# Patient Record
Sex: MALE | Race: WHITE | NOT HISPANIC OR LATINO | Employment: PART TIME | URBAN - METROPOLITAN AREA
[De-identification: names, ages, dates, MRNs, and addresses within clinical notes are randomized per-mention and may not be internally consistent; named-entity substitution may affect disease eponyms.]

---

## 2017-02-11 ENCOUNTER — APPOINTMENT (OUTPATIENT)
Dept: LAB | Age: 67
End: 2017-02-11
Payer: COMMERCIAL

## 2017-02-11 DIAGNOSIS — E78.1 PURE HYPERGLYCERIDEMIA: ICD-10-CM

## 2017-02-11 DIAGNOSIS — E78.5 HYPERLIPIDEMIA: ICD-10-CM

## 2017-02-11 DIAGNOSIS — I10 ESSENTIAL (PRIMARY) HYPERTENSION: ICD-10-CM

## 2017-02-11 LAB
ALBUMIN SERPL BCP-MCNC: 4 G/DL (ref 3.5–5)
ALP SERPL-CCNC: 81 U/L (ref 46–116)
ALT SERPL W P-5'-P-CCNC: 34 U/L (ref 12–78)
ANION GAP SERPL CALCULATED.3IONS-SCNC: 9 MMOL/L (ref 4–13)
AST SERPL W P-5'-P-CCNC: 22 U/L (ref 5–45)
BILIRUB SERPL-MCNC: 0.87 MG/DL (ref 0.2–1)
BUN SERPL-MCNC: 8 MG/DL (ref 5–25)
CALCIUM SERPL-MCNC: 9.1 MG/DL (ref 8.3–10.1)
CHLORIDE SERPL-SCNC: 101 MMOL/L (ref 100–108)
CHOLEST SERPL-MCNC: 221 MG/DL (ref 50–200)
CO2 SERPL-SCNC: 29 MMOL/L (ref 21–32)
CREAT SERPL-MCNC: 0.91 MG/DL (ref 0.6–1.3)
GFR SERPL CREATININE-BSD FRML MDRD: >60 ML/MIN/1.73SQ M
GLUCOSE SERPL-MCNC: 93 MG/DL (ref 65–140)
HDLC SERPL-MCNC: 42 MG/DL (ref 40–60)
LDLC SERPL CALC-MCNC: 121 MG/DL (ref 0–100)
POTASSIUM SERPL-SCNC: 3.9 MMOL/L (ref 3.5–5.3)
PROT SERPL-MCNC: 8.1 G/DL (ref 6.4–8.2)
SODIUM SERPL-SCNC: 139 MMOL/L (ref 136–145)
TRIGL SERPL-MCNC: 289 MG/DL

## 2017-02-11 PROCEDURE — 80053 COMPREHEN METABOLIC PANEL: CPT

## 2017-02-11 PROCEDURE — 80061 LIPID PANEL: CPT

## 2017-02-11 PROCEDURE — 36415 COLL VENOUS BLD VENIPUNCTURE: CPT

## 2017-02-28 ENCOUNTER — ALLSCRIPTS OFFICE VISIT (OUTPATIENT)
Dept: OTHER | Facility: OTHER | Age: 67
End: 2017-02-28

## 2017-02-28 ENCOUNTER — TRANSCRIBE ORDERS (OUTPATIENT)
Dept: ADMINISTRATIVE | Facility: HOSPITAL | Age: 67
End: 2017-02-28

## 2017-02-28 DIAGNOSIS — M25.562 LEFT KNEE PAIN, UNSPECIFIED CHRONICITY: Primary | ICD-10-CM

## 2017-03-01 ENCOUNTER — ALLSCRIPTS OFFICE VISIT (OUTPATIENT)
Dept: OTHER | Facility: OTHER | Age: 67
End: 2017-03-01

## 2017-03-04 ENCOUNTER — APPOINTMENT (OUTPATIENT)
Dept: LAB | Age: 67
End: 2017-03-04
Payer: COMMERCIAL

## 2017-03-04 DIAGNOSIS — Z85.09 PERSONAL HISTORY OF MALIGNANT NEOPLASM OF OTHER DIGESTIVE ORGANS: ICD-10-CM

## 2017-03-04 LAB
ANION GAP SERPL CALCULATED.3IONS-SCNC: 5 MMOL/L (ref 4–13)
BUN SERPL-MCNC: 10 MG/DL (ref 5–25)
CALCIUM SERPL-MCNC: 9.6 MG/DL (ref 8.3–10.1)
CHLORIDE SERPL-SCNC: 101 MMOL/L (ref 100–108)
CO2 SERPL-SCNC: 34 MMOL/L (ref 21–32)
CREAT SERPL-MCNC: 0.97 MG/DL (ref 0.6–1.3)
GFR SERPL CREATININE-BSD FRML MDRD: >60 ML/MIN/1.73SQ M
GLUCOSE SERPL-MCNC: 96 MG/DL (ref 65–140)
POTASSIUM SERPL-SCNC: 4.7 MMOL/L (ref 3.5–5.3)
SODIUM SERPL-SCNC: 140 MMOL/L (ref 136–145)

## 2017-03-04 PROCEDURE — 80048 BASIC METABOLIC PNL TOTAL CA: CPT

## 2017-03-04 PROCEDURE — 36415 COLL VENOUS BLD VENIPUNCTURE: CPT

## 2017-03-07 ENCOUNTER — HOSPITAL ENCOUNTER (OUTPATIENT)
Dept: RADIOLOGY | Facility: HOSPITAL | Age: 67
Discharge: HOME | End: 2017-03-07
Payer: COMMERCIAL

## 2017-03-07 DIAGNOSIS — M25.562 LEFT KNEE PAIN, UNSPECIFIED CHRONICITY: ICD-10-CM

## 2017-03-07 PROCEDURE — 76942 ECHO GUIDE FOR BIOPSY: CPT

## 2017-03-07 PROCEDURE — 20606 DRAIN/INJ JOINT/BURSA W/US: CPT

## 2017-03-07 PROCEDURE — 76881 US COMPL JOINT R-T W/IMG: CPT

## 2017-03-07 RX ORDER — LIDOCAINE HYDROCHLORIDE 10 MG/ML
5 INJECTION, SOLUTION INFILTRATION; PERINEURAL
Status: COMPLETED | OUTPATIENT
Start: 2017-03-07 | End: 2017-03-07

## 2017-03-07 RX ORDER — SODIUM BICARBONATE 42 MG/ML
2.4 INJECTION, SOLUTION INTRAVENOUS
Status: COMPLETED | OUTPATIENT
Start: 2017-03-07 | End: 2017-03-07

## 2017-03-07 RX ADMIN — SODIUM BICARBONATE 2.4 MEQ: 42 SOLUTION INTRAVENOUS at 08:38

## 2017-03-07 RX ADMIN — LIDOCAINE HYDROCHLORIDE 5 ML: 10 INJECTION, SOLUTION INFILTRATION; PERINEURAL at 08:38

## 2017-03-16 ENCOUNTER — HOSPITAL ENCOUNTER (OUTPATIENT)
Dept: RADIOLOGY | Facility: HOSPITAL | Age: 67
Discharge: HOME | End: 2017-03-16
Attending: SURGERY
Payer: COMMERCIAL

## 2017-03-16 DIAGNOSIS — Z85.09 PERSONAL HISTORY OF MALIGNANT NEOPLASM OF OTHER DIGESTIVE ORGANS: ICD-10-CM

## 2017-03-16 PROCEDURE — 71260 CT THORAX DX C+: CPT

## 2017-03-16 PROCEDURE — 74177 CT ABD & PELVIS W/CONTRAST: CPT

## 2017-03-16 RX ADMIN — IOHEXOL 100 ML: 350 INJECTION, SOLUTION INTRAVENOUS at 19:00

## 2017-03-22 ENCOUNTER — TRANSCRIBE ORDERS (OUTPATIENT)
Dept: ADMINISTRATIVE | Facility: HOSPITAL | Age: 67
End: 2017-03-22

## 2017-03-22 ENCOUNTER — ALLSCRIPTS OFFICE VISIT (OUTPATIENT)
Dept: OTHER | Facility: OTHER | Age: 67
End: 2017-03-22

## 2017-03-22 DIAGNOSIS — Z85.09 PERSONAL HISTORY OF MALIGNANT NEOPLASM OF GALLBLADDER: Primary | ICD-10-CM

## 2017-04-06 ENCOUNTER — ALLSCRIPTS OFFICE VISIT (OUTPATIENT)
Dept: OTHER | Facility: OTHER | Age: 67
End: 2017-04-06

## 2017-04-25 ENCOUNTER — ALLSCRIPTS OFFICE VISIT (OUTPATIENT)
Dept: OTHER | Facility: OTHER | Age: 67
End: 2017-04-25

## 2017-05-18 ENCOUNTER — ALLSCRIPTS OFFICE VISIT (OUTPATIENT)
Dept: OTHER | Facility: OTHER | Age: 67
End: 2017-05-18

## 2017-05-26 ENCOUNTER — APPOINTMENT (OUTPATIENT)
Dept: LAB | Age: 67
End: 2017-05-26
Payer: COMMERCIAL

## 2017-05-26 ENCOUNTER — TRANSCRIBE ORDERS (OUTPATIENT)
Dept: ADMINISTRATIVE | Age: 67
End: 2017-05-26

## 2017-05-26 DIAGNOSIS — R97.20 ELEVATED PROSTATE SPECIFIC ANTIGEN (PSA): ICD-10-CM

## 2017-05-26 LAB — PSA SERPL-MCNC: 1.1 NG/ML (ref 0–4)

## 2017-05-26 PROCEDURE — 36415 COLL VENOUS BLD VENIPUNCTURE: CPT

## 2017-05-26 PROCEDURE — 84153 ASSAY OF PSA TOTAL: CPT

## 2017-06-05 ENCOUNTER — APPOINTMENT (OUTPATIENT)
Dept: LAB | Facility: HOSPITAL | Age: 67
End: 2017-06-05
Payer: COMMERCIAL

## 2017-06-05 ENCOUNTER — ALLSCRIPTS OFFICE VISIT (OUTPATIENT)
Dept: OTHER | Facility: OTHER | Age: 67
End: 2017-06-05

## 2017-06-05 DIAGNOSIS — R35.0 FREQUENCY OF MICTURITION: ICD-10-CM

## 2017-06-05 DIAGNOSIS — N40.0 ENLARGED PROSTATE WITHOUT LOWER URINARY TRACT SYMPTOMS (LUTS): ICD-10-CM

## 2017-06-05 LAB
BILIRUB UR QL STRIP: NORMAL
CLARITY UR: NORMAL
COLOR UR: YELLOW
GLUCOSE (HISTORICAL): NORMAL
HGB UR QL STRIP.AUTO: NORMAL
KETONES UR STRIP-MCNC: NORMAL MG/DL
LEUKOCYTE ESTERASE UR QL STRIP: NORMAL
NITRITE UR QL STRIP: NORMAL
PH UR STRIP.AUTO: 5 [PH]
PROT UR STRIP-MCNC: NORMAL MG/DL
SP GR UR STRIP.AUTO: 1
UROBILINOGEN UR QL STRIP.AUTO: NORMAL

## 2017-06-05 PROCEDURE — 87086 URINE CULTURE/COLONY COUNT: CPT

## 2017-06-06 LAB — BACTERIA UR CULT: NORMAL

## 2017-07-06 ENCOUNTER — TRANSCRIBE ORDERS (OUTPATIENT)
Dept: ADMINISTRATIVE | Age: 67
End: 2017-07-06

## 2017-07-06 ENCOUNTER — ALLSCRIPTS OFFICE VISIT (OUTPATIENT)
Dept: OTHER | Facility: OTHER | Age: 67
End: 2017-07-06

## 2017-07-06 ENCOUNTER — APPOINTMENT (OUTPATIENT)
Dept: LAB | Age: 67
End: 2017-07-06
Payer: COMMERCIAL

## 2017-07-06 DIAGNOSIS — E78.1 PURE HYPERGLYCERIDEMIA: ICD-10-CM

## 2017-07-06 DIAGNOSIS — L29.9 PRURITUS: ICD-10-CM

## 2017-07-06 DIAGNOSIS — M17.10 PRIMARY OSTEOARTHRITIS OF ONE KNEE: ICD-10-CM

## 2017-07-06 DIAGNOSIS — G25.0 ESSENTIAL TREMOR: ICD-10-CM

## 2017-07-06 DIAGNOSIS — K21.9 GASTRO-ESOPHAGEAL REFLUX DISEASE WITHOUT ESOPHAGITIS: ICD-10-CM

## 2017-07-06 DIAGNOSIS — I10 ESSENTIAL (PRIMARY) HYPERTENSION: ICD-10-CM

## 2017-07-06 DIAGNOSIS — E78.5 HYPERLIPIDEMIA: ICD-10-CM

## 2017-07-06 PROCEDURE — 82785 ASSAY OF IGE: CPT

## 2017-07-06 PROCEDURE — 36415 COLL VENOUS BLD VENIPUNCTURE: CPT

## 2017-07-06 PROCEDURE — 86003 ALLG SPEC IGE CRUDE XTRC EA: CPT

## 2017-07-07 LAB
A ALTERNATA IGE QN: <0.1 KUA/I
A FUMIGATUS IGE QN: <0.1 KUA/I
ALLERGEN COMMENT: NORMAL
ALLERGEN COMMENT: NORMAL
ALMOND IGE QN: <0.1 KUA/I
BERMUDA GRASS IGE QN: <0.1 KUA/I
BOXELDER IGE QN: <0.1 KUA/I
C HERBARUM IGE QN: <0.1 KUA/I
CASHEW NUT IGE QN: <0.1 KUA/I
CAT DANDER IGE QN: <0.1 KUA/I
CMN PIGWEED IGE QN: <0.1 KUA/I
CODFISH IGE QN: <0.1 KUA/I
COMMON RAGWEED IGE QN: <0.1 KUA/I
COTTONWOOD IGE QN: <0.1 KUA/I
D FARINAE IGE QN: <0.1 KUA/I
D PTERONYSS IGE QN: <0.1 KUA/I
DOG DANDER IGE QN: <0.1 KUA/I
EGG WHITE IGE QN: <0.1 KUA/I
GLUTEN IGE QN: <0.1 KUA/I
HAZELNUT IGE QN: <0.1 KUA/L
LONDON PLANE IGE QN: <0.1 KUA/I
MILK IGE QN: <0.1 KUA/I
MOUSE URINE PROT IGE QN: <0.1 KUA/I
MT JUNIPER IGE QN: <0.1 KUA/I
MUGWORT IGE QN: <0.1 KUA/I
P NOTATUM IGE QN: <0.1 KUA/I
PEANUT IGE QN: <0.1 KUA/I
ROACH IGE QN: <0.1 KUA/I
SALMON IGE QN: <0.1 KUA/I
SCALLOP IGE QN: <0.1 KUA/I
SESAME SEED IGE QN: <0.1 KUA/I
SHEEP SORREL IGE QN: <0.1 KUA/I
SHRIMP IGE QN: <0.1 KUA/I
SILVER BIRCH IGE QN: <0.1 KUA/I
SOYBEAN IGE QN: <0.1 KUA/I
TIMOTHY IGE QN: <0.1 KUA/I
TOTAL IGE SMQN RAST: 8.22 KU/L (ref 0–113)
TOTAL IGE SMQN RAST: 8.22 KU/L (ref 0–113)
TUNA IGE QN: <0.1 KUA/I
WALNUT IGE QN: <0.1 KUA/I
WALNUT IGE QN: <0.1 KUA/I
WHEAT IGE QN: <0.1 KUA/I
WHITE ASH IGE QN: <0.1 KUA/I
WHITE ELM IGE QN: <0.1 KUA/I
WHITE MULBERRY IGE QN: <0.1 KUA/I
WHITE OAK IGE QN: <0.1 KUA/I

## 2017-07-10 ENCOUNTER — GENERIC CONVERSION - ENCOUNTER (OUTPATIENT)
Dept: OTHER | Facility: OTHER | Age: 67
End: 2017-07-10

## 2017-07-11 ENCOUNTER — ALLSCRIPTS OFFICE VISIT (OUTPATIENT)
Dept: OTHER | Facility: OTHER | Age: 67
End: 2017-07-11

## 2017-08-15 ENCOUNTER — GENERIC CONVERSION - ENCOUNTER (OUTPATIENT)
Dept: OTHER | Facility: OTHER | Age: 67
End: 2017-08-15

## 2017-08-22 ENCOUNTER — ALLSCRIPTS OFFICE VISIT (OUTPATIENT)
Dept: OTHER | Facility: OTHER | Age: 67
End: 2017-08-22

## 2017-08-28 ENCOUNTER — APPOINTMENT (OUTPATIENT)
Dept: LAB | Facility: CLINIC | Age: 67
End: 2017-08-28
Payer: COMMERCIAL

## 2017-08-28 ENCOUNTER — GENERIC CONVERSION - ENCOUNTER (OUTPATIENT)
Dept: OTHER | Facility: OTHER | Age: 67
End: 2017-08-28

## 2017-08-28 DIAGNOSIS — E78.5 HYPERLIPIDEMIA: ICD-10-CM

## 2017-08-28 LAB
ALBUMIN SERPL BCP-MCNC: 3.6 G/DL (ref 3.5–5)
ALP SERPL-CCNC: 73 U/L (ref 46–116)
ALT SERPL W P-5'-P-CCNC: 23 U/L (ref 12–78)
ANION GAP SERPL CALCULATED.3IONS-SCNC: 5 MMOL/L (ref 4–13)
AST SERPL W P-5'-P-CCNC: 15 U/L (ref 5–45)
BILIRUB SERPL-MCNC: 0.86 MG/DL (ref 0.2–1)
BUN SERPL-MCNC: 10 MG/DL (ref 5–25)
CALCIUM SERPL-MCNC: 9.3 MG/DL (ref 8.3–10.1)
CHLORIDE SERPL-SCNC: 102 MMOL/L (ref 100–108)
CHOLEST SERPL-MCNC: 193 MG/DL (ref 50–200)
CO2 SERPL-SCNC: 30 MMOL/L (ref 21–32)
CREAT SERPL-MCNC: 0.84 MG/DL (ref 0.6–1.3)
GFR SERPL CREATININE-BSD FRML MDRD: 92 ML/MIN/1.73SQ M
GLUCOSE P FAST SERPL-MCNC: 93 MG/DL (ref 65–99)
HDLC SERPL-MCNC: 39 MG/DL (ref 40–60)
LDLC SERPL CALC-MCNC: 112 MG/DL (ref 0–100)
POTASSIUM SERPL-SCNC: 4.7 MMOL/L (ref 3.5–5.3)
PROT SERPL-MCNC: 7.4 G/DL (ref 6.4–8.2)
SODIUM SERPL-SCNC: 137 MMOL/L (ref 136–145)
TRIGL SERPL-MCNC: 208 MG/DL

## 2017-08-28 PROCEDURE — 80061 LIPID PANEL: CPT

## 2017-08-28 PROCEDURE — 80053 COMPREHEN METABOLIC PANEL: CPT

## 2017-08-28 PROCEDURE — 36415 COLL VENOUS BLD VENIPUNCTURE: CPT

## 2017-09-01 DIAGNOSIS — E78.5 HYPERLIPIDEMIA: ICD-10-CM

## 2017-09-01 DIAGNOSIS — Z85.09 PERSONAL HISTORY OF MALIGNANT NEOPLASM OF OTHER DIGESTIVE ORGANS: ICD-10-CM

## 2017-09-06 ENCOUNTER — ALLSCRIPTS OFFICE VISIT (OUTPATIENT)
Dept: OTHER | Facility: OTHER | Age: 67
End: 2017-09-06

## 2017-09-07 ENCOUNTER — APPOINTMENT (OUTPATIENT)
Dept: LAB | Facility: CLINIC | Age: 67
End: 2017-09-07
Payer: COMMERCIAL

## 2017-09-07 DIAGNOSIS — Z85.09 PERSONAL HISTORY OF MALIGNANT NEOPLASM OF OTHER DIGESTIVE ORGANS: ICD-10-CM

## 2017-09-07 LAB
ANION GAP SERPL CALCULATED.3IONS-SCNC: 7 MMOL/L (ref 4–13)
BUN SERPL-MCNC: 12 MG/DL (ref 5–25)
CALCIUM SERPL-MCNC: 9.5 MG/DL (ref 8.3–10.1)
CHLORIDE SERPL-SCNC: 102 MMOL/L (ref 100–108)
CO2 SERPL-SCNC: 29 MMOL/L (ref 21–32)
CREAT SERPL-MCNC: 0.98 MG/DL (ref 0.6–1.3)
GFR SERPL CREATININE-BSD FRML MDRD: 81 ML/MIN/1.73SQ M
GLUCOSE P FAST SERPL-MCNC: 101 MG/DL (ref 65–99)
POTASSIUM SERPL-SCNC: 4.7 MMOL/L (ref 3.5–5.3)
SODIUM SERPL-SCNC: 138 MMOL/L (ref 136–145)

## 2017-09-07 PROCEDURE — 36415 COLL VENOUS BLD VENIPUNCTURE: CPT

## 2017-09-07 PROCEDURE — 80048 BASIC METABOLIC PNL TOTAL CA: CPT

## 2017-09-19 ENCOUNTER — GENERIC CONVERSION - ENCOUNTER (OUTPATIENT)
Dept: OTHER | Facility: OTHER | Age: 67
End: 2017-09-19

## 2017-09-19 ENCOUNTER — HOSPITAL ENCOUNTER (OUTPATIENT)
Dept: RADIOLOGY | Facility: HOSPITAL | Age: 67
Discharge: HOME | End: 2017-09-19
Attending: SURGERY
Payer: COMMERCIAL

## 2017-09-19 DIAGNOSIS — Z85.09 PERSONAL HISTORY OF MALIGNANT NEOPLASM OF OTHER DIGESTIVE ORGANS: ICD-10-CM

## 2017-09-19 PROCEDURE — 71260 CT THORAX DX C+: CPT

## 2017-09-19 PROCEDURE — 74177 CT ABD & PELVIS W/CONTRAST: CPT

## 2017-09-19 RX ADMIN — IOHEXOL 100 ML: 350 INJECTION, SOLUTION INTRAVENOUS at 18:05

## 2017-09-27 ENCOUNTER — ALLSCRIPTS OFFICE VISIT (OUTPATIENT)
Dept: OTHER | Facility: OTHER | Age: 67
End: 2017-09-27

## 2017-09-27 ENCOUNTER — TRANSCRIBE ORDERS (OUTPATIENT)
Dept: ADMINISTRATIVE | Facility: HOSPITAL | Age: 67
End: 2017-09-27

## 2017-09-27 DIAGNOSIS — D49.0 NEOPLASM OF GALLBLADDER: Primary | ICD-10-CM

## 2017-10-23 NOTE — PROGRESS NOTES
Assessment  1  Essential tremor (333 1) (G25 0)    Plan  Acute respiratory infection, Baker's cyst of knee, Chronic diarrhea, Elevated prostate  specific antigen (PSA), Health Maintenance, Encounter for prostate cancer screening,  Enlarged prostate without lower urinary tract symptoms (luts), Esophageal reflux,  Essential tremor    · Follow-up visit in 4 Months Evaluation and Treatment  Follow-up  Status: Hold For -  Scheduling  Requested for: [id number]   Ordered; For: Acute respiratory infection, Baker's cyst of knee, Chronic diarrhea, Elevated prostate specific antigen (PSA), Health Maintenance, Encounter for prostate cancer screening, Enlarged prostate without lower urinary tract symptoms (luts), Esophageal reflux, Essential tremor; Ordered By: Linda Counts Performed:  Due: [id number]  Essential tremor    · Primidone 50 MG Oral Tablet; 1/2 po qhs for 1 week then 1/2 po bid for 1 wk then  1/2 qam  and 1 qhs for 1 wk then 1 po bid   Rx By: Linda Richards; Dispense: 0 Days ; #:60 Tablet; Refill: 5;For: Essential tremor; HUMPHREY = N; Sent To: [id number]   Discussion/Summary  Discussion Summary:   He has tried and failed multiple medication so far  Tremors continues to be bothersome and he would like not exhaust all medication options before considering surgery  Medical and surgical treatment options including DBS and MRI focused ultrasound discussed  will try a trial of primidone 50mg 1: 1/2 tab qhs 2: 1/2 tab q am and q eve3: 1/2 tab q am and 1 tab qeve4: 1 tab twice daily  Counseling Documentation With Imm: The patient was counseled regarding instructions for management,-- patient and family education,-- impressions,-- risks and benefits of treatment options  Medication SE Review and Pt Understands Tx: Possible side effects of new medications were reviewed with the patient/guardian today  The treatment plan was reviewed with the patient/guardian   The patient/guardian understands and agrees with the treatment plan      Chief Complaint  Chief Complaint Free Text Note Form: Patient present for a follow up with tremors due to stopping topermax because of side effects  History of Present Illness  HPI: Jigar Jarvis is a 74year old [address]  with essential tremor who presents for follow up  To review, tremors began gradually over 10 years ago  He was followed by Dr Haddad Loud  He was previously on propranolol ER 120mg  Gabapentin was added about 2 years ago but it made him drowsy so it was discontinued  He was hospitalized for Gastrointestinal stromal tumor removal and was questioned about propranolol ER  This got him thinking and he questioned being on it  It was also becoming less effective  He was eventually tapered off by Dr Faizan De  His tremor gotten worse  last seen she was started on topiramate  He stopped it due to tingling and dizziness but it was helping significantly when he was on it  He has no head, vocal, or leg tremor  Tremor continues to be present with action and can interfere with eating, using utensils, and buttoning  Handwriting is tremulous  Using two hands helps  Review of Systems  Neurological ROS:   Constitutional: no fever, no chills, no recent weight gain, no recent weight loss, no complaints of feeling tired, no changes in appetite  HEENT:  no sinus problems, not feeling congested, no blurred vision, no dryness of the eyes, no eye pain, no hearing loss, no tinnitus, no mouth sores, no sore throat, no hoarseness, no dysphagia, no masses, no bleeding  Cardiovascular:  no chest pain or pressure, no palpitations present, the heart rate was not rapid or irregular, no swelling in the arms or legs, no poor circulation  Respiratory:  no unusual or persistant cough, no shortness of breath with or without exertion  Gastrointestinal: diarrhea  Genitourinary: increased frequency  Musculoskeletal: arthralgias  Integumentary rash: [place]    Psychiatric:  no anxiety, no depression, no mood swings, no psychiatric hospitalizations, no sleep problems  Endocrine  no unusual weight loss or gain, no excessive urination, no excessive thirst, no hair loss or gain, no hot or cold intolerance, no menstrual period change or irregularity, no loss of sexual ability or drive, no erection difficulty, no nipple discharge  Hematologic/Lymphatic:  no unusual bleeding, no tendency for easy bruising, no clotting skin or lumps  Neurological General: increased sleepiness,-- trouble falling asleep-- and-- waking up at night  Neurological Mental Status:  no confusion, no mood swings, no alteration or loss of consciousness, no difficulty expressing/understanding speech, no memory problems  Neurological Cranial Nerves:  no blurry or double vision, no loss of vision, no face drooping, no facial numbness or weakness, no taste or smell loss/changes, no hearing loss or ringing, no vertigo or dizziness, no dysphagia, no slurred speech  Neurological Motor findings include: tremor  Neurological Coordination:  no unsteadiness, no vertigo or dizziness, no clumsiness, no problems reaching for objects  Neurological Sensory:  no numbness, no pain, no tingling, does not fall when eyes closed or taking a shower  Neurological Gait:  no difficulty walking, not falling to one side, no sensation of being pushed, has not had falls  ROS Reviewed:   ROS reviewed  Active Problems  1  Acute respiratory infection (519 8) (J22)   2  Baker's cyst of knee (727 51) (M71 20)   3  Chronic diarrhea (787 91) (K52 9)   4  Elevated prostate specific antigen (PSA) (790 93) (R97 20)   5  Encounter for prostate cancer screening (V76 44) (Z12 5)   6  Enlarged prostate without lower urinary tract symptoms (luts) (600 00) (N40 0)   7  Esophageal reflux (530 81) (K21 9)   8  Essential hypertriglyceridemia (272 1) (E78 1)   9  Essential tremor (333 1) (G25 0)   10  H/O malignant gastrointestinal stromal tumor (GIST) (V10 09) (Z85 09)   11  Hyperlipidemia (272 4) (E78 5)   12  Hypertension (401 9) (I10)   13  Itching (698 9) (L29 9)   14  Metatarsal stress fracture of right foot (733 94) (M84 374A)   15  History of Need for vaccination for DTP (V06 1) (Z23)   16  Peyronie's disease (607 85) (N48 6)   17  Primary osteoarthritis of knee (715 16) (M17 10)   18  Splenomegaly (789 2) (R16 1)   19  Tremor, essential (333 1) (G25 0)   20  Urinary frequency (788 41) (R35 0)    Past Medical History  1  History of Acute upper respiratory infection (465 9) (J06 9)   2  History of Atypical chest pain (786 59) (R07 89)   3  History of Cough (786 2) (R05)   4  History of Disturbance of skin sensation (782 0) (R20 9)   5  History of Duodenal nodule (537 89) (K31 89)   6  History of abdominal pain (V13 89) (Z87 898)   7  History of abnormal weight loss (V13 89) (Z87 898)   8  History of acute pharyngitis (V12 69) (Z87 09)   9  History of anemia (V12 3) (Z86 2)   10  History of depression (V11 8) (Z86 59)   11  History of diarrhea (V12 79) (Z87 898)   12  History of diarrhea (V12 79) (Z87 898)   13  History of diverticulitis of colon (V12 79) (Z87 19)   14  History of dizziness (V13 89) (Z87 898)   15  History of fatigue (V13 89) (Z87 898)   16  History of glaucoma (V12 49) (Z86 69)   17  History of insomnia (V13 89) (Z87 898)   18  History of malignant gastrointestinal stromal tumor (GIST) (V10 09) (Z85 09)   19  History of Knee effusion (719 06) (M25 469)   20  History of Lactose intolerance (271 3) (E73 9)   21  History of Need for measles-mumps-rubella (MMR) vaccine (V06 4) (Z23)   22  History of Need for vaccination for DTP (V06 1) (Z23)   23  History of Need for vaccination with 13-polyvalent pneumococcal conjugate vaccine    (V03 82) (Z23)   24  History of Numbness (782 0) (R20 0)   25  History of Pain in left knee (719 46) (M25 562)   26  History of Post-nasal drip (784 91) (R09 82)   27  History of Rhinorrhea (478 19) (J34 89)   28   History of Screening for genitourinary condition (V81 6) (Z13 89)   29  History of Situational depression (309 0) (F43 21)   30  History of Sore throat (462) (J02 9)   31  History of Welcome to Medicare preventive visit (V70 0) (Z00 00)  Active Problems And Past Medical History Reviewed: The active problems and past medical history were reviewed and updated today  Surgical History  1  History of Back Surgery   2  History of Cholecystectomy   3  History of Elbow Surgery Repair   4  History of Knee Arthroscopy (Therapeutic)  Surgical History Reviewed: The surgical history was reviewed and updated today  Family History  Mother    1  Denied: Family history of Colon cancer   2  Denied: Family history of Crohn disease   3  Denied: Family history of Liver disease  Father    4  Denied: Family history of Colon cancer   5  Denied: Family history of Crohn disease   6  Denied: Family history of Liver disease  Family History    7  Denied: Family history of colon cancer   8  Denied: Family history of Crohn's disease   9  Denied: Family history of liver disease   10  Family history of Hypertension (V17 49)    Social History   · Denied: History of Drug Use   · Never a smoker   · Never Drank Alcohol   · Non-smoker (V49 89) (Z78 9)  Social History Reviewed: The social history was reviewed and updated today  Current Meds   1  Cholestyramine 4 GM Oral Packet; TAKE 1 PACKET EVERY OTHER DAY; Therapy: (Nirali Grace) to Recorded   2  ClonazePAM 1 MG Oral Tablet; TAKE 1 TABLET Bedtime AS NEEDED; Therapy: 73RTW7652 to (Evaluate:11Mar2017)  Requested for: 68WXH6242; Last   Rx:01Mar2017 Ordered   3  Dorzolamide HCl-Timolol Mal 22 3-6 8 MG/ML Ophthalmic Solution; INSTILL 1 DROP   INTO RIGHT EYE 3 TIMES DAILY; Therapy: 28Apr2015 to (Evaluate:26Pjj7403) Recorded   4  Fish Oil 1200 MG Oral Capsule; TAKE 2 CAPSULE Daily; Therapy: 61LMI5859 to (Evaluate:92Gtc0268) Recorded   5   Fluticasone Propionate 50 MCG/ACT Nasal Suspension; 2 SPRAYS NASALLY AT   BEDTIME AS NEEDED; Therapy: 69SKJ6590 to (Evaluate:10Apr2017)  Requested for: 84YZG8780; Last   Rx:01Mar2017 Ordered   6  Glucosamine Chondr 1500 Complx CAPS; TAKE 3 CAPSULE DAILY; Therapy: (Recorded:25Mar2015) to Recorded   7  Lansoprazole 30 MG SOLR; USE AS DIRECTED; Therapy: ([facility]) to Recorded   8  Lumigan 0 01 % Ophthalmic Solution; INSTILL 1 DROP INTO RIGHT EYE ONCE DAILY   IN THE EVENING; Therapy: 28Apr2015 to (Evaluate:08Jan2016) Recorded   9  One-Daily Multi Vitamins TABS; TAKE 1 TABLET DAILY; Therapy: (Recorded:25Mar2015) to Recorded   10  Super B-50 B Complex Oral Capsule; 1 tablet daily; Therapy: 10HGV1346 to Recorded   11  Tamsulosin HCl - 0 4 MG Oral Capsule; TAKE ONE CAPSULE BY MOUTH EVERY DAY AT    BEDTIME; Therapy: 18Apr2011 to (Evaluate:03Jun2018)  Requested for: 05VTP3090; Last    Rx:09Jun2017 Ordered   12  Vitamin D3 5000 UNIT Oral Capsule; Take 1 tablet daily; Therapy: 33PGU1137 to (Evaluate:57Hvp7770) Recorded   13  Vitamin E 400 UNIT Oral Capsule; 1 tablet daily; Therapy: 40MZA1463 to Recorded  Medication List Reviewed: The medication list was reviewed and updated today  Allergies  1  No Known Drug Allergies  2  No Known Environmental Allergies   3  No Known Food Allergies    Vitals  Signs   Recorded: 10HFN8851 85:66RA   Systolic: 247, LUE, Sitting  Diastolic: 72, LUE, Sitting  Height: 5 ft 8 in  Weight: 185 lb   BMI Calculated: 28 13  BSA Calculated: 1 98    Physical Exam    Constitutional   General appearance: No acute distress, well appearing and well nourished  Eyes   Ophthalmoscopic examination: Vision is grossly normal  Gross visual field testing by confrontation shows no abnormalities  EOMI in both eyes  Conjunctivae clear  Eyelids normal palpebral fissures equal  Orbits exhibit normal position  No discharge from the eyes  PERRL  Bilateral optic discs: not visualized     Musculoskeletal   Gait and station: Normal gait, stance and balance  Muscle strength: Normal strength throughout  Muscle tone: No atrophy, abnormal movements, flaccidity, cogwheeling or spasticity  Neurologic   Orientation to person, place, and time: Normal     Recent and remote memory: Demonstrates normal memory  Attention span and concentration: Normal thought process and attention span  Language: Names objects, able to repeat phrases and speaks spontaneously  Fund of knowledge: Normal vocabulary with appropriate knowledge of current events and past history  2nd cranial nerve: Normal     3rd, 4th, and 6th cranial nerves: Normal     5th cranial nerve: Normal     7th cranial nerve: Normal     8th cranial nerve: Normal     9th cranial nerve: Normal     11th cranial nerve: Normal     12th cranial nerve: Normal     Sensation: Normal   Sensory exam: intact to light touch,-- intact pain and temperature sensation-- and-- intact vibration sensation  Reflexes: Normal   Deep tendon reflexes: 1+ right biceps,-- 1+ left biceps,-- 1+ right triceps,-- 1+ left triceps,-- 1+ right patella,-- 1+ left patella,-- 1+ right ankle jerk-- and-- 1+ left ankle jerk  Superficial/Primitive Reflexes: primitive reflexes were absent  Coordination: Abnormal  -- (Mild tremor on FTN on left    Moderate tremor on spirals which was worse on the left  Handwriting was moderately tremulous  No vocal or head tremor  No bradykinesia or dystonic posturing) Coordination: bilateral finger to nose dysmetria, but-- no dysdiadochokinesia-- and-- no heel-shin dysmetria     Mood and affect: Normal        Future Appointments    Date/Time Provider Specialty Site   09/06/2017 05:30 PM Radha Schwab DO Internal Medicine [facility]   09/27/2017 03:45 PM Allison Da Silva MD Surgical Oncology [facility]   06/12/2018 09:00 AM Annette Suh [facility] U[facility] [address]     Signatures   Electronically signed by : Luciana Ramsey MD; Aug 22 2017  8:34AM EST                       (Author)

## 2017-10-25 NOTE — PROGRESS NOTES
Assessment  1  Chronic diarrhea (787 91) (K52 9)   2  History of benign essential tremor (V12 49) (Z86 69)   3  History of hypertension (V12 59) (Z86 79)   4  Hyperlipidemia (272 4) (E78 5)   5  History of Acute respiratory infection (519 8) (J22)   6  History of urinary frequency (V13 09) (Z87 898)   7  History of Metatarsal stress fracture of right foot (733 94) (M84 374A)   8  History of itching (V13 3) (Z87 898)   9  Tremor, essential (333 1) (G25 0)    Assessment and plan #1 chronic diarrhea which is well-controlled using Questran secondary to yeast tumor which is being monitored through gastric urology  #2 hyperlipidemia recommend a low-cholesterol diet currently stable and doing well #3 essential tremor ongoing symptoms he is currently on primidone he did not tolerate Topamax secondary to numbness and tingling therefore the medicine had to be discontinued currently with the primidone and he does not see much improvement he is working with neurology he does not want a Botox injection  #4  Recommend a seasonal flu shot in the fall RTO in 6 months call if any problems  Plan  Screening for genitourinary condition    · *VB - Urinary Incontinence Screen (Dx Z13 89 Screen for UI); Status:Complete - Retrospective By  Protocol Authorization;   Done: 44MPT3522 05:22PM    Chief Complaint  Chief Complaint Chronic Condition [facility]: Patient is here today for follow up of chronic conditions described in HPI  Advance Directives  Advance Directive [facility]:   The patient is in agreement to receive the Advance Care Planning service--    YES - Patient has an advance health care directive  History of Present Illness  HPI: 62-year-old male coming in for a follow-up examination hyperlipidemia, essential tremor, chronic diarrhea/Shirley tumor; the patient reports any that he continues to have ongoing essential tremor he is now try to medications  Neurology   Patient does report to me swelling of imbalance diet reports to me that he is doing some walking but primarily doing a lot of driving  He reports any chronic diarrhea which is well-controlled with Questran  Overall the patient does feel well he is compliant taking his medications and reports many episode of itching improved with Z-Dejuan and also steroid cream  the pt is working with Neuro Dr Samantha Zimmer first tried topamax tingling and numbness but helped and pt trying priodone , diet good , some walking no stress      Review of Systems  Complete-Male:   Constitutional: No fever or chills, feels well, no tiredness, no recent weight gain or weight loss  Cardiovascular: No complaints of slow heart rate, no fast heart rate, no chest pain, no palpitations, no leg claudication, no lower extremity  Respiratory: No complaints of shortness of breath, no wheezing, no cough, no SOB on exertion, no orthopnea or PND  Gastrointestinal: diarrhea-- and-- chronic, but-- no abdominal pain,-- no nausea,-- no vomiting,-- no constipation-- and-- no blood in stools  Genitourinary: frequency, but-- no dysuria,-- no urinary hesitancy,-- no genital lesions,-- no incontinence,-- no nocturia-- and-- no testicular pain  ROS Reviewed:   ROS reviewed  Active Problems  1  Baker's cyst of knee (727 51) (M71 20)   2  Chronic diarrhea (787 91) (K52 9)   3  Elevated prostate specific antigen (PSA) (790 93) (R97 20)   4  Encounter for prostate cancer screening (V76 44) (Z12 5)   5  Enlarged prostate without lower urinary tract symptoms (luts) (600 00) (N40 0)   6  Esophageal reflux (530 81) (K21 9)   7  Essential hypertriglyceridemia (272 1) (E78 1)   8  H/O malignant gastrointestinal stromal tumor (GIST) (V10 09) (Z85 09)   9  Hyperlipidemia (272 4) (E78 5)   10  History of Need for vaccination for DTP (V06 1) (Z23)   11  Peyronie's disease (607 85) (N48 6)   12  Primary osteoarthritis of knee (715 16) (M17 10)   13  Splenomegaly (789 2) (R16 1)   14   Tremor, essential (333 1) (G25 0)    Past Medical History  1  History of Acute upper respiratory infection (465 9) (J06 9)   2  History of Atypical chest pain (786 59) (R07 89)   3  History of Cough (786 2) (R05)   4  History of Disturbance of skin sensation (782 0) (R20 9)   5  History of Duodenal nodule (537 89) (K31 89)   6  History of abdominal pain (V13 89) (Z87 898)   7  History of abnormal weight loss (V13 89) (Z87 898)   8  History of acute pharyngitis (V12 69) (Z87 09)   9  History of anemia (V12 3) (Z86 2)   10  History of depression (V11 8) (Z86 59)   11  History of diarrhea (V12 79) (Z87 898)   12  History of diarrhea (V12 79) (Z87 898)   13  History of diverticulitis of colon (V12 79) (Z87 19)   14  History of dizziness (V13 89) (Z87 898)   15  History of fatigue (V13 89) (Z87 898)   16  History of glaucoma (V12 49) (Z86 69)   17  History of insomnia (V13 89) (Z87 898)   18  History of malignant gastrointestinal stromal tumor (GIST) (V10 09) (Z85 09)   19  History of Knee effusion (719 06) (M25 469)   20  History of Lactose intolerance (271 3) (E73 9)   21  History of Need for measles-mumps-rubella (MMR) vaccine (V06 4) (Z23)   22  History of Need for vaccination for DTP (V06 1) (Z23)   23  History of Need for vaccination with 13-polyvalent pneumococcal conjugate vaccine (V03 82) (Z23)   24  History of Numbness (782 0) (R20 0)   25  History of Pain in left knee (719 46) (M25 562)   26  History of Post-nasal drip (784 91) (R09 82)   27  History of Rhinorrhea (478 19) (J34 89)   28  History of Screening for genitourinary condition (V81 6) (Z13 89)   29  History of Situational depression (309 0) (F43 21)   30  History of Sore throat (462) (J02 9)   31  History of Welcome to Medicare preventive visit (V70 0) (Z00 00)  Active Problems And Past Medical History Reviewed: The active problems and past medical history were reviewed and updated today  Surgical History  1  History of Back Surgery   2  History of Cholecystectomy   3   History of Elbow Surgery Repair   4  History of Knee Arthroscopy (Therapeutic)  Surgical History Reviewed: The surgical history was reviewed and updated today  Family History  Mother    1  Denied: Family history of Colon cancer   2  Denied: Family history of Crohn disease   3  Denied: Family history of Liver disease  Father    4  Denied: Family history of Colon cancer   5  Denied: Family history of Crohn disease   6  Denied: Family history of Liver disease  Family History    7  Denied: Family history of colon cancer   8  Denied: Family history of Crohn's disease   9  Denied: Family history of liver disease   10  Family history of Hypertension (V17 49)  Family History Reviewed: The family history was reviewed and updated today  Social History   · Denied: History of Drug Use   · Never a smoker   · Never Drank Alcohol   · Non-smoker (V49 89) (Z78 9)  Social History Reviewed: The social history was reviewed and updated today  The social history was reviewed and is unchanged  Current Meds   1  Cholestyramine 4 GM Oral Packet; TAKE 1 PACKET EVERY OTHER DAY; Therapy: (Ze Harmon) to Recorded   2  Dorzolamide HCl-Timolol Mal 22 3-6 8 MG/ML Ophthalmic Solution; INSTILL 1 DROP INTO RIGHT EYE 3   TIMES DAILY; Therapy: 28Apr2015 to (Evaluate:74Kdi1749) Recorded   3  Fish Oil 1200 MG Oral Capsule; TAKE 2 CAPSULE Daily; Therapy: 46RJD4166 to (Evaluate:86Bwq4938) Recorded   4  Fluticasone Propionate 50 MCG/ACT Nasal Suspension; 2 SPRAYS NASALLY AT BEDTIME AS   NEEDED; Therapy: 26DAP7021 to (Evaluate:13Uhc7452)  Requested for: 92PFQ6003; Last Rx:01Mar2017   Ordered   5  Glucosamine Chondr 1500 Complx CAPS; TAKE 3 CAPSULE DAILY; Therapy: (Recorded:25Mar2015) to Recorded   6  Lansoprazole 30 MG SOLR; USE AS DIRECTED; Therapy: (Ze Harmon) to Recorded   7  Lumigan 0 01 % Ophthalmic Solution; INSTILL 1 DROP INTO RIGHT EYE ONCE DAILY IN THE   EVENING; Therapy: 28Apr2015 to (Evaluate:08Jan2016) Recorded   8  One-Daily Multi Vitamins TABS; TAKE 1 TABLET DAILY; Therapy: (Recorded:25Mar2015) to Recorded   9  Primidone 50 MG Oral Tablet; 1/2 po qhs for 1 week then 1/2 po bid for 1 wk then 1/2 qam    and 1 qhs for 1 wk then 1 po bid; Therapy: 01Hht7684 to (Last Henrietta Weiss)  Requested for: 83Fhr9129 Ordered   10  Super B-50 B Complex Oral Capsule; 1 tablet daily; Therapy: 62ULU3859 to Recorded   11  Tamsulosin HCl - 0 4 MG Oral Capsule; TAKE ONE CAPSULE BY MOUTH EVERY DAY AT BEDTIME; Therapy: 18Apr2011 to (Evaluate:03Jun2018)  Requested for: 24MZI3731; Last Rx:09Jun2017    Ordered   12  Vitamin D3 5000 UNIT Oral Capsule; Take 1 tablet daily; Therapy: 52MUM7669 to (Evaluate:71Tjx9924) Recorded   13  Vitamin E 400 UNIT Oral Capsule; 1 tablet daily; Therapy: 98RAY1624 to Recorded  Medication List Reviewed: The medication list was reviewed and updated today  Allergies  1  No Known Drug Allergies  2  No Known Environmental Allergies   3  No Known Food Allergies    Vitals  Vital Signs    Recorded: 91GAK4043 05:25PM   Heart Rate 61   Respiration 16   Systolic 438   Diastolic 82   Height 5 ft 8 in   Weight 185 lb    BMI Calculated 28 13   BSA Calculated 1 98   O2 Saturation 98     Physical Exam    Constitutional   General appearance: No acute distress, well appearing and well nourished  Eyes   Conjunctiva and lids: No swelling, erythema, or discharge  Pupils and irises: Equal, round and reactive to light  Ears, Nose, Mouth, and Throat   External inspection of ears and nose: Normal     Nasal mucosa, septum, and turbinates: Normal without edema or erythema  Oropharynx: Normal with no erythema, edema, exudate or lesions  Pulmonary   Respiratory effort: No increased work of breathing or signs of respiratory distress  Auscultation of lungs: Clear to auscultation, equal breath sounds bilaterally, no wheezes, no rales, no rhonci      Cardiovascular   Auscultation of heart: Normal rate and rhythm, normal S1 and S2, without murmurs  Lymphatic   Palpation of lymph nodes in neck: No lymphadenopathy  Psychiatric   Mood and affect: Normal          Results/Data  *VB - Urinary Incontinence Screen (Dx Z13 89 Screen for UI) 52GBB9288 05:22PM Matos Kayli     Test Name Result Flag Reference   Urinary Incontinence Assessment [id number]         Health Management  History of diarrhea   COLONOSCOPY; every 10 years; Last 57CTW0474; Next Due: 76Nhq2803; Active    Future Appointments    Date/Time Provider Specialty Site   05/21/2018 05:30 PM Linn Fields MD Neurology [facility] [address]   12/05/2017 02:15 PM Aki Stokes [facility] NEUROLOGY [facility]   03/21/2018 05:30 PM Shawna Milan DO Internal Medicine [facility]   09/19/2017 05:20 PM ANNA Briseno   Orthopedic Surgery [facility]   09/27/2017 03:45 PM Víctor Geiger MD Surgical Oncology [facility] SURGICAL ONCOLOGY   06/12/2018 09:00 AM Penny Esquivel [facility] Neurology [facility] CTR FOR UROLOGY [facility]     Signatures   Electronically signed by : Leyda Longo DO; Sep  6 2017  7:16PM EST                       (Author)

## 2017-11-27 ENCOUNTER — APPOINTMENT (OUTPATIENT)
Dept: RADIOLOGY | Age: 67
End: 2017-11-27
Payer: COMMERCIAL

## 2017-11-27 ENCOUNTER — TRANSCRIBE ORDERS (OUTPATIENT)
Dept: ADMINISTRATIVE | Age: 67
End: 2017-11-27

## 2017-11-27 ENCOUNTER — ALLSCRIPTS OFFICE VISIT (OUTPATIENT)
Dept: OTHER | Facility: OTHER | Age: 67
End: 2017-11-27

## 2017-11-27 DIAGNOSIS — J20.9 ACUTE BRONCHITIS: ICD-10-CM

## 2017-11-27 PROCEDURE — 71020 HB CHEST X-RAY 2VW FRONTAL&LATL: CPT

## 2017-11-28 ENCOUNTER — GENERIC CONVERSION - ENCOUNTER (OUTPATIENT)
Dept: OTHER | Facility: OTHER | Age: 67
End: 2017-11-28

## 2017-11-28 NOTE — PROGRESS NOTES
Assessment    1  Acute bronchitis (466 0) (J20 9)  Assessment and plan 1  Acute bronchitis rule out pneumonia the patient has taken a course of Z-Dejuan 10 days ago this antibiotic is now out of his system he reports me initially feeling better but his symptoms did come back I will check a chest x-ray PA and Lat to rule out pneumonia I will prescribe the patient Levaquin 500 mg 1 tablet daily x7 days, Tessalon Perle 100 mg 1 p o  q h s  p r n  and slightly of fluids, rest he may use Mucinex as directed p r n  RTO as scheduled call if any change, worsen or if his symptoms do not landen   Plan  Acute bronchitis    · Benzonatate 100 MG Oral Capsule (Tessalon Perles); TAKE 1 CAPSULE BedtimePRN   · LevoFLOXacin 500 MG Oral Tablet (Levaquin); TAKE 1 TABLET DAILY ASDIRECTED   · ProAir  (90 Base) MCG/ACT Inhalation Aerosol Solution; INHALE 2 PUFFSEVERY 4-6 HOURS AS NEEDED   · * XR CHEST PA & LATERAL; Status:Active; Requested for:27Nov2017;     Chief Complaint  Patient presents today c/o dry cough with chest tightness x 4 weeks  He is also c/o loose stools off and on  History of Present Illness  HPI: 61-year-old male who is coming in with a chief complaint cough he reports me he developed cold sx and cough 1 week after the flu shot ; got the z-pack day 10  Of the antibiotic; the patient does report me his symptoms initially improved while on Z-Dejuan but his symptoms did come back as of this past Saturday cough, he has noticed some tightness  Dry cough is now using Allegra; the pt to me that while he is eating his coughing and sometimes does have food that comes out the nostrils temp 97  2 feels warm ;  for Kids peace eating and drinking ok      Review of Systems   Constitutional: feeling poorly, but-- no fever,-- no recent weight gain,-- no chills,-- not feeling tired-- and-- no recent weight loss    Cardiovascular: no complaints of slow or fast heart rate, no chest pain, no palpitations, no leg claudication or lower extremity edema  Respiratory: cough, but-- no shortness of breath,-- no orthopnea,-- no wheezing,-- no shortness of breath during exertion-- and-- no PND  Gastrointestinal: no complaints of abdominal pain, no constipation, no nausea or vomiting, no diarrhea or bloody stools  Genitourinary: no complaints of dysuria or incontinence, no hesitancy, no nocturia, no genital lesion, no inadequacy of penile erection  ROS reviewed  Active Problems  1  Baker's cyst of knee (727 51) (M71 20)   2  Chronic diarrhea (787 91) (K52 9)   3  Elevated prostate specific antigen (PSA) (790 93) (R97 20)   4  Encounter for prostate cancer screening (V76 44) (Z12 5)   5  Enlarged prostate without lower urinary tract symptoms (luts) (600 00) (N40 0)   6  Esophageal reflux (530 81) (K21 9)   7  Essential hypertriglyceridemia (272 1) (E78 1)   8  H/O malignant gastrointestinal stromal tumor (GIST) (V10 09) (Z85 09)   9  Hyperlipidemia (272 4) (E78 5)   10  History of Need for vaccination for DTP (V06 1) (Z23)   11  Peyronie's disease (607 85) (N48 6)   12  Primary osteoarthritis of knee (715 16) (M17 10)   13  Screening for genitourinary condition (V81 6) (Z13 89)   14  Splenomegaly (789 2) (R16 1)   15  Tremor, essential (333 1) (G25 0)    Past Medical History  Active Problems And Past Medical History Reviewed: The active problems and past medical history were reviewed and updated today  Surgical History  Surgical History Reviewed: The surgical history was reviewed and updated today  Social History     · Denied: History of Drug Use   · Never a smoker   · Never Drank Alcohol   · Non-smoker (V49 89) (Z78 9)  The social history was reviewed and updated today  The social history was reviewed and is unchanged  Family History  Family History Reviewed: The family history was reviewed and updated today  Current Meds   1   Cholestyramine 4 GM Oral Packet; TAKE 1 PACKET EVERY OTHER DAY  Requested for: 40LGV6845; Last Rx:28Sep2017 Ordered   2  Dorzolamide HCl-Timolol Mal 22 3-6 8 MG/ML Ophthalmic Solution; INSTILL 1 DROP INTO RIGHT EYE 3 TIMES DAILY; Therapy: 28Apr2015 to (Evaluate:13Vob7910) Recorded   3  Fish Oil 1200 MG Oral Capsule; TAKE 2 CAPSULE Daily; Therapy: 15MRM6356 to (Evaluate:95Qww2646) Recorded   4  Fluticasone Propionate 50 MCG/ACT Nasal Suspension; 2 SPRAYS NASALLY AT BEDTIME AS NEEDED; Therapy: 77EEL8375 to (Evaluate:10Apr2017)  Requested for: 63OPD2154; Last Rx:01Mar2017 Ordered   5  Glucosamine Chondr 1500 Complx CAPS; TAKE 3 CAPSULE DAILY; Therapy: (Recorded:25Mar2015) to Recorded   6  Lansoprazole 30 MG SOLR; USE AS DIRECTED; Therapy: (Nirali Grace) to Recorded   7  Lumigan 0 01 % Ophthalmic Solution; INSTILL 1 DROP INTO RIGHT EYE ONCE DAILY IN THE EVENING; Therapy: 28Apr2015 to (Evaluate:08Jan2016) Recorded   8  One-Daily Multi Vitamins TABS; TAKE 1 TABLET DAILY; Therapy: (Recorded:25Mar2015) to Recorded   9  Primidone 50 MG Oral Tablet; 3 po qam and 2 po qeve; Therapy: 10Nvh3377 to (Last Rx:01Nov2017)  Requested for: 14RJH1945 Ordered   10  Super B-50 B Complex Oral Capsule; 1 tablet daily; Therapy: 57ZZR5757 to Recorded   11  Tamsulosin HCl - 0 4 MG Oral Capsule; TAKE ONE CAPSULE BY MOUTH EVERY DAY AT  BEDTIME; Therapy: 18Apr2011 to (Evaluate:03Jun2018)  Requested for: 15YIJ9924; Last  Rx:09Jun2017 Ordered   12  Vitamin D3 5000 UNIT Oral Capsule; Take 1 tablet daily; Therapy: 50NFV9134 to (Evaluate:40Eet9293) Recorded   13  Vitamin E 400 UNIT Oral Capsule; 1 tablet daily; Therapy: 47UEU0366 to Recorded    The medication list was reviewed and updated today  Allergies  1  No Known Drug Allergies  2  No Known Environmental Allergies   3   No Known Food Allergies    Vitals   Recorded: 28UXU4646 02:51PM   Temperature 97 4 F   Heart Rate 63   Respiration 16   Systolic 381, RUE, Sitting   Diastolic 74, RUE, Sitting   Height 5 ft 8 in   Weight 181 lb 0 2 oz   BMI Calculated 27 52 BSA Calculated 1 96   O2 Saturation 99       Physical Exam   Constitutional  General appearance: No acute distress, well appearing and well nourished  Eyes  Conjunctiva and lids: No swelling, erythema, or discharge  Pupils and irises: Equal, round and reactive to light  Ears, Nose, Mouth, and Throat  External inspection of ears and nose: Normal    Nasal mucosa, septum, and turbinates: Normal without edema or erythema  Oropharynx: Normal with no erythema, edema, exudate or lesions  Pulmonary  Respiratory effort: No increased work of breathing or signs of respiratory distress  -- ? Crackles right middle lung  Auscultation of lungs: Clear to auscultation, equal breath sounds bilaterally, no wheezes, no rales, no rhonci  Cardiovascular  Auscultation of heart: Normal rate and rhythm, normal S1 and S2, without murmurs  Lymphatic  Palpation of lymph nodes in neck: No lymphadenopathy  Psychiatric  Mood and affect: Normal          Future Appointments    Date/Time Provider Specialty Site   05/21/2018 05:30 PM Paddy Nation MD Neurology [place] [address]   12/05/2017 02:15 PM Adams Holter, [facility] Neurology [facility]  [place]   03/21/2018 05:30 PM Lupe Mack DO Internal Medicine [facility] [place]   12/19/2017 05:20 PM Milissa Kayser, M D   Orthopedic Surgery [facility] INPATIENT REHABILITATION [place]   04/04/2018 03:00 PM Michael Cortez MD Surgical Oncology [facility]ER [facility]ARE  [place]   06/12/2018 09:00 AM Tamie Kramer [facility] Neurology [facility] CTR FOR UROLOGY [place]       Signatures   Electronically signed by : Ariana Adkins DO; Nov 27 2017  3:21PM EST                       (Author)

## 2017-12-05 ENCOUNTER — ALLSCRIPTS OFFICE VISIT (OUTPATIENT)
Dept: OTHER | Facility: OTHER | Age: 67
End: 2017-12-05

## 2017-12-12 ENCOUNTER — GENERIC CONVERSION - ENCOUNTER (OUTPATIENT)
Dept: INTERNAL MEDICINE CLINIC | Facility: CLINIC | Age: 67
End: 2017-12-12

## 2017-12-12 ENCOUNTER — GENERIC CONVERSION - ENCOUNTER (OUTPATIENT)
Dept: OTHER | Facility: OTHER | Age: 67
End: 2017-12-12

## 2017-12-13 NOTE — PROGRESS NOTES
Assessment    1  Tremor, essential (333 1) (G25 0)    Plan  Tremor, essential    · Follow-up as previously scheduled Evaluation and Treatment  Follow-up  Status:Complete  Done: 58BLL3949   Ordered;Tremor, essential; Ordered By: Edna Madden Performed:  Due: 17MJD2446    Discussion/Summary  Discussion Summary:   Patient continues to have action tremors in the hands mainly affecting his handwriting  No parkinsonian features on exam  He denies any improvement of his tremors with Primidone and is concerned that he may be having some side effects  He has failed trials of topiramate, Neurontin and propranolol in the past  Once again discussed the surgical options including DBS and MRI guided US  He is not interested in surgery at this time and feels that he is still functioning well  He would like to to stop the Primidone and he will wean off as follows: 2tabs bid x 1 week then 1tab bid x 1 week then 1tab daily x 1 week then STOP  He would still like to come in with Dr Maite Arellano as scheduled in 5/2017  the case and plan to Dr Maite Arellano for review  Counseling Documentation With Imm: The patient was counseled regarding instructions for management,-- prognosis,-- patient and family education,-- impressions,-- risks and benefits of treatment options  total time of encounter was 35 minutes-- and-- 18 minutes was spent counseling  Chief Complaint  Chief Complaint Free Text Note Form: Patient presents today for a neurological follow up with worsening tremor in hands      History of Present Illness  HPI: Yuval Martinez is a 74year old [facility]  with essential tremor who presents for follow up  To review, tremors began gradually over 10 years ago  He was followed by Dr Sheila Hagan  He was previously on propranolol ER 120mg  Gabapentin was added about 2 years ago but it made him drowsy so it was discontinued  He was hospitalized for Gastrointestinal stromal tumor removal and was questioned about propranolol ER   This got him thinking and he questioned being on it  It was also becoming less effective  He was eventually tapered off by Dr Shellye Libman  His tremor got worse  He was later started on topiramate however this was stopped due to tingling and dizziness  It did however help with the tremors  his last visit he was started on a trial of Primidone and is currently taking Primidone 50mg 3tab qam and 2tabs qpm   denies any clear improvement since starting the Primidone  He actually feels that his tremors are worse  He continues to have bothersome tremors  He has to steady his left hand with his right when trying to write  He has trouble with eating and drinking  The tremors are not so much affecting his daily function and he is still able to perform his duties at work  He is able to dress and shower without issues  He does not feel that the medication is helping the tremors at this point  his ROS, FH, Sh and social history  Review of Systems  Neurological ROS:  Constitutional: fatigue  HEENT:  no sinus problems, not feeling congested, no blurred vision, no dryness of the eyes, no eye pain, no hearing loss, no tinnitus, no mouth sores, no sore throat, no hoarseness, no dysphagia, no masses, no bleeding  Cardiovascular:  no chest pain or pressure, no palpitations present, the heart rate was not rapid or irregular, no swelling in the arms or legs, no poor circulation  Respiratory:  no unusual or persistant cough, no shortness of breath with or without exertion  Gastrointestinal: diarrhea  Genitourinary: feelings of urinary urgency-- and-- increased frequency  Musculoskeletal:  no arthralgias, no myalgias, no immobility or loss of function, no head/neck/back pain, no pain while walking  Integumentary  no masses, no rash, no skin lesions, no livedo reticularis  Psychiatric:  no anxiety, no depression, no mood swings, no psychiatric hospitalizations, no sleep problems  Endocrine   no unusual weight loss or gain, no excessive urination, no excessive thirst, no hair loss or gain, no hot or cold intolerance, no menstrual period change or irregularity, no loss of sexual ability or drive, no erection difficulty, no nipple discharge  Hematologic/Lymphatic:  no unusual bleeding, no tendency for easy bruising, no clotting skin or lumps  Neurological General: trouble falling asleep-- and-- waking up at night  Neurological Mental Status:  no confusion, no mood swings, no alteration or loss of consciousness, no difficulty expressing/understanding speech, no memory problems  Neurological Cranial Nerves:  no blurry or double vision, no loss of vision, no face drooping, no facial numbness or weakness, no taste or smell loss/changes, no hearing loss or ringing, no vertigo or dizziness, no dysphagia, no slurred speech  Neurological Motor findings include: tremor  Neurological Coordination:  no unsteadiness, no vertigo or dizziness, no clumsiness, no problems reaching for objects  Neurological Sensory:  no numbness, no pain, no tingling, does not fall when eyes closed or taking a shower  Neurological Gait:  no difficulty walking, not falling to one side, no sensation of being pushed, has not had falls  Active Problems  1  Acute bronchitis (466 0) (J20 9)   2  Baker's cyst of knee (727 51) (M71 20)   3  Chronic diarrhea (787 91) (K52 9)   4  Elevated prostate specific antigen (PSA) (790 93) (R97 20)   5  Encounter for prostate cancer screening (V76 44) (Z12 5)   6  Enlarged prostate without lower urinary tract symptoms (luts) (600 00) (N40 0)   7  Esophageal reflux (530 81) (K21 9)   8  Essential hypertriglyceridemia (272 1) (E78 1)   9  H/O malignant gastrointestinal stromal tumor (GIST) (V10 09) (Z85 09)   10  Hyperlipidemia (272 4) (E78 5)   11  History of Need for vaccination for DTP (V06 1) (Z23)   12  Peyronie's disease (607 85) (N48 6)   13  Primary osteoarthritis of knee (715 16) (M17 10)   14   Screening for genitourinary condition (V81 6) (Z13 89)   15  Splenomegaly (789 2) (R16 1)   16  Tremor, essential (333 1) (G25 0)    Past Medical History    1  History of Acute upper respiratory infection (465 9) (J06 9)   2  History of Atypical chest pain (786 59) (R07 89)   3  History of Cough (786 2) (R05)   4  History of Disturbance of skin sensation (782 0) (R20 9)   5  History of Duodenal nodule (537 89) (K31 89)   6  History of abdominal pain (V13 89) (Z87 898)   7  History of abnormal weight loss (V13 89) (Z87 898)   8  History of acute pharyngitis (V12 69) (Z87 09)   9  History of anemia (V12 3) (Z86 2)   10  History of depression (V11 8) (Z86 59)   11  History of diarrhea (V12 79) (Z87 898)   12  History of diarrhea (V12 79) (Z87 898)   13  History of diverticulitis of colon (V12 79) (Z87 19)   14  History of dizziness (V13 89) (Z87 898)   15  History of fatigue (V13 89) (Z87 898)   16  History of glaucoma (V12 49) (Z86 69)   17  History of insomnia (V13 89) (Z87 898)   18  History of malignant gastrointestinal stromal tumor (GIST) (V10 09) (Z85 09)   19  History of Knee effusion (719 06) (M25 469)   20  History of Lactose intolerance (271 3) (E73 9)   21  History of Need for measles-mumps-rubella (MMR) vaccine (V06 4) (Z23)   22  History of Need for vaccination for DTP (V06 1) (Z23)   23  History of Need for vaccination with 13-polyvalent pneumococcal conjugate vaccine  (V03 82) (Z23)   24  History of Numbness (782 0) (R20 0)   25  History of Pain in left knee (719 46) (M25 562)   26  History of Post-nasal drip (784 91) (R09 82)   27  History of Rhinorrhea (478 19) (J34 89)   28  History of Screening for genitourinary condition (V81 6) (Z13 89)   29  History of Situational depression (309 0) (F43 21)   30  History of Sore throat (462) (J02 9)   31  History of Welcome to Medicare preventive visit (V70 0) (Z00 00)    Surgical History  1  History of Back Surgery   2  History of Cholecystectomy   3  History of Elbow Surgery Repair   4   History of Knee Arthroscopy (Therapeutic)    Family History  Mother    1  Denied: Family history of Colon cancer   2  Denied: Family history of Crohn disease   3  Denied: Family history of Liver disease  Father    4  Denied: Family history of Colon cancer   5  Denied: Family history of Crohn disease   6  Denied: Family history of Liver disease  Family History    7  Denied: Family history of colon cancer   8  Denied: Family history of Crohn's disease   9  Denied: Family history of liver disease   10  Family history of Hypertension (V17 49)    Social History     · Denied: History of Drug Use   · Never a smoker   · Never Drank Alcohol   · Non-smoker (V49 89) (Z78 9)    Current Meds   1  Cholestyramine 4 GM Oral Packet; TAKE 1 PACKET EVERY OTHER DAY  Requested for: 60WVT8613; Last Rx:86Oim2552 Ordered   2  ClonazePAM 1 MG Oral Tablet; Take 1 tablet at night PRN; Therapy: (Recorded:25Odp6755) to Recorded   3  Dorzolamide HCl-Timolol Mal 22 3-6 8 MG/ML Ophthalmic Solution; INSTILL 1 DROP INTO RIGHT EYE 3 TIMES DAILY; Therapy: 28Apr2015 to (Evaluate:74Hfn3748) Recorded   4  Fish Oil 1200 MG Oral Capsule; TAKE 2 CAPSULE Daily; Therapy: 06CIK4793 to (Evaluate:77Fbi4435) Recorded   5  Glucosamine Chondr 1500 Complx CAPS; TAKE 3 CAPSULE DAILY; Therapy: (Recorded:25Mar2015) to Recorded   6  Lansoprazole 30 MG SOLR; USE AS DIRECTED; Therapy: (Henny Estes) to Recorded   7  Lumigan 0 01 % Ophthalmic Solution; INSTILL 1 DROP INTO RIGHT EYE ONCE DAILY IN THE EVENING; Therapy: 28Apr2015 to (Evaluate:08Jan2016) Recorded   8  One-Daily Multi Vitamins TABS; TAKE 1 TABLET DAILY; Therapy: (Recorded:25Mar2015) to Recorded   9  Primidone 50 MG Oral Tablet; 3 po qam and 2 po qeve; Therapy: 63Uem7591 to (Last Rx:01Nov2017)  Requested for: 61YBD8306 Ordered   10  ProAir  (90 Base) MCG/ACT Inhalation Aerosol Solution; INHALE 2 PUFFS  EVERY 4-6 HOURS AS NEEDED; Therapy: 90WUR0291 to (Last 781 1854)  Requested for: [phone] Ordered   11  Super B-50 B Complex Oral Capsule; 1 tablet daily; Therapy: 65GGF0657 to Recorded   12  Tamsulosin HCl - 0 4 MG Oral Capsule; TAKE ONE CAPSULE BY MOUTH EVERY DAY AT  BEDTIME; Therapy: 18Apr2011 to (Evaluate:03Jun2018)  Requested for: 30VLQ1555; Last  Rx:09Jun2017 Ordered   13  Vitamin D3 5000 UNIT Oral Capsule; Take 1 tablet daily; Therapy: 84NLD9971 to (Evaluate:62Nyr0564) Recorded   14  Vitamin E 400 UNIT Oral Capsule; 1 tablet daily; Therapy: 03QGE4341 to Recorded    Allergies  1  No Known Drug Allergies    2  No Known Environmental Allergies   3  No Known Food Allergies    Vitals  Signs   Recorded: 87JYS5305 50:84JC   Systolic: 966  Diastolic: 72  Height: 5 ft 8 in  Weight: 181 lb 6 oz  BMI Calculated: 27 58  BSA Calculated: 1 96    Physical Exam   Constitutional  General appearance: No acute distress, well appearing and well nourished  Eyes  Ophthalmoscopic examination: Vision is grossly normal  Gross visual field testing by confrontation shows no abnormalities  EOMI in both eyes  Conjunctivae clear  Eyelids normal palpebral fissures equal  Orbits exhibit normal position  No discharge from the eyes  PERRL  Bilateral optic discs: not visualized  Musculoskeletal  Gait and station: Normal gait, stance and balance  Muscle strength: Normal strength throughout  Muscle tone: No atrophy, abnormal movements, flaccidity, cogwheeling or spasticity  Neurologic  Orientation to person, place, and time: Normal    Recent and remote memory: Demonstrates normal memory  Attention span and concentration: Normal thought process and attention span  Language: Names objects, able to repeat phrases and speaks spontaneously  Fund of knowledge: Normal vocabulary with appropriate knowledge of current events and past history     2nd cranial nerve: Normal    3rd, 4th, and 6th cranial nerves: Normal    5th cranial nerve: Normal    7th cranial nerve: Normal    8th cranial nerve: Normal    9th cranial nerve: Normal    11th cranial nerve: Normal    12th cranial nerve: Normal    Sensation: Normal   Sensory exam: intact to light touch,-- intact pain and temperature sensation-- and-- intact vibration sensation  Reflexes: Normal   Deep tendon reflexes: 1+ right biceps,-- 1+ left biceps,-- 1+ right triceps,-- 1+ left triceps,-- 1+ right patella,-- 1+ left patella,-- 1+ right ankle jerk-- and-- 1+ left ankle jerk  Superficial/Primitive Reflexes: primitive reflexes were absent  Coordination: Abnormal  -- (Very subtle tremor on FTN  Moderate tremor on spirals worse on the left  Handwriting was moderately tremulous and illegible  No vocal or head tremor  No bradykinesia or dystonic posturing) Coordination: bilateral finger to nose dysmetria, but-- no dysdiadochokinesia-- and-- no heel-shin dysmetria  Mood and affect: Normal        Attending Note  Collaborating Physician Note: Collaborating Note: I agree with the Advanced Practitioner note  I discussed the case with the Advanced Practitioner and reviewed the AP note      Future Appointments    Date/Time Provider Specialty Site   05/21/2018 05:30 PM Charleen Floyd MD Neurology [facility]  [place]   03/21/2018 05:30 PM Sagar Parra DO Internal Medicine [facility] [place]   01/09/2018 05:00 PM ANNA Finn   Orthopedic Surgery [facility] [place]   04/04/2018 03:00 PM [facility], MD Surgical Oncology [facility]   06/12/2018 09:00 AM Enid Chang [facility] Neurology  [address] [facility]       Signatures   Electronically signed by : Caryle Collier, [facility]; Dec  5 2017  2:44PM EST                       (Author)    Electronically signed by : Diana Alamo MD; Dec 12 2017 12:52PM EST                       (Author)

## 2018-01-02 ENCOUNTER — GENERIC CONVERSION - ENCOUNTER (OUTPATIENT)
Dept: OTHER | Facility: OTHER | Age: 68
End: 2018-01-02

## 2018-01-10 NOTE — RESULT NOTES
Verified Results  (1) ALLERGY, FOOD PANEL [id number] 03:18PM Yaakov Granados    Order Number: [id number]     Test Name Result Flag Reference   FISH COD <0 10 kUA/I  0 00-0 09   EGG WHITE <0 10 kUA/I  0 00-0 09   GLUTEN <0 10 kUA/I  0 00-0 09   MILK <0 10 kUA/I  0 00-0 09   PEANUT <0 10 kUA/I  0 00-0 09   F041 SALMON <0 10 kUA/I  0 00-0 09   SCALLOP <0 10 kUA/I  0 00-0 09   SESAME <0 10 kUA/I  0 00-0 09   SHRIMP <0 10 kUA/I  0 00-0 09   SOYBEAN <0 10 kUA/I  0 00-0 09   ALLERGEN TUNA (F40) IGE <0 10 kUA/I  0 00-0 09   WALNUT <0 10 kUA/I  0 00-0 09   WHEAT <0 10 kUA/I  0 00-0 09   TOTAL IGE 8 22 kU/l  0-113   ALLERGEN ALMONDS <0 10 kUA/I  0 00-0 09   ALLERGEN CASHEW <0 10 kUA/I  0 00-0 09   ALLERGEN HAZELNUT/FILBERT IGE <0 10 kUA/l  0 00-0 09   ALLERGEN COMMENT See Below     As in all diagnostic testing, a diagnosis should be made by the physician based on both test results and patient clinical history  (1) ALLERGY, NORTHEAST PANEL ADULT [id number] 03:18PM Yaakov Granados    Order Number: [id number]     Test Name Result Flag Reference   ALTERNARIA ALTERNATA <0 10 kUA/I  0 00-0 09   ASPERGILLUS FUMIGATUS <0 10 kUA/I  0 00-0 09   BERMUDA GRASS <0 10 kUA/I  0 00-0 09   ALLERGEN MAPLE/BOX ELDER <0 10 kUA/I  0 00-0 09   ALLERGEN CAT EPITHELIUM-DANDER <0 10 kUA/I  0 00-0 09   CLADOSPORIUM HERBARUM <0 10 kUA/I  0 00-0 09   COCKROACH <0 10 kUA/I  0 00-0 09   ALLERGEN, COMMON SILVER BIRCH <0 10 kUA/I  0 00-0 09   ALLERGEN, COTTONWOOD <0 10 kUA/I  0 00-0 09   D  FARINAE <0 10 kUA/I  0 00-0 09   D  PTERONYSSINUS <0 10 kUA/I  0 00-0 09   DOG DANDER <0 10 kUA/I  0 00-0 09   ALLERGEN ELM <0 10 kUA/I  0 00-0 09   M[place] TREE <0 10 kUA/I  0 00-0 09   MOUSE URINE <0 10 kUA/I  0 00-0 09   As in all diagnostic testing, a diagnosis should be made by the physician based on both test results and patient clinical history     ALLERGEN MUGWORT IGE <0 10 kUA/I  0 00-0 09   MULBERRY TREE <0 10 kUA/I  0 00-0 09 ALLERGEN, OAK <0 10 kUA/I  0 00-0 09   PENICILLIUM CHRYSOGENUM <0 10 kUA/I  0 00-0 09   ALLERGEN ROUGH PIGWEED (W14) IGE <0 10 kUA/I  0 00-0 09   COMMON RAGWEED <0 10 kUA/I  0 00-0 09   ALLERGEN SHEEP SORREL (W18) IGE <0 10 kUA/I  0 00-0 09   SYCAMORE TREE <0 10 kUA/I  0 00-0 09   FLAVIA GRASS <0 10 kUA/I  0 00-0 09   WALNUT TREE <0 10 kUA/I  0 00-0 09   WHITE VERÓNICA TREE <0 10 kUA/I  0 00-0 09   TOTAL IGE 8 22 kU/l  0-113   ALLERGEN COMMENT See Below

## 2018-01-10 NOTE — RESULT NOTES
Message   notify the pt normal throat culture f/u as scheduled        Verified Results  (1) THROAT CULTURE (CULTURE, UPPER RESPIRATORY) [id number] 01:12PM Apurva Janie    Order Number: [id number]     Test Name Result Flag Reference   CLINICAL REPORT (Report)     Test:        Throat culture  Specimen Type:   Throat  Specimen Date:   12/13/2016 1:12 PM  Result Date:    12/15/2016 1:06 PM  Result Status:   Final result  Resulting Lab:   [place] [address]            Tel: [phone]      CULTURE                                       ------------------                                   Negative for beta-hemolytic Streptococcus       Signatures   Electronically signed by : Tim Adame DO; Dec 16 2016  3:41PM EST                       (Author)

## 2018-01-10 NOTE — RESULT NOTES
Message   Please notify pt normal test-chest x-ray no active pulmonary disease please have the pt follow up with me to discuss as scheduled        Verified Results  * XR CHEST PA & LATERAL [id number] 03:47PM Faustine Sheets Order Number: [id number]     Test Name Result Flag Reference   XR CHEST PA & LATERAL (Report)     CHEST - DUAL ENERGY     INDICATION: J20 9: Acute bronchitis, unspecified  History taken directly from the electronic ordering system  Cough, chest tightness     COMPARISON: 12/23/2016     VIEWS: PA (including soft tissue/bone algorithms) and lateral projections     IMAGES: 5     FINDINGS:        Cardiomediastinal silhouette appears unremarkable  The lungs are clear  No pneumothorax or pleural effusion  Visualized osseous structures appear within normal limits for the patient's age  IMPRESSION:     No active pulmonary disease         Workstation performed: [id number]     Signed by:   Jacek Snowden MD   11/28/17

## 2018-01-11 NOTE — RESULT NOTES
Message   Notify the patient normal TSH follow up as scheduled        Verified Results  (1) VITAMIN B12 [id number] 09:50AM Lynder Oyster Order Number: [id number]  TW Order Number: [id number]     Test Name Result Flag Reference   VITAMIN B12 595 pg/mL  100-900     (1) VITAMIN D 25-HYDROXY 38VHM5059 09:50AM Lupe Number   TW Order Number: [id number]  TW Order Number: [id number]     Test Name Result Flag Reference   VIT D 25-HYDROX 31 5 ng/mL  30 0-100 0     (1) CBC/ PLT (NO DIFF) 20NLQ7293 09:50AM Lynder Oyster Order Number: [id number]   Order Number: [id number]     Test Name Result Flag Reference   HEMATOCRIT 46 4 %  36 5-49 3   HEMOGLOBIN 16 0 g/dL  12 0-17 0   MCHC 34 5 g/dL  31 4-37 4   MCH 31 5 pg  26 8-34 3   MCV 91 fL  82-98   PLATELET COUNT 757 Thousands/uL  149-390   RBC COUNT 5 08 Million/uL  3 88-5 62   RDW 13 1 %  11 6-15 1   WBC COUNT 6 04 Thousand/uL  4 31-10 16   MPV 11 7 fL  8 9-12 7     (1) TSH 27SKM7326 09:50AM Lynaleksey Ovalentin Order Number: [id number]     Test Name Result Flag Reference   TSH 0 505 uIU/mL  0 358-3 740       Signatures   Electronically signed by : Ariana Adkins DO; May 28 2016  7:47AM EST                       (Author)

## 2018-01-12 VITALS
SYSTOLIC BLOOD PRESSURE: 110 MMHG | DIASTOLIC BLOOD PRESSURE: 66 MMHG | TEMPERATURE: 94.4 F | BODY MASS INDEX: 28.59 KG/M2 | HEART RATE: 55 BPM | WEIGHT: 188 LBS | OXYGEN SATURATION: 95 %

## 2018-01-12 VITALS
HEIGHT: 68 IN | BODY MASS INDEX: 27.43 KG/M2 | SYSTOLIC BLOOD PRESSURE: 114 MMHG | OXYGEN SATURATION: 99 % | TEMPERATURE: 97.4 F | HEART RATE: 63 BPM | WEIGHT: 181.01 LBS | RESPIRATION RATE: 16 BRPM | DIASTOLIC BLOOD PRESSURE: 74 MMHG

## 2018-01-12 VITALS
RESPIRATION RATE: 17 BRPM | SYSTOLIC BLOOD PRESSURE: 122 MMHG | TEMPERATURE: 98.3 F | DIASTOLIC BLOOD PRESSURE: 82 MMHG | WEIGHT: 184.31 LBS | BODY MASS INDEX: 27.93 KG/M2 | HEIGHT: 68 IN | HEART RATE: 74 BPM | OXYGEN SATURATION: 94 %

## 2018-01-13 VITALS
HEART RATE: 64 BPM | WEIGHT: 183 LBS | SYSTOLIC BLOOD PRESSURE: 110 MMHG | BODY MASS INDEX: 27.74 KG/M2 | RESPIRATION RATE: 14 BRPM | DIASTOLIC BLOOD PRESSURE: 70 MMHG | HEIGHT: 68 IN | OXYGEN SATURATION: 94 % | TEMPERATURE: 98.2 F

## 2018-01-13 VITALS
DIASTOLIC BLOOD PRESSURE: 72 MMHG | WEIGHT: 185 LBS | HEIGHT: 68 IN | SYSTOLIC BLOOD PRESSURE: 124 MMHG | BODY MASS INDEX: 28.04 KG/M2

## 2018-01-13 VITALS
HEART RATE: 85 BPM | WEIGHT: 186.03 LBS | BODY MASS INDEX: 28.19 KG/M2 | DIASTOLIC BLOOD PRESSURE: 68 MMHG | SYSTOLIC BLOOD PRESSURE: 112 MMHG | HEIGHT: 68 IN | RESPIRATION RATE: 20 BRPM | OXYGEN SATURATION: 95 %

## 2018-01-13 VITALS
BODY MASS INDEX: 28.15 KG/M2 | HEART RATE: 63 BPM | HEIGHT: 68 IN | DIASTOLIC BLOOD PRESSURE: 72 MMHG | SYSTOLIC BLOOD PRESSURE: 114 MMHG | OXYGEN SATURATION: 96 % | WEIGHT: 185.75 LBS | RESPIRATION RATE: 18 BRPM

## 2018-01-13 VITALS
SYSTOLIC BLOOD PRESSURE: 138 MMHG | HEIGHT: 68 IN | BODY MASS INDEX: 28.19 KG/M2 | DIASTOLIC BLOOD PRESSURE: 84 MMHG | RESPIRATION RATE: 18 BRPM | HEART RATE: 78 BPM | WEIGHT: 186.01 LBS

## 2018-01-13 VITALS
HEIGHT: 68 IN | HEART RATE: 61 BPM | RESPIRATION RATE: 16 BRPM | DIASTOLIC BLOOD PRESSURE: 82 MMHG | WEIGHT: 185 LBS | BODY MASS INDEX: 28.04 KG/M2 | OXYGEN SATURATION: 98 % | SYSTOLIC BLOOD PRESSURE: 126 MMHG

## 2018-01-13 NOTE — RESULT NOTES
Message   Please inform the patient of the recent ultrasound was normal, spleen appears to be somewhat smaller than previous  Biopsies from small intestine and stomach are normal  The nodule is benign  Follow-up in the office as needed  Verified Results  * US ABDOMEN COMPLETE [id number] 08:40AM Caity Heredia Order Number: [id number]   Performing Comments: evaluate for splenomegaly   - Patient Instructions: To schedule this appointment, please contact Central Scheduling at [phone]  Test Name Result Flag Reference   US ABDOMEN COMPLETE (Report)     ABDOMEN ULTRASOUND, COMPLETE     INDICATION: History of duodenal carcinoma  Evaluate splenic size  COMPARISON: CT, 9/14/2016, prior ultrasound, 6/21/2010     TECHNIQUE:  Real-time ultrasound of the abdomen was performed with a curvilinear transducer with both volumetric sweeps and still imaging techniques  FINDINGS:     PANCREAS: Visualized portions of the pancreas are within normal limits  AORTA AND IVC: Visualized portions are normal for patient age  LIVER:   Size: Within normal range  The liver measures 14 7 cm in the midclavicular line  Contour: Surface contour is smooth  Parenchyma: Echogenicity and echotexture are within normal limits  No evidence of suspicious mass  The main portal vein is patent and hepatopetal       BILIARY:   Status post cholecystectomy  No intrahepatic biliary dilatation  CBD measures 4 mm  No choledocholithiasis  KIDNEY:    Right kidney measures 12 0 x 5 9 cm  Within normal limits  Left kidney measures 11 1 x 5 4 cm  Simple 8 mm upper pole cortical cyst noted  Otherwise unremarkable  SPLEEN:    Measures 11 1 x 12 0 x 3 8 cm  Prior CT studies have demonstrated a length of approximately 13 cm  ASCITES: None  IMPRESSION:   1  Maximal splenic diameter 12 0 cm  This is measuring slightly smaller than prior CT measurements of 13 cm      2  Status post cholecystectomy 3  8 mm left renal cyst   4  Otherwise unremarkable study       Workstation performed: [id number]     Signed by:   Anastasiia Alva MD   11/7/16     (1) TISSUE EXAM [id number] 02:07PM Odette Crespo     Test Name Result Flag Reference   LAB AP CASE REPORT (Report)     Surgical Pathology Report             Case: [id number]                   Authorizing Provider: Jacek Porras MD      Collected:      11/01/2016 1407        Ordering Location:   [facility]    Received:      11/01/2016 [address] [facility], [address] Endoscopy                               Pathologist:      Bindu Antoine MD                                 Specimens:  A) - Duodenum                                             B) - Stomach, Gastric nodule   LAB AP FINAL DIAGNOSIS (Report)     A  Duodenum (biopsy):  - Duodenal mucosa with no diagnostic abnormality  - No Marsh lesion  - Negative for dysplasia     B  Gastric nodule (biopsy):  - Reactive antral and oxyntic gastropathy  - No H pylori identified on immunohistochemistry stain  - No intestinal metaplasia (Alcian blue/PAS)    Comment: Pancytokeratin AE1/3 was performed  Electronically signed by Bindu Antoine MD on 11/4/2016 at 9:14 AM   LAB AP SURGICAL ADDITIONAL INFORMATION (Report)     These tests were developed and their performance characteristics   determined by [facility] or School Admissions  They may not be cleared or approved by the [place]  Food and   Drug Administration  The FDA has determined that such clearance or   approval is not necessary  These tests are used for clinical purposes  They should not be regarded as investigational or for research  This   laboratory has been approved by [facility], designated as a high-complexity   laboratory and is qualified to perform these tests  LAB AP GROSS DESCRIPTION (Report)     A  The specimen is received in formalin, labeled with the patient's name   and medical record number, and is designated duodenum   The specimen   consists of several, rubbery, tan-brown tissue fragments measuring 0 8 x   0 8 x 0 2 cm in aggregate dimension  Entirely submitted  One cassette  B  The specimen is received in formalin, labeled with the patient's name   and medical record number, and is designated gastric nodule   The   specimen consists of 3 rubbery tan-brown tissue fragments measuring from   0 2 up to 0 4 cm in greatest dimension  Entirely submitted  One cassette  Note: The estimated total formalin fixation time based upon information   provided by the submitting clinician and the standard processing schedule   is 23 75 hours      SRS   LAB AP CLINICAL INFORMATION      R/o celiac    H/o Duodenal GIST - resected at [facility]   R/o celiac

## 2018-01-13 NOTE — RESULT NOTES
Message   Notify the patient normal vitamin D level follow up as scheduled        Verified Results  (1) VITAMIN B12 [id number] 09:50AM Martha Aguilar    Order Number: [id number]  TW Order Number: [id number]     Test Name Result Flag Reference   VITAMIN B12 595 pg/mL  100-900     (1) VITAMIN D 25-HYDROXY 77PSB0034 09:50AM Martha Aguilar    Order Number: [id number]   Order Number: [id number]     Test Name Result Flag Reference   VIT D 25-HYDROX 31 5 ng/mL  30 0-100 0       Signatures   Electronically signed by : Raman Son DO; May 28 2016  7:46AM EST                       (Author)

## 2018-01-14 VITALS
WEIGHT: 185.25 LBS | DIASTOLIC BLOOD PRESSURE: 80 MMHG | HEIGHT: 68 IN | BODY MASS INDEX: 28.08 KG/M2 | SYSTOLIC BLOOD PRESSURE: 126 MMHG | HEART RATE: 66 BPM

## 2018-01-14 VITALS
DIASTOLIC BLOOD PRESSURE: 70 MMHG | HEIGHT: 68 IN | HEART RATE: 64 BPM | WEIGHT: 187 LBS | SYSTOLIC BLOOD PRESSURE: 124 MMHG | BODY MASS INDEX: 28.34 KG/M2

## 2018-01-15 VITALS
RESPIRATION RATE: 18 BRPM | WEIGHT: 186 LBS | SYSTOLIC BLOOD PRESSURE: 143 MMHG | DIASTOLIC BLOOD PRESSURE: 81 MMHG | HEART RATE: 75 BPM | BODY MASS INDEX: 28.28 KG/M2

## 2018-01-15 VITALS
SYSTOLIC BLOOD PRESSURE: 114 MMHG | HEART RATE: 79 BPM | DIASTOLIC BLOOD PRESSURE: 78 MMHG | BODY MASS INDEX: 28.19 KG/M2 | RESPIRATION RATE: 20 BRPM | WEIGHT: 185.38 LBS

## 2018-01-16 NOTE — RESULT NOTES
Message   Notify the patient normal vitamin B12 level follow up as scheduled        Verified Results  (1) VITAMIN B12 43XMQ7453 09:50AM Sagar Parra    Order Number: [id number]  TW Order Number: [id number]     Test Name Result Flag Reference   VITAMIN B12 595 pg/mL  100-900       Signatures   Electronically signed by : Trudi Prader, DO; May 28 2016  7:46AM EST                       (Author)

## 2018-01-18 NOTE — RESULT NOTES
Message   Notify the patient normal CBC follow up as scheduled        Verified Results  (1) VITAMIN B12 02YVC2623 09:50AM Judith Du Bois Order Number: [id number]  TW Order Number: [id number]     Test Name Result Flag Reference   VITAMIN B12 595 pg/mL  100-900     (1) VITAMIN D 25-HYDROXY 34GJV3815 09:50AM Judith Poplar Order Number: [id number]  TW Order Number: [id number]     Test Name Result Flag Reference   VIT D 25-HYDROX 31 5 ng/mL  30 0-100 0     (1) CBC/ PLT (NO DIFF) 59UOP3705 09:50AM Judith Du Bois Order Number: [id number]  TW Order Number: [id number]     Test Name Result Flag Reference   HEMATOCRIT 46 4 %  36 5-49 3   HEMOGLOBIN 16 0 g/dL  12 0-17 0   MCHC 34 5 g/dL  31 4-37 4   MCH 31 5 pg  26 8-34 3   MCV 91 fL  82-98   PLATELET COUNT 532 Thousands/uL  149-390   RBC COUNT 5 08 Million/uL  3 88-5 62   RDW 13 1 %  11 6-15 1   WBC COUNT 6 04 Thousand/uL  4 31-10 16   MPV 11 7 fL  8 9-12 7       Signatures   Electronically signed by : Dellia Gosselin, DO; May 28 2016  7:47AM EST                       (Author)

## 2018-01-22 VITALS
SYSTOLIC BLOOD PRESSURE: 126 MMHG | WEIGHT: 184 LBS | BODY MASS INDEX: 27.89 KG/M2 | HEIGHT: 68 IN | HEART RATE: 58 BPM | DIASTOLIC BLOOD PRESSURE: 74 MMHG

## 2018-01-23 VITALS
WEIGHT: 181.38 LBS | HEIGHT: 68 IN | BODY MASS INDEX: 27.49 KG/M2 | SYSTOLIC BLOOD PRESSURE: 114 MMHG | DIASTOLIC BLOOD PRESSURE: 72 MMHG

## 2018-01-23 NOTE — PROGRESS NOTES
Assessment    1  Elevated prostate specific antigen (PSA) (790 93) (R97 20)   2  Enlarged prostate without lower urinary tract symptoms (luts) (600 00) (N40 0)   3  Peyronie's disease (607 85) (N48 6)    Plan  Elevated prostate specific antigen (PSA), Urinary frequency    · Myrbetriq 50 MG Oral Tablet Extended Release 24 Hour; Take 1 tablet daily   Rx By: Deyvi Machado; Dispense: 0 Days ; #:90 Tablet Extended Release 24 Hour; Refill: 3; For: Elevated prostate specific antigen (PSA), Urinary frequency; HUMPHREY = N; Verified Transmission to [id number] Last Updated By: System, SureScripts; 6/5/2017 9:12:19 AM   · Prostate Needle Biopsy - POC; Status:Active; Requested KBA:[id number];    Perform: In Office; IOH:[id number]; Ordered;  For:Elevated prostate specific antigen (PSA), Urinary frequency; Ordered By:Diogo Ambriz Hence; Enlarged prostate without lower urinary tract symptoms (luts)    · Tamsulosin HCl - 0 4 MG Oral Capsule; TAKE ONE CAPSULE BY MOUTH  NIGHTLY AT BEDTIME   Rx By: Deyvi Machado; Dispense: 30 Days ; #:30 Capsule; Refill: 11; For: Enlarged prostate without lower urinary tract symptoms (luts); HUMPHREY = Y; Verified Transmission to [id number] Last Updated By: System, SureScripts; 6/5/2017 9:12:20 AM  Enlarged prostate without lower urinary tract symptoms (luts), Urinary frequency    · (1) URINE CULTURE; Source:Urine, Clean Catch; Status:Active - Retrospective By  Protocol Authorization; Requested WEX:[id number];    Perform:[facility]; RICHARD:[id number]; Last Updated Power Marie; 6/5/2017 9:05:30 AM;Ordered;  For:Enlarged prostate without lower urinary tract symptoms (luts), Urinary frequency; Ordered By:Diogo Ambriz Hence; Peyronie's disease    · Urine Dip Non-Automated- POC; Status:Complete - Retrospective By Protocol  Authorization;   Done: 56BBE3443 08:40AM   Performed: In Office; NVO:[id number]; Last Updated Power Marie; 6/5/2017 8:41:31 AM;Ordered;   For:Peyronie's disease; Ordered By:Rashad Ambriz; Discussion/Summary  Discussion Summary:   Nguyễn Escobar is a 72-year-old gentleman here for follow-up on his BPH managed by Dr Inga Langston  PSA is stable at 1 1  He will have this repeated in one year  He continues on tamsulosin  This was refilled electronically  His urine is clear today  However, we will send off for culture to ensure no infection  We will treat if positive  He was given samples of Myrbetriq 50 mg to use  Side effect profile was reviewed  Prescription was also sent electronically to his pharmacy  He will follow-up in 4-6 weeks with a PVR at the visit  He knows to call with any problems or concerns  Medication SE Review and Pt Understands Tx: Possible side effects of new medications were reviewed with the patient/guardian today  The treatment plan was reviewed with the patient/guardian  The patient/guardian understands and agrees with the treatment plan      Chief Complaint  Chief Complaint Free Text Note Form: elevated PSA, BPH, peyronie's disease  PSA= 1 1 5/26/17      History of Present Illness  HPI: Nguyễn Escobar is a 72-year-old gentleman here for follow-up on his BPH, Peyronie's disease and prostate cancer screening  He reports she's been having some pressure right is for the past few months  He is not getting any better  In addition, he reports urinary frequency and nocturia Ã2-3  He does watch what he drinks during work hours due to his job  He attempts to stop fluids prior to bedtime  His urinary symptoms have been bothersome for the past month or so  Patient denies any new medications  He denies any constipation  Review of Systems  Complete-Male Urology:   Constitutional: No fever or chills, feels well, no tiredness, no recent weight gain or weight loss  Respiratory: No complaints of shortness of breath, no wheezing, no cough, no SOB on exertion, no orthopnea or PND     Cardiovascular: No complaints of slow heart rate, no fast heart rate, no chest pain, no palpitations, no leg claudication, no lower extremity  Gastrointestinal: No complaints of abdominal pain, no constipation, no nausea or vomiting, no diarrhea or bloody stools  Genitourinary: feelings of urinary urgency and stream quality fair, but no dysuria, no urinary hesitancy, no hematuria, no empty sensation and no incontinence    The patient presents with complaints of nocturia (2-3x)  Musculoskeletal: No complaints of arthralgia, no myalgias, no joint swelling or stiffness, no limb pain or swelling  Integumentary: itching  Hematologic/Lymphatic: No complaints of swollen glands, no swollen glands in the neck, does not bleed easily, no easy bruising  Neurological: No compliants of headache, no confusion, no convulsions, no numbness or tingling, no dizziness or fainting, no limb weakness, no difficulty walking  ROS Reviewed:   ROS reviewed  Active Problems    1  Baker's cyst of knee (727 51) (M71 20)   2  Chronic diarrhea (787 91) (K52 9)   3  Elevated prostate specific antigen (PSA) (790 93) (R97 20)   4  Enlarged prostate without lower urinary tract symptoms (luts) (600 00) (N40 0)   5  Esophageal reflux (530 81) (K21 9)   6  Essential hypertriglyceridemia (272 1) (E78 1)   7  Essential tremor (333 1) (G25 0)   8  H/O malignant gastrointestinal stromal tumor (GIST) (V10 09) (Z85 09)   9  Hyperlipidemia (272 4) (E78 5)   10  Hypertension (401 9) (I10)   11  Metatarsal stress fracture of right foot (733 94) (M84 374A)   12  History of Need for vaccination for DTP (V06 1) (Z23)   13  Peyronie's disease (607 85) (N48 6)   14  Primary osteoarthritis of knee (715 16) (M17 10)   15  Splenomegaly (789 2) (R16 1)   16  Tremor, essential (333 1) (G25 0)    Past Medical History    1  History of Acute upper respiratory infection (465 9) (J06 9)   2  History of Atypical chest pain (786 59) (R07 89)   3  History of Cough (786 2) (R05)   4  History of Disturbance of skin sensation (782 0) (R20 9)   5   History of Duodenal nodule (537 89) (K31 89)   6  History of abdominal pain (V13 89) (Z87 898)   7  History of abnormal weight loss (V13 89) (Z87 898)   8  History of acute pharyngitis (V12 69) (Z87 09)   9  History of anemia (V12 3) (Z86 2)   10  History of depression (V11 8) (Z86 59)   11  History of diarrhea (V12 79) (Z87 898)   12  History of diarrhea (V12 79) (Z87 898)   13  History of diverticulitis of colon (V12 79) (Z87 19)   14  History of dizziness (V13 89) (Z87 898)   15  History of fatigue (V13 89) (Z87 898)   16  History of glaucoma (V12 49) (Z86 69)   17  History of insomnia (V13 89) (Z87 898)   18  History of malignant gastrointestinal stromal tumor (GIST) (V10 09) (Z85 09)   19  History of Knee effusion (719 06) (M25 469)   20  History of Lactose intolerance (271 3) (E73 9)   21  History of Need for measles-mumps-rubella (MMR) vaccine (V06 4) (Z23)   22  History of Need for vaccination for DTP (V06 1) (Z23)   23  History of Need for vaccination with 13-polyvalent pneumococcal conjugate vaccine    (V03 82) (Z23)   24  History of Numbness (782 0) (R20 0)   25  History of Pain in left knee (719 46) (M25 562)   26  History of Post-nasal drip (784 91) (R09 82)   27  History of Rhinorrhea (478 19) (J34 89)   28  History of Screening for genitourinary condition (V81 6) (Z13 89)   29  History of Situational depression (309 0) (F43 21)   30  History of Sore throat (462) (J02 9)   31  History of Welcome to Medicare preventive visit (V70 0) (Z00 00)  Active Problems And Past Medical History Reviewed: The active problems and past medical history were reviewed and updated today  Surgical History    1  History of Back Surgery   2  History of Cholecystectomy   3  History of Elbow Surgery Repair   4  History of Knee Arthroscopy (Therapeutic)  Surgical History Reviewed: The surgical history was reviewed and updated today  Family History  Mother    1  Denied: Family history of Colon cancer   2   Denied: Family history of Crohn disease   3  Denied: Family history of Liver disease  Father    4  Denied: Family history of Colon cancer   5  Denied: Family history of Crohn disease   6  Denied: Family history of Liver disease  Family History    7  Denied: Family history of colon cancer   8  Denied: Family history of Crohn's disease   9  Denied: Family history of liver disease   10  Family history of Hypertension (V17 49)  Family History Reviewed: The family history was reviewed and updated today  Social History    · Denied: History of Drug Use   · Never Drank Alcohol   · Non-smoker (V49 89) (Z78 9)  Social History Reviewed: The social history was reviewed and updated today  The social history was reviewed and is unchanged  Current Meds   1  Cholestyramine 4 GM Oral Packet; MIX THE CONTENTS OF 1 POWDER PACKET WITH   2-6 OZ OF NONCARBONATED BEVERAGE AND SWALLOW ONCE DAILY; Therapy: 70RFN7541 to (Carmela Ureña)  Requested for: 48DQK0619; Last   Rx:01Feb2017 Ordered   2  ClonazePAM 1 MG Oral Tablet; TAKE 1 TABLET Bedtime AS NEEDED; Therapy: 53VWH8973 to (Evaluate:11Mar2017)  Requested for: 70UHH2579; Last   Rx:01Mar2017 Ordered   3  Dorzolamide HCl-Timolol Mal 22 3-6 8 MG/ML Ophthalmic Solution; INSTILL 1 DROP   INTO RIGHT EYE 3 TIMES DAILY; Therapy: 14Ekb6699 to (Evaluate:91Ssn4130) Recorded   4  Fish Oil 1200 MG Oral Capsule; TAKE 2 CAPSULE Daily; Therapy: 96MMR7672 to (Evaluate:62Ezb4855) Recorded   5  Fluticasone Propionate 50 MCG/ACT Nasal Suspension; 2 SPRAYS NASALLY AT   BEDTIME AS NEEDED; Therapy: 41BKF3853 to (Evaluate:70Mgx8724)  Requested for: 14QSY1874; Last   Rx:01Mar2017 Ordered   6  Glucosamine Chondr 1500 Complx CAPS; TAKE 3 CAPSULE DAILY; Therapy: (Recorded:25Mar2015) to Recorded   7  Lansoprazole 30 MG Oral Capsule Delayed Release; TAKE 1 CAPSULE DAILY; Therapy: 16MCZ1001 to (Evaluate:05Oct2017)  Requested for: 21ENE4644; Last   Rx:10Oct2016 Ordered   8   Lumigan 0 01 % Ophthalmic Solution; INSTILL 1 DROP INTO RIGHT EYE ONCE DAILY   IN THE EVENING; Therapy: 28Apr2015 to (Evaluate:08Jan2016) Recorded   9  One-Daily Multi Vitamins TABS; TAKE 1 TABLET DAILY; Therapy: (Recorded:25Mar2015) to Recorded   10  Super B-50 B Complex Oral Capsule; 1 tablet daily; Therapy: 60FKU6274 to Recorded   11  Tamsulosin HCl - 0 4 MG Oral Capsule; TAKE 1 CAPSULE AT BEDTIME; Therapy: 95CNZ4224 to (Last Laretta Medici)  Requested for: 71Mct2617 Ordered   12  Topiramate 25 MG Oral Tablet; 1 po qam x 1 week then 1 po bid x 1 week then 2    po qam and 1 po qeve x 1 week then 2 po bid (continue at lowest effective dose); Therapy: 78GKS3533 to (Last Rx:74Hpz4754)  Requested for: 83DIR1639 Ordered   13  Vitamin D3 5000 UNIT Oral Capsule; Take 1 tablet daily; Therapy: 16CPZ3263 to (Evaluate:48Nif7385) Recorded   14  Vitamin E 400 UNIT Oral Capsule; 1 tablet daily; Therapy: 41WLA0592 to Recorded  Medication List Reviewed: The medication list was reviewed and updated today  Allergies    1  No Known Drug Allergies    2  No Known Environmental Allergies   3  No Known Food Allergies    Vitals  Vital Signs    Recorded: 79ZCL6259 08:40AM   Heart Rate 64   Systolic 957   Diastolic 70   Height 5 ft 8 in   Weight 187 lb    BMI Calculated 28 43   BSA Calculated 1 99     Physical Exam    Constitutional   General appearance: No acute distress, well appearing and well nourished  Head and Face   Head and face: Normal     Eyes   Conjunctiva and lids: No erythema, swelling or discharge  Ears, Nose, Mouth, and Throat   Hearing: Normal     Pulmonary   Respiratory effort: No increased work of breathing or signs of respiratory distress  Cardiovascular   Examination of extremities for edema and/or varicosities: Normal     Abdomen   Abdomen: Non-tender, no masses  Genitourinary deferred  Musculoskeletal   Gait and station: Normal     Neurologic   Cranial nerves: Cranial nerves 2-12 intact      Psychiatric   Judgment and insight: Normal     Orientation to person, place and time: Normal     Recent and remote memory: Intact  Mood and affect: Normal        Results/Data  Urine Dip Non-Automated- POC 45GYO8728 08:40AM Maya Campoil     Test Name Result Flag Reference   Color Yellow     Clarity Transparent     Leukocytes -     Nitrite -     Blood -     Bilirubin -     Urobilinogen -     Protein -     Ph 5 0     Specific Gravity 1 005     Ketone -     Glucose -       (1) PSA, DIAGNOSTIC (FOLLOW-UP) 88XLD8056 10:04AM [place]  Order Number: [id number]     Test Name Result Flag Reference   PSA 1 1 ng/mL  0 0-4 0   American Urological Association Guidelines define biochemical recurrence of prostate cancer as a detectable or rising PSA value post-radical prostatectomy that is greater than or equal to 0 2 ng/mL with a second confirmatory level of greater than or equal to 0 2 ng/mL  (1) PSA, DIAGNOSTIC (FOLLOW-UP) 37LAF4178 09:40AM Marshall Castor     Test Name Result Flag Reference   PSA 1 0 ng/mL  0 0-4 0     Procedure    Procedure: Bladder Ultrasound Post Void Residual    Test indication: Urinary Urgency  The procedure's were discussed with the patient  Equipment And Procedure: The patient voided  U/S Findings: 67 47 ml        Future Appointments    Date/Time Provider Specialty Site   08/22/2017 08:00 AM Niles Cuello MD Neurology [facility] NEUROLOGY [facility]   09/06/2017 05:30 PM Jose L Adkins DO Internal Medicine [facility]   09/27/2017 03:45 PM Jesus Gee MD Surgical Oncology [facility] SURGICAL ONCOLOGY   07/11/2017 09:15 AM Maya Agarwal [facility] Urology [address]  [address]ilo Claros     Signatures   Electronically signed by : Manjit Gomez [facility]; Jun 5 2017  9:29AM EST                       (Author)    Electronically signed by : ANNA Bolivar ; Jun 5 2017 11:06AM EST

## 2018-01-24 VITALS
DIASTOLIC BLOOD PRESSURE: 78 MMHG | SYSTOLIC BLOOD PRESSURE: 116 MMHG | HEART RATE: 70 BPM | WEIGHT: 180 LBS | BODY MASS INDEX: 27.37 KG/M2

## 2018-02-15 ENCOUNTER — OFFICE VISIT (OUTPATIENT)
Dept: OBGYN CLINIC | Facility: OTHER | Age: 68
End: 2018-02-15
Payer: COMMERCIAL

## 2018-02-15 ENCOUNTER — APPOINTMENT (OUTPATIENT)
Dept: RADIOLOGY | Facility: OTHER | Age: 68
End: 2018-02-15
Payer: COMMERCIAL

## 2018-02-15 VITALS
BODY MASS INDEX: 27.8 KG/M2 | HEIGHT: 68 IN | HEART RATE: 76 BPM | DIASTOLIC BLOOD PRESSURE: 85 MMHG | WEIGHT: 183.4 LBS | SYSTOLIC BLOOD PRESSURE: 126 MMHG

## 2018-02-15 DIAGNOSIS — M75.41 SUBACROMIAL IMPINGEMENT OF RIGHT SHOULDER: Primary | ICD-10-CM

## 2018-02-15 PROCEDURE — 20610 DRAIN/INJ JOINT/BURSA W/O US: CPT | Performed by: ORTHOPAEDIC SURGERY

## 2018-02-15 PROCEDURE — 73030 X-RAY EXAM OF SHOULDER: CPT

## 2018-02-15 PROCEDURE — 99203 OFFICE O/P NEW LOW 30 MIN: CPT | Performed by: ORTHOPAEDIC SURGERY

## 2018-02-15 RX ORDER — BENZONATATE 100 MG/1
1 CAPSULE ORAL
COMMUNITY
Start: 2017-11-27 | End: 2018-03-21 | Stop reason: ALTCHOICE

## 2018-02-15 RX ORDER — VITAMIN E 268 MG
1 CAPSULE ORAL DAILY
COMMUNITY
Start: 2016-06-16 | End: 2018-04-04 | Stop reason: SDUPTHER

## 2018-02-15 RX ORDER — CHOLESTYRAMINE 4 G/9G
1 POWDER, FOR SUSPENSION ORAL EVERY OTHER DAY
COMMUNITY
End: 2018-11-07 | Stop reason: SDUPTHER

## 2018-02-15 RX ORDER — LEVOFLOXACIN 500 MG/1
1 TABLET, FILM COATED ORAL DAILY
COMMUNITY
Start: 2017-11-27 | End: 2018-04-04 | Stop reason: ALTCHOICE

## 2018-02-15 RX ORDER — PRIMIDONE 50 MG/1
TABLET ORAL
COMMUNITY
Start: 2017-08-22 | End: 2018-04-04 | Stop reason: ALTCHOICE

## 2018-02-15 RX ORDER — HYDROCORTISONE ACETATE 0.5 %
CREAM (GRAM) TOPICAL
COMMUNITY
End: 2018-04-04 | Stop reason: SDUPTHER

## 2018-02-15 RX ORDER — FLUTICASONE PROPIONATE 50 MCG
2 SPRAY, SUSPENSION (ML) NASAL
COMMUNITY
Start: 2017-03-01 | End: 2018-12-24 | Stop reason: ALTCHOICE

## 2018-02-15 RX ORDER — BETAMETHASONE SODIUM PHOSPHATE AND BETAMETHASONE ACETATE 3; 3 MG/ML; MG/ML
6 INJECTION, SUSPENSION INTRA-ARTICULAR; INTRALESIONAL; INTRAMUSCULAR; SOFT TISSUE
Status: COMPLETED | OUTPATIENT
Start: 2018-02-15 | End: 2018-02-15

## 2018-02-15 RX ORDER — BUPIVACAINE HYDROCHLORIDE 2.5 MG/ML
2 INJECTION, SOLUTION INFILTRATION; PERINEURAL
Status: COMPLETED | OUTPATIENT
Start: 2018-02-15 | End: 2018-02-15

## 2018-02-15 RX ORDER — DIFLORASONE DIACETATE 0.5 MG/G
OINTMENT TOPICAL
Refills: 3 | COMMUNITY
Start: 2017-11-10 | End: 2019-03-20 | Stop reason: ALTCHOICE

## 2018-02-15 RX ORDER — AMOXICILLIN 500 MG
2 CAPSULE ORAL DAILY
COMMUNITY
Start: 2016-06-16 | End: 2018-04-04 | Stop reason: SDUPTHER

## 2018-02-15 RX ADMIN — BETAMETHASONE SODIUM PHOSPHATE AND BETAMETHASONE ACETATE 6 MG: 3; 3 INJECTION, SUSPENSION INTRA-ARTICULAR; INTRALESIONAL; INTRAMUSCULAR; SOFT TISSUE at 16:18

## 2018-02-15 RX ADMIN — BUPIVACAINE HYDROCHLORIDE 2 ML: 2.5 INJECTION, SOLUTION INFILTRATION; PERINEURAL at 16:18

## 2018-02-15 NOTE — PATIENT INSTRUCTIONS

## 2018-02-15 NOTE — PROGRESS NOTES
Assessment/Plan:  Assessment/Plan   Diagnoses and all orders for this visit:    Subacromial impingement of right shoulder        Discussion:  The patient has an examination consistent with subacromial impingement his right shoulder and was offered/provided a subacromial injection today with referral to physical therapy  We will re-evaluate in 6 to 8 weeks, if he fails to improve further imaging in the form of MRI scan would be indicated, currently there is no indication for an MRI given his excellent rotator cuff strength testing on exam   The pathophysiology and treatment options for subacromial impingement syndrome were reviewed in detail with the patient    Subjective:   Patient ID: Dawood Mckay is a 79 y o  male  Patient presents, referred by his primary physician Dr Leesa Ruelas for evaluation and treatment of his right shoulder pain  The pain has been present for a month or so, he did fall on the ice while ice skating and injured his hip and knee as well as his right shoulder, the shoulder continues to cause him pain with internal rotation and pulling his pants up, he has not noted any decrease range of motion  He says the pain is sharp is localized lateral aspect of his right proximal humerus, worse activity relieved by rest, no numbness or tingling or fevers and chills associated with this, the pain is intermittent in timing  The following portions of the patient's history were reviewed and updated as appropriate: allergies, current medications, past family history, past medical history, past social history, past surgical history and problem list     Review of Systems   Constitutional: Negative for chills and fever  HENT: Negative for hearing loss  Eyes: Negative for visual disturbance  Respiratory: Negative for shortness of breath  Cardiovascular: Negative for chest pain  Gastrointestinal: Negative for abdominal pain     Musculoskeletal:        As reviewed in the HPI   Skin: Negative for rash  Neurological:        As reviewed in the HPI   Psychiatric/Behavioral: Negative for agitation  Objective:  Right Shoulder Exam     Comments:  Right shoulder full active and passive range of motion, positive Carroll and Neer impingement signs, 5/5 abduction external rotation strength testing, pain with testing but good strength  Full elbow wrist range range of motion with neurologically intact distally              Physical Exam   Constitutional: He is oriented to person, place, and time  He appears well-developed and well-nourished  HENT:   Head: Normocephalic and atraumatic  Pulmonary/Chest: Effort normal    Abdominal: Soft  Neurological: He is alert and oriented to person, place, and time  Skin: Skin is warm and dry  Psychiatric: He has a normal mood and affect  His behavior is normal    Nursing note and vitals reviewed  I have personally reviewed pertinent films in PACS and my interpretation is as follows      Three views right shoulder show no acute glenohumeral abnormality      Large joint arthrocentesis  Date/Time: 2/15/2018 4:18 PM  Consent given by: patient  Timeout: Immediately prior to procedure a time out was called to verify the correct patient, procedure, equipment, support staff and site/side marked as required   Supporting Documentation  Indications: pain   Procedure Details  Location: shoulder - R subacromial bursa  Preparation: Patient was prepped and draped in the usual sterile fashion  Needle size: 22 G  Ultrasound guidance: no  Approach: lateral  Medications administered: 2 mL bupivacaine 0 25 %; 6 mg betamethasone acetate-betamethasone sodium phosphate 6 (3-3) mg/mL    Patient tolerance: patient tolerated the procedure well with no immediate complications  Dressing:  Sterile dressing applied

## 2018-03-05 ENCOUNTER — EVALUATION (OUTPATIENT)
Dept: PHYSICAL THERAPY | Facility: REHABILITATION | Age: 68
End: 2018-03-05
Payer: COMMERCIAL

## 2018-03-05 DIAGNOSIS — M75.41 SUBACROMIAL IMPINGEMENT OF RIGHT SHOULDER: Primary | ICD-10-CM

## 2018-03-05 PROCEDURE — 97110 THERAPEUTIC EXERCISES: CPT | Performed by: PHYSICAL THERAPIST

## 2018-03-05 PROCEDURE — G8990 OTHER PT/OT CURRENT STATUS: HCPCS | Performed by: PHYSICAL THERAPIST

## 2018-03-05 PROCEDURE — G8991 OTHER PT/OT GOAL STATUS: HCPCS | Performed by: PHYSICAL THERAPIST

## 2018-03-05 PROCEDURE — 97161 PT EVAL LOW COMPLEX 20 MIN: CPT | Performed by: PHYSICAL THERAPIST

## 2018-03-05 NOTE — PROGRESS NOTES
PT Evaluation     Today's date: 3/5/2018  Patient name: Yo Singh  : 1950  MRN: [id number]  Referring provider: Viktoriya Cullen MD  Dx:   Encounter Diagnosis     ICD-10-CM    1  Subacromial impingement of right shoulder M75 41 Ambulatory referral to Physical Therapy                  Assessment  Impairments: abnormal or restricted ROM, impaired physical strength, lacks appropriate home exercise program and pain with function    Assessment details: Patient presents with shoulder pain, decreased shoulder ROM, decreased shoulder strength, and decreased function secondary to R shoulder impingement  Patient would benefit from skilled PT intervention to address these issues and to maximize function  Thank you for the referral     Understanding of Dx/Px/POC: good   Prognosis: good    Plan  Planned modality interventions: cryotherapy  Planned therapy interventions: manual therapy, joint mobilization, neuromuscular re-education, patient education, therapeutic exercise and home exercise program  Frequency: 2x week  Duration in weeks: 6  Treatment plan discussed with: patient        Subjective Evaluation    History of Present Illness  Mechanism of injury: Pt is a 79 y o male with a c/o R shoulder pain after falling while ice skating , [address] [place] [address] Rd injury  Pt noted the pain started a few days later, initially had pain in his knee and hip, which has subsided  Pt saw Dr Sanaz Rodriguez in February and was diagnosed with shoulder impingement  Pt was given a cortisone injection which provided relief for a few days  Pt also had radiographs, which were negative, as per pt  Pt is now referred for therapy  Pt is L hand dominant  Pt reports pain/difficulty with OH activity, pulling pants up, donning shirts, reaching behind back, donning belt, vacuuming, sleep (diffiuculty laying on R shoulder for any length of time)  Pt would like to resume exercising at home      Pain  At best pain ratin  At worst pain rating: 4  Location: R shoulder          Objective     Tenderness     Additional Tenderness Details  No TTP noted  Active Range of Motion     Right Shoulder   Flexion: 140 degrees with pain  Abduction: 140 degrees with pain  Adduction: WFL  External rotation BTH: C6 with pain  Internal rotation BTB: T9 with pain    Passive Range of Motion     Right Shoulder   Flexion: 143 degrees with pain  Abduction: 160 degrees with pain  External rotation 90°: WFL  Internal rotation 90°: 55 degrees     Additional Passive Range of Motion Details  Decreased post/inf GH joint play  Strength/Myotome Testing     Right Shoulder     Planes of Motion   Flexion: 4 (pain)   Abduction: 4+ (pain)   External rotation at 0°: 4+ (pain)   Internal rotation at 0°: 5     Isolated Muscles   Biceps: 5   Triceps: 5     Tests     Additional Tests Details  (+)neers, (+)salmeron, (-)cross arm, (-)drop arm, (-)ER lag, (-)lift off, (-)empty can, (+)obriens for pain  General Comments     Shoulder Comments   Pt denies instability  Pt reports intermittent crepitus, but not painful  Precautions: GI disorder, essential tremor, C4-5 fusion  Daily Treatment Diary     Manual  3/5            R shoulder IR PROM and post/inf GH Jm's                                                                       Exercise Diary  3/5            ube             TB LPD gtb 10            TB rows gtb 10            TB ER gtb 10            TB IR gtb 10            Sleeper stretch 10"x10            Post cap stretch HEP            scap punches             scap abcs             Wall slides-towel             Prone rows             Prone shoulder ext             Prone horizontal ABD             Ball circles against wall cw/ccw                                                                                               Modalities              CP PRN

## 2018-03-08 ENCOUNTER — OFFICE VISIT (OUTPATIENT)
Dept: PHYSICAL THERAPY | Facility: REHABILITATION | Age: 68
End: 2018-03-08
Payer: COMMERCIAL

## 2018-03-08 DIAGNOSIS — M75.41 SUBACROMIAL IMPINGEMENT OF RIGHT SHOULDER: Primary | ICD-10-CM

## 2018-03-08 PROCEDURE — 97112 NEUROMUSCULAR REEDUCATION: CPT

## 2018-03-08 NOTE — PROGRESS NOTES
Daily Note     Today's date: 3/8/2018  Patient name: Theo Cross  : 1950  MRN: [id number]  Referring provider: Angus Felipe MD  Dx:   Encounter Diagnosis     ICD-10-CM    1  Subacromial impingement of right shoulder M75 41                   Subjective: Pt reported intermittent soreness and achiness along anterior shoulder  Most challenged and discomfort pulling his pants up  Objective: See treatment diary   Manual  3/8                     R shoulder IR PROM and post/inf GH Jm's   5 min                                                                                                                           Exercise Diary  3/5  3/8                   ube    90 rpm 3'/3'                   TB LPD gtb 10  gtb 15                   TB rows gtb 10  gtb 15                   TB ER gtb 10  gtb 15                   TB IR gtb 10  gtb 15                   Sleeper stretch 10"x10  10"x10                   Post cap stretch HEP                     scap punches    3"x 15                   scap abcs    1x                   Wall slides-towel    10"x10                   Prone rows    15                   Prone shoulder ext    15                   Prone horizontal ABD    15                   Ball circles against wall cw/ccw    grn 15 ea                                                                                                                                                                          Modalities                        CP PRN                                                                              Assessment: Tolerated treatment well  Patient exhibited good technique with therapeutic exercises  Monitor symptoms NV  Plan: Continue per plan of care

## 2018-03-13 ENCOUNTER — APPOINTMENT (OUTPATIENT)
Dept: LAB | Age: 68
End: 2018-03-13
Payer: COMMERCIAL

## 2018-03-13 ENCOUNTER — TRANSCRIBE ORDERS (OUTPATIENT)
Dept: ADMINISTRATIVE | Age: 68
End: 2018-03-13

## 2018-03-13 DIAGNOSIS — Z85.09 PERSONAL HISTORY OF MALIGNANT NEOPLASM OF OTHER DIGESTIVE ORGANS: ICD-10-CM

## 2018-03-13 LAB
ANION GAP SERPL CALCULATED.3IONS-SCNC: 4 MMOL/L (ref 4–13)
BUN SERPL-MCNC: 11 MG/DL (ref 5–25)
CALCIUM SERPL-MCNC: 8.8 MG/DL (ref 8.3–10.1)
CHLORIDE SERPL-SCNC: 105 MMOL/L (ref 100–108)
CO2 SERPL-SCNC: 30 MMOL/L (ref 21–32)
CREAT SERPL-MCNC: 0.84 MG/DL (ref 0.6–1.3)
GFR SERPL CREATININE-BSD FRML MDRD: 91 ML/MIN/1.73SQ M
GLUCOSE P FAST SERPL-MCNC: 87 MG/DL (ref 65–99)
POTASSIUM SERPL-SCNC: 4 MMOL/L (ref 3.5–5.3)
SODIUM SERPL-SCNC: 139 MMOL/L (ref 136–145)

## 2018-03-13 PROCEDURE — 80048 BASIC METABOLIC PNL TOTAL CA: CPT

## 2018-03-13 PROCEDURE — 36415 COLL VENOUS BLD VENIPUNCTURE: CPT

## 2018-03-14 ENCOUNTER — OFFICE VISIT (OUTPATIENT)
Dept: PHYSICAL THERAPY | Facility: REHABILITATION | Age: 68
End: 2018-03-14
Payer: COMMERCIAL

## 2018-03-14 DIAGNOSIS — M75.41 SUBACROMIAL IMPINGEMENT OF RIGHT SHOULDER: Primary | ICD-10-CM

## 2018-03-14 PROCEDURE — 97112 NEUROMUSCULAR REEDUCATION: CPT | Performed by: PHYSICAL THERAPIST

## 2018-03-14 PROCEDURE — 97110 THERAPEUTIC EXERCISES: CPT | Performed by: PHYSICAL THERAPIST

## 2018-03-14 NOTE — PROGRESS NOTES
Daily Note     Today's date: 3/14/2018  Patient name: Manuel Andrade  : 1950  MRN: [id number]  Referring provider: Jeane Joaquin MD  Dx: No diagnosis found  Subjective: Pt reports no significant changes thus far  Pt noted some discomfort with posterior capsule stretch and sleeper stretch; reviewed technique with pt with improved tolerance  Objective: See treatment diary below  Manual  3/8  3/14                   R shoulder IR PROM and post/inf GH Jm's   5 min  6'                                                                                                                         Exercise Diary  3/5  3/8  3/14                 ube    90 rpm 3'/3'  90 rpm 3'/3'                 TB LPD gtb 10  gtb 15  2x10                 TB rows gtb 10  gtb 15  2x10                 TB ER gtb 10  gtb 15  2x10                 TB IR gtb 10  gtb 15  2x10                 Sleeper stretch 10"x10  10"x10  10"x10                 Post cap stretch HEP                     scap punches    3"x 15  3"x15                 scap abcs    1x  x1                 Wall slides-towel    10"x10  10"x10                 Prone rows    15  15                 Prone shoulder ext    15  15                 Prone horizontal ABD    15  15                 Ball circles against wall cw/ccw    grn 15 ea   15ea                                                                                                                                                                       Modalities                        CP PRN                                                     Assessment: Tolerated treatment well  Pt required VC's for correct technique with Te's  No pain or soreness post session  Patient would benefit from continued PT      Plan: Continue per plan of care

## 2018-03-19 ENCOUNTER — OFFICE VISIT (OUTPATIENT)
Dept: PHYSICAL THERAPY | Facility: REHABILITATION | Age: 68
End: 2018-03-19
Payer: COMMERCIAL

## 2018-03-19 DIAGNOSIS — M75.41 SUBACROMIAL IMPINGEMENT OF RIGHT SHOULDER: Primary | ICD-10-CM

## 2018-03-19 PROCEDURE — 97112 NEUROMUSCULAR REEDUCATION: CPT

## 2018-03-19 PROCEDURE — 97110 THERAPEUTIC EXERCISES: CPT

## 2018-03-19 NOTE — PROGRESS NOTES
Daily Note     Today's date: 3/19/2018  Patient name: Shreyas Reid  : 1950  MRN: [id number]  Referring provider: Dmitri Mariano MD  Dx:   Encounter Diagnosis     ICD-10-CM    1  Subacromial impingement of right shoulder M75 41                   Subjective: Patient noted that pain in R shoulder shifted to lateral and goes down half way between shoulder and elbow  Since starting therapy  Patient noted no change in sx's  Patient is unable to sleep due to not being able to get comfortable or lay on either side  Objective: See treatment diary below    Manual  3/8  3/14  3/19                 R shoulder IR PROM and post/inf GH Jm's   5 min  6'  5 min IR  (post/ inf GH JM NV)                                                                                                                       Exercise Diary  3/5  3/8  3/14  3/19               ube    90 rpm 3'/3'  90 rpm 3'/3'  90 rpm 3'/3'               TB LPD gtb 10  gtb 15  2x10  2x10               TB rows gtb 10  gtb 15  2x10  2x10               TB ER gtb 10  gtb 15  2x10 10x               TB IR gtb 10  gtb 15  2x10 10x               Sleeper stretch 10"x10  10"x10  10"x10  10"x10               Post cap stretch HEP                     scap punches    3"x 15  3"x15  3"x15               scap abcs    1x  x1  1x               Wall slides-towel    10"x10  10"x10 10"x10               Prone rows    15  15  15               Prone shoulder ext    15  15  15x               Prone horizontal ABD    15  15  15x               Ball circles against wall cw/ccw    grn 15 ea   14UG  68BE                                                                                                                                                                     Modalities                        CP PRN                                                    Assessment: Tolerated treatment fair  TB exercises irritated patient's Upper arm/ R shoulder modified reps for today   Pt  Noted that sleeper stretch is still challenging Patient  would benefit from continued PT      Plan: Continue per plan of care

## 2018-03-21 ENCOUNTER — OFFICE VISIT (OUTPATIENT)
Dept: INTERNAL MEDICINE CLINIC | Facility: CLINIC | Age: 68
End: 2018-03-21
Payer: COMMERCIAL

## 2018-03-21 VITALS
DIASTOLIC BLOOD PRESSURE: 78 MMHG | BODY MASS INDEX: 28.22 KG/M2 | HEIGHT: 68 IN | WEIGHT: 186.2 LBS | OXYGEN SATURATION: 97 % | RESPIRATION RATE: 16 BRPM | HEART RATE: 73 BPM | SYSTOLIC BLOOD PRESSURE: 118 MMHG

## 2018-03-21 DIAGNOSIS — E78.1 HYPERTRIGLYCERIDEMIA: ICD-10-CM

## 2018-03-21 DIAGNOSIS — G25.0 TREMOR, ESSENTIAL: ICD-10-CM

## 2018-03-21 DIAGNOSIS — J02.9 SORE THROAT: ICD-10-CM

## 2018-03-21 DIAGNOSIS — K21.9 GASTROESOPHAGEAL REFLUX DISEASE WITHOUT ESOPHAGITIS: Primary | ICD-10-CM

## 2018-03-21 PROCEDURE — 99214 OFFICE O/P EST MOD 30 MIN: CPT | Performed by: INTERNAL MEDICINE

## 2018-03-21 RX ORDER — PANTOPRAZOLE SODIUM 40 MG/1
40 TABLET, DELAYED RELEASE ORAL DAILY
Qty: 30 TABLET | Refills: 3 | Status: SHIPPED | OUTPATIENT
Start: 2018-03-21 | End: 2018-05-08 | Stop reason: SDUPTHER

## 2018-03-21 RX ORDER — AMOXICILLIN 500 MG/1
500 CAPSULE ORAL EVERY 8 HOURS SCHEDULED
Qty: 30 CAPSULE | Refills: 0 | Status: SHIPPED | OUTPATIENT
Start: 2018-03-21 | End: 2018-03-31

## 2018-03-21 NOTE — PROGRESS NOTES
Assessment/Plan:         Diagnoses and all orders for this visit:    Gastroesophageal reflux disease without esophagitis  Comments: Worsening of the symptoms, nocturnal awakenings he is currently on high-dose Prevacid; I will have the patient go for EGD and will change to Protonix 40 mg kolby  Orders:  -     Ambulatory referral to Gastroenterology; Future  -     pantoprazole (PROTONIX) 40 mg tablet; Take 1 tablet (40 mg total) by mouth daily    Sore throat  Comments:  Amoxicillin 500 mg t i d  times 10 days, fluids rest if symptoms not landen in the next several days please notify me  Orders:  -     amoxicillin (AMOXIL) 500 mg capsule; Take 1 capsule (500 mg total) by mouth every 8 (eight) hours for 10 days    Hypertriglyceridemia  Comments:  Low triglyceride diet routine exercise will continue monitor the lipid panel  Orders:  -     Comprehensive metabolic panel; Future  -     Lipid Panel with Direct LDL reflex; Future    Tremor, essential  Comments:  Working with Neurology I did review the Neurology letters currently he has not had much success these he has tried primidone, Topamax and gabapentin         Return to office 6 months  call if any problems  Subjective:      Patient ID: Mele Mehta is a 79 y o  male  HPI 72-year old male coming in for a follow up visit regarding GERD, sore throat, essential tremor, hyperlipidemia; the patient reports me that; st x 1 day; pt reports burping , not feeling too good he needs take prevacid bid since the holiday- increase of the gerd he was waking in middle of the night ;last night st , no f/c , feels warm starting wed , 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  Does report me sinus pain coughing no postnasal drip no ear pain symptoms did start last night he has not noticed the lymph node swelling      The following portions of the patient's history were reviewed and updated as appropriate: current medications, past family history, past medical history, past social history, past surgical history and problem list     Review of Systems   Constitutional: Positive for fatigue  Negative for activity change, appetite change, chills, fever and unexpected weight change  HENT: Positive for sore throat  Negative for hearing loss and postnasal drip  Eyes: Negative for pain  Respiratory: Negative for cough and shortness of breath  Cardiovascular: Negative for chest pain  Gastrointestinal: Positive for diarrhea (controlled )  Negative for abdominal distention, abdominal pain, nausea and vomiting  Gerd   Neurological: Negative for dizziness, light-headedness and headaches  Psychiatric/Behavioral: Negative for suicidal ideas  The patient is not nervous/anxious  Objective:      /78 (BP Location: Left arm, Patient Position: Sitting, Cuff Size: Standard)   Pulse 73   Resp 16   Ht 5' 8" (1 727 m)   Wt 84 5 kg (186 lb 3 2 oz)   SpO2 97%   BMI 28 31 kg/m²                       Return in about 6 months (around 9/21/2018)        No Known Allergies    Past Medical History:   Diagnosis Date    Abnormal weight loss     RESOLVED: [id number]    Anemia     RESOLVED: [id number]    Atypical chest pain     RESOLVED: [id number]    BPH (benign prostatic hyperplasia)     Cancer (HCC)     DUODENAL    Luis Miguel's syndrome     Depression     RESOLVED: [id number]    Diverticulitis of colon     LAST ASSESSED: [id number]    Duodenal nodule     RESOLVED: [id number]    Gastrointestinal stromal tumor (GIST) (HCC)     MALIGNANT; RESOLVED: [id number]    GERD (gastroesophageal reflux disease)     Glaucoma     RESOLVED: [id number]    Insomnia     RESOLVED: [id number]    Lactose intolerance     LAST ASSESSED: [id number]     Past Surgical History:   Procedure Laterality Date    BACK SURGERY      CERVICAL FUSION      c4 and c5    CHOLECYSTECTOMY      ELBOW SURGERY Left     REPAIR    EYE SURGERY      KNEE ARTHROSCOPY Left     LUMBAR DISCECTOMY      L5 S1  ORIF RADIAL SHAFT FRACTURE      VT ESOPHAGOGASTRODUODENOSCOPY TRANSORAL DIAGNOSTIC N/A 11/1/2016    Procedure: ESOPHAGOGASTRODUODENOSCOPY (EGD); Surgeon: Milly Govea MD;  Location: [facility];   Service: Gastroenterology    SMALL INTESTINE SURGERY      GIST surgery     Current Outpatient Prescriptions on File Prior to Visit   Medication Sig Dispense Refill    B Complex-Biotin-FA (SUPER B-50 B COMPLEX PO) Take 1 tablet by mouth daily      bimatoprost (LUMIGAN) 0 01 % ophthalmic drops 1 drop daily at bedtime      Cholecalciferol (VITAMIN D-3 PO) Take 1 capsule by mouth daily      cholestyramine (QUESTRAN) 4 GM/DOSE powder Take 1 packet by mouth every other day      dorzolamide (TRUSOPT) 2 % ophthalmic solution 1 drop 3 (three) times a day      fluticasone (FLONASE) 50 mcg/act nasal spray 2 sprays into each nostril      GLUCOSAMINE CHONDROITIN COMPLX PO Take 1 tablet by mouth daily      Multiple Vitamins-Minerals (MULTIVITAL) tablet Take 1 tablet by mouth daily      PROAIR  (90 Base) MCG/ACT inhaler Inhale 2 puffs  0    tamsulosin (FLOMAX) 0 4 mg Take 0 4 mg by mouth daily with dinner      Vitamin E 400 UNITS TABS Take 1 tablet by mouth daily      [DISCONTINUED] LANSOPRAZOLE PO Take 30 mg by mouth daily      clonazePAM (KlonoPIN) 1 mg tablet Take 1 mg by mouth daily at bedtime as needed for seizures      diflorasone (PSORCON) 0 05 % ointment APPLY EVERY DAY AT BEDTIME WITH OCCLUSION  3    Glucosamine-Chondroit-Vit C-Mn (GLUCOSAMINE CHONDR 1500 COMPLX) CAPS Take by mouth      levofloxacin (LEVAQUIN) 500 mg tablet Take 1 tablet by mouth daily      Omega-3 Fatty Acids (FISH OIL PO) Take 2,400 mg by mouth daily      Omega-3 Fatty Acids (FISH OIL) 1200 MG CAPS Take 2 capsules by mouth daily      primidone (MYSOLINE) 50 mg tablet Take by mouth      vitamin E, tocopherol, 400 units capsule Take 1 tablet by mouth daily      [DISCONTINUED] benzonatate (TESSALON PERLES) 100 mg capsule Take 1 capsule by mouth       No current facility-administered medications on file prior to visit  Family History   Problem Relation Age of Onset    Thyroid disease Mother     Thyroid disease Brother     Diabetes Paternal Grandmother     Hypertension Family      Social History     Social History    Marital status: /Civil Union     Spouse name: N/A    Number of children: N/A    Years of education: N/A     Occupational History    Not on file  Social History Main Topics    Smoking status: Never Smoker    Smokeless tobacco: Never Used    Alcohol use Yes      Comment: 2x per month; NEVER DRANK ALCOHOL AS PER ALL SCRIPTS     Drug use: No    Sexual activity: Not on file     Other Topics Concern    Not on file     Social History Narrative    No narrative on file     Vitals:    03/21/18 0925   BP: 118/78   BP Location: Left arm   Patient Position: Sitting   Cuff Size: Standard   Pulse: 73   Resp: 16   SpO2: 97%   Weight: 84 5 kg (186 lb 3 2 oz)   Height: 5' 8" (1 727 m)     Results for orders placed or performed in visit on 64/66/90   Basic metabolic panel   Result Value Ref Range    Sodium 139 136 - 145 mmol/L    Potassium 4 0 3 5 - 5 3 mmol/L    Chloride 105 100 - 108 mmol/L    CO2 30 21 - 32 mmol/L    Anion Gap 4 4 - 13 mmol/L    BUN 11 5 - 25 mg/dL    Creatinine 0 84 0 60 - 1 30 mg/dL    Glucose, Fasting 87 65 - 99 mg/dL    Calcium 8 8 8 3 - 10 1 mg/dL    eGFR 91 ml/min/1 73sq m     Weight (last 2 days)     Date/Time   Weight    03/21/18 0925  84 5 (186 2)            Body mass index is 28 31 kg/m²  BP      Temp      Pulse     Resp      SpO2        Vitals:    03/21/18 0925   Weight: 84 5 kg (186 lb 3 2 oz)     Vitals:    03/21/18 0925   Weight: 84 5 kg (186 lb 3 2 oz)      Physical Exam   Constitutional: He appears well-developed and well-nourished  No distress  HENT:   Head: Normocephalic and atraumatic     Right Ear: External ear normal    Left Ear: External ear normal    Mouth/Throat: Oropharynx is clear and moist  No oropharyngeal exudate  Erythema, normal airway no exudate   Eyes: Conjunctivae are normal  Pupils are equal, round, and reactive to light  Right eye exhibits no discharge  Left eye exhibits no discharge  No scleral icterus  Neck: Neck supple  Cardiovascular: Normal rate, regular rhythm and normal heart sounds  Exam reveals no gallop and no friction rub  No murmur heard  Pulmonary/Chest: No respiratory distress  He has no wheezes  He has no rales  Abdominal: Soft  Bowel sounds are normal  He exhibits no distension and no mass  There is no tenderness  There is no rebound and no guarding  Musculoskeletal: He exhibits no edema or deformity  Lymphadenopathy:     He has no cervical adenopathy  Neurological: He is alert  Skin: He is not diaphoretic  Psychiatric: He has a normal mood and affect

## 2018-03-22 ENCOUNTER — APPOINTMENT (OUTPATIENT)
Dept: PHYSICAL THERAPY | Facility: REHABILITATION | Age: 68
End: 2018-03-22
Payer: COMMERCIAL

## 2018-03-26 ENCOUNTER — HOSPITAL ENCOUNTER (OUTPATIENT)
Dept: RADIOLOGY | Facility: HOSPITAL | Age: 68
Discharge: HOME | End: 2018-03-26
Attending: SURGERY
Payer: COMMERCIAL

## 2018-03-26 DIAGNOSIS — Z85.09 PERSONAL HISTORY OF MALIGNANT NEOPLASM OF GALLBLADDER: ICD-10-CM

## 2018-03-26 DIAGNOSIS — D49.0 NEOPLASM OF GALLBLADDER: ICD-10-CM

## 2018-03-26 PROCEDURE — 74177 CT ABD & PELVIS W/CONTRAST: CPT

## 2018-03-26 PROCEDURE — 71260 CT THORAX DX C+: CPT

## 2018-03-26 RX ADMIN — IOHEXOL 100 ML: 350 INJECTION, SOLUTION INTRAVENOUS at 18:39

## 2018-03-27 DIAGNOSIS — Z85.09 PERSONAL HISTORY OF MALIGNANT NEOPLASM OF OTHER DIGESTIVE ORGANS: ICD-10-CM

## 2018-03-28 ENCOUNTER — OFFICE VISIT (OUTPATIENT)
Dept: PHYSICAL THERAPY | Facility: REHABILITATION | Age: 68
End: 2018-03-28
Payer: COMMERCIAL

## 2018-03-28 DIAGNOSIS — M75.41 SUBACROMIAL IMPINGEMENT OF RIGHT SHOULDER: Primary | ICD-10-CM

## 2018-03-28 PROCEDURE — 97110 THERAPEUTIC EXERCISES: CPT | Performed by: PHYSICAL THERAPIST

## 2018-03-28 PROCEDURE — 97112 NEUROMUSCULAR REEDUCATION: CPT | Performed by: PHYSICAL THERAPIST

## 2018-03-28 NOTE — PROGRESS NOTES
Daily Note     Today's date: 3/28/2018  Patient name: Shreyas Reid  : 1950  MRN: [id number]  Referring provider: Dmitri Mariano MD  Dx:   Encounter Diagnosis     ICD-10-CM    1  Subacromial impingement of right shoulder M75 41               1on1 from [id number] and performed remaining TE's as part of Fitness program       Subjective: Pt expresses discouragement with continued pain, especially with laying in bed  Pt advised to try using pillow to support his arm in bed      Objective: See treatment diary below  Manual  3/8  3/14  3/19  3/28               R shoulder IR PROM and post/inf GH Jm's   5 min  6'  5 min IR  (post/ inf GH JM NV)  5'                                                                                                                     Exercise Diary  3/5  3/8  3/14  3/19  3/28             ube    90 rpm 3'/3'  90 rpm 3'/3'  90 rpm 3'/3'  90rpm 3'/3'             TB LPD gtb 10  gtb 15  2x10  2x10  gtb 20             TB rows gtb 10  gtb 15  2x10  2x10  gtb 20             TB ER gtb 10  gtb 15  2x10 10x  gtb 20             TB IR gtb 10  gtb 15  2x10 10x  gtb 20             Sleeper stretch 10"x10  10"x10  10"x10  10"x10  10"x10             Post cap stretch HEP                     scap punches    3"x 15  3"x15  3"x15  2#2x10, 3"ea             scap abcs    1x  x1  1x  2#x1             Wall slides-towel    10"x10  10"x10 10"x10  10"x10             Prone rows    15  15  15  2#x15             Prone shoulder ext    15  15  15x  2#x15             Prone horizontal ABD    15  15  15x  2#X15             Ball circles against wall cw/ccw    grn 15 ea   05BF  88MJ Donna Welch                        CP PRN                             Assessment: Tolerated treatment well  Improved ROM post Jm's    VC's required for correct technique with TE's and to perform within pain-free ranges  Patient would benefit from continued PT      Plan: Continue per plan of care

## 2018-03-29 ENCOUNTER — OFFICE VISIT (OUTPATIENT)
Dept: PHYSICAL THERAPY | Facility: REHABILITATION | Age: 68
End: 2018-03-29
Payer: COMMERCIAL

## 2018-03-29 DIAGNOSIS — M75.41 SUBACROMIAL IMPINGEMENT OF RIGHT SHOULDER: Primary | ICD-10-CM

## 2018-03-29 PROCEDURE — 97110 THERAPEUTIC EXERCISES: CPT

## 2018-03-29 PROCEDURE — 97112 NEUROMUSCULAR REEDUCATION: CPT

## 2018-03-29 NOTE — PROGRESS NOTES
Daily Note     Today's date: 3/29/2018  Patient name: Yo Singh  : 1950  MRN: [id number]  Referring provider: Viktoriya Cullen MD  Dx:   Encounter Diagnosis     ICD-10-CM    1  Subacromial impingement of right shoulder M75 41                   Subjective: Pt reported intermittent symptoms persist but does see improvement  Objective: See treatment diary below  Manual  3/8  3/14  3/19  3/28  3/29             R shoulder IR PROM and post/inf GH Jm's   5 min  6'  5 min IR  (post/ inf GH JM NV)  5'  5 min                                                                                                                   Exercise Diary  3/5  3/8  3/14  3/19  3/28  3/29           ube    90 rpm 3'/3'  90 rpm 3'/3'  90 rpm 3'/3'  90rpm 3'/3'  90 rpm 3'/3'           TB LPD gtb 10  gtb 15  2x10  2x10  gtb 20  gtb 20           TB rows gtb 10  gtb 15  2x10  2x10  gtb 20  gtb 20           TB ER gtb 10  gtb 15  2x10 10x  gtb 20  gtb 20           TB IR gtb 10  gtb 15  2x10 10x  gtb 20  gtb 20           Sleeper stretch 10"x10  10"x10  10"x10  10"x10  10"x10  10"x10           Post cap stretch HEP                     scap punches    3"x 15  3"x15  3"x15  2#2x10, 3"ea  2#  3"x20           scap abcs    1x  x1  1x  2#x1  2#x1           6y Wall slides-towel    10"x10  10"x10 10"x10  10"x10  10"x10           Prone rows    15  15  15  2#x15  2#x15           Prone shoulder ext    15  15  15x  2#x15  2#x15           Prone horizontal ABD    15  15  15x  2#X15  2#x15           Ball circles against wall cw/ccw    grn 15 ea   15ea  92HF  12KK  15 ea                                                                                                                                                                  Modalities                        CP PRN                              Assessment: Tolerated treatment well  Patient exhibited good technique with therapeutic exercises  VC's needed for correct technique throughout session  Fatigued by end of session  Plan: Continue per plan of care

## 2018-04-02 ENCOUNTER — OFFICE VISIT (OUTPATIENT)
Dept: PHYSICAL THERAPY | Facility: REHABILITATION | Age: 68
End: 2018-04-02
Payer: COMMERCIAL

## 2018-04-02 DIAGNOSIS — M75.41 SUBACROMIAL IMPINGEMENT OF RIGHT SHOULDER: Primary | ICD-10-CM

## 2018-04-02 PROCEDURE — 97112 NEUROMUSCULAR REEDUCATION: CPT

## 2018-04-02 PROCEDURE — 97140 MANUAL THERAPY 1/> REGIONS: CPT

## 2018-04-02 PROCEDURE — 97110 THERAPEUTIC EXERCISES: CPT

## 2018-04-02 NOTE — PROGRESS NOTES
Daily Note     Today's date: 2018  Patient name: Leslie Muñoz  : 1950  MRN: [id number]  Referring provider: Bao Delarosa MD  Dx:   Encounter Diagnosis     ICD-10-CM    1  Subacromial impingement of right shoulder M75 41                   Subjective: Pt  reports no change in status upon arrival         Objective: See treatment diary below      Manual  3/8  3/14  3/19  3/28  3/29  42           R shoulder IR PROM and post/inf GH Jm's   5 min  6'  5 min IR  (post/ inf GH JM NV)  5'  5 min  KP (PROM & LAT)                                                                                                                 Exercise Diary  3/5  3/8  3/14  3/19  3/28  3/29  4/2         ube    90 rpm 3'/3'  90 rpm 3'/3'  90 rpm 3'/3'  90rpm 3'/3'  90 rpm 3'/3'  90 rpm 3/3         TB LPD gtb 10  gtb 15  2x10  2x10  gtb 20  gtb 20  BTB 20         TB rows gtb 10  gtb 15  2x10  2x10  gtb 20  gtb 20  BTB 20         TB ER gtb 10  gtb 15  2x10 10x  gtb 20  gtb 20  BTB 20         TB IR gtb 10  gtb 15  2x10 10x  gtb 20  gtb 20  BTB 20         Sleeper stretch 10"x10  10"x10  10"x10  10"x10  10"x10  10"x10  10x10"         Post cap stretch HEP                     scap punches    3"x 15  3"x15  3"x15  2#2x10, 3"ea  2#  3"x20  2# 20x3"         scap abcs    1x  x1  1x  2#x1  2#x1  2#x1         6y Wall slides-towel    10"x10  10"x10 10"x10  10"x10  10"x10  10x10"         Prone rows    15  15  15  2#x15  2#x15  2#x15         Prone shoulder ext    15  15  15x  2#x15  2#x15  2#x15         Prone horizontal ABD    15  15  15x  2#X15  2#x15  2#x15         Ball circles against wall cw/ccw    grn 15 ea   15ea  15ea  91DX  14 ea  Panda Cosier                                                                                                                                                               Modalities                        CP PRN                              Assessment: Tolerated treatment fair   Patient demonstrated fatigue post treatment and would benefit from continued PT  Pt able to increase resistance on band exercise, tolerated well  Pt requires some cuing for form during TB rows  Pt  able to complete all exercises with no increase in pain during or after session  Pt 1:1 with PTA KP [phone], PT FB [phone]  Plan: Continue per plan of care

## 2018-04-03 ENCOUNTER — TELEPHONE (OUTPATIENT)
Dept: OBGYN CLINIC | Facility: CLINIC | Age: 68
End: 2018-04-03

## 2018-04-03 NOTE — TELEPHONE ENCOUNTER
Caller: Dr Soheila Mendez Patient  Ph: [phone]  Caller would like to start authorization for left knee injection  Please process

## 2018-04-04 ENCOUNTER — OFFICE VISIT (OUTPATIENT)
Dept: SURGICAL ONCOLOGY | Facility: HOSPITAL | Age: 68
End: 2018-04-04
Payer: COMMERCIAL

## 2018-04-04 ENCOUNTER — OFFICE VISIT (OUTPATIENT)
Dept: PHYSICAL THERAPY | Facility: REHABILITATION | Age: 68
End: 2018-04-04
Payer: COMMERCIAL

## 2018-04-04 VITALS
WEIGHT: 180 LBS | DIASTOLIC BLOOD PRESSURE: 80 MMHG | BODY MASS INDEX: 27.28 KG/M2 | RESPIRATION RATE: 14 BRPM | SYSTOLIC BLOOD PRESSURE: 138 MMHG | HEART RATE: 70 BPM | TEMPERATURE: 97.6 F | HEIGHT: 68 IN

## 2018-04-04 DIAGNOSIS — M75.41 SUBACROMIAL IMPINGEMENT OF RIGHT SHOULDER: Primary | ICD-10-CM

## 2018-04-04 DIAGNOSIS — C49.A3 GASTROINTESTINAL STROMAL TUMOR (GIST) OF DUODENUM (HCC): Primary | ICD-10-CM

## 2018-04-04 PROCEDURE — 97112 NEUROMUSCULAR REEDUCATION: CPT

## 2018-04-04 PROCEDURE — 99214 OFFICE O/P EST MOD 30 MIN: CPT | Performed by: SURGERY

## 2018-04-04 PROCEDURE — 97110 THERAPEUTIC EXERCISES: CPT

## 2018-04-04 PROCEDURE — 97140 MANUAL THERAPY 1/> REGIONS: CPT

## 2018-04-04 NOTE — PROGRESS NOTES
Surgical Oncology Follow Up       [address] [place] [address] SURGICAL ONCOLOGY [facility] [address]  [place] [address]    Ana Manzanareswa  1950  [id number]  [place] [address] [address]    Chief Complaint   Patient presents with    Results     CT SCAN & LABS WORK, 6 MONTH F/U       Assessment/Plan:    No problem-specific Assessment & Plan notes found for this encounter  Diagnoses and all orders for this visit:    Gastrointestinal stromal tumor (GIST) of duodenum ([address] )  -     CT chest abdomen pelvis w contrast; Future  -     Basic metabolic panel; Future        Advance Care Planning/Advance Directives:  Discussed disease status, cancer treatment plans and/or cancer treatment goals with the patient  No history exists  History of Present Illness:   Diagnosis and Staging: stage I gastrointestinal stromal tumor, duodenum         Treatment History: duodenal resection July 2015      Interval History: Mr Charles Rivera is a 29-year-old man here for follow-up visit approximately a year post duodenal resection for low-grade gastrointestinal stromal tumor  He is doing well and has no major complaints to report  He had a CT scan done in anticipation of today's visit  Review of Systems:  Review of Systems   Constitutional: Negative  HENT: Negative  Eyes: Negative  Respiratory: Negative  Cardiovascular: Negative  Gastrointestinal: Negative  Endocrine: Negative  Genitourinary: Negative  Musculoskeletal: Negative  Skin: Negative  Allergic/Immunologic: Negative  Neurological: Negative  Hematological: Negative  Psychiatric/Behavioral: Negative          Patient Active Problem List   Diagnosis    Subacromial impingement of right shoulder    Enlarged prostate without lower urinary tract symptoms (luts)    Esophageal reflux    Essential hypertriglyceridemia    Hyperlipidemia    Splenomegaly    Tremor, essential    Primary osteoarthritis of knee    Gastrointestinal stromal tumor (GIST) of duodenum (HCC)     Past Medical History:   Diagnosis Date    Abnormal weight loss     RESOLVED: [id number]    Anemia     RESOLVED: 13QLE5286    Atypical chest pain     RESOLVED: 64EMO7551    BPH (benign prostatic hyperplasia)     Cancer (HCC)     DUODENAL    Luis Miguel's syndrome     Depression     RESOLVED: 47NGV3539    Diverticulitis of colon     LAST ASSESSED: 30DGQ6251    Duodenal nodule     RESOLVED: 34PGR6289    Gastrointestinal stromal tumor (GIST) (HCC)     MALIGNANT; RESOLVED: 95BYT4981    GERD (gastroesophageal reflux disease)     Glaucoma     RESOLVED: 49HUJ5146    Insomnia     RESOLVED: 81TSH2625    Lactose intolerance     LAST ASSESSED: 53OKY1323     Past Surgical History:   Procedure Laterality Date    BACK SURGERY      CERVICAL FUSION      c4 and c5    CHOLECYSTECTOMY      ELBOW SURGERY Left     REPAIR    EYE SURGERY      KNEE ARTHROSCOPY Left     LUMBAR DISCECTOMY      L5 S1    ORIF RADIAL SHAFT FRACTURE      [place] ESOPHAGOGASTRODUODENOSCOPY TRANSORAL DIAGNOSTIC N/A 11/1/2016    Procedure: ESOPHAGOGASTRODUODENOSCOPY (EGD); Surgeon: Verena Guthrie MD;  [facility]; Service: Gastroenterology    SMALL INTESTINE SURGERY      GIST surgery     Family History   Problem Relation Age of Onset    Thyroid disease Mother     Thyroid disease Brother     Diabetes Paternal Grandmother     Hypertension Family      Social History     Social History    Marital status: /Civil Union     Spouse name: N/A    Number of children: N/A    Years of education: N/A     Occupational History    Not on file       Social History Main Topics    Smoking status: Never Smoker    Smokeless tobacco: Never Used    Alcohol use Yes      Comment: 2x per month; NEVER DRANK ALCOHOL AS PER ALL SCRIPTS     Drug use: No    Sexual activity: Not on file     Other Topics Concern    Not on file     Social History Narrative    No narrative on file       Current Outpatient Prescriptions:     B Complex-Biotin-FA (SUPER B-50 B COMPLEX PO), Take 1 tablet by mouth daily, Disp: , Rfl:     bimatoprost (LUMIGAN) 0 01 % ophthalmic drops, 1 drop daily at bedtime, Disp: , Rfl:     Cholecalciferol (VITAMIN D-3 PO), Take 1 capsule by mouth daily, Disp: , Rfl:     cholestyramine (QUESTRAN) 4 GM/DOSE powder, Take 1 packet by mouth every other day, Disp: , Rfl:     diflorasone (PSORCON) 0 05 % ointment, APPLY EVERY DAY AT BEDTIME WITH OCCLUSION, Disp: , Rfl: 3    dorzolamide (TRUSOPT) 2 % ophthalmic solution, 1 drop 3 (three) times a day, Disp: , Rfl:     fluticasone (FLONASE) 50 mcg/act nasal spray, 2 sprays into each nostril, Disp: , Rfl:     GLUCOSAMINE CHONDROITIN COMPLX PO, Take 1 tablet by mouth daily, Disp: , Rfl:     Multiple Vitamins-Minerals (MULTIVITAL) tablet, Take 1 tablet by mouth daily, Disp: , Rfl:     pantoprazole (PROTONIX) 40 mg tablet, Take 1 tablet (40 mg total) by mouth daily, Disp: 30 tablet, Rfl: 3    PROAIR  (90 Base) MCG/ACT inhaler, Inhale 2 puffs, Disp: , Rfl: 0    tamsulosin (FLOMAX) 0 4 mg, Take 0 4 mg by mouth daily with dinner, Disp: , Rfl:     Vitamin E 400 UNITS TABS, Take 1 tablet by mouth daily, Disp: , Rfl:   No Known Allergies  Vitals:    04/04/18 1508   BP: 138/80   Pulse: 70   Resp: 14   Temp: 97 6 °F (36 4 °C)       Physical Exam   Constitutional: He is oriented to person, place, and time  He appears well-developed and well-nourished  HENT:   Head: Normocephalic and atraumatic  Right Ear: External ear normal    Left Ear: External ear normal    Eyes: Conjunctivae and EOM are normal  Pupils are equal, round, and reactive to light  Neck: Normal range of motion  Neck supple  Cardiovascular: Normal rate, regular rhythm, normal heart sounds and intact distal pulses      Pulmonary/Chest: Effort normal and breath sounds normal    Abdominal: Soft  Bowel sounds are normal  He exhibits no distension and no mass  There is no tenderness  There is no rebound and no guarding  Musculoskeletal: Normal range of motion  Lymphadenopathy:     He has no cervical adenopathy  Neurological: He is alert and oriented to person, place, and time  Skin: Skin is warm and dry  Psychiatric: He has a normal mood and affect  His behavior is normal  Judgment and thought content normal          Results:  Labs:  None    Imaging  Ct Chest Abdomen Pelvis W Contrast    Result Date: 4/1/2018  Narrative: CT CHEST, ABDOMEN AND PELVIS WITH IV CONTRAST INDICATION:   D49 0: Neoplasm of unspecified behavior of digestive system  COMPARISON: CT chest abdomen pelvis 9/19/2017 TECHNIQUE: CT examination of the chest, abdomen and pelvis was performed  Axial, sagittal, and coronal 2D reformatted images were created from the source data and submitted for interpretation  Radiation dose length product (DLP) for this visit:  957 77 mGy-cm   This examination, like all CT scans performed in the [facility], was performed utilizing techniques to minimize radiation dose exposure, including the use of iterative  reconstruction and automated exposure control  IV Contrast:  100 mL of iohexol (OMNIPAQUE) Enteric Contrast: Enteric contrast was administered  FINDINGS: CHEST LUNGS:  There is bibasilar dependent atelectasis  Lungs are otherwise clear  There is no tracheal or endobronchial lesion  PLEURA:  Unremarkable  HEART/GREAT VESSELS:  Unremarkable for patient's age  MEDIASTINUM AND ARIAN:  Unremarkable  CHEST WALL AND LOWER NECK:   Unremarkable  ABDOMEN LIVER/BILIARY TREE:  Unremarkable  GALLBLADDER:  Gallbladder is surgically absent  SPLEEN:  Unremarkable  PANCREAS:  Unremarkable  ADRENAL GLANDS:  Unremarkable  KIDNEYS/URETERS:  Unremarkable  No hydronephrosis   STOMACH AND BOWEL:  There is colonic diverticulosis without evidence of acute diverticulitis  APPENDIX:  A normal appendix was visualized  ABDOMINOPELVIC CAVITY:  No ascites or free intraperitoneal air  No lymphadenopathy  VESSELS:  Unremarkable for patient's age  PELVIS REPRODUCTIVE ORGANS:  Unremarkable for patient's age  URINARY BLADDER:  Unremarkable  ABDOMINAL WALL/INGUINAL REGIONS:  Unremarkable  OSSEOUS STRUCTURES:  No acute fracture or destructive osseous lesion  Impression: 1  No evidence of metastatic disease within the chest, abdomen or pelvis  2   Colonic diverticulosis without evidence of acute diverticulitis  Workstation performed: [id number]     I reviewed the above laboratory and imaging data  Discussion/Summary:  History of stage I duodenal gastrointestinal stromal tumor  No evidence of disease recurrence  Plan on 6 month follow-up with scan at that time

## 2018-04-04 NOTE — LETTER
April 4, 2018     Halima JuliannaIvette  [address] [facility] [address] [place] [address]    Patient: Jessica Garcia   YOB: 1950   Date of Visit: 4/4/2018       Dear Dr Brennen Avalos: Thank you for referring Jessica Garcia to me for evaluation  Below are my notes for this consultation  If you have questions, please do not hesitate to call me  I look forward to following your patient along with you  Sincerely,        Gretel Ellis MD        CC: MD Dalila Robb MD Murphy Foy, MD Libbie Morton, MD Hazeline Phillips, MD  4/4/2018  3:26 PM  Sign at close encounter     Surgical Oncology Follow Up       [place]  [address]  [facility] [address]    Jessica Garcia  1950  [id number]  [facility]  [address] [address]    Chief Complaint   Patient presents with    Results     CT SCAN & LABS WORK, 6 MONTH F/U       Assessment/Plan:    No problem-specific Assessment & Plan notes found for this encounter  Diagnoses and all orders for this visit:    Gastrointestinal stromal tumor (GIST) of duodenum ([facility] [address] )  -     CT chest abdomen pelvis w contrast; Future  -     Basic metabolic panel; Future        Advance Care Planning/Advance Directives:  Discussed disease status, cancer treatment plans and/or cancer treatment goals with the patient  No history exists  History of Present Illness:   Diagnosis and Staging: stage I gastrointestinal stromal tumor, duodenum         Treatment History: duodenal resection July 2015      Interval History: Mr Dewayne Corcoran is a 74-year-old man here for follow-up visit approximately a year post duodenal resection for low-grade gastrointestinal stromal tumor  He is doing well and has no major complaints to report  He had a CT scan done in anticipation of today's visit  Review of Systems:  Review of Systems   Constitutional: Negative  HENT: Negative  Eyes: Negative  Respiratory: Negative  Cardiovascular: Negative  Gastrointestinal: Negative  Endocrine: Negative  Genitourinary: Negative  Musculoskeletal: Negative  Skin: Negative  Allergic/Immunologic: Negative  Neurological: Negative  Hematological: Negative  Psychiatric/Behavioral: Negative  Patient Active Problem List   Diagnosis    Subacromial impingement of right shoulder    Enlarged prostate without lower urinary tract symptoms (luts)    Esophageal reflux    Essential hypertriglyceridemia    Hyperlipidemia    Splenomegaly    Tremor, essential    Primary osteoarthritis of knee    Gastrointestinal stromal tumor (GIST) of duodenum (HCC)     Past Medical History:   Diagnosis Date    Abnormal weight loss     RESOLVED: 06FRU9500    Anemia     RESOLVED: 23RGW4903    Atypical chest pain     RESOLVED: 69ZGV6979    BPH (benign prostatic hyperplasia)     Cancer (HCC)     DUODENAL    Luis Miguel's syndrome     Depression     RESOLVED: 67POP8472    Diverticulitis of colon     LAST ASSESSED: 59XBA4476    Duodenal nodule     RESOLVED: 12XBW4450    Gastrointestinal stromal tumor (GIST) (HCC)     MALIGNANT; RESOLVED: 63YKL1462    GERD (gastroesophageal reflux disease)     Glaucoma     RESOLVED: 13FOW3731    Insomnia     RESOLVED: 53IUA6097    Lactose intolerance     LAST ASSESSED: 29JGC3494     Past Surgical History:   Procedure Laterality Date    BACK SURGERY      CERVICAL FUSION      c4 and c5    CHOLECYSTECTOMY      ELBOW SURGERY Left     REPAIR    EYE SURGERY      KNEE ARTHROSCOPY Left     LUMBAR DISCECTOMY      L5 S1    ORIF RADIAL SHAFT FRACTURE      [place] ESOPHAGOGASTRODUODENOSCOPY TRANSORAL DIAGNOSTIC N/A 11/1/2016    Procedure: ESOPHAGOGASTRODUODENOSCOPY (EGD); Surgeon: Hanny Herr MD;  [facility];   Service: Gastroenterology    SMALL INTESTINE SURGERY      GIST surgery     Family History   Problem Relation Age of Onset    Thyroid disease Mother     Thyroid disease Brother     Diabetes Paternal Grandmother     Hypertension Family      Social History     Social History    Marital status: /Civil Union     Spouse name: N/A    Number of children: N/A    Years of education: N/A     Occupational History    Not on file       Social History Main Topics    Smoking status: Never Smoker    Smokeless tobacco: Never Used    Alcohol use Yes      Comment: 2x per month; NEVER DRANK ALCOHOL AS PER ALL SCRIPTS     Drug use: No    Sexual activity: Not on file     Other Topics Concern    Not on file     Social History Narrative    No narrative on file       Current Outpatient Prescriptions:     B Complex-Biotin-FA (SUPER B-50 B COMPLEX PO), Take 1 tablet by mouth daily, Disp: , Rfl:     bimatoprost (LUMIGAN) 0 01 % ophthalmic drops, 1 drop daily at bedtime, Disp: , Rfl:     Cholecalciferol (VITAMIN D-3 PO), Take 1 capsule by mouth daily, Disp: , Rfl:     cholestyramine (QUESTRAN) 4 GM/DOSE powder, Take 1 packet by mouth every other day, Disp: , Rfl:     diflorasone (PSORCON) 0 05 % ointment, APPLY EVERY DAY AT BEDTIME WITH OCCLUSION, Disp: , Rfl: 3    dorzolamide (TRUSOPT) 2 % ophthalmic solution, 1 drop 3 (three) times a day, Disp: , Rfl:     fluticasone (FLONASE) 50 mcg/act nasal spray, 2 sprays into each nostril, Disp: , Rfl:     GLUCOSAMINE CHONDROITIN COMPLX PO, Take 1 tablet by mouth daily, Disp: , Rfl:     Multiple Vitamins-Minerals (MULTIVITAL) tablet, Take 1 tablet by mouth daily, Disp: , Rfl:     pantoprazole (PROTONIX) 40 mg tablet, Take 1 tablet (40 mg total) by mouth daily, Disp: 30 tablet, Rfl: 3    PROAIR  (90 Base) MCG/ACT inhaler, Inhale 2 puffs, Disp: , Rfl: 0    tamsulosin (FLOMAX) 0 4 mg, Take 0 4 mg by mouth daily with dinner, Disp: , Rfl:     Vitamin E 400 UNITS TABS, Take 1 tablet by mouth daily, Disp: , Rfl: No Known Allergies  Vitals:    04/04/18 1508   BP: 138/80   Pulse: 70   Resp: 14   Temp: 97 6 °F (36 4 °C)       Physical Exam   Constitutional: He is oriented to person, place, and time  He appears well-developed and well-nourished  HENT:   Head: Normocephalic and atraumatic  Right Ear: External ear normal    Left Ear: External ear normal    Eyes: Conjunctivae and EOM are normal  Pupils are equal, round, and reactive to light  Neck: Normal range of motion  Neck supple  Cardiovascular: Normal rate, regular rhythm, normal heart sounds and intact distal pulses  Pulmonary/Chest: Effort normal and breath sounds normal    Abdominal: Soft  Bowel sounds are normal  He exhibits no distension and no mass  There is no tenderness  There is no rebound and no guarding  Musculoskeletal: Normal range of motion  Lymphadenopathy:     He has no cervical adenopathy  Neurological: He is alert and oriented to person, place, and time  Skin: Skin is warm and dry  Psychiatric: He has a normal mood and affect  His behavior is normal  Judgment and thought content normal          Results:  Labs:  None    Imaging  Ct Chest Abdomen Pelvis W Contrast    Result Date: 4/1/2018  Narrative: CT CHEST, ABDOMEN AND PELVIS WITH IV CONTRAST INDICATION:   D49 0: Neoplasm of unspecified behavior of digestive system  COMPARISON: CT chest abdomen pelvis 9/19/2017 TECHNIQUE: CT examination of the chest, abdomen and pelvis was performed  Axial, sagittal, and coronal 2D reformatted images were created from the source data and submitted for interpretation  Radiation dose length product (DLP) for this visit:  957 77 mGy-cm   This examination, like all CT scans performed in the [facility], was performed utilizing techniques to minimize radiation dose exposure, including the use of iterative  reconstruction and automated exposure control   IV Contrast:  100 mL of iohexol (OMNIPAQUE) Enteric Contrast: Enteric contrast was administered  FINDINGS: CHEST LUNGS:  There is bibasilar dependent atelectasis  Lungs are otherwise clear  There is no tracheal or endobronchial lesion  PLEURA:  Unremarkable  HEART/GREAT VESSELS:  Unremarkable for patient's age  MEDIASTINUM AND ARIAN:  Unremarkable  CHEST WALL AND LOWER NECK:   Unremarkable  ABDOMEN LIVER/BILIARY TREE:  Unremarkable  GALLBLADDER:  Gallbladder is surgically absent  SPLEEN:  Unremarkable  PANCREAS:  Unremarkable  ADRENAL GLANDS:  Unremarkable  KIDNEYS/URETERS:  Unremarkable  No hydronephrosis  STOMACH AND BOWEL:  There is colonic diverticulosis without evidence of acute diverticulitis  APPENDIX:  A normal appendix was visualized  ABDOMINOPELVIC CAVITY:  No ascites or free intraperitoneal air  No lymphadenopathy  VESSELS:  Unremarkable for patient's age  PELVIS REPRODUCTIVE ORGANS:  Unremarkable for patient's age  URINARY BLADDER:  Unremarkable  ABDOMINAL WALL/INGUINAL REGIONS:  Unremarkable  OSSEOUS STRUCTURES:  No acute fracture or destructive osseous lesion  Impression: 1  No evidence of metastatic disease within the chest, abdomen or pelvis  2   Colonic diverticulosis without evidence of acute diverticulitis  Workstation performed: [id number]     I reviewed the above laboratory and imaging data  Discussion/Summary:  History of stage I duodenal gastrointestinal stromal tumor  No evidence of disease recurrence  Plan on 6 month follow-up with scan at that time

## 2018-04-04 NOTE — PROGRESS NOTES
Daily Note     Today's date: 2018  Patient name: Janette Johnson  : 1950  MRN: [id number]  Referring provider: Royer Berry MD  Dx:   Encounter Diagnosis     ICD-10-CM    1   Subacromial impingement of right shoulder M75 41                   Subjective: Pt  reports no change in status upon arrival         Objective: See treatment diary below        Manual  3/8  3/14  3/19  3/28  3/29  4/2  4/4         R shoulder IR PROM and post/inf GH Jm's   5 min  6'  5 min IR  (post/ inf GH JM NV)  5'  5 min  KP (PROM & LAT)  KP (PROM & LAT)                                                                                                               Exercise Diary  3/5  3/8  3/14  3/19  3/28  3/29  4/2  4/4       ube    90 rpm 3'/3'  90 rpm 3'/3'  90 rpm 3'/3'  90rpm 3'/3'  90 rpm 3'/3'  90 rpm 3/3  90 rpm 3/3       TB LPD gtb 10  gtb 15  2x10  2x10  gtb 20  gtb 20  BTB 20  BTB 20       TB rows gtb 10  gtb 15  2x10  2x10  gtb 20  gtb 20  BTB 20  BTB 20       TB ER gtb 10  gtb 15  2x10 10x  gtb 20  gtb 20  BTB 20  BTB 20       TB IR gtb 10  gtb 15  2x10 10x  gtb 20  gtb 20  BTB 20  BTB 20       Sleeper stretch 10"x10  10"x10  10"x10  10"x10  10"x10  10"x10  10x10"  10x10"       Post cap stretch HEP                     scap punches    3"x 15  3"x15  3"x15  2#2x10, 3"ea  2#  3"x20  2# 20x3"  2# 20x3"       scap abcs    1x  x1  1x  2#x1  2#x1  2#x1  2# x 1       6y Wall slides-towel    10"x10  10"x10 10"x10  10"x10  10"x10  10x10"  10x10"       Prone rows    15  15  15  2#x15  2#x15  2#x15  2#x15       Prone shoulder ext    15  15  15x  2#x15  2#x15  2#x15  2#x15       Prone horizontal ABD    15  15  15x  2#X15  2#x15  2#x15  2#x15       Ball circles against wall cw/ccw    grn 15 ea   15ea  15ea  15ea  15 ea  [place] [facility] [place]                        CP PRN                              Assessment: Tolerated treatment fair  Patient would benefit from continued PT  Pt had some fatigue with ball on wall, required cuing for proper hand orientation for prone exercises  Pt  able to complete all exercises with no increase in pain during or after session  Pt  1:1 with PTA for entirety  Plan: Continue per plan of care

## 2018-04-09 ENCOUNTER — OFFICE VISIT (OUTPATIENT)
Dept: PHYSICAL THERAPY | Facility: REHABILITATION | Age: 68
End: 2018-04-09
Payer: COMMERCIAL

## 2018-04-09 DIAGNOSIS — M75.41 SUBACROMIAL IMPINGEMENT OF RIGHT SHOULDER: Primary | ICD-10-CM

## 2018-04-09 PROCEDURE — 97112 NEUROMUSCULAR REEDUCATION: CPT

## 2018-04-09 PROCEDURE — 97110 THERAPEUTIC EXERCISES: CPT

## 2018-04-09 PROCEDURE — 97140 MANUAL THERAPY 1/> REGIONS: CPT

## 2018-04-09 NOTE — PROGRESS NOTES
Daily Note     Today's date: 2018  Patient name: Leo Mcmillan  : 1950  MRN: [id number]  Referring provider: Rasta Ball MD  Dx:   Encounter Diagnosis     ICD-10-CM    1  Subacromial impingement of right shoulder M75 41                   Subjective: Patient states that his shoulder hurts more when he is trying to sleep           Objective: See treatment diary below        Manual  3/8  3/14  3/19  3/28  3/29  4/2  4/4         R shoulder IR PROM and post/inf GH Jm's   5 min  6'  5 min IR  (post/ inf GH JM NV)  5'  5 min  KP (PROM & LAT)  KP (PROM & LAT)                                                                                                               Exercise Diary  3/5  3/8  3/14  3/19  3/28  3/29  4/2  4/4  4/9   ube    90 rpm 3'/3'  90 rpm 3'/3'  90 rpm 3'/3'  90rpm 3'/3'  90 rpm 3'/3'  90 rpm 3/3  90 rpm 3/3  90rpm  3/3   TB LPD gtb 10  gtb 15  2x10  2x10  gtb 20  gtb 20  BTB 20  BTB 20  BTB 20   TB rows gtb 10  gtb 15  2x10  2x10  gtb 20  gtb 20  BTB 20  BTB 20  BTB 20   TB ER gtb 10  gtb 15  2x10 10x  gtb 20  gtb 20  BTB 20  BTB 20  BTB 20   TB IR gtb 10  gtb 15  2x10 10x  gtb 20  gtb 20  BTB 20  BTB 20  BTB 20   Sleeper stretch 10"x10  10"x10  10"x10  10"x10  10"x10  10"x10  10x10"  10x10"  10x10"   Post cap stretch HEP                HEP   scap punches    3"x 15  3"x15  3"x15  2#2x10, 3"ea  2#  3"x20  2# 20x3"  2# 20x3"  3# 20x3"   scap abcs    1x  x1  1x  2#x1  2#x1  2#x1  2# x 1  3# x1   Wall slides-towel    10"x10  10"x10 10"x10  10"x10  10"x10  10x10"  10x10"  10x10"   Prone rows    15  15  15  2#x15  2#x15  2#x15  2#x15  2# x15   Prone shoulder ext    15  15  15x  2#x15  2#x15  2#x15  2#x15  2# x15   Prone horizontal ABD    15  15  15x  2#X15  2#x15  2#x15  2#x15  2# x15   Ball circles against wall cw/ccw    grn 15 ea   15ea  15ea  15ea  15 ea   95MV  89DO  20 ea                                                                                                                                             Modalities                        CP PRN                              Assessment: Patient did well with all TE as listed, reports no increased pain during or post tx  Plan: Continue per plan of care

## 2018-04-10 ENCOUNTER — OFFICE VISIT (OUTPATIENT)
Dept: OBGYN CLINIC | Facility: HOSPITAL | Age: 68
End: 2018-04-10
Payer: COMMERCIAL

## 2018-04-10 VITALS
WEIGHT: 187 LBS | SYSTOLIC BLOOD PRESSURE: 128 MMHG | DIASTOLIC BLOOD PRESSURE: 85 MMHG | RESPIRATION RATE: 20 BRPM | HEART RATE: 72 BPM | BODY MASS INDEX: 28.43 KG/M2

## 2018-04-10 DIAGNOSIS — M71.22 BAKER CYST, LEFT: Primary | ICD-10-CM

## 2018-04-10 DIAGNOSIS — M17.12 PRIMARY OSTEOARTHRITIS OF LEFT KNEE: ICD-10-CM

## 2018-04-10 PROCEDURE — 20610 DRAIN/INJ JOINT/BURSA W/O US: CPT | Performed by: ORTHOPAEDIC SURGERY

## 2018-04-10 PROCEDURE — 20610 DRAIN/INJ JOINT/BURSA W/O US: CPT | Performed by: PHYSICIAN ASSISTANT

## 2018-04-10 RX ORDER — LIDOCAINE HYDROCHLORIDE 10 MG/ML
2 INJECTION, SOLUTION INFILTRATION; PERINEURAL
Status: COMPLETED | OUTPATIENT
Start: 2018-04-10 | End: 2018-04-10

## 2018-04-10 RX ADMIN — LIDOCAINE HYDROCHLORIDE 2 ML: 10 INJECTION, SOLUTION INFILTRATION; PERINEURAL at 18:01

## 2018-04-10 NOTE — PROGRESS NOTES
Orthopedics          Con Benjamin 79 y o  male MRN: [id number]      Chief Complaint:   left knee pain    HPI:   79 y o male complaining of left knee pain and osteoarthritis  The patient did receive a Synvisc-One injection 7 months ago  He states having mild Ubaldo Paci agent of his pain  Pain is aching in nature  Patient does state having fullness in the posterior aspect of his left knee  Does have a history aspiration of his left Baker cyst however this did accumulate several days thereafter  He does have aching pain in nature denies any clicking popping catching instability of his left knee                Review Of Systems:   · Skin: Normal  · Neuro: See HPI  · Musculoskeletal: See HPI  · 14 point review of systems negative except as stated above     Past Medical History:   Past Medical History:   Diagnosis Date    Abnormal weight loss     RESOLVED: [id number]    Anemia     RESOLVED: [id number]    Atypical chest pain     RESOLVED: 05STL5771    BPH (benign prostatic hyperplasia)     Cancer (HCC)     DUODENAL    Luis Miguel's syndrome     Depression     RESOLVED: [id number]    Diverticulitis of colon     LAST ASSESSED: [id number]    Duodenal nodule     RESOLVED: [id number]    Gastrointestinal stromal tumor (GIST) (HCC)     MALIGNANT; RESOLVED: [id number]    GERD (gastroesophageal reflux disease)     Glaucoma     RESOLVED: 77BDR3173    Insomnia     RESOLVED: 43ZOM7012    Lactose intolerance     LAST ASSESSED: [id number]       Past Surgical History:   Past Surgical History:   Procedure Laterality Date    BACK SURGERY      CERVICAL FUSION      c4 and c5    CHOLECYSTECTOMY      ELBOW SURGERY Left     REPAIR    EYE SURGERY      KNEE ARTHROSCOPY Left     LUMBAR DISCECTOMY      L5 S1    ORIF RADIAL SHAFT FRACTURE      [place] ESOPHAGOGASTRODUODENOSCOPY TRANSORAL DIAGNOSTIC N/A 11/1/2016    Procedure: ESOPHAGOGASTRODUODENOSCOPY (EGD); Surgeon: Stone Blancas MD;  Location: [facility];   Service: Gastroenterology   Yoel Medrano SMALL INTESTINE SURGERY      GIST surgery       Family History:  Family history reviewed and non-contributory  Family History   Problem Relation Age of Onset    Thyroid disease Mother     Thyroid disease Brother     Diabetes Paternal Grandmother     Hypertension Family        Social History:  Social History     Social History    Marital status: /Civil Union     Spouse name: N/A    Number of children: N/A    Years of education: N/A     Social History Main Topics    Smoking status: Never Smoker    Smokeless tobacco: Never Used    Alcohol use Yes      Comment: 2x per month; NEVER DRANK ALCOHOL AS PER ALL SCRIPTS     Drug use: No    Sexual activity: Not Asked     Other Topics Concern    None     Social History Narrative    None       Allergies:   No Known Allergies    Labs:    0  Lab Value Date/Time   HCT 49 9 (H) 12/06/2016 1032   HCT 46 4 05/27/2016 0950   HCT 39 1 08/05/2015 0649   HCT 40 5 08/02/2015 0604   HCT 39 1 07/31/2015 0657   HGB 17 4 (H) 12/06/2016 1032   HGB 16 0 05/27/2016 0950   HGB 13 2 08/05/2015 0649   HGB 13 8 08/02/2015 0604   HGB 13 1 07/31/2015 0657   INR 0 96 07/01/2015 0944   WBC 7 53 12/06/2016 1032   WBC 6 04 05/27/2016 0950   WBC 5 23 08/05/2015 0649   WBC 9 51 08/02/2015 0604   WBC 8 05 07/31/2015 0657   ESR 7 12/06/2016 1032       Meds:    Current Outpatient Prescriptions:     B Complex-Biotin-FA (SUPER B-50 B COMPLEX PO), Take 1 tablet by mouth daily, Disp: , Rfl:     bimatoprost (LUMIGAN) 0 01 % ophthalmic drops, 1 drop daily at bedtime, Disp: , Rfl:     Cholecalciferol (VITAMIN D-3 PO), Take 1 capsule by mouth daily, Disp: , Rfl:     cholestyramine (QUESTRAN) 4 GM/DOSE powder, Take 1 packet by mouth every other day, Disp: , Rfl:     diflorasone (PSORCON) 0 05 % ointment, APPLY EVERY DAY AT BEDTIME WITH OCCLUSION, Disp: , Rfl: 3    dorzolamide (TRUSOPT) 2 % ophthalmic solution, 1 drop 3 (three) times a day, Disp: , Rfl:     fluticasone (FLONASE) 50 mcg/act nasal spray, 2 sprays into each nostril, Disp: , Rfl:     GLUCOSAMINE CHONDROITIN COMPLX PO, Take 1 tablet by mouth daily, Disp: , Rfl:     Multiple Vitamins-Minerals (MULTIVITAL) tablet, Take 1 tablet by mouth daily, Disp: , Rfl:     pantoprazole (PROTONIX) 40 mg tablet, Take 1 tablet (40 mg total) by mouth daily, Disp: 30 tablet, Rfl: 3    PROAIR  (90 Base) MCG/ACT inhaler, Inhale 2 puffs, Disp: , Rfl: 0    tamsulosin (FLOMAX) 0 4 mg, Take 0 4 mg by mouth daily with dinner, Disp: , Rfl:     Vitamin E 400 UNITS TABS, Take 1 tablet by mouth daily, Disp: , Rfl:       Physical Exam:     General Appearance:    Alert, cooperative, no distress, appears stated age   Head:    Normocephalic, without obvious abnormality, atraumatic   Eyes:    conjunctiva/corneas clear, both eyes        Nose:   Nares normal, septum midline, no drainage    Throat:   Lips normal; teeth and gums normal   Neck:    symmetrical, trachea midline, ;     thyroid:  no enlargement/   Back:     Symmetric, no curvature, ROM normal   Lungs:   No audible wheezing or labored breathing   Chest Wall:    No tenderness or deformity    Heart:    Regular rate and rhythm               Pulses:   2+ and symmetric all extremities   Skin:   Skin color, texture, turgor normal, no rashes or lesions   Neurologic:   normal strength, sensation and reflexes     throughout       Musculoskeletal: left lower extremity  · On examination of the left knee there is no effusion, no erythema  Range of motion to full active extension and flexion to greater than 120°  Pain on palpation medial and lateral joint lines  There is crepitus with range of motion, no warmth to palpation, bony enlargement noted  No pain on palpation pes anserine bursa region or distal iliotibial band  There is a large Baker cyst palpated posteriorly of the left knee Stable to varus and valgus stress without pain or gapping  Negative anterior and posterior drawer testing    Sensation intact distal pulses present  Large joint arthrocentesis  Date/Time: 4/10/2018 6:01 PM  Consent given by: patient  Site marked: site marked  Supporting Documentation  Indications: pain   Procedure Details  Location: knee - L knee  Needle size: 18 G  Ultrasound guidance: no  Approach: superior  Medications administered: 2 mL lidocaine 1 %; 48 mg hylan 48 MG/6ML              _*_*_*_*_*_*_*_*_*_*_*_*_*_*_*_*_*_*_*_*_*_*_*_*_*_*_*_*_*_*_*_*_*_*_*_*_*_*_*_*_*    Assessment:  68 y o male with left knee pain osteoarthritis left knee Baker cyst    Plan:   · Weight bearing as tolerated  left lower extremity  · Left knee Synvisc-One injection given as noted above  · Advised no significant activity or increased ambulation over the next 24-48 hours and warm compresses to the knee no greater 20 minutes 3-4 times 24 hours following the injection  Patient advised should they develop any increasing pain, redness, drainage, numbness, tingling or swelling surrounding the injection sight, they are to contact our office or present to the emergency department    · Ordered ultrasound guided left knee Baker cyst aspiration and injection Depo-Medrol  · Follow up in 3 months or sooner if needed        James Buck PA-C

## 2018-04-11 ENCOUNTER — TRANSCRIBE ORDERS (OUTPATIENT)
Dept: ADMINISTRATIVE | Facility: HOSPITAL | Age: 68
End: 2018-04-11

## 2018-04-11 DIAGNOSIS — M71.22 SYNOVIAL CYST OF LEFT POPLITEAL SPACE: Primary | ICD-10-CM

## 2018-04-12 ENCOUNTER — OFFICE VISIT (OUTPATIENT)
Dept: PHYSICAL THERAPY | Facility: REHABILITATION | Age: 68
End: 2018-04-12
Payer: COMMERCIAL

## 2018-04-12 DIAGNOSIS — M75.41 SUBACROMIAL IMPINGEMENT OF RIGHT SHOULDER: Primary | ICD-10-CM

## 2018-04-12 PROCEDURE — 97112 NEUROMUSCULAR REEDUCATION: CPT

## 2018-04-12 PROCEDURE — G8990 OTHER PT/OT CURRENT STATUS: HCPCS

## 2018-04-12 PROCEDURE — G8991 OTHER PT/OT GOAL STATUS: HCPCS

## 2018-04-12 NOTE — PROGRESS NOTES
Daily Note     Today's date: 2018  Patient name: Urmila Kumar  : 1950  MRN: [id number]  Referring provider: Balbir Adkins MD  Dx:   Encounter Diagnosis     ICD-10-CM    1  Subacromial impingement of right shoulder M75 41                   Subjective: Pt reported minimal symptoms today  Improved ROM noted  Objective: See treatment diary below  FOTO 82; initial 63  Manual                 R shoulder IR PROM and post/inf GH Jm's   5 min                                                                                                                     Exercise Diary             ube  90 rpm 4'/4'           TB LPD btb 20           TB rows btb 20           TB ER btb 20           TB IR btb 20           Sleeper stretch 10"x10           Post cap stretch HEP                   scap punches  3# 20x3"           scap abcs  3#x1           Wall slides-towel  10"x10           Prone rows  3#x15           Prone shoulder ext  3#x15           Prone horizontal ABD  3#x15           Ball circles against wall cw/ccw  20 ea                                                                                                                                                      Modalities                        CP PRN  10 min                           Assessment: Tolerated treatment well  Patient exhibited good technique with therapeutic exercises  VC's needed for correct technique throughout session  Plan: Continue per plan of care   MD appt

## 2018-04-17 ENCOUNTER — OFFICE VISIT (OUTPATIENT)
Dept: OBGYN CLINIC | Facility: OTHER | Age: 68
End: 2018-04-17
Payer: COMMERCIAL

## 2018-04-17 VITALS
WEIGHT: 186 LBS | BODY MASS INDEX: 28.19 KG/M2 | DIASTOLIC BLOOD PRESSURE: 84 MMHG | HEIGHT: 68 IN | HEART RATE: 69 BPM | SYSTOLIC BLOOD PRESSURE: 122 MMHG

## 2018-04-17 DIAGNOSIS — M75.51 SUBACROMIAL BURSITIS OF RIGHT SHOULDER JOINT: Primary | ICD-10-CM

## 2018-04-17 PROCEDURE — 99214 OFFICE O/P EST MOD 30 MIN: CPT | Performed by: ORTHOPAEDIC SURGERY

## 2018-04-17 NOTE — PROGRESS NOTES
Assessment/Plan:  Assessment/Plan     80-year-old male with ongoing right shoulder pain secondary to right subacromial bursitis without pain relief with conservative treatment  As patient has not improved significantly with right shoulder subacromial corticosteroid injection and physical therapy, MRI of right shoulder will be obtained to further investigate patient's right shoulder symptoms  Discussion was had with patient concerning possible future surgical over since non operative treatment depending on what the MRI of right shoulder shows  Patient will follow up in office following MRI of right shoulder to discuss the results and further treatment options  Subjective:   Patient ID: Lydia Ortez is a 79 y o  male  80-year-old male with right shoulder subacromial bursitis presenting for evaluation of ongoing right shoulder pain  Patient states that he has been participating in outpatient physical therapy 2-3 days per week over the last 6 weeks without significant improvement in symptoms  Patient continues to complain of anterior shoulder pain that is made worse with overhead activity  Pain subsided somewhat at rest   Patient has not been taking over-the-counter analgesics for pain relief  Patient denies any numbness or tingling  He believes that he has gotten stronger with physical therapy but his pain persists  The following portions of the patient's history were reviewed and updated as appropriate: allergies, current medications, past family history, past medical history, past social history, past surgical history and problem list     Review of Systems   All other systems reviewed and are negative        Objective:  Ortho Exam   Right upper extremity  Skin intact, no erythema, no ecchymosis  Range of motion right shoulder equal to contralateral limb in forward  flexion, internal rotation, external rotation, AB duction  5/5 strength with shoulder abduction, internal rotation, external rotation  Positive Carroll, negative Abhilash's, negative form close, negative bear hugger tests, negative speed's, negative Yergason's  Distal extremity warm and sensate  Physical Exam   Constitutional: He is oriented to person, place, and time  He appears well-developed and well-nourished  HENT:   Head: Normocephalic  Eyes: Pupils are equal, round, and reactive to light  Cardiovascular: Normal rate  Pulmonary/Chest: Effort normal and breath sounds normal    Abdominal: Soft  Neurological: He is alert and oriented to person, place, and time  Skin: Skin is warm  Psychiatric: He has a normal mood and affect         No x-rays were taken reviewed at today's visit

## 2018-04-18 ENCOUNTER — TRANSCRIBE ORDERS (OUTPATIENT)
Dept: RADIOLOGY | Facility: HOSPITAL | Age: 68
End: 2018-04-18

## 2018-04-18 ENCOUNTER — HOSPITAL ENCOUNTER (OUTPATIENT)
Dept: RADIOLOGY | Facility: HOSPITAL | Age: 68
Discharge: HOME | End: 2018-04-18
Attending: PHYSICIAN ASSISTANT
Payer: COMMERCIAL

## 2018-04-18 DIAGNOSIS — M71.22 SYNOVIAL CYST OF LEFT POPLITEAL SPACE: ICD-10-CM

## 2018-04-18 PROCEDURE — 20605 DRAIN/INJ JOINT/BURSA W/O US: CPT

## 2018-04-18 PROCEDURE — 76881 US COMPL JOINT R-T W/IMG: CPT

## 2018-04-18 RX ORDER — LIDOCAINE HYDROCHLORIDE 10 MG/ML
2 INJECTION, SOLUTION INFILTRATION; PERINEURAL ONCE
Status: COMPLETED | OUTPATIENT
Start: 2018-04-18 | End: 2018-04-18

## 2018-04-18 RX ORDER — BUPIVACAINE HYDROCHLORIDE 2.5 MG/ML
1 INJECTION, SOLUTION EPIDURAL; INFILTRATION; INTRACAUDAL ONCE
Status: COMPLETED | OUTPATIENT
Start: 2018-04-18 | End: 2018-04-18

## 2018-04-18 RX ORDER — METHYLPREDNISOLONE ACETATE 80 MG/ML
80 INJECTION, SUSPENSION INTRA-ARTICULAR; INTRALESIONAL; INTRAMUSCULAR; SOFT TISSUE ONCE
Status: COMPLETED | OUTPATIENT
Start: 2018-04-18 | End: 2018-04-18

## 2018-04-18 RX ADMIN — METHYLPREDNISOLONE ACETATE 80 MG: 80 INJECTION, SUSPENSION INTRA-ARTICULAR; INTRALESIONAL; INTRAMUSCULAR; SOFT TISSUE at 09:22

## 2018-04-18 RX ADMIN — LIDOCAINE HYDROCHLORIDE 2 ML: 10 INJECTION, SOLUTION INFILTRATION; PERINEURAL at 09:20

## 2018-04-18 RX ADMIN — BUPIVACAINE HYDROCHLORIDE 1 ML: 2.5 INJECTION, SOLUTION EPIDURAL; INFILTRATION; INTRACAUDAL at 09:20

## 2018-04-19 ENCOUNTER — TELEPHONE (OUTPATIENT)
Dept: INTERNAL MEDICINE CLINIC | Facility: CLINIC | Age: 68
End: 2018-04-19

## 2018-04-19 NOTE — TELEPHONE ENCOUNTER
Patient wanted to let you know that the Pantoprazole 40mg that was prescribed at his last visit was not working at [facility]  He increased to QAM and QPM and states that it is helping a little however he still has the nausea and belching  He has an appt with Dr Reid Means on 5/8  He wants to know if this is ok to take BID until he sees Dr Reid Means  Please advise

## 2018-04-24 ENCOUNTER — HOSPITAL ENCOUNTER (OUTPATIENT)
Dept: RADIOLOGY | Age: 68
Discharge: HOME | End: 2018-04-24
Payer: COMMERCIAL

## 2018-04-24 DIAGNOSIS — M75.51 SUBACROMIAL BURSITIS OF RIGHT SHOULDER JOINT: ICD-10-CM

## 2018-04-24 PROCEDURE — 73221 MRI JOINT UPR EXTREM W/O DYE: CPT

## 2018-05-03 ENCOUNTER — OFFICE VISIT (OUTPATIENT)
Dept: OBGYN CLINIC | Facility: OTHER | Age: 68
End: 2018-05-03
Payer: COMMERCIAL

## 2018-05-03 VITALS
DIASTOLIC BLOOD PRESSURE: 74 MMHG | BODY MASS INDEX: 28.71 KG/M2 | HEART RATE: 76 BPM | WEIGHT: 188.8 LBS | SYSTOLIC BLOOD PRESSURE: 107 MMHG

## 2018-05-03 DIAGNOSIS — M25.819 CYST OF JOINT OF SHOULDER: ICD-10-CM

## 2018-05-03 DIAGNOSIS — M75.41 SUBACROMIAL IMPINGEMENT OF RIGHT SHOULDER: Primary | ICD-10-CM

## 2018-05-03 DIAGNOSIS — M19.011 PRIMARY OSTEOARTHRITIS OF RIGHT SHOULDER: ICD-10-CM

## 2018-05-03 PROCEDURE — 99214 OFFICE O/P EST MOD 30 MIN: CPT | Performed by: ORTHOPAEDIC SURGERY

## 2018-05-03 NOTE — PROGRESS NOTES
Orthopaedic Surgery - Office Note  Lydia Ortez (81 y o  male)   : 1950   MRN: [id number]  Encounter Date: 5/3/2018    Chief Complaint   Patient presents with    Right Shoulder - Pain       Assessment / Plan  Primary Osteoarthritis right shoulder, supraspinatus intrasubstance tear with cyst      · Continue with home exercises  · Conservative vs surgical treatment options were discussed today  Pt would like to continue conservative treatment  · Continue with OTC pain medication as needed for pain   Return for 6-8 weeks  if continuing to have pain, will consider a shoulder arthroscopy  History of Present Illness  Lydia Ortez is a 79 y o  male who presents for follow up of right shoulder pain after MRI  He states that for 2 weeks he has been resting his shoulder and not doing exercises  IT feels better but he still has pain in the lateral shoulder  The pain does not radiate  He denies any numbness or tingling  He is currently not taking anything for pain  He did PT in the past without relief  He has been doing home exercises but stopped for the past 2 weeks to rest   He states his symptoms are now tolerable  He had a steroid injection in the past but is unsure how much it helped  Review of Systems  Pertinent items are noted in HPI  All other systems were reviewed and are negative  A comprehensive review of systems was negative  Physical Exam  /74 (BP Location: Left arm, Patient Position: Sitting, Cuff Size: Adult)   Pulse 76   Wt 85 6 kg (188 lb 12 8 oz)   BMI 28 71 kg/m²   Cons: Appears well  No apparent distress  Psych: Alert  Oriented x3  Mood and affect normal   Eyes: PERRLA, EOMI  Resp: Normal effort  No audible wheezing or stridor  CV: Palpable pulse  No discernable arrhythmia  No LE edema  Lymph:  No palpable cervical, axillary, or inguinal lymphadenopathy  Skin: Warm  No palpable masses  No visible lesions  Neuro: Normal muscle tone    Normal and symmetric DTR's      Right Shoulder Exam  Alignment / Posture:  Normal shoulder posture  Inspection:  No swelling  No edema  No erythema  No ecchymosis  No muscle atrophy  No deformity  Palpation:  No tenderness  ROM:  ROM right shoulder equal to the left shoulder in Flexion, ER, IR with pain at extreme ROM  Strength:  5/5 supraspinatus, infraspinatus, and subscapularis  Stability:  No objective shoulder instability  Tests: (-) Bear hug  Neurovascular:  Sensation intact in Ax/R/M/U nerve distributions  2+ radial pulse  Studies Reviewed  I have personally reviewed pertinent films in PACS  and I have personally reviewed pertinent films in PACS and my interpretation is mild DJD of shoulder, noninsertional supraspinatus intrasubstance tear with cyst     Procedures  No procedures today  Medical, Surgical, Family, and Social History  The patient's medical history, family history, and social history, were reviewed and updated as appropriate      Past Medical History:   Diagnosis Date    Abnormal weight loss     RESOLVED: [id number]    Anemia     RESOLVED: 63ZBX1435    Atypical chest pain     RESOLVED: 08DMJ4569    BPH (benign prostatic hyperplasia)     Cancer (HCC)     DUODENAL    Luis Miguel's syndrome     Depression     RESOLVED: 19MBX6536    Diverticulitis of colon     LAST ASSESSED: 33PUY5989    Duodenal nodule     RESOLVED: 33MCP3828    Gastrointestinal stromal tumor (GIST) (HCC)     MALIGNANT; RESOLVED: 71EMH8764    GERD (gastroesophageal reflux disease)     Glaucoma     RESOLVED: 28CRH4594    Insomnia     RESOLVED: 67BZI9232    Lactose intolerance     LAST ASSESSED: 80KCD3575       Past Surgical History:   Procedure Laterality Date    BACK SURGERY      CERVICAL FUSION      c4 and c5    CHOLECYSTECTOMY      ELBOW SURGERY Left     REPAIR    EYE SURGERY      KNEE ARTHROSCOPY Left     LUMBAR DISCECTOMY      L5 S1    ORIF RADIAL SHAFT FRACTURE      [place] ESOPHAGOGASTRODUODENOSCOPY TRANSORAL DIAGNOSTIC N/A 11/1/2016    Procedure: ESOPHAGOGASTRODUODENOSCOPY (EGD); Surgeon: Sanam Martinez MD;  Location: [facility]; Service: Gastroenterology    SMALL INTESTINE SURGERY      GIST surgery       Family History   Problem Relation Age of Onset    Thyroid disease Mother     Thyroid disease Brother     Diabetes Paternal Grandmother     Hypertension Family        Social History     Occupational History    Not on file       Social History Main Topics    Smoking status: Never Smoker    Smokeless tobacco: Never Used    Alcohol use Yes      Comment: 2x per month; NEVER DRANK ALCOHOL AS PER ALL SCRIPTS     Drug use: No    Sexual activity: Not on file       No Known Allergies      Current Outpatient Prescriptions:     B Complex-Biotin-FA (SUPER B-50 B COMPLEX PO), Take 1 tablet by mouth daily, Disp: , Rfl:     bimatoprost (LUMIGAN) 0 01 % ophthalmic drops, 1 drop daily at bedtime, Disp: , Rfl:     Cholecalciferol (VITAMIN D-3 PO), Take 1 capsule by mouth daily, Disp: , Rfl:     cholestyramine (QUESTRAN) 4 GM/DOSE powder, Take 1 packet by mouth every other day, Disp: , Rfl:     diflorasone (PSORCON) 0 05 % ointment, APPLY EVERY DAY AT BEDTIME WITH OCCLUSION, Disp: , Rfl: 3    dorzolamide (TRUSOPT) 2 % ophthalmic solution, 1 drop 3 (three) times a day, Disp: , Rfl:     fluticasone (FLONASE) 50 mcg/act nasal spray, 2 sprays into each nostril, Disp: , Rfl:     GLUCOSAMINE CHONDROITIN COMPLX PO, Take 1 tablet by mouth daily, Disp: , Rfl:     Multiple Vitamins-Minerals (MULTIVITAL) tablet, Take 1 tablet by mouth daily, Disp: , Rfl:     pantoprazole (PROTONIX) 40 mg tablet, Take 1 tablet (40 mg total) by mouth daily, Disp: 30 tablet, Rfl: 3    PROAIR  (90 Base) MCG/ACT inhaler, Inhale 2 puffs, Disp: , Rfl: 0    tamsulosin (FLOMAX) 0 4 mg, Take 0 4 mg by mouth daily with dinner, Disp: , Rfl:     Vitamin E 400 UNITS TABS, Take 1 tablet by mouth daily, Disp: , Rfl:       DELORES Adhikari Attestation I,:    am acting as a scribe while in the presence of the attending physician :        I,:    personally performed the services described in this documentation    as scribed in my presence :

## 2018-05-03 NOTE — PATIENT INSTRUCTIONS
If the symptoms become worse please contact the office so we can consider progressing towards arthroscopic evaluation and treatment of the shoulder

## 2018-05-08 ENCOUNTER — OFFICE VISIT (OUTPATIENT)
Dept: GASTROENTEROLOGY | Facility: AMBULARY SURGERY CENTER | Age: 68
End: 2018-05-08
Payer: COMMERCIAL

## 2018-05-08 VITALS
BODY MASS INDEX: 28.31 KG/M2 | DIASTOLIC BLOOD PRESSURE: 68 MMHG | HEART RATE: 70 BPM | TEMPERATURE: 98.2 F | WEIGHT: 186.8 LBS | HEIGHT: 68 IN | SYSTOLIC BLOOD PRESSURE: 120 MMHG

## 2018-05-08 DIAGNOSIS — C49.A3 GASTROINTESTINAL STROMAL TUMOR (GIST) OF DUODENUM (HCC): Primary | ICD-10-CM

## 2018-05-08 DIAGNOSIS — K21.9 GASTROESOPHAGEAL REFLUX DISEASE WITHOUT ESOPHAGITIS: ICD-10-CM

## 2018-05-08 PROBLEM — R14.2 BELCHING: Status: ACTIVE | Noted: 2018-05-08

## 2018-05-08 PROCEDURE — 99213 OFFICE O/P EST LOW 20 MIN: CPT | Performed by: INTERNAL MEDICINE

## 2018-05-08 RX ORDER — PANTOPRAZOLE SODIUM 40 MG/1
40 TABLET, DELAYED RELEASE ORAL 2 TIMES DAILY
Qty: 60 TABLET | Refills: 3 | Status: SHIPPED | OUTPATIENT
Start: 2018-05-08 | End: 2018-09-12 | Stop reason: SDUPTHER

## 2018-05-08 RX ORDER — RANITIDINE 150 MG/1
150 TABLET ORAL 2 TIMES DAILY
Qty: 60 TABLET | Refills: 3 | Status: SHIPPED | OUTPATIENT
Start: 2018-05-08 | End: 2018-05-23 | Stop reason: ALTCHOICE

## 2018-05-08 NOTE — PROGRESS NOTES
[facility]  Iraida Garcia 79 y o  male MRN: [id number]            Assessment & Plan:    Pleasant 74-year-old gentleman with a history of a duodenal GIST, status post resection, has had IBS type symptoms, now with previously well controlled longstanding history of acid reflux however over the past several months that said significant discomfort and persistent belching  1  Gastrointestinal stromal tumor (GIST) of duodenum   -status post resection, imaging studies have been unremarkable    2  Gastroesophageal reflux disease with excessive belching  The patient does report that he swallows very slowly in drinks significant liquids well he is eating   - I will schedule an EGD to assess possible causes of his belching dyspepsia and mild dysphagia  I will look for esophagitis, gastritis, and PUD  - I discussed with him/her the risks and benefits of the procedure  - in the meantime, he can discontinue taking pantoprazole , will prescribe ranitidine twice daily to try instead of PPI's  I also recommend that he tries taking gas-X or similar OTC medication to reduce the belching and upper GI gas    -additionally he can try IBGard   - he is to continue to avoid carbonated beverages and other food triggers  He will call the office in a few weeks to discuss results of the procedure and to discuss further treatment if necessary  _____________________________________________________________      CC: GERD    HPI:  Iraida Garcia is a 79 y o male who is here for follow up  He is status post resection of duodenal gist  He was taking Prevacid or other PPI many years, which stopped alleviating his symptoms a few months ago  He was put on pantoprazole twice a day instead, with no improvement to his symptoms  His symptoms are significantly worse at night when he lays down  He complains of belching and and painful heartburn  He does not eat late at night, and he elevates the head of the bed   The patient reports that his wife and him have not been sleeping in the same room due to the loud belching  He also has difficulty sleeping due to the belching, he takes melatonin  He reports some nausea, but despite this his weight has been stable and denies vomiting, diarrhea, and loss of appetite  He eats yogurt 5 times a week, and he started a probiotic  He reports that he has been able to avoids dairy  He also reports chronic dysphagia to solid food, which causes him to drink lots of water while eating  His last EGD was in 2016, no remarkable findings were noted at that time  He is a nonsmoker, but reports some alcohol use  He works as a  for [employer]  Patient reports that his loose stools much improved, he has been taking cholestyramine about every 3rd day  ROS:  The remainder of the ROS was negative except for the pertinent positives mentioned in HPI  Allergies: Patient has no known allergies      Medications:   Current Outpatient Prescriptions:     B Complex-Biotin-FA (SUPER B-50 B COMPLEX PO), Take 1 tablet by mouth daily, Disp: , Rfl:     bimatoprost (LUMIGAN) 0 01 % ophthalmic drops, 1 drop daily at bedtime, Disp: , Rfl:     Cholecalciferol (VITAMIN D-3 PO), Take 1 capsule by mouth daily, Disp: , Rfl:     cholestyramine (QUESTRAN) 4 GM/DOSE powder, Take 1 packet by mouth every other day, Disp: , Rfl:     diflorasone (PSORCON) 0 05 % ointment, APPLY EVERY DAY AT BEDTIME WITH OCCLUSION, Disp: , Rfl: 3    dorzolamide (TRUSOPT) 2 % ophthalmic solution, 1 drop 3 (three) times a day, Disp: , Rfl:     fluticasone (FLONASE) 50 mcg/act nasal spray, 2 sprays into each nostril, Disp: , Rfl:     GLUCOSAMINE CHONDROITIN COMPLX PO, Take 1 tablet by mouth daily, Disp: , Rfl:     Multiple Vitamins-Minerals (MULTIVITAL) tablet, Take 1 tablet by mouth daily, Disp: , Rfl:     pantoprazole (PROTONIX) 40 mg tablet, Take 1 tablet (40 mg total) by mouth daily, Disp: 30 tablet, Rfl: 3    PROAIR  (90 Base) MCG/ACT inhaler, Inhale 2 puffs, Disp: , Rfl: 0    tamsulosin (FLOMAX) 0 4 mg, Take 0 4 mg by mouth daily with dinner, Disp: , Rfl:     Vitamin E 400 UNITS TABS, Take 1 tablet by mouth daily, Disp: , Rfl:     Past Medical History:   Diagnosis Date    Abnormal weight loss     RESOLVED: [id number]    Anemia     RESOLVED: 69MDJ4656    Atypical chest pain     RESOLVED: [id number]    BPH (benign prostatic hyperplasia)     Cancer (HCC)     DUODENAL    Luis Miguel's syndrome     Depression     RESOLVED: [id number]    Diverticulitis of colon     LAST ASSESSED: 99SMC2770    Duodenal nodule     RESOLVED: 45ITD4810    Gastrointestinal stromal tumor (GIST) (HCC)     MALIGNANT; RESOLVED: [id number]    GERD (gastroesophageal reflux disease)     Glaucoma     RESOLVED: 75MBZ1576    Insomnia     RESOLVED: [id number]    Lactose intolerance     LAST ASSESSED: 72CSG1221       Past Surgical History:   Procedure Laterality Date    BACK SURGERY      CERVICAL FUSION      c4 and c5    CHOLECYSTECTOMY      ELBOW SURGERY Left     REPAIR    EYE SURGERY      KNEE ARTHROSCOPY Left     LUMBAR DISCECTOMY      L5 S1    ORIF RADIAL SHAFT FRACTURE      SD ESOPHAGOGASTRODUODENOSCOPY TRANSORAL DIAGNOSTIC N/A 11/1/2016    Procedure: ESOPHAGOGASTRODUODENOSCOPY (EGD); Surgeon: Jacek Porras MD;  [facility]; Service: Gastroenterology    SMALL INTESTINE SURGERY      GIST surgery       Family History   Problem Relation Age of Onset    Thyroid disease Mother     Thyroid disease Brother     Diabetes Paternal Grandmother     Hypertension Family         reports that he has never smoked  He has never used smokeless tobacco  He reports that he drinks alcohol  He reports that he does not use drugs        Physical Exam:    /68 (BP Location: Left arm, Patient Position: Sitting, Cuff Size: Standard)   Pulse 70   Temp 98 2 °F (36 8 °C) (Tympanic)   Ht 5' 8" (1 727 m)   Wt 84 7 kg (186 lb 12 8 oz)   BMI 28 40 kg/m² Gen: wn/wd, NAD  HEENT: anicteric, MMM, no cervical LAD  CVS: RRR, no m/r/g  CHEST: CTA b/l  ABD: +BS, soft, NT,ND, no hepatosplenomegaly  Surgical scar, well-healed  EXT: no c/c/e  NEURO: aaox3  SKIN: NO rashes    Attestation:   By signing my name below, IMasha, attest that this documentation has been prepared under the direction and in the presence of Becky Reardon MD  Electronically Signed: Mirtha Purvis  05/08/2018  IBecky, personally performed the services described in this documentation  All medical record entries made by the mirtha were at my direction and in my presence  I have reviewed the chart and discharge instructions and agree that the record reflects my personal performance and is accurate and complete  Becky Reardon MD  05/08/2018

## 2018-05-08 NOTE — LETTER
May 8, 2018     Familiaarnie LyleIvette  [address] [place] [address]    Patient: Dmitri Kaminski   YOB: 1950   Date of Visit: 5/8/2018       Dear Dr Caron Brandt: Thank you for referring Dmitri Kaminski to me for evaluation  Below are my notes for this consultation  If you have questions, please do not hesitate to call me  I look forward to following your patient along with you  Sincerely,        Felicita Doran MD        CC: No Recipients  Felicita Doran MD  5/8/2018 10:24 PM  Sign at close encounter  [facility]  Dmitri Kaminski 79 y o  male MRN: [id number]            Assessment & Plan:    Pleasant 51-year-old gentleman with a history of a duodenal GIST, status post resection, has had IBS type symptoms, now with previously well controlled longstanding history of acid reflux however over the past several months that said significant discomfort and persistent belching  1  Gastrointestinal stromal tumor (GIST) of duodenum   -status post resection, imaging studies have been unremarkable    2  Gastroesophageal reflux disease with excessive belching  The patient does report that he swallows very slowly in drinks significant liquids well he is eating   - I will schedule an EGD to assess possible causes of his belching dyspepsia and mild dysphagia  I will look for esophagitis, gastritis, and PUD  - I discussed with him/her the risks and benefits of the procedure  - in the meantime, he can discontinue taking pantoprazole , will prescribe ranitidine twice daily to try instead of PPI's  I also recommend that he tries taking gas-X or similar OTC medication to reduce the belching and upper GI gas    -additionally he can try IBGard   - he is to continue to avoid carbonated beverages and other food triggers  He will call the office in a few weeks to discuss results of the procedure and to discuss further treatment if necessary  _____________________________________________________________      CC: GERD    HPI:  Iraida Garcia is a 79 y o male who is here for follow up  He is status post resection of duodenal gist  He was taking Prevacid or other PPI many years, which stopped alleviating his symptoms a few months ago  He was put on pantoprazole twice a day instead, with no improvement to his symptoms  His symptoms are significantly worse at night when he lays down  He complains of belching and and painful heartburn  He does not eat late at night, and he elevates the head of the bed  The patient reports that his wife and him have not been sleeping in the same room due to the loud belching  He also has difficulty sleeping due to the belching, he takes melatonin  He reports some nausea, but despite this his weight has been stable and denies vomiting, diarrhea, and loss of appetite  He eats yogurt 5 times a week, and he started a probiotic  He reports that he has been able to avoids dairy  He also reports chronic dysphagia to solid food, which causes him to drink lots of water while eating  His last EGD was in 2016, no remarkable findings were noted at that time  He is a nonsmoker, but reports some alcohol use  He works as a  for [employer]  Patient reports that his loose stools much improved, he has been taking cholestyramine about every 3rd day  ROS:  The remainder of the ROS was negative except for the pertinent positives mentioned in HPI  Allergies: Patient has no known allergies      Medications:   Current Outpatient Prescriptions:     B Complex-Biotin-FA (SUPER B-50 B COMPLEX PO), Take 1 tablet by mouth daily, Disp: , Rfl:     bimatoprost (LUMIGAN) 0 01 % ophthalmic drops, 1 drop daily at bedtime, Disp: , Rfl:     Cholecalciferol (VITAMIN D-3 PO), Take 1 capsule by mouth daily, Disp: , Rfl:     cholestyramine (QUESTRAN) 4 GM/DOSE powder, Take 1 packet by mouth every other day, Disp: , Rfl:     diflorasone (PSORCON) 0 05 % ointment, APPLY EVERY DAY AT BEDTIME WITH OCCLUSION, Disp: , Rfl: 3    dorzolamide (TRUSOPT) 2 % ophthalmic solution, 1 drop 3 (three) times a day, Disp: , Rfl:     fluticasone (FLONASE) 50 mcg/act nasal spray, 2 sprays into each nostril, Disp: , Rfl:     GLUCOSAMINE CHONDROITIN COMPLX PO, Take 1 tablet by mouth daily, Disp: , Rfl:     Multiple Vitamins-Minerals (MULTIVITAL) tablet, Take 1 tablet by mouth daily, Disp: , Rfl:     pantoprazole (PROTONIX) 40 mg tablet, Take 1 tablet (40 mg total) by mouth daily, Disp: 30 tablet, Rfl: 3    PROAIR  (90 Base) MCG/ACT inhaler, Inhale 2 puffs, Disp: , Rfl: 0    tamsulosin (FLOMAX) 0 4 mg, Take 0 4 mg by mouth daily with dinner, Disp: , Rfl:     Vitamin E 400 UNITS TABS, Take 1 tablet by mouth daily, Disp: , Rfl:     Past Medical History:   Diagnosis Date    Abnormal weight loss     RESOLVED: [id number]    Anemia     RESOLVED: 16KCL7608    Atypical chest pain     RESOLVED: 26IEG4505    BPH (benign prostatic hyperplasia)     Cancer (HCC)     DUODENAL    Luis Miguel's syndrome     Depression     RESOLVED: 88GPN9048    Diverticulitis of colon     LAST ASSESSED: [id number]    Duodenal nodule     RESOLVED: 07KSI1608    Gastrointestinal stromal tumor (GIST) (HCC)     MALIGNANT; RESOLVED: 40UPM6495    GERD (gastroesophageal reflux disease)     Glaucoma     RESOLVED: 15TMP4934    Insomnia     RESOLVED: 07ARH9666    Lactose intolerance     LAST ASSESSED: 58ONB6077       Past Surgical History:   Procedure Laterality Date    BACK SURGERY      CERVICAL FUSION      c4 and c5    CHOLECYSTECTOMY      ELBOW SURGERY Left     REPAIR    EYE SURGERY      KNEE ARTHROSCOPY Left     LUMBAR DISCECTOMY      L5 S1    ORIF RADIAL SHAFT FRACTURE      NH ESOPHAGOGASTRODUODENOSCOPY TRANSORAL DIAGNOSTIC N/A 11/1/2016    Procedure: ESOPHAGOGASTRODUODENOSCOPY (EGD); Surgeon: Jacek Porras MD;  [facility];   Service: Gastroenterology    SMALL INTESTINE SURGERY      GIST surgery       Family History   Problem Relation Age of Onset    Thyroid disease Mother     Thyroid disease Brother     Diabetes Paternal Grandmother     Hypertension Family         reports that he has never smoked  He has never used smokeless tobacco  He reports that he drinks alcohol  He reports that he does not use drugs  Physical Exam:    /68 (BP Location: Left arm, Patient Position: Sitting, Cuff Size: Standard)   Pulse 70   Temp 98 2 °F (36 8 °C) (Tympanic)   Ht 5' 8" (1 727 m)   Wt 84 7 kg (186 lb 12 8 oz)   BMI 28 40 kg/m²      Gen: wn/wd, NAD  HEENT: anicteric, MMM, no cervical LAD  CVS: RRR, no m/r/g  CHEST: CTA b/l  ABD: +BS, soft, NT,ND, no hepatosplenomegaly  Surgical scar, well-healed  EXT: no c/c/e  NEURO: aaox3  SKIN: NO rashes    Attestation:   By signing my name below, IHilton, attest that this documentation has been prepared under the direction and in the presence of Odette Brink MD  Electronically Signed: Andre Zapata  05/08/2018  IOdette, personally performed the services described in this documentation  All medical record entries made by the paulaibadelaida were at my direction and in my presence  I have reviewed the chart and discharge instructions and agree that the record reflects my personal performance and is accurate and complete  Odette Brink MD  05/08/2018

## 2018-05-09 ENCOUNTER — APPOINTMENT (OUTPATIENT)
Dept: LAB | Age: 68
End: 2018-05-09
Payer: COMMERCIAL

## 2018-05-09 DIAGNOSIS — E78.1 HYPERTRIGLYCERIDEMIA: ICD-10-CM

## 2018-05-09 LAB
ALBUMIN SERPL BCP-MCNC: 3.8 G/DL (ref 3.5–5)
ALP SERPL-CCNC: 70 U/L (ref 46–116)
ALT SERPL W P-5'-P-CCNC: 27 U/L (ref 12–78)
ANION GAP SERPL CALCULATED.3IONS-SCNC: 6 MMOL/L (ref 4–13)
AST SERPL W P-5'-P-CCNC: 19 U/L (ref 5–45)
BILIRUB SERPL-MCNC: 0.87 MG/DL (ref 0.2–1)
BUN SERPL-MCNC: 11 MG/DL (ref 5–25)
CALCIUM SERPL-MCNC: 9.4 MG/DL (ref 8.3–10.1)
CHLORIDE SERPL-SCNC: 108 MMOL/L (ref 100–108)
CHOLEST SERPL-MCNC: 207 MG/DL (ref 50–200)
CO2 SERPL-SCNC: 27 MMOL/L (ref 21–32)
CREAT SERPL-MCNC: 0.86 MG/DL (ref 0.6–1.3)
GFR SERPL CREATININE-BSD FRML MDRD: 90 ML/MIN/1.73SQ M
GLUCOSE P FAST SERPL-MCNC: 87 MG/DL (ref 65–99)
HDLC SERPL-MCNC: 42 MG/DL (ref 40–60)
LDLC SERPL CALC-MCNC: 137 MG/DL (ref 0–100)
POTASSIUM SERPL-SCNC: 3.9 MMOL/L (ref 3.5–5.3)
PROT SERPL-MCNC: 7.2 G/DL (ref 6.4–8.2)
SODIUM SERPL-SCNC: 141 MMOL/L (ref 136–145)
TRIGL SERPL-MCNC: 140 MG/DL

## 2018-05-09 PROCEDURE — 80061 LIPID PANEL: CPT

## 2018-05-09 PROCEDURE — 80053 COMPREHEN METABOLIC PANEL: CPT

## 2018-05-09 PROCEDURE — 36415 COLL VENOUS BLD VENIPUNCTURE: CPT

## 2018-05-22 ENCOUNTER — ANESTHESIA EVENT (OUTPATIENT)
Dept: PERIOP | Facility: AMBULARY SURGERY CENTER | Age: 68
End: 2018-05-22
Payer: COMMERCIAL

## 2018-05-23 ENCOUNTER — OFFICE VISIT (OUTPATIENT)
Dept: NEUROLOGY | Facility: CLINIC | Age: 68
End: 2018-05-23
Payer: COMMERCIAL

## 2018-05-23 VITALS
SYSTOLIC BLOOD PRESSURE: 116 MMHG | DIASTOLIC BLOOD PRESSURE: 70 MMHG | HEIGHT: 68 IN | HEART RATE: 54 BPM | WEIGHT: 182 LBS | BODY MASS INDEX: 27.58 KG/M2

## 2018-05-23 DIAGNOSIS — G25.0 TREMOR, ESSENTIAL: Primary | ICD-10-CM

## 2018-05-23 PROCEDURE — 99214 OFFICE O/P EST MOD 30 MIN: CPT | Performed by: PSYCHIATRY & NEUROLOGY

## 2018-05-23 RX ORDER — PRIMIDONE 50 MG/1
TABLET ORAL
Qty: 300 TABLET | Refills: 5 | Status: SHIPPED | OUTPATIENT
Start: 2018-05-23 | End: 2018-10-10 | Stop reason: HOSPADM

## 2018-05-23 NOTE — PROGRESS NOTES
Patient ID: Joby Moore is a 79 y o  male  Assessment/Plan:    Tremor, essential  Moderate action tremors, prominent on the left  We once again discussed surgical options including deep brain stimulation and MRI focused ultrasound  He is not interested in surgical options  After discussion he opted to restart primidone 50mg qhs x 1 week then 1 po bid x 1 week then keep increasing by adding 1 tab weekly until you reach 250mg bid (5 pills bid) or lowest dose that improves tremor He will call if  reaches this dose to inform if it helps so we can send in 250mg pills bid or does not help for instructions to taper off  He will call if he develop side effects  If ineffective can consider trials of medication which have less evidence in treating tremor such as mirtazepine  Diagnoses and all orders for this visit:    Tremor, essential  -     primidone (MYSOLINE) 50 mg tablet; 1 po qhs x 1 week then increase by 1 tab weekly until reach 5 tabs bid (250mg) or lowest effective dose         Subjective:    Joby Moore is a [facility]  with essential tremor who presents for follow up  To review, tremors began gradually over 11 years ago  He was followed by Dr Smiley Hum  He was previously on propranolol ER 120mg  Gabapentin was added about 2 years ago but it made him drowsy so it was discontinued  He was hospitalized for Gastrointestinal stromal tumor removal and was questioned about propranolol ER  This got him thinking and he questioned being on it  It was also becoming less effective  He was eventually tapered off by Dr Mary Washington  His tremor gotten worse  Later seen and placed on topiramate with significant improvement but discontinued due to tingling and dizziness  He  has no head, vocal, or leg tremor  We have spoken of DBS and focused ultrasound but he wished to exhaust all medication options first       He was last started on primidone and titrated up to 50mg 3 tabs qam and 2 qhs with no improvement   He was told he could further increase but opted to taper off and wait to see me before deciding   Moderate action tremor continues to be present with action and can interfere with eating, using utensils, and buttoning  Handwriting is tremulous  Using two hands helps  Left hand is worse  Nothing makes it better  He wishes to know if it is safe to retry primidone at higher dose  The following portions of the patient's history were reviewed and updated as appropriate: past family history, past social history and past surgical history  Objective:    Blood pressure 116/70, pulse (!) 54, height 5' 8" (1 727 m), weight 82 6 kg (182 lb)  Physical Exam    Neurological Exam      ROS:    Review of Systems   Constitutional: Negative  Negative for appetite change and fever  HENT: Positive for trouble swallowing  Negative for hearing loss, tinnitus and voice change  Eyes: Negative  Negative for photophobia and pain  Respiratory: Positive for cough  Negative for shortness of breath  Cardiovascular: Negative  Negative for palpitations  Gastrointestinal: Positive for constipation and diarrhea  Negative for nausea and vomiting  Endocrine: Negative  Negative for cold intolerance and heat intolerance  Genitourinary: Positive for frequency and urgency  Negative for dysuria  Musculoskeletal: Positive for arthralgias and neck stiffness  Negative for myalgias and neck pain  Skin: Negative  Negative for rash  Neurological: Positive for tremors  Negative for dizziness, seizures, syncope, facial asymmetry, speech difficulty, weakness, light-headedness, numbness and headaches  Hematological: Negative  Does not bruise/bleed easily  Psychiatric/Behavioral: Negative  Negative for confusion, hallucinations and sleep disturbance

## 2018-05-23 NOTE — PATIENT INSTRUCTIONS
Restart primidone 50mg qhs x 1 week then 1 po bid x 1 week then keep increasing by adding 1 tab weekly until you reach 250mg bid (5 pills bid) or lowest dose that improves tremor  Call if you reach this dose to inform if it helps so we can send in 250mg pills or does not help for instructions to taper off  Call if you develop side effects  If ineffective can consider trials of medication which have less evidence in treating tremor such as mirtazepine

## 2018-05-23 NOTE — ASSESSMENT & PLAN NOTE
Moderate action tremors, prominent on the left  We once again discussed surgical options including deep brain stimulation and MRI focused ultrasound  He is not interested in surgical options  After discussion he opted to restart primidone 50mg qhs x 1 week then 1 po bid x 1 week then keep increasing by adding 1 tab weekly until you reach 250mg bid (5 pills bid) or lowest dose that improves tremor He will call if  reaches this dose to inform if it helps so we can send in 250mg pills bid or does not help for instructions to taper off  He will call if he develop side effects  If ineffective can consider trials of medication which have less evidence in treating tremor such as mirtazepine

## 2018-05-31 ENCOUNTER — HOSPITAL ENCOUNTER (OUTPATIENT)
Facility: AMBULARY SURGERY CENTER | Age: 68
Setting detail: OUTPATIENT SURGERY
Discharge: HOME | End: 2018-05-31
Attending: INTERNAL MEDICINE | Admitting: INTERNAL MEDICINE
Payer: COMMERCIAL

## 2018-05-31 ENCOUNTER — ANESTHESIA (OUTPATIENT)
Dept: PERIOP | Facility: AMBULARY SURGERY CENTER | Age: 68
End: 2018-05-31
Payer: COMMERCIAL

## 2018-05-31 VITALS
HEART RATE: 68 BPM | DIASTOLIC BLOOD PRESSURE: 54 MMHG | OXYGEN SATURATION: 97 % | SYSTOLIC BLOOD PRESSURE: 99 MMHG | TEMPERATURE: 97 F | WEIGHT: 182 LBS | RESPIRATION RATE: 22 BRPM | HEIGHT: 68 IN | BODY MASS INDEX: 27.58 KG/M2

## 2018-05-31 DIAGNOSIS — C49.A3 GASTROINTESTINAL STROMAL TUMOR (GIST) OF DUODENUM (HCC): ICD-10-CM

## 2018-05-31 DIAGNOSIS — K21.9 GASTROESOPHAGEAL REFLUX DISEASE WITHOUT ESOPHAGITIS: ICD-10-CM

## 2018-05-31 PROCEDURE — 88342 IMHCHEM/IMCYTCHM 1ST ANTB: CPT | Performed by: PATHOLOGY

## 2018-05-31 PROCEDURE — 88305 TISSUE EXAM BY PATHOLOGIST: CPT | Performed by: PATHOLOGY

## 2018-05-31 PROCEDURE — 43239 EGD BIOPSY SINGLE/MULTIPLE: CPT | Performed by: INTERNAL MEDICINE

## 2018-05-31 RX ORDER — SODIUM CHLORIDE 9 MG/ML
INJECTION, SOLUTION INTRAVENOUS CONTINUOUS PRN
Status: DISCONTINUED | OUTPATIENT
Start: 2018-05-31 | End: 2018-05-31 | Stop reason: SURG

## 2018-05-31 RX ORDER — LIDOCAINE HYDROCHLORIDE 10 MG/ML
INJECTION, SOLUTION INFILTRATION; PERINEURAL AS NEEDED
Status: DISCONTINUED | OUTPATIENT
Start: 2018-05-31 | End: 2018-05-31 | Stop reason: SURG

## 2018-05-31 RX ORDER — PROPOFOL 10 MG/ML
INJECTION, EMULSION INTRAVENOUS AS NEEDED
Status: DISCONTINUED | OUTPATIENT
Start: 2018-05-31 | End: 2018-05-31 | Stop reason: SURG

## 2018-05-31 RX ADMIN — LIDOCAINE HYDROCHLORIDE 50 MG: 10 INJECTION, SOLUTION INFILTRATION; PERINEURAL at 09:40

## 2018-05-31 RX ADMIN — PROPOFOL 120 MG: 10 INJECTION, EMULSION INTRAVENOUS at 09:40

## 2018-05-31 RX ADMIN — SODIUM CHLORIDE: 0.9 INJECTION, SOLUTION INTRAVENOUS at 09:31

## 2018-05-31 RX ADMIN — PROPOFOL 20 MG: 10 INJECTION, EMULSION INTRAVENOUS at 09:43

## 2018-05-31 NOTE — ANESTHESIA POSTPROCEDURE EVALUATION
Post-Op Assessment Note      CV Status:  Stable    Mental Status:  Alert and awake    Hydration Status:  Euvolemic    PONV Controlled:  Controlled    Airway Patency:  Patent    Post Op Vitals Reviewed: Yes          Staff: CRNA           BP 99/54 (05/31/18 0950)    Temp (!) 97 3 °F (36 3 °C) (05/31/18 0950)    Pulse (!) 53 (05/31/18 0950)   Resp 18 (05/31/18 0950)    SpO2 97 % (05/31/18 0950)

## 2018-05-31 NOTE — DISCHARGE INSTRUCTIONS
Discharge home  Resume regular diet  Continue with pantoprazole twice daily for symptom control  Gradually try to wean off PPI therapy  Trial of activated charcoal  Consider amitriptyline for persistent belching  Follow up biopsy results  Call with any abdominal pain, bleeding, fevers

## 2018-05-31 NOTE — ANESTHESIA PREPROCEDURE EVALUATION
Review of Systems/Medical History  Patient summary reviewed    No history of anesthetic complications     Cardiovascular  Negative cardio ROS Hyperlipidemia,    Pulmonary  Negative pulmonary ROS        GI/Hepatic    GERD ,             Endo/Other     GYN       Hematology   Musculoskeletal    Arthritis     Neurology   Psychology           Physical Exam    Airway    Mallampati score: II  TM Distance: >3 FB  Neck ROM: full     Dental       Cardiovascular  Comment: Negative ROS,     Pulmonary      Other Findings        Anesthesia Plan  ASA Score- 2     Anesthesia Type- IV sedation with anesthesia with ASA Monitors  Additional Monitors:   Airway Plan:     Comment: IV sedation,  standard ASA monitors  Risks and benefits discussed with patient; patient consented and agrees to proceed  I saw and evaluated the patient  If seen with CRNA, we have discussed the anesthetic plan and I am in agreement that the plan is appropriate for the patient        Plan Factors-    Induction- intravenous  Postoperative Plan-     Informed Consent- Anesthetic plan and risks discussed with patient  I personally reviewed this patient with the CRNA  Discussed and agreed on the Anesthesia Plan with the [facility] [place]

## 2018-05-31 NOTE — OP NOTE
ESOPHAGOGASTRODUODENOSCOPY    PROCEDURE: EGD    SEDATION: Monitored anesthesia care, check anesthesia records    ASA Class: 2    INDICATIONS:  Dyspepsia, belching, reflux    CONSENT:  Informed consent was obtained for the procedure, including sedation after explaining the risks and benefits of the procedure  Risks including but not limited to bleeding, perforation, infection, and missed lesion  PREPARATION:   Telemetry, pulse oximetry, blood pressure were monitored throughout the procedure  Patient was identified by myself both verbally and by visual inspection of ID band  DESCRIPTION:   Patient was placed in the left lateral decubitus position and was sedated with the above medication  The gastroscope was introduced in to the oropharynx and the esophagus was intubated under direct visualization  Scope was passed down the esophagus up to 2nd part of the duodenum  A careful inspection was made as the gastroscope was withdrawn, including a retroflexed view of the stomach; findings and interventions are described below  FINDINGS:    #1  Esophagus-normal esophagus, normal GE junction, no signs of  esophagitis    #2  Stomach-mild antral erythematous changes biopsies taken for H pylori  Normal retroflexion    #3  Duodenum-normal appearing duodenum, postsurgical changes from the bulb to 2nd portion of the duodenum  No signs of nodularity or residual recurrent just         IMPRESSIONS:      Mild gastritis otherwise relatively normal examination including close examination of the esophagus  Gastric biopsies taken    RECOMMENDATIONS:     Discharge home  Resume regular diet  Continue with pantoprazole twice daily for symptom control  Gradually try to wean off PPI therapy  Trial of activated charcoal  Consider amitriptyline for persistent belching  Follow up biopsy results  Call with any abdominal pain, bleeding, fevers    COMPLICATIONS:  None; patient tolerated the procedure well            DISPOSITION: PACU CONDITION: Stable

## 2018-05-31 NOTE — H&P
History and Physical - [facility]  Ml Dumont 79 y o  male MRN: [id number]    HPI: Ml Dumont is a 79y o  year old male who presents with hx of duodenal GIST, persistent belching and reflux  Review of Systems    Historical Information   Past Medical History:   Diagnosis Date    Abnormal weight loss     RESOLVED: [id number]    Anemia     RESOLVED: 30ZQP4475    Atypical chest pain     RESOLVED: 11LSR0078    BPH (benign prostatic hyperplasia)     Cancer (HCC)     DUODENAL    Luis Miguel's syndrome     Depression     RESOLVED: 63QIO0151    Diverticulitis of colon     LAST ASSESSED: 56KEB4733    Duodenal nodule     RESOLVED: 16KSL3347    Gastrointestinal stromal tumor (GIST) (HCC)     MALIGNANT; RESOLVED: 47GXE1572    GERD (gastroesophageal reflux disease)     Glaucoma     RESOLVED: 59TDE4819    Insomnia     RESOLVED: 52LNO8505    Lactose intolerance     LAST ASSESSED: 82GQO7387     Past Surgical History:   Procedure Laterality Date    BACK SURGERY      CERVICAL FUSION      c4 and c5    CHOLECYSTECTOMY      ELBOW SURGERY Left     REPAIR    EYE SURGERY      KNEE ARTHROSCOPY Left     LUMBAR DISCECTOMY      L5 S1    ORIF RADIAL SHAFT FRACTURE      [place] ESOPHAGOGASTRODUODENOSCOPY TRANSORAL DIAGNOSTIC N/A 11/1/2016    Procedure: ESOPHAGOGASTRODUODENOSCOPY (EGD); Surgeon: Dona Burns MD;  [facility];   Service: Gastroenterology    SMALL INTESTINE SURGERY      GIST surgery     Social History   History   Alcohol Use    Yes     Comment: 2x month/ rare     History   Drug Use No     History   Smoking Status    Never Smoker   Smokeless Tobacco    Never Used     Family History   Problem Relation Age of Onset    Thyroid disease Mother     Thyroid disease Brother     Diabetes Paternal Grandmother     Hypertension Family        Meds/Allergies     Prescriptions Prior to Admission   Medication    B Complex-Biotin-FA (SUPER B-50 B COMPLEX PO)    bimatoprost (LUMIGAN) 0 01 % ophthalmic drops    Cholecalciferol (VITAMIN D-3 PO)    cholestyramine (QUESTRAN) 4 GM/DOSE powder    dorzolamide (TRUSOPT) 2 % ophthalmic solution    GLUCOSAMINE CHONDROITIN COMPLX PO    Multiple Vitamins-Minerals (MULTIVITAL) tablet    pantoprazole (PROTONIX) 40 mg tablet    tamsulosin (FLOMAX) 0 4 mg    Vitamin E 400 UNITS TABS    diflorasone (PSORCON) 0 05 % ointment    fluticasone (FLONASE) 50 mcg/act nasal spray    primidone (MYSOLINE) 50 mg tablet       No Known Allergies    Objective     Blood pressure 116/76, pulse (!) 53, temperature (!) 96 9 °F (36 1 °C), temperature source Temporal, resp  rate 16, height 5' 8" (1 727 m), weight 82 6 kg (182 lb), SpO2 96 %  PHYSICAL EXAM    Gen: NAD  CV: RRR  CHEST: Clear  ABD: soft, NT/ND  EXT: no edema  Neuro: AAO      ASSESSMENT/PLAN:  This is a 79y o  year old male here for hx of duodenal GIST, persistent belching and reflux       PLAN:   Procedure: Antione Galvez

## 2018-06-01 DIAGNOSIS — Z12.5 ENCOUNTER FOR SCREENING FOR MALIGNANT NEOPLASM OF PROSTATE: ICD-10-CM

## 2018-06-01 DIAGNOSIS — R97.20 ELEVATED PROSTATE SPECIFIC ANTIGEN (PSA): ICD-10-CM

## 2018-06-02 ENCOUNTER — APPOINTMENT (OUTPATIENT)
Dept: LAB | Age: 68
End: 2018-06-02
Payer: COMMERCIAL

## 2018-06-02 DIAGNOSIS — R97.20 ELEVATED PROSTATE SPECIFIC ANTIGEN (PSA): ICD-10-CM

## 2018-06-02 DIAGNOSIS — Z12.5 ENCOUNTER FOR SCREENING FOR MALIGNANT NEOPLASM OF PROSTATE: ICD-10-CM

## 2018-06-02 LAB — PSA SERPL-MCNC: 0.9 NG/ML (ref 0–4)

## 2018-06-02 PROCEDURE — 36415 COLL VENOUS BLD VENIPUNCTURE: CPT

## 2018-06-02 PROCEDURE — G0103 PSA SCREENING: HCPCS

## 2018-06-07 ENCOUNTER — TELEPHONE (OUTPATIENT)
Dept: GASTROENTEROLOGY | Facility: AMBULARY SURGERY CENTER | Age: 68
End: 2018-06-07

## 2018-06-07 NOTE — TELEPHONE ENCOUNTER
----- Message from Melisa Stringer, [address] [place] sent at 6/6/2018  7:45 AM EDT -----  Regarding: results      ----- Message -----  From: Odette Brink MD  Sent: 6/6/2018   6:03 AM  To: Gastroenterology [facility]    Please inform the patient that biopsies from recent upper endoscopy were negative for H pylori  Please make sure that the patient has tried activated charcoal     Please have him call with an update of his symptoms in 2 to 4 weeks, if he continues to have persistent belching will try amitriptyline  Please have the patient call with any questions or concerns, schedule office follow-up with me in 3 months, sooner if needed

## 2018-06-07 NOTE — TELEPHONE ENCOUNTER
Spoke with pt, charcoal did not works- caused loose stools   Scheduled patient 3month follow up, pt will call in 2 wks to follow up regarding sx

## 2018-06-11 ENCOUNTER — OFFICE VISIT (OUTPATIENT)
Dept: UROLOGY | Facility: AMBULATORY SURGERY CENTER | Age: 68
End: 2018-06-11
Payer: COMMERCIAL

## 2018-06-11 VITALS
WEIGHT: 184 LBS | HEIGHT: 68 IN | DIASTOLIC BLOOD PRESSURE: 68 MMHG | SYSTOLIC BLOOD PRESSURE: 118 MMHG | BODY MASS INDEX: 27.89 KG/M2 | HEART RATE: 72 BPM

## 2018-06-11 DIAGNOSIS — N40.0 ENLARGED PROSTATE WITHOUT LOWER URINARY TRACT SYMPTOMS (LUTS): Primary | ICD-10-CM

## 2018-06-11 DIAGNOSIS — Z12.5 SCREENING FOR PROSTATE CANCER: ICD-10-CM

## 2018-06-11 PROCEDURE — 99213 OFFICE O/P EST LOW 20 MIN: CPT | Performed by: NURSE PRACTITIONER

## 2018-06-11 RX ORDER — TAMSULOSIN HYDROCHLORIDE 0.4 MG/1
0.4 CAPSULE ORAL
Qty: 90 CAPSULE | Refills: 3 | Status: SHIPPED | OUTPATIENT
Start: 2018-06-11 | End: 2018-06-19 | Stop reason: SDUPTHER

## 2018-06-11 RX ORDER — DORZOLAMIDE HYDROCHLORIDE AND TIMOLOL MALEATE 20; 5 MG/ML; MG/ML
SOLUTION/ DROPS OPHTHALMIC
Refills: 4 | COMMUNITY
Start: 2018-05-20

## 2018-06-11 NOTE — PROGRESS NOTES
6/11/2018    Urmila Kumar  1950  [id number]        Assessment  BPH  Prostate cancer screening    Discussion  Tanya Mathew is a 79 y o  male being managed by Dr Singletary  His PSA remains stable at 0 9  He is doing well with no urinary complaints  He will return in 1 year with a PSA  He will continue to take Flomax daily  All questions answered  History of Present Illness  79 y o  male with a history of BPH presents today for 1 year follow up  He continues to take Flomax daily  He denies any lower urinary tract symptoms except for a weak stream  He does not find this bothersome  He denies any changes in his overall health  Review of Systems  Review of Systems   Constitutional: Negative  HENT: Negative  Respiratory: Negative  Cardiovascular: Negative  Gastrointestinal: Negative  Genitourinary:        As per HPI   Musculoskeletal: Negative  Skin: Negative  Neurological: Negative  Hematological: Negative  AUA SYMPTOM SCORE      Most Recent Value   AUA SYMPTOM SCORE   How often have you had a sensation of not emptying your bladder completely after you finished urinating? 1   How often have you had to urinate again less than two hours after you finished urinating? 1   How often have you found you stopped and started again several times when you urinate? 1   How often have you found it difficult to postpone urination? 1   How often have you had a weak urinary stream?  1   How often have you had to push or strain to begin urination? 0   How many times did you most typically get up to urinate from the time you went to bed at night until the time you got up in the morning? 2   Quality of Life: If you were to spend the rest of your life with your urinary condition just the way it is now, how would you feel about that?  2   AUA SYMPTOM SCORE  7        Urinary Incontinence Screening      Most Recent Value   Urinary Incontinence   Urinary Incontinence? No   Incomplete emptying? Yes   Urinary frequency? Yes   Urinary urgency? Yes   Urinary hesitancy? No   Dysuria (painful difficult urination)? No   Nocturia (waking up to use the bathroom)? No   Straining (having to push to go)? No   Weak stream?  Yes   Intermittent stream?  No   Post void dribbling? No            Past Medical History  Past Medical History:   Diagnosis Date    Abnormal weight loss     RESOLVED: [id number]    Anemia     RESOLVED: 36AVW8404    Atypical chest pain     RESOLVED: 64BZA4974    BPH (benign prostatic hyperplasia)     Cancer (HCC)     DUODENAL    Luis Miguel's syndrome     Depression     RESOLVED: 50CGS8078    Diverticulitis of colon     LAST ASSESSED: 03FIY4705    Duodenal nodule     RESOLVED: 23GRF2950    Gastrointestinal stromal tumor (GIST) (HCC)     MALIGNANT; RESOLVED: 31IIX7804    GERD (gastroesophageal reflux disease)     Glaucoma     RESOLVED: 43NPQ3155    Insomnia     RESOLVED: 29EED1132    Lactose intolerance     LAST ASSESSED: 89UXZ4742       Past Surgical History  Past Surgical History:   Procedure Laterality Date    BACK SURGERY      CERVICAL FUSION      c4 and c5    CHOLECYSTECTOMY      ELBOW SURGERY Left     REPAIR    EYE SURGERY      KNEE ARTHROSCOPY Left     LUMBAR DISCECTOMY      L5 S1    ORIF RADIAL SHAFT FRACTURE      [place] ESOPHAGOGASTRODUODENOSCOPY TRANSORAL DIAGNOSTIC N/A 11/1/2016    Procedure: ESOPHAGOGASTRODUODENOSCOPY (EGD); Surgeon: Gene Velazquez MD;  [facility]; Service: Gastroenterology    RI ESOPHAGOGASTRODUODENOSCOPY TRANSORAL DIAGNOSTIC N/A 5/31/2018    Procedure: ESOPHAGOGASTRODUODENOSCOPY (EGD); Surgeon: Gene Velazquez MD;  [facility];   Service: Gastroenterology    SMALL INTESTINE SURGERY      GIST surgery        Past Family History  Family History   Problem Relation Age of Onset    Thyroid disease Mother     Thyroid disease Brother     Diabetes Paternal Grandmother     Hypertension Family        Past Social history  Social History Social History    Marital status: /Civil Union     Spouse name: N/A    Number of children: N/A    Years of education: N/A     Occupational History    Not on file  Social History Main Topics    Smoking status: Never Smoker    Smokeless tobacco: Never Used    Alcohol use Yes      Comment: 2x month/ rare    Drug use: No    Sexual activity: Yes     Other Topics Concern    Not on file     Social History Narrative    No narrative on file       Current Medications  Current Outpatient Prescriptions   Medication Sig Dispense Refill    B Complex-Biotin-FA (SUPER B-50 B COMPLEX PO) Take 1 tablet by mouth daily      bimatoprost (LUMIGAN) 0 01 % ophthalmic drops 1 drop daily at bedtime      Cholecalciferol (VITAMIN D-3 PO) Take 1 capsule by mouth daily      cholestyramine (QUESTRAN) 4 GM/DOSE powder Take 1 packet by mouth every other day      diflorasone (PSORCON) 0 05 % ointment APPLY EVERY DAY AT BEDTIME WITH OCCLUSION  3    dorzolamide (TRUSOPT) 2 % ophthalmic solution 1 drop 3 (three) times a day      fluticasone (FLONASE) 50 mcg/act nasal spray 2 sprays into each nostril      GLUCOSAMINE CHONDROITIN COMPLX PO Take 1 tablet by mouth daily      Multiple Vitamins-Minerals (MULTIVITAL) tablet Take 1 tablet by mouth daily      pantoprazole (PROTONIX) 40 mg tablet Take 1 tablet (40 mg total) by mouth 2 (two) times a day 60 tablet 3    primidone (MYSOLINE) 50 mg tablet 1 po qhs x 1 week then increase by 1 tab weekly until reach 5 tabs bid (250mg) or lowest effective dose 300 tablet 5    tamsulosin (FLOMAX) 0 4 mg Take 0 4 mg by mouth daily with dinner      Vitamin E 400 UNITS TABS Take 1 tablet by mouth daily      dorzolamide-timolol (COSOPT) 22 3-6 8 MG/ML ophthalmic solution INSTILL 1 DROP INTO RIGHT EYE 3 TIMES A DAY  4     No current facility-administered medications for this visit          Allergies  No Known Allergies    Past Medical History, Social History, Family History, medications and allergies were reviewed and updated as appropriate  Vitals  Vitals:    06/11/18 1455   BP: 118/68   Pulse: 72   Weight: 83 5 kg (184 lb)   Height: 5' 8" (1 727 m)       Physical Exam  Skin: warm, dry, intact  Pulmonary: Non-labored breathing  Abdomen: Soft, non-tender, non-distended  Musculoskeletal: AROM with no joint deformity or tenderness  Neurology: Alert and oriented  (Male):  KATHLEEN: Prostate is not enlarged at 35 grams  No nodules noted          Results  Lab Results   Component Value Date    PSA 0 9 06/02/2018    PSA 1 1 05/26/2017    PSA 1 0 05/18/2016     Lab Results   Component Value Date    GLUCOSE 96 03/04/2017    CALCIUM 9 4 05/09/2018     05/09/2018    K 3 9 05/09/2018    CO2 27 05/09/2018     05/09/2018    BUN 11 05/09/2018    CREATININE 0 86 05/09/2018     Lab Results   Component Value Date    WBC 7 53 12/06/2016    HGB 17 4 (H) 12/06/2016    HCT 49 9 (H) 12/06/2016    MCV 90 12/06/2016     12/06/2016

## 2018-06-19 DIAGNOSIS — N40.0 ENLARGED PROSTATE WITHOUT LOWER URINARY TRACT SYMPTOMS (LUTS): ICD-10-CM

## 2018-06-20 RX ORDER — TAMSULOSIN HYDROCHLORIDE 0.4 MG/1
0.4 CAPSULE ORAL
Qty: 90 CAPSULE | Refills: 3 | Status: SHIPPED | OUTPATIENT
Start: 2018-06-20 | End: 2019-06-14 | Stop reason: SDUPTHER

## 2018-07-31 ENCOUNTER — TELEPHONE (OUTPATIENT)
Dept: NEUROLOGY | Facility: CLINIC | Age: 68
End: 2018-07-31

## 2018-07-31 NOTE — TELEPHONE ENCOUNTER
He has failed trials of propranolol, Neurontin and Topamax in the past   If he feels the Primidone is not helping and causing side effects he can taper off of the medication  There have been some trials looking at Remeron for tremor control (this is an antidepressant)  I do not know if it would help or not however I do not have any other medication options for him  I know we spoke about surgical options in the past as well which remain on option for him  Spoke with the patient and he would like to titrate off the Primidone  He will take 2tabs bid for a week then 1tab bid for a week then stop  Discussed the option of a trial of Remeron however he does not wish to start anything new  He is not interested in surgery  At this time he is asking to cancel his upcoming appointment with Dr Erica Fernando in October and will call if he changes his mind in the future  Padmini Gee, can we please cancel his October appt with Dr Erica Fernando  Thanks!

## 2018-07-31 NOTE — TELEPHONE ENCOUNTER
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

## 2018-08-15 ENCOUNTER — OFFICE VISIT (OUTPATIENT)
Dept: INTERNAL MEDICINE CLINIC | Facility: CLINIC | Age: 68
End: 2018-08-15
Payer: COMMERCIAL

## 2018-08-15 VITALS
WEIGHT: 176.6 LBS | HEART RATE: 90 BPM | HEIGHT: 68 IN | TEMPERATURE: 98.2 F | DIASTOLIC BLOOD PRESSURE: 76 MMHG | BODY MASS INDEX: 26.76 KG/M2 | SYSTOLIC BLOOD PRESSURE: 138 MMHG | OXYGEN SATURATION: 97 %

## 2018-08-15 DIAGNOSIS — M79.10 MYALGIA: ICD-10-CM

## 2018-08-15 DIAGNOSIS — K92.1 MELENA: ICD-10-CM

## 2018-08-15 DIAGNOSIS — R07.89 ATYPICAL CHEST PAIN: ICD-10-CM

## 2018-08-15 DIAGNOSIS — C49.A4 GIST (GASTROINTESTINAL STROMA TUMOR), MALIGNANT, COLON (HCC): ICD-10-CM

## 2018-08-15 DIAGNOSIS — R19.7 DIARRHEA, UNSPECIFIED TYPE: Primary | ICD-10-CM

## 2018-08-15 PROBLEM — R52 BODY ACHES: Status: ACTIVE | Noted: 2018-08-15

## 2018-08-15 PROCEDURE — 93000 ELECTROCARDIOGRAM COMPLETE: CPT | Performed by: INTERNAL MEDICINE

## 2018-08-15 PROCEDURE — 99214 OFFICE O/P EST MOD 30 MIN: CPT | Performed by: INTERNAL MEDICINE

## 2018-08-15 RX ORDER — LATANOPROST 50 UG/ML
1 SOLUTION/ DROPS OPHTHALMIC
COMMUNITY
End: 2018-10-10 | Stop reason: HOSPADM

## 2018-08-15 NOTE — PROGRESS NOTES
Assessment/Plan:           Problem List Items Addressed This Visit        Digestive    GIST (gastrointestinal stroma tumor), malignant, colon ([address] )     With his multitude of symptoms I would like him to go for a CT scan of the abdomen to evaluate for recurrence of this tumor         Relevant Orders    Comprehensive metabolic panel (Completed)    Melena     Rule out peptic ulcer disease at this point time he does not want to go to ER but will go to ER if worse, I will refer him to GI for his EGD and further evaluation  Relevant Orders    Ambulatory referral to Gastroenterology    CBC (Includes Diff/Plt) (Refl)    Lipase (Completed)    Stool Enteric Bacterial Panel by PCR (Completed)    Clostridium difficile Cult W/Refl Toxin B,PCR    CT abdomen pelvis w contrast       Other    Diarrhea - Primary     I will order stool studies? Could this be secondary to a Gist tumor versus secondary to melena         Relevant Orders    Comprehensive metabolic panel (Completed)    Lipase (Completed)    Stool Enteric Bacterial Panel by PCR (Completed)    Clostridium difficile Cult W/Refl Toxin B,PCR    CT abdomen pelvis w contrast    Myalgia    Relevant Orders    Lipase (Completed)    Lyme disease, western blot (Completed)    CT abdomen pelvis w contrast    Echo stress test w contrast if indicated    Atypical chest pain     He reports me lower chest wall myalgias/discomfort? Atypical chest pain will check stress echo, EKG baseline normal sinus rhythm no acute changes  Relevant Orders    POCT ECG          Return to office 1  months  call if any problems  Subjective:      Patient ID: Ray Topete is a 79 y o  male      HPI 79 male multiple problems diarrhea, lower chest wall discomfort/myalgias, history of gastrointestinal stroma tumor; the patient is accompanied with his wife The last week he has noticed sensitivity to touch and also across the lower chest bilaterally and abdominal region worsened with sitting back chair the temperature the watery diarrhea times day and he does report me a dark/black stools whole last week reports me stomachache he no exertional sx  No rash previous achiness, no chills no fevers no mucus in the stool    The following portions of the patient's history were reviewed and updated as appropriate: allergies, current medications, past family history, past medical history, past social history, past surgical history and problem list     Review of Systems   Constitutional: Negative for activity change, appetite change and unexpected weight change  Eyes: Negative for visual disturbance  Respiratory: Negative for cough and shortness of breath  Cardiovascular: Negative for chest pain  Lower chest wall pressure   Gastrointestinal: Positive for diarrhea  Negative for abdominal pain, nausea and vomiting  Melena   Neurological: Negative for dizziness, light-headedness and headaches  Hematological: Negative for adenopathy  Objective:                    No Follow-up on file        No Known Allergies    Past Medical History:   Diagnosis Date    Abnormal weight loss     RESOLVED: [id number]    Anemia     RESOLVED: 79NMQ7572    Atypical chest pain     RESOLVED: 99EZT6853    BPH (benign prostatic hyperplasia)     Cancer (HCC)     DUODENAL    Luis Miguel's syndrome     Depression     RESOLVED: 36OLH6781    Diverticulitis of colon     LAST ASSESSED: 46HSY7775    Duodenal nodule     RESOLVED: 41PUU6291    Gastrointestinal stromal tumor (GIST) (HCC)     MALIGNANT; RESOLVED: 37IVY8538    GERD (gastroesophageal reflux disease)     Glaucoma     RESOLVED: 03IIT4754    Insomnia     RESOLVED: 86HOZ3483    Lactose intolerance     LAST ASSESSED: 67GTZ9344     Past Surgical History:   Procedure Laterality Date    BACK SURGERY      CERVICAL FUSION      c4 and c5    CHOLECYSTECTOMY      ELBOW SURGERY Left     REPAIR    EYE SURGERY      KNEE ARTHROSCOPY Left     LUMBAR DISCECTOMY L5 S1    ORIF RADIAL SHAFT FRACTURE      UT ESOPHAGOGASTRODUODENOSCOPY TRANSORAL DIAGNOSTIC N/A 11/1/2016    Procedure: ESOPHAGOGASTRODUODENOSCOPY (EGD); Surgeon: Sanam Martinez MD;  [facility]; Service: Gastroenterology    UT ESOPHAGOGASTRODUODENOSCOPY TRANSORAL DIAGNOSTIC N/A 5/31/2018    Procedure: ESOPHAGOGASTRODUODENOSCOPY (EGD); Surgeon: Sanam Martinez MD;  [facility];   Service: Gastroenterology    SMALL INTESTINE SURGERY      GIST surgery     Current Outpatient Prescriptions on File Prior to Visit   Medication Sig Dispense Refill    B Complex-Biotin-FA (SUPER B-50 B COMPLEX PO) Take 1 tablet by mouth daily      Cholecalciferol (VITAMIN D-3) 5000 units TABS Take 1 capsule by mouth daily      cholestyramine (QUESTRAN) 4 GM/DOSE powder Take 1 packet by mouth every other day      dorzolamide-timolol (COSOPT) 22 3-6 8 MG/ML ophthalmic solution INSTILL 1 DROP INTO RIGHT EYE 3 TIMES A DAY  4    GLUCOSAMINE CHONDROITIN COMPLX PO Take 1 tablet by mouth daily 4500mg  daily       Multiple Vitamins-Minerals (MULTIVITAL) tablet Take 1 tablet by mouth daily [employer]       pantoprazole (PROTONIX) 40 mg tablet Take 1 tablet (40 mg total) by mouth 2 (two) times a day 60 tablet 3    tamsulosin (FLOMAX) 0 4 mg Take 1 capsule (0 4 mg total) by mouth daily with dinner 90 capsule 3    Vitamin E 400 UNITS TABS Take 1 tablet by mouth daily      bimatoprost (LUMIGAN) 0 01 % ophthalmic drops 1 drop daily at bedtime      diflorasone (PSORCON) 0 05 % ointment APPLY EVERY DAY AT BEDTIME WITH OCCLUSION  3    dorzolamide (TRUSOPT) 2 % ophthalmic solution 1 drop 3 (three) times a day      fluticasone (FLONASE) 50 mcg/act nasal spray 2 sprays into each nostril      primidone (MYSOLINE) 50 mg tablet 1 po qhs x 1 week then increase by 1 tab weekly until reach 5 tabs bid (250mg) or lowest effective dose (Patient not taking: Reported on 8/15/2018 ) 300 tablet 5     No current facility-administered medications on file prior to visit  Family History   Problem Relation Age of Onset    Thyroid disease Mother     Thyroid disease Brother     Diabetes Paternal Grandmother     Hypertension Family      Social History     Social History    Marital status: /Civil Union     Spouse name: N/A    Number of children: N/A    Years of education: N/A     Occupational History    Not on file  Social History Main Topics    Smoking status: Never Smoker    Smokeless tobacco: Never Used    Alcohol use Yes      Comment: occasional    Drug use: No    Sexual activity: Yes     Other Topics Concern    Not on file     Social History Narrative    No narrative on file     Vitals:    08/15/18 1453   BP: 138/76   Pulse: 90   Temp: 98 2 °F (36 8 °C)   SpO2: 97%   Weight: 80 1 kg (176 lb 9 6 oz)   Height: 5' 8" (1 727 m)     Results for orders placed or performed in visit on 06/02/18   PSA   Result Value Ref Range    PSA 0 9 0 0 - 4 0 ng/mL     Weight (last 2 days)     None        Body mass index is 26 85 kg/m²  BP      Temp      Pulse     Resp      SpO2        Vitals:    08/15/18 1453   Weight: 80 1 kg (176 lb 9 6 oz)     Vitals:    08/15/18 1453   Weight: 80 1 kg (176 lb 9 6 oz)       /76   Pulse 90   Temp 98 2 °F (36 8 °C)   Ht 5' 8" (1 727 m)   Wt 80 1 kg (176 lb 9 6 oz)   SpO2 97%   BMI 26 85 kg/m²          Physical Exam   Constitutional: He appears well-developed and well-nourished  No distress  HENT:   Head: Normocephalic and atraumatic  Right Ear: External ear normal    Left Ear: External ear normal    Mouth/Throat: Oropharynx is clear and moist    Eyes: Conjunctivae are normal  Pupils are equal, round, and reactive to light  Right eye exhibits no discharge  Left eye exhibits no discharge  No scleral icterus  Neck: Neck supple  Cardiovascular: Normal rate, regular rhythm and normal heart sounds  Exam reveals no gallop and no friction rub  No murmur heard    Pulmonary/Chest: No respiratory distress  He has no wheezes  He has no rales  Abdominal: Soft  Bowel sounds are normal  He exhibits no distension and no mass  There is no tenderness  There is no rebound and no guarding  Musculoskeletal: He exhibits no edema or deformity  Lymphadenopathy:     He has no cervical adenopathy  Neurological: He is alert  Skin: He is not diaphoretic  Psychiatric: He has a normal mood and affect

## 2018-08-16 ENCOUNTER — TRANSCRIBE ORDERS (OUTPATIENT)
Dept: LAB | Age: 68
End: 2018-08-16

## 2018-08-16 ENCOUNTER — APPOINTMENT (OUTPATIENT)
Dept: LAB | Age: 68
End: 2018-08-16
Payer: COMMERCIAL

## 2018-08-16 ENCOUNTER — TRANSCRIBE ORDERS (OUTPATIENT)
Dept: ADMINISTRATIVE | Age: 68
End: 2018-08-16

## 2018-08-16 DIAGNOSIS — M79.10 MYALGIA: ICD-10-CM

## 2018-08-16 DIAGNOSIS — C49.A4 GIST (GASTROINTESTINAL STROMA TUMOR), MALIGNANT, COLON (HCC): ICD-10-CM

## 2018-08-16 DIAGNOSIS — M79.10 MYALGIA: Primary | ICD-10-CM

## 2018-08-16 DIAGNOSIS — R19.7 DIARRHEA, UNSPECIFIED TYPE: ICD-10-CM

## 2018-08-16 DIAGNOSIS — K92.1 MELENA: ICD-10-CM

## 2018-08-16 DIAGNOSIS — R19.7 DIARRHEA OF PRESUMED INFECTIOUS ORIGIN: Primary | ICD-10-CM

## 2018-08-16 DIAGNOSIS — C49.A3 GASTROINTESTINAL STROMAL TUMOR (GIST) OF DUODENUM (HCC): ICD-10-CM

## 2018-08-16 LAB
ALBUMIN SERPL BCP-MCNC: 4 G/DL (ref 3.5–5)
ALP SERPL-CCNC: 76 U/L (ref 46–116)
ALT SERPL W P-5'-P-CCNC: 37 U/L (ref 12–78)
ANION GAP SERPL CALCULATED.3IONS-SCNC: 5 MMOL/L (ref 4–13)
AST SERPL W P-5'-P-CCNC: 44 U/L (ref 5–45)
BASOPHILS # BLD AUTO: 0.01 THOUSANDS/ΜL (ref 0–0.1)
BASOPHILS NFR BLD AUTO: 0 % (ref 0–1)
BILIRUB SERPL-MCNC: 0.73 MG/DL (ref 0.2–1)
BUN SERPL-MCNC: 9 MG/DL (ref 5–25)
CALCIUM SERPL-MCNC: 9.4 MG/DL (ref 8.3–10.1)
CHLORIDE SERPL-SCNC: 103 MMOL/L (ref 100–108)
CO2 SERPL-SCNC: 29 MMOL/L (ref 21–32)
CREAT SERPL-MCNC: 0.83 MG/DL (ref 0.6–1.3)
EOSINOPHIL # BLD AUTO: 0.09 THOUSAND/ΜL (ref 0–0.61)
EOSINOPHIL NFR BLD AUTO: 3 % (ref 0–6)
ERYTHROCYTE [DISTWIDTH] IN BLOOD BY AUTOMATED COUNT: 12.1 % (ref 11.6–15.1)
GFR SERPL CREATININE-BSD FRML MDRD: 92 ML/MIN/1.73SQ M
GLUCOSE P FAST SERPL-MCNC: 93 MG/DL (ref 65–99)
HCT VFR BLD AUTO: 46.8 % (ref 36.5–49.3)
HGB BLD-MCNC: 15.8 G/DL (ref 12–17)
IMM GRANULOCYTES # BLD AUTO: 0.01 THOUSAND/UL (ref 0–0.2)
IMM GRANULOCYTES NFR BLD AUTO: 0 % (ref 0–2)
LIPASE SERPL-CCNC: 197 U/L (ref 73–393)
LYMPHOCYTES # BLD AUTO: 1.01 THOUSANDS/ΜL (ref 0.6–4.47)
LYMPHOCYTES NFR BLD AUTO: 37 % (ref 14–44)
MCH RBC QN AUTO: 30.5 PG (ref 26.8–34.3)
MCHC RBC AUTO-ENTMCNC: 33.8 G/DL (ref 31.4–37.4)
MCV RBC AUTO: 90 FL (ref 82–98)
MONOCYTES # BLD AUTO: 0.38 THOUSAND/ΜL (ref 0.17–1.22)
MONOCYTES NFR BLD AUTO: 14 % (ref 4–12)
NEUTROPHILS # BLD AUTO: 1.25 THOUSANDS/ΜL (ref 1.85–7.62)
NEUTS SEG NFR BLD AUTO: 46 % (ref 43–75)
NRBC BLD AUTO-RTO: 0 /100 WBCS
PLATELET # BLD AUTO: 96 THOUSANDS/UL (ref 149–390)
PMV BLD AUTO: 11.8 FL (ref 8.9–12.7)
POTASSIUM SERPL-SCNC: 4.4 MMOL/L (ref 3.5–5.3)
PROT SERPL-MCNC: 7.7 G/DL (ref 6.4–8.2)
RBC # BLD AUTO: 5.18 MILLION/UL (ref 3.88–5.62)
SODIUM SERPL-SCNC: 137 MMOL/L (ref 136–145)
WBC # BLD AUTO: 2.75 THOUSAND/UL (ref 4.31–10.16)

## 2018-08-16 PROCEDURE — 80053 COMPREHEN METABOLIC PANEL: CPT

## 2018-08-16 PROCEDURE — 87505 NFCT AGENT DETECTION GI: CPT

## 2018-08-16 PROCEDURE — 36415 COLL VENOUS BLD VENIPUNCTURE: CPT

## 2018-08-16 PROCEDURE — 85025 COMPLETE CBC W/AUTO DIFF WBC: CPT

## 2018-08-16 PROCEDURE — 87493 C DIFF AMPLIFIED PROBE: CPT

## 2018-08-16 PROCEDURE — 83690 ASSAY OF LIPASE: CPT

## 2018-08-16 PROCEDURE — 86617 LYME DISEASE ANTIBODY: CPT

## 2018-08-17 LAB
C DIFF TOX GENS STL QL NAA+PROBE: NORMAL
CAMPYLOBACTER DNA SPEC NAA+PROBE: NORMAL
SALMONELLA DNA SPEC QL NAA+PROBE: NORMAL
SHIGA TOXIN STX GENE SPEC NAA+PROBE: NORMAL
SHIGELLA DNA SPEC QL NAA+PROBE: NORMAL

## 2018-08-18 LAB
B BURGDOR IGG PATRN SER IB-IMP: NEGATIVE
B BURGDOR IGM PATRN SER IB-IMP: NEGATIVE
B BURGDOR18KD IGG SER QL IB: NORMAL
B BURGDOR23KD IGG SER QL IB: NORMAL
B BURGDOR23KD IGM SER QL IB: NORMAL
B BURGDOR28KD IGG SER QL IB: NORMAL
B BURGDOR30KD IGG SER QL IB: NORMAL
B BURGDOR39KD IGG SER QL IB: NORMAL
B BURGDOR39KD IGM SER QL IB: NORMAL
B BURGDOR41KD IGG SER QL IB: NORMAL
B BURGDOR41KD IGM SER QL IB: NORMAL
B BURGDOR45KD IGG SER QL IB: NORMAL
B BURGDOR58KD IGG SER QL IB: NORMAL
B BURGDOR66KD IGG SER QL IB: NORMAL
B BURGDOR93KD IGG SER QL IB: NORMAL

## 2018-08-19 PROBLEM — R07.89 ATYPICAL CHEST PAIN: Status: ACTIVE | Noted: 2018-08-19

## 2018-08-19 PROBLEM — C49.A4 GIST (GASTROINTESTINAL STROMA TUMOR), MALIGNANT, COLON (HCC): Status: ACTIVE | Noted: 2018-08-19

## 2018-08-19 PROBLEM — K92.1 MELENA: Status: ACTIVE | Noted: 2018-08-19

## 2018-08-19 PROBLEM — M79.10 MYALGIA: Status: ACTIVE | Noted: 2018-08-19

## 2018-08-19 PROCEDURE — 93000 ELECTROCARDIOGRAM COMPLETE: CPT | Performed by: INTERNAL MEDICINE

## 2018-08-19 NOTE — ASSESSMENT & PLAN NOTE
With his multitude of symptoms I would like him to go for a CT scan of the abdomen to evaluate for recurrence of this tumor

## 2018-08-19 NOTE — ASSESSMENT & PLAN NOTE
He reports me lower chest wall myalgias/discomfort? Atypical chest pain will check stress echo, EKG baseline normal sinus rhythm no acute changes

## 2018-08-19 NOTE — ASSESSMENT & PLAN NOTE
Rule out peptic ulcer disease at this point time he does not want to go to ER but will go to ER if worse, I will refer him to GI for his EGD and further evaluation

## 2018-08-20 ENCOUNTER — APPOINTMENT (OUTPATIENT)
Dept: LAB | Age: 68
End: 2018-08-20
Payer: COMMERCIAL

## 2018-08-20 DIAGNOSIS — D69.6 THROMBOCYTOPENIA (HCC): ICD-10-CM

## 2018-08-20 DIAGNOSIS — D72.819 LEUKOPENIA, UNSPECIFIED TYPE: Primary | ICD-10-CM

## 2018-08-20 DIAGNOSIS — D72.819 LEUKOPENIA, UNSPECIFIED TYPE: ICD-10-CM

## 2018-08-20 LAB
BASOPHILS # BLD AUTO: 0.01 THOUSANDS/ΜL (ref 0–0.1)
BASOPHILS NFR BLD AUTO: 0 % (ref 0–1)
EOSINOPHIL # BLD AUTO: 0.05 THOUSAND/ΜL (ref 0–0.61)
EOSINOPHIL NFR BLD AUTO: 1 % (ref 0–6)
ERYTHROCYTE [DISTWIDTH] IN BLOOD BY AUTOMATED COUNT: 12.3 % (ref 11.6–15.1)
HCT VFR BLD AUTO: 44.8 % (ref 36.5–49.3)
HGB BLD-MCNC: 15.1 G/DL (ref 12–17)
IMM GRANULOCYTES # BLD AUTO: 0.02 THOUSAND/UL (ref 0–0.2)
IMM GRANULOCYTES NFR BLD AUTO: 0 % (ref 0–2)
LYMPHOCYTES # BLD AUTO: 1.26 THOUSANDS/ΜL (ref 0.6–4.47)
LYMPHOCYTES NFR BLD AUTO: 20 % (ref 14–44)
MCH RBC QN AUTO: 30.7 PG (ref 26.8–34.3)
MCHC RBC AUTO-ENTMCNC: 33.7 G/DL (ref 31.4–37.4)
MCV RBC AUTO: 91 FL (ref 82–98)
MONOCYTES # BLD AUTO: 0.65 THOUSAND/ΜL (ref 0.17–1.22)
MONOCYTES NFR BLD AUTO: 10 % (ref 4–12)
NEUTROPHILS # BLD AUTO: 4.47 THOUSANDS/ΜL (ref 1.85–7.62)
NEUTS SEG NFR BLD AUTO: 69 % (ref 43–75)
NRBC BLD AUTO-RTO: 0 /100 WBCS
PLATELET # BLD AUTO: 166 THOUSANDS/UL (ref 149–390)
PMV BLD AUTO: 10.8 FL (ref 8.9–12.7)
RBC # BLD AUTO: 4.92 MILLION/UL (ref 3.88–5.62)
WBC # BLD AUTO: 6.46 THOUSAND/UL (ref 4.31–10.16)

## 2018-08-20 PROCEDURE — 85025 COMPLETE CBC W/AUTO DIFF WBC: CPT

## 2018-08-20 PROCEDURE — 36415 COLL VENOUS BLD VENIPUNCTURE: CPT

## 2018-08-21 ENCOUNTER — TELEPHONE (OUTPATIENT)
Dept: INTERNAL MEDICINE CLINIC | Facility: CLINIC | Age: 68
End: 2018-08-21

## 2018-08-21 NOTE — TELEPHONE ENCOUNTER
Patient had questions about blood work and what is next  Patient's CT scan was moved up from October to next Tuesday 8/28  Patient has appt [facility] on [facility]12 and a few hours before that he sees Dr Nan Garcia  Any follow up plans to blood work now that it was normal? He wants to know if you want to wait to proceed further until CT scan results come back  Please advise

## 2018-08-22 NOTE — TELEPHONE ENCOUNTER
Patient informed of plan  Patient reports he is still "tired as hell" but the aches & pains have subsided

## 2018-08-30 ENCOUNTER — HOSPITAL ENCOUNTER (OUTPATIENT)
Dept: RADIOLOGY | Facility: HOSPITAL | Age: 68
Discharge: HOME | End: 2018-08-30
Attending: SURGERY
Payer: COMMERCIAL

## 2018-08-30 DIAGNOSIS — C49.A3 GASTROINTESTINAL STROMAL TUMOR (GIST) OF DUODENUM (HCC): ICD-10-CM

## 2018-08-30 PROCEDURE — 74177 CT ABD & PELVIS W/CONTRAST: CPT

## 2018-08-30 PROCEDURE — 71260 CT THORAX DX C+: CPT

## 2018-08-30 RX ADMIN — IOHEXOL 100 ML: 350 INJECTION, SOLUTION INTRAVENOUS at 19:58

## 2018-09-12 ENCOUNTER — OFFICE VISIT (OUTPATIENT)
Dept: INTERNAL MEDICINE CLINIC | Facility: CLINIC | Age: 68
End: 2018-09-12
Payer: COMMERCIAL

## 2018-09-12 ENCOUNTER — OFFICE VISIT (OUTPATIENT)
Dept: GASTROENTEROLOGY | Facility: AMBULARY SURGERY CENTER | Age: 68
End: 2018-09-12
Payer: COMMERCIAL

## 2018-09-12 VITALS
DIASTOLIC BLOOD PRESSURE: 80 MMHG | BODY MASS INDEX: 26.98 KG/M2 | RESPIRATION RATE: 18 BRPM | HEIGHT: 68 IN | TEMPERATURE: 97.7 F | WEIGHT: 178 LBS | HEART RATE: 63 BPM | SYSTOLIC BLOOD PRESSURE: 100 MMHG

## 2018-09-12 VITALS
WEIGHT: 177.6 LBS | DIASTOLIC BLOOD PRESSURE: 82 MMHG | RESPIRATION RATE: 16 BRPM | OXYGEN SATURATION: 97 % | SYSTOLIC BLOOD PRESSURE: 120 MMHG | BODY MASS INDEX: 26.92 KG/M2 | HEART RATE: 67 BPM | HEIGHT: 68 IN

## 2018-09-12 DIAGNOSIS — K58.0 IRRITABLE BOWEL SYNDROME WITH DIARRHEA: ICD-10-CM

## 2018-09-12 DIAGNOSIS — K92.1 MELENA: ICD-10-CM

## 2018-09-12 DIAGNOSIS — C49.A4 GIST (GASTROINTESTINAL STROMA TUMOR), MALIGNANT, COLON (HCC): ICD-10-CM

## 2018-09-12 DIAGNOSIS — R14.2 BELCHING: Primary | ICD-10-CM

## 2018-09-12 DIAGNOSIS — Z23 NEED FOR PNEUMOCOCCAL VACCINATION: ICD-10-CM

## 2018-09-12 DIAGNOSIS — K21.9 GASTROESOPHAGEAL REFLUX DISEASE WITHOUT ESOPHAGITIS: ICD-10-CM

## 2018-09-12 DIAGNOSIS — R53.83 FATIGUE, UNSPECIFIED TYPE: Primary | ICD-10-CM

## 2018-09-12 PROBLEM — K58.9 IBS (IRRITABLE BOWEL SYNDROME): Status: ACTIVE | Noted: 2018-09-12

## 2018-09-12 PROCEDURE — 99213 OFFICE O/P EST LOW 20 MIN: CPT | Performed by: INTERNAL MEDICINE

## 2018-09-12 PROCEDURE — G0009 ADMIN PNEUMOCOCCAL VACCINE: HCPCS

## 2018-09-12 PROCEDURE — 99214 OFFICE O/P EST MOD 30 MIN: CPT | Performed by: INTERNAL MEDICINE

## 2018-09-12 PROCEDURE — 90732 PPSV23 VACC 2 YRS+ SUBQ/IM: CPT

## 2018-09-12 RX ORDER — PANTOPRAZOLE SODIUM 40 MG/1
40 TABLET, DELAYED RELEASE ORAL 2 TIMES DAILY
Qty: 60 TABLET | Refills: 3 | Status: SHIPPED | OUTPATIENT
Start: 2018-09-12 | End: 2019-01-04 | Stop reason: SDUPTHER

## 2018-09-12 NOTE — PROGRESS NOTES
Assessment/Plan:         Problem List Items Addressed This Visit        Digestive    Gastroesophageal reflux disease without esophagitis     Currently on Protonix 40 mg once a day seen GI and currently clinically stable  GIST (gastrointestinal stroma tumor), malignant, colon ([facility] [address] )     Recent CT scan did not show any recurrence  IBS (irritable bowel syndrome)     CT scan was negative the patient seen GI and his symptoms were felt to be irritable bowel syndrome we recommended increasing fiber in the diet probiotic and patient will follow up with GI I did review the stool cultures that were negative  Other Visit Diagnoses     Fatigue, unspecified type    -  Primary    Relevant Orders    TSH, 3rd generation (Completed)    Need for pneumococcal vaccination        Relevant Orders    Pneumococcal polysaccharide vaccine 23-valent greater than or equal to 1yo subcutaneous/IM (Completed)          Return to office 6  months  call if any problems  Subjective:      Patient ID: Ray Topete is a 79 y o  male  HPI 72-year old male coming in for a follow up visit regarding fatigue, irritable bowel syndrome Gist tumor, GERD; The patient reports me compliant taking medications without untoward side effects the  The patient is here to review his medical condition, update me on the medical condition and the patient reports me no hospitalizations and no ER visits  Patient reports me his bowel movements have somewhat improved he has seen GI and felt to have irritable bowel syndrome he underwent CT scan which was reviewed with him today  Also stool cultures were negative  Report me symptoms of fatigue    The following portions of the patient's history were reviewed and updated as appropriate: allergies, current medications, past family history, past medical history, past social history, past surgical history and problem list     Review of Systems   Constitutional: Positive for fatigue   Negative for activity change, appetite change and unexpected weight change  HENT: Negative for congestion and postnasal drip  Eyes: Negative for visual disturbance  Respiratory: Negative for cough and shortness of breath  Cardiovascular: Negative for chest pain  Gastrointestinal: Negative for abdominal pain, diarrhea (Improved), nausea and vomiting  Neurological: Negative for dizziness, light-headedness and headaches  Hematological: Negative for adenopathy  Objective:                    No Follow-up on file  No Known Allergies    Past Medical History:   Diagnosis Date    Abnormal weight loss     RESOLVED: [id number]    Anemia     RESOLVED: 87OQL5082    Atypical chest pain     RESOLVED: 34ZOL8251    BPH (benign prostatic hyperplasia)     Cancer (HCC)     DUODENAL    Luis Miguel's syndrome     Depression     RESOLVED: 73MMH3930    Diverticulitis of colon     LAST ASSESSED: 33BCK6329    Duodenal nodule     RESOLVED: [id number]    Gastrointestinal stromal tumor (GIST) (HCC)     MALIGNANT; RESOLVED: 99XFI9010    GERD (gastroesophageal reflux disease)     Glaucoma     RESOLVED: 39LVB3297    Insomnia     RESOLVED: 95TOX9709    Lactose intolerance     LAST ASSESSED: 22QKF7900     Past Surgical History:   Procedure Laterality Date    BACK SURGERY      CERVICAL FUSION      c4 and c5    CHOLECYSTECTOMY      ELBOW SURGERY Left     REPAIR    EYE SURGERY      KNEE ARTHROSCOPY Left     LUMBAR DISCECTOMY      L5 S1    ORIF RADIAL SHAFT FRACTURE      [place] ESOPHAGOGASTRODUODENOSCOPY TRANSORAL DIAGNOSTIC N/A 11/1/2016    Procedure: ESOPHAGOGASTRODUODENOSCOPY (EGD); Surgeon: Lewis Chen MD;  [facility]; Service: Gastroenterology    [place] ESOPHAGOGASTRODUODENOSCOPY TRANSORAL DIAGNOSTIC N/A 5/31/2018    Procedure: ESOPHAGOGASTRODUODENOSCOPY (EGD); Surgeon: Lewis Chen MD;  [facility];   Service: Gastroenterology    SMALL INTESTINE SURGERY      GIST surgery     Current Outpatient Prescriptions on File Prior to Visit   Medication Sig Dispense Refill    B Complex-Biotin-FA (SUPER B-50 B COMPLEX PO) Take 1 tablet by mouth daily      bimatoprost (LUMIGAN) 0 01 % ophthalmic drops 1 drop daily at bedtime      Cholecalciferol (VITAMIN D-3) 5000 units TABS Take 1 capsule by mouth daily      cholestyramine (QUESTRAN) 4 GM/DOSE powder Take 1 packet by mouth every other day      diflorasone (PSORCON) 0 05 % ointment APPLY EVERY DAY AT BEDTIME WITH OCCLUSION  3    dorzolamide (TRUSOPT) 2 % ophthalmic solution 1 drop 3 (three) times a day      dorzolamide-timolol (COSOPT) 22 3-6 8 MG/ML ophthalmic solution INSTILL 1 DROP INTO RIGHT EYE 3 TIMES A DAY  4    fluticasone (FLONASE) 50 mcg/act nasal spray 2 sprays into each nostril      GLUCOSAMINE CHONDROITIN COMPLX PO Take 1 tablet by mouth daily 4500mg  daily       latanoprost (XALATAN) 0 005 % ophthalmic solution Administer 1 drop to the right eye daily at bedtime      Multiple Vitamins-Minerals (MULTIVITAL) tablet Take 1 tablet by mouth daily Spectravite       primidone (MYSOLINE) 50 mg tablet 1 po qhs x 1 week then increase by 1 tab weekly until reach 5 tabs bid (250mg) or lowest effective dose 300 tablet 5    tamsulosin (FLOMAX) 0 4 mg Take 1 capsule (0 4 mg total) by mouth daily with dinner 90 capsule 3    Vitamin E 400 UNITS TABS Take 1 tablet by mouth daily       No current facility-administered medications on file prior to visit  Family History   Problem Relation Age of Onset    Thyroid disease Mother     Thyroid disease Brother     Diabetes Paternal Grandmother     Hypertension Family      Social History     Social History    Marital status: /Civil Union     Spouse name: N/A    Number of children: N/A    Years of education: N/A     Occupational History    Not on file       Social History Main Topics    Smoking status: Never Smoker    Smokeless tobacco: Never Used    Alcohol use Yes      Comment: occasional    Drug use: No    Sexual activity: Yes     Other Topics Concern    Not on file     Social History Narrative    No narrative on file     Vitals:    09/12/18 1819   BP: 120/82   BP Location: Right arm   Patient Position: Sitting   Cuff Size: Standard   Pulse: 67   Resp: 16   SpO2: 97%   Weight: 80 6 kg (177 lb 9 6 oz)   Height: 5' 8" (1 727 m)     Results for orders placed or performed in visit on 08/20/18   CBC and differential   Result Value Ref Range    WBC 6 46 4 31 - 10 16 Thousand/uL    RBC 4 92 3 88 - 5 62 Million/uL    Hemoglobin 15 1 12 0 - 17 0 g/dL    Hematocrit 44 8 36 5 - 49 3 %    MCV 91 82 - 98 fL    MCH 30 7 26 8 - 34 3 pg    MCHC 33 7 31 4 - 37 4 g/dL    RDW 12 3 11 6 - 15 1 %    MPV 10 8 8 9 - 12 7 fL    Platelets 084 841 - 809 Thousands/uL    nRBC 0 /100 WBCs    Neutrophils Relative 69 43 - 75 %    Immat GRANS % 0 0 - 2 %    Lymphocytes Relative 20 14 - 44 %    Monocytes Relative 10 4 - 12 %    Eosinophils Relative 1 0 - 6 %    Basophils Relative 0 0 - 1 %    Neutrophils Absolute 4 47 1 85 - 7 62 Thousands/µL    Immature Grans Absolute 0 02 0 00 - 0 20 Thousand/uL    Lymphocytes Absolute 1 26 0 60 - 4 47 Thousands/µL    Monocytes Absolute 0 65 0 17 - 1 22 Thousand/µL    Eosinophils Absolute 0 05 0 00 - 0 61 Thousand/µL    Basophils Absolute 0 01 0 00 - 0 10 Thousands/µL     Weight (last 2 days)     None        Body mass index is 27 kg/m²  BP      Temp      Pulse     Resp      SpO2        Vitals:    09/12/18 1819   Weight: 80 6 kg (177 lb 9 6 oz)     Vitals:    09/12/18 1819   Weight: 80 6 kg (177 lb 9 6 oz)       /82 (BP Location: Right arm, Patient Position: Sitting, Cuff Size: Standard)   Pulse 67   Resp 16   Ht 5' 8" (1 727 m)   Wt 80 6 kg (177 lb 9 6 oz)   SpO2 97%   BMI 27 00 kg/m²          Physical Exam   Constitutional: He appears well-developed and well-nourished  No distress  HENT:   Head: Normocephalic and atraumatic     Right Ear: External ear normal    Left Ear: External ear normal    Mouth/Throat: Oropharynx is clear and moist    Eyes: Conjunctivae are normal  Pupils are equal, round, and reactive to light  Right eye exhibits no discharge  Left eye exhibits no discharge  No scleral icterus  Neck: Neck supple  Cardiovascular: Normal rate, regular rhythm and normal heart sounds  Exam reveals no gallop and no friction rub  No murmur heard  Pulmonary/Chest: No respiratory distress  He has no wheezes  He has no rales  Abdominal: Soft  Bowel sounds are normal  He exhibits no distension and no mass  There is no tenderness  There is no rebound and no guarding  Musculoskeletal: He exhibits no edema or deformity  Lymphadenopathy:     He has no cervical adenopathy  Neurological: He is alert  Skin: He is not diaphoretic  Psychiatric: He has a normal mood and affect

## 2018-09-12 NOTE — LETTER
September 12, 2018     Leeann BrighticKaleeramses  [address] [facility] [address] [place] [address]    Patient: Yelitza Ortiz   YOB: 1950   Date of Visit: 9/12/2018       Dear Dr Artis Serrano: Thank you for referring Yelitza Ortiz to me for evaluation  Below are my notes for this consultation  If you have questions, please do not hesitate to call me  I look forward to following your patient along with you  Sincerely,        Tory Jane MD        CC: No Recipients  Tory Jane MD  9/12/2018  6:21 PM  Sign at close encounter  [address] [facility]  Yelitza Ortiz 79 y o  male MRN: [id number]            Assessment & Plan:    Pleasant 77-year-old gentleman here for follow-up of dyspepsia and belching with also diarrhea  Has a history of just     1   Belching:  Suspect this is functional, appeared to have improved with PPI therapy  -continue twice daily PPI therapy for now  -recommend lactose-free diet  -we will consider amitriptyline in the future if his belching persists    2  Diarrhea:  Appears to be functional, we have tried several different options  -recommended daily fiber supplementation  -if symptoms are not improving will give him a trial of Xifaxan for IBS-diarrhea  -again recommended lactose-free diet    3  History of just:  Followed by Surgical Oncology  -his recent CT scan showed no recurrence of disease    4  Fatigue:  Suspect this may be functional, would recommend checking TSH to evaluate      _____________________________________________________________        CC:  Here for follow-up    HPI:  Yelitza Ortiz is a 79 y o male who is here for here for follow-up  As you know this is a pleasant 77-year-old gentleman, with a history of duodenal just status post surgical resection, had been doing relatively well but we had seen him several months ago he had persistent belching and dyspeptic symptoms    Upper endoscopy demonstrated some esophagitis and gastritis, he is placed on twice daily PPI therapy  He notes some much of the symptoms had improved over the summer however that he had what he describes as significant fatigue like symptoms and change in stools  He had a CT scan and laboratory studies which were normal  Initial laboratory studies demonstrated some mild leukopenia and thrombocytopenia which had resolved on repeat labs  The patient for weight has been stable  Continues to have loose stools despite taking cholestyramine  Alternating days of several episodes of loose bowel movements  Denies any significant abdominal bloating or pain  His appetite is poor but he has maintained his weight  He continues take Protonix twice daily  He has been eating significant amounts of yogurt for the probiotic affect  Interestingly in the past it seems that some of his loose stools were due to dairy products  Recently has belching has returned, he has to wake up at night shift around and walk until he belches then he has relief  We had tried him on activated charcoal which she notes did not help with his symptoms  ROS:  The remainder of the ROS was negative except for the pertinent positives mentioned in HPI  Allergies: Patient has no known allergies      Medications:   Current Outpatient Prescriptions:     B Complex-Biotin-FA (SUPER B-50 B COMPLEX PO), Take 1 tablet by mouth daily, Disp: , Rfl:     bimatoprost (LUMIGAN) 0 01 % ophthalmic drops, 1 drop daily at bedtime, Disp: , Rfl:     Cholecalciferol (VITAMIN D-3) 5000 units TABS, Take 1 capsule by mouth daily, Disp: , Rfl:     cholestyramine (QUESTRAN) 4 GM/DOSE powder, Take 1 packet by mouth every other day, Disp: , Rfl:     dorzolamide (TRUSOPT) 2 % ophthalmic solution, 1 drop 3 (three) times a day, Disp: , Rfl:     fluticasone (FLONASE) 50 mcg/act nasal spray, 2 sprays into each nostril, Disp: , Rfl:     GLUCOSAMINE CHONDROITIN COMPLX PO, Take 1 tablet by mouth daily 4500mg  daily , Disp: , Rfl:    Multiple Vitamins-Minerals (MULTIVITAL) tablet, Take 1 tablet by mouth daily Spectravite , Disp: , Rfl:     pantoprazole (PROTONIX) 40 mg tablet, Take 1 tablet (40 mg total) by mouth 2 (two) times a day, Disp: 60 tablet, Rfl: 3    tamsulosin (FLOMAX) 0 4 mg, Take 1 capsule (0 4 mg total) by mouth daily with dinner, Disp: 90 capsule, Rfl: 3    Vitamin E 400 UNITS TABS, Take 1 tablet by mouth daily, Disp: , Rfl:     diflorasone (PSORCON) 0 05 % ointment, APPLY EVERY DAY AT BEDTIME WITH OCCLUSION, Disp: , Rfl: 3    dorzolamide-timolol (COSOPT) 22 3-6 8 MG/ML ophthalmic solution, INSTILL 1 DROP INTO RIGHT EYE 3 TIMES A DAY, Disp: , Rfl: 4    latanoprost (XALATAN) 0 005 % ophthalmic solution, Administer 1 drop to the right eye daily at bedtime, Disp: , Rfl:     primidone (MYSOLINE) 50 mg tablet, 1 po qhs x 1 week then increase by 1 tab weekly until reach 5 tabs bid (250mg) or lowest effective dose (Patient not taking: Reported on 8/15/2018 ), Disp: 300 tablet, Rfl: 5    Past Medical History:   Diagnosis Date    Abnormal weight loss     RESOLVED: [id number]    Anemia     RESOLVED: 15OCE5553    Atypical chest pain     RESOLVED: [id number]    BPH (benign prostatic hyperplasia)     Cancer (HCC)     DUODENAL    Luis Miguel's syndrome     Depression     RESOLVED: 28XLT4295    Diverticulitis of colon     LAST ASSESSED: 69BXN7128    Duodenal nodule     RESOLVED: 16JKV5913    Gastrointestinal stromal tumor (GIST) (HCC)     MALIGNANT; RESOLVED: 28AEC0806    GERD (gastroesophageal reflux disease)     Glaucoma     RESOLVED: 60CYI8481    Insomnia     RESOLVED: 40TCN1905    Lactose intolerance     LAST ASSESSED: 27LQV5194       Past Surgical History:   Procedure Laterality Date    BACK SURGERY      CERVICAL FUSION      c4 and c5    CHOLECYSTECTOMY      ELBOW SURGERY Left     REPAIR    EYE SURGERY      KNEE ARTHROSCOPY Left     LUMBAR DISCECTOMY      L5 S1    ORIF RADIAL SHAFT FRACTURE      [place] ESOPHAGOGASTRODUODENOSCOPY TRANSORAL DIAGNOSTIC N/A 11/1/2016    Procedure: ESOPHAGOGASTRODUODENOSCOPY (EGD); Surgeon: Marzena Pak MD;  [facility]; Service: Gastroenterology    [place] ESOPHAGOGASTRODUODENOSCOPY TRANSORAL DIAGNOSTIC N/A 5/31/2018    Procedure: ESOPHAGOGASTRODUODENOSCOPY (EGD); Surgeon: Marzena Pak MD;  [facility]; Service: Gastroenterology    SMALL INTESTINE SURGERY      GIST surgery       Family History   Problem Relation Age of Onset    Thyroid disease Mother     Thyroid disease Brother     Diabetes Paternal Grandmother     Hypertension Family         reports that he has never smoked  He has never used smokeless tobacco  He reports that he drinks alcohol  He reports that he does not use drugs        Physical Exam:    /80 (BP Location: Right arm, Patient Position: Sitting, Cuff Size: Standard)   Pulse 63   Temp 97 7 °F (36 5 °C) (Tympanic)   Resp 18   Ht 5' 8" (1 727 m)   Wt 80 7 kg (178 lb)   BMI 27 06 kg/m²      Gen: wn/wd, NAD  HEENT: anicteric, MMM, no cervical LAD  CVS: RRR, no m/r/g  CHEST: CTA b/l  ABD: +BS, soft, NT,ND, no hepatosplenomegaly  EXT: no c/c/e  NEURO: aaox3  SKIN: NO rashes

## 2018-09-12 NOTE — PATIENT INSTRUCTIONS
Stop yogurt  Take 1 tablespoon of metamucil daily  If not better in 2-4 weeks, call us  Keep protonix twice daily  Talk to dr Jermaine Corbett about thyroid

## 2018-09-12 NOTE — PROGRESS NOTES
[facility]  Dmitri Kaminski 79 y o  male MRN: [id number]            Assessment & Plan:    Pleasant 46-year-old gentleman here for follow-up of dyspepsia and belching with also diarrhea  Has a history of just     1   Belching:  Suspect this is functional, appeared to have improved with PPI therapy  -continue twice daily PPI therapy for now  -recommend lactose-free diet  -we will consider amitriptyline in the future if his belching persists    2  Diarrhea:  Appears to be functional, we have tried several different options  -recommended daily fiber supplementation  -if symptoms are not improving will give him a trial of Xifaxan for IBS-diarrhea  -again recommended lactose-free diet    3  History of just:  Followed by Surgical Oncology  -his recent CT scan showed no recurrence of disease    4  Fatigue:  Suspect this may be functional, would recommend checking TSH to evaluate      _____________________________________________________________        CC:  Here for follow-up    HPI:  Dmitri Kaminski is a 79 y o male who is here for here for follow-up  As you know this is a pleasant 46-year-old gentleman, with a history of duodenal just status post surgical resection, had been doing relatively well but we had seen him several months ago he had persistent belching and dyspeptic symptoms  Upper endoscopy demonstrated some esophagitis and gastritis, he is placed on twice daily PPI therapy  He notes some much of the symptoms had improved over the summer however that he had what he describes as significant fatigue like symptoms and change in stools  He had a CT scan and laboratory studies which were normal  Initial laboratory studies demonstrated some mild leukopenia and thrombocytopenia which had resolved on repeat labs  The patient for weight has been stable  Continues to have loose stools despite taking cholestyramine  Alternating days of several episodes of loose bowel movements      Denies any significant abdominal bloating or pain  His appetite is poor but he has maintained his weight  He continues take Protonix twice daily  He has been eating significant amounts of yogurt for the probiotic affect  Interestingly in the past it seems that some of his loose stools were due to dairy products  Recently has belching has returned, he has to wake up at night shift around and walk until he belches then he has relief  We had tried him on activated charcoal which she notes did not help with his symptoms  ROS:  The remainder of the ROS was negative except for the pertinent positives mentioned in HPI  Allergies: Patient has no known allergies      Medications:   Current Outpatient Prescriptions:     B Complex-Biotin-FA (SUPER B-50 B COMPLEX PO), Take 1 tablet by mouth daily, Disp: , Rfl:     bimatoprost (LUMIGAN) 0 01 % ophthalmic drops, 1 drop daily at bedtime, Disp: , Rfl:     Cholecalciferol (VITAMIN D-3) 5000 units TABS, Take 1 capsule by mouth daily, Disp: , Rfl:     cholestyramine (QUESTRAN) 4 GM/DOSE powder, Take 1 packet by mouth every other day, Disp: , Rfl:     dorzolamide (TRUSOPT) 2 % ophthalmic solution, 1 drop 3 (three) times a day, Disp: , Rfl:     fluticasone (FLONASE) 50 mcg/act nasal spray, 2 sprays into each nostril, Disp: , Rfl:     GLUCOSAMINE CHONDROITIN COMPLX PO, Take 1 tablet by mouth daily 4500mg  daily , Disp: , Rfl:     Multiple Vitamins-Minerals (MULTIVITAL) tablet, Take 1 tablet by mouth daily Spectravite , Disp: , Rfl:     pantoprazole (PROTONIX) 40 mg tablet, Take 1 tablet (40 mg total) by mouth 2 (two) times a day, Disp: 60 tablet, Rfl: 3    tamsulosin (FLOMAX) 0 4 mg, Take 1 capsule (0 4 mg total) by mouth daily with dinner, Disp: 90 capsule, Rfl: 3    Vitamin E 400 UNITS TABS, Take 1 tablet by mouth daily, Disp: , Rfl:     diflorasone (PSORCON) 0 05 % ointment, APPLY EVERY DAY AT BEDTIME WITH OCCLUSION, Disp: , Rfl: 3    dorzolamide-timolol (COSOPT) 22 3-6 8 MG/ML ophthalmic solution, INSTILL 1 DROP INTO RIGHT EYE 3 TIMES A DAY, Disp: , Rfl: 4    latanoprost (XALATAN) 0 005 % ophthalmic solution, Administer 1 drop to the right eye daily at bedtime, Disp: , Rfl:     primidone (MYSOLINE) 50 mg tablet, 1 po qhs x 1 week then increase by 1 tab weekly until reach 5 tabs bid (250mg) or lowest effective dose (Patient not taking: Reported on 8/15/2018 ), Disp: 300 tablet, Rfl: 5    Past Medical History:   Diagnosis Date    Abnormal weight loss     RESOLVED: [id number]    Anemia     RESOLVED: 23DEB1764    Atypical chest pain     RESOLVED: 12ASB8553    BPH (benign prostatic hyperplasia)     Cancer (HCC)     DUODENAL    Luis Miguel's syndrome     Depression     RESOLVED: 64XHX6608    Diverticulitis of colon     LAST ASSESSED: 97YHJ0406    Duodenal nodule     RESOLVED: 12NYA9269    Gastrointestinal stromal tumor (GIST) ([facility] [address] )     MALIGNANT; RESOLVED: 81VXR7929    GERD (gastroesophageal reflux disease)     Glaucoma     RESOLVED: 61XKZ5575    Insomnia     RESOLVED: 55DVA5087    Lactose intolerance     LAST ASSESSED: 60FAR8820       Past Surgical History:   Procedure Laterality Date    BACK SURGERY      CERVICAL FUSION      c4 and c5    CHOLECYSTECTOMY      ELBOW SURGERY Left     REPAIR    EYE SURGERY      KNEE ARTHROSCOPY Left     LUMBAR DISCECTOMY      L5 S1    ORIF RADIAL SHAFT FRACTURE      [place] ESOPHAGOGASTRODUODENOSCOPY TRANSORAL DIAGNOSTIC N/A 11/1/2016    Procedure: ESOPHAGOGASTRODUODENOSCOPY (EGD); Surgeon: Brennon Peter MD;  [facility]; Service: Gastroenterology    [place] ESOPHAGOGASTRODUODENOSCOPY TRANSORAL DIAGNOSTIC N/A 5/31/2018    Procedure: ESOPHAGOGASTRODUODENOSCOPY (EGD); Surgeon: Brennon Peter MD;  [facility];   Service: Gastroenterology    SMALL INTESTINE SURGERY      GIST surgery       Family History   Problem Relation Age of Onset    Thyroid disease Mother     Thyroid disease Brother     Diabetes Paternal Grandmother     Hypertension Family         reports that he has never smoked  He has never used smokeless tobacco  He reports that he drinks alcohol  He reports that he does not use drugs        Physical Exam:    /80 (BP Location: Right arm, Patient Position: Sitting, Cuff Size: Standard)   Pulse 63   Temp 97 7 °F (36 5 °C) (Tympanic)   Resp 18   Ht 5' 8" (1 727 m)   Wt 80 7 kg (178 lb)   BMI 27 06 kg/m²     Gen: wn/wd, NAD  HEENT: anicteric, MMM, no cervical LAD  CVS: RRR, no m/r/g  CHEST: CTA b/l  ABD: +BS, soft, NT,ND, no hepatosplenomegaly  EXT: no c/c/e  NEURO: aaox3  SKIN: NO rashes

## 2018-09-14 ENCOUNTER — APPOINTMENT (OUTPATIENT)
Dept: LAB | Age: 68
End: 2018-09-14
Payer: COMMERCIAL

## 2018-09-14 DIAGNOSIS — R53.83 FATIGUE, UNSPECIFIED TYPE: ICD-10-CM

## 2018-09-14 LAB — TSH SERPL DL<=0.05 MIU/L-ACNC: 0.51 UIU/ML (ref 0.36–3.74)

## 2018-09-14 PROCEDURE — 36415 COLL VENOUS BLD VENIPUNCTURE: CPT

## 2018-09-14 PROCEDURE — 84443 ASSAY THYROID STIM HORMONE: CPT

## 2018-09-16 NOTE — ASSESSMENT & PLAN NOTE
CT scan was negative the patient seen GI and his symptoms were felt to be irritable bowel syndrome we recommended increasing fiber in the diet probiotic and patient will follow up with GI I did review the stool cultures that were negative

## 2018-09-19 DIAGNOSIS — R53.83 FATIGUE, UNSPECIFIED TYPE: Primary | ICD-10-CM

## 2018-10-09 ENCOUNTER — TELEPHONE (OUTPATIENT)
Dept: GASTROENTEROLOGY | Facility: CLINIC | Age: 68
End: 2018-10-09

## 2018-10-10 ENCOUNTER — OFFICE VISIT (OUTPATIENT)
Dept: SURGICAL ONCOLOGY | Facility: CLINIC | Age: 68
End: 2018-10-10
Payer: COMMERCIAL

## 2018-10-10 VITALS
RESPIRATION RATE: 16 BRPM | WEIGHT: 180 LBS | DIASTOLIC BLOOD PRESSURE: 72 MMHG | TEMPERATURE: 97.7 F | HEART RATE: 72 BPM | SYSTOLIC BLOOD PRESSURE: 120 MMHG | HEIGHT: 68 IN | BODY MASS INDEX: 27.28 KG/M2

## 2018-10-10 DIAGNOSIS — C49.A3 GASTROINTESTINAL STROMAL TUMOR (GIST) OF DUODENUM (HCC): Primary | ICD-10-CM

## 2018-10-10 DIAGNOSIS — R14.2 BELCHING: Primary | ICD-10-CM

## 2018-10-10 PROCEDURE — 99214 OFFICE O/P EST MOD 30 MIN: CPT | Performed by: SURGERY

## 2018-10-10 RX ORDER — AMITRIPTYLINE HYDROCHLORIDE 25 MG/1
25 TABLET, FILM COATED ORAL
Qty: 30 TABLET | Refills: 5 | Status: SHIPPED | OUTPATIENT
Start: 2018-10-10 | End: 2018-12-24 | Stop reason: ALTCHOICE

## 2018-10-10 NOTE — TELEPHONE ENCOUNTER
Discussed with patient  PPI has helped his heartburn symptoms but belching has persistent and is worst when lying down at night and is waking him up from sleep  Based on last office note from Dr Núñez Manner, I started patient on amitriptyline 25 mg before bed   I advised he let us know if he has any issues with the medication and to let us know in about a week if he does not have any improvement in symptoms

## 2018-10-10 NOTE — PROGRESS NOTES
Surgical Oncology Follow Up       [address] [facility],[address] [place] [address]  [facility] [address]  1950  [id number]  [address]  [facility] [place]  [facility] [phone] [id number]    Chief Complaint   Patient presents with    Follow-up     Patient is here for a 6 month follow up with CT scan  Assessment/Plan:    No problem-specific Assessment & Plan notes found for this encounter  Diagnoses and all orders for this visit:    Gastrointestinal stromal tumor (GIST) of duodenum ([address] )  -     CT chest abdomen pelvis w contrast; Future  -     Basic metabolic panel; Future        Advance Care Planning/Advance Directives:  Discussed disease status, cancer treatment plans and/or cancer treatment goals with the patient  No history exists  History of Present Illness: Diagnosis and Staging: stage I gastrointestinal stromal tumor, duodenum         Treatment History: duodenal resection July 2015      Interval History: Mr Mina Mckeon is a 28-year-old man here for follow-up visit approximately 3 years post duodenal resection for low-grade gastrointestinal stromal tumor  He is doing well and has no major complaints to report except for sporadic bouts of belching and diarrhea, for which she is seeing Dr Vinayak Tamayo  He had a CT scan done in anticipation of today's visit  Review of Systems:  Review of Systems   Constitutional: Negative  HENT: Negative  Eyes: Negative  Respiratory: Negative  Cardiovascular: Negative  Gastrointestinal: Positive for diarrhea  Endocrine: Negative  Genitourinary: Negative  Skin: Negative  Allergic/Immunologic: Negative  Hematological: Negative  Psychiatric/Behavioral: Negative          Patient Active Problem List   Diagnosis    Subacromial impingement of right shoulder    Enlarged prostate without lower urinary tract symptoms (luts)  Esophageal reflux    Essential hypertriglyceridemia    Hyperlipidemia    Splenomegaly    Tremor, essential    Primary osteoarthritis of knee    Gastrointestinal stromal tumor (GIST) of duodenum (HCC)    Belching    Gastroesophageal reflux disease without esophagitis    Screening for prostate cancer    Diarrhea    Body aches    Myalgia    GIST (gastrointestinal stroma tumor), malignant, colon (HCC)    Melena    Atypical chest pain    IBS (irritable bowel syndrome)     Past Medical History:   Diagnosis Date    Abnormal weight loss     RESOLVED: [id number]    Anemia     RESOLVED: 50FMR7168    Atypical chest pain     RESOLVED: 22KPL2431    BPH (benign prostatic hyperplasia)     Cancer (HCC)     DUODENAL    Luis Miguel's syndrome     Depression     RESOLVED: 98VQH3866    Diverticulitis of colon     LAST ASSESSED: 40BJL2241    Duodenal nodule     RESOLVED: 31TVP8360    Gastrointestinal stromal tumor (GIST) (HCC)     MALIGNANT; RESOLVED: 98ROC1759    GERD (gastroesophageal reflux disease)     Glaucoma     RESOLVED: 38NBW3777    Insomnia     RESOLVED: 47QSS0806    Lactose intolerance     LAST ASSESSED: 08IWG1042     Past Surgical History:   Procedure Laterality Date    BACK SURGERY      CERVICAL FUSION      c4 and c5    CHOLECYSTECTOMY      ELBOW SURGERY Left     REPAIR    EYE SURGERY      KNEE ARTHROSCOPY Left     LUMBAR DISCECTOMY      L5 S1    ORIF RADIAL SHAFT FRACTURE      [place] ESOPHAGOGASTRODUODENOSCOPY TRANSORAL DIAGNOSTIC N/A 11/1/2016    Procedure: ESOPHAGOGASTRODUODENOSCOPY (EGD); Surgeon: Chad Guy MD;  [facility]; Service: Gastroenterology    [place] ESOPHAGOGASTRODUODENOSCOPY TRANSORAL DIAGNOSTIC N/A 5/31/2018    Procedure: ESOPHAGOGASTRODUODENOSCOPY (EGD); Surgeon: Chad Guy MD;  [facility];   Service: Gastroenterology    SMALL INTESTINE SURGERY      GIST surgery     Family History   Problem Relation Age of Onset    Thyroid disease Mother    Roman Dobbins Thyroid disease Brother     Diabetes Paternal Grandmother     Hypertension Family      Social History     Social History    Marital status: /Civil Union     Spouse name: N/A    Number of children: N/A    Years of education: N/A     Occupational History    Not on file       Social History Main Topics    Smoking status: Never Smoker    Smokeless tobacco: Never Used    Alcohol use Yes      Comment: occasional    Drug use: No    Sexual activity: Yes     Other Topics Concern    Not on file     Social History Narrative    No narrative on file       Current Outpatient Prescriptions:     B Complex-Biotin-FA (SUPER B-50 B COMPLEX PO), Take 1 tablet by mouth daily, Disp: , Rfl:     bimatoprost (LUMIGAN) 0 01 % ophthalmic drops, 1 drop daily at bedtime, Disp: , Rfl:     Cholecalciferol (VITAMIN D-3) 5000 units TABS, Take 1 capsule by mouth daily, Disp: , Rfl:     cholestyramine (QUESTRAN) 4 GM/DOSE powder, Take 1 packet by mouth every other day, Disp: , Rfl:     dorzolamide-timolol (COSOPT) 22 3-6 8 MG/ML ophthalmic solution, INSTILL 1 DROP INTO RIGHT EYE 3 TIMES A DAY, Disp: , Rfl: 4    fluticasone (FLONASE) 50 mcg/act nasal spray, 2 sprays into each nostril, Disp: , Rfl:     GLUCOSAMINE CHONDROITIN COMPLX PO, Take 1 tablet by mouth daily 4500mg  daily , Disp: , Rfl:     Multiple Vitamins-Minerals (MULTIVITAL) tablet, Take 1 tablet by mouth daily Spectravite , Disp: , Rfl:     pantoprazole (PROTONIX) 40 mg tablet, Take 1 tablet (40 mg total) by mouth 2 (two) times a day, Disp: 60 tablet, Rfl: 3    tamsulosin (FLOMAX) 0 4 mg, Take 1 capsule (0 4 mg total) by mouth daily with dinner, Disp: 90 capsule, Rfl: 3    Vitamin E 400 UNITS TABS, Take 1 tablet by mouth daily, Disp: , Rfl:     diflorasone (PSORCON) 0 05 % ointment, APPLY EVERY DAY AT BEDTIME WITH OCCLUSION, Disp: , Rfl: 3  No Known Allergies  Vitals:    10/10/18 1048   BP: 120/72   Pulse: 72   Resp: 16   Temp: 97 7 °F (36 5 °C) Physical Exam   Constitutional: He is oriented to person, place, and time  He appears well-developed and well-nourished  HENT:   Head: Normocephalic and atraumatic  Right Ear: External ear normal    Left Ear: External ear normal    Eyes: Pupils are equal, round, and reactive to light  Conjunctivae and EOM are normal    Neck: Normal range of motion  Neck supple  Cardiovascular: Normal rate, regular rhythm, normal heart sounds and intact distal pulses  Pulmonary/Chest: Effort normal and breath sounds normal    Abdominal: Soft  Bowel sounds are normal    Musculoskeletal: Normal range of motion  Neurological: He is alert and oriented to person, place, and time  Skin: Skin is warm and dry  Psychiatric: He has a normal mood and affect  His behavior is normal  Judgment and thought content normal          Results:  Labs:  None    Imaging  CT CHEST, ABDOMEN AND PELVIS WITH IV CONTRAST     INDICATION:   C49  A3: Gastrointestinal stromal tumor of small intestine      COMPARISON:  CT chest abdomen pelvis dated March 26, 2018      TECHNIQUE: CT examination of the chest, abdomen and pelvis was performed  Axial, sagittal, and coronal 2D reformatted images were created from the source data and submitted for interpretation      Radiation dose length product (DLP) for this visit:  939 32 mGy-cm   This examination, like all CT scans performed in the [facility], was performed utilizing techniques to minimize radiation dose exposure, including the use of iterative   reconstruction and automated exposure control      IV Contrast:  100 mL of iohexol (OMNIPAQUE)  Enteric Contrast: Enteric contrast was administered      FINDINGS:     CHEST     LUNGS:  Lungs are clear    There is no tracheal or endobronchial lesion      PLEURA:  Unremarkable      HEART/GREAT VESSELS:  Unremarkable for patient's age      MEDIASTINUM AND ARIAN:  Unremarkable      CHEST WALL AND LOWER NECK: Unremarkable      ABDOMEN     LIVER/BILIARY TREE:  Liver is diffusely decreased in density consistent with mild fatty change  No CT evidence of suspicious hepatic mass  Normal hepatic contours  No biliary dilatation      GALLBLADDER:  Gallbladder is surgically absent      SPLEEN:  Unremarkable      PANCREAS:  Unremarkable      ADRENAL GLANDS:  Unremarkable      KIDNEYS/URETERS:  No hydronephrosis  One or more sharply circumscribed subcentimeter renal hypodensities are noted  These lesions are too small to accurately characterize, but are statistically most likely to represent benign cortical renal cyst(s)  According to the guidelines published in   the [facility] Paper of the ACR Incidental Findings Committee (Radiology 2010), no further workup of these lesions is recommended      STOMACH AND BOWEL:  There is colonic diverticulosis without evidence of acute diverticulitis      APPENDIX:  A normal appendix was visualized      ABDOMINOPELVIC CAVITY:  No ascites or free intraperitoneal air  No lymphadenopathy      VESSELS:  Unremarkable for patient's age      PELVIS     REPRODUCTIVE ORGANS:  Unremarkable for patient's age      URINARY BLADDER:  Unremarkable      ABDOMINAL WALL/INGUINAL REGIONS:  Unremarkable      OSSEOUS STRUCTURES:  No acute fracture or destructive osseous lesion  Partially visualized postoperative changes of prior anterior cervical fusion are noted      IMPRESSION:     No evidence of metastatic disease to the chest, abdomen or pelvis      Scattered colonic diverticulosis with no inflammatory changes present to suggest acute diverticulitis      Workstation performed: [id number]     No results found  I reviewed the above laboratory and imaging data  Discussion/Summary:  19-year-old man with history of duodenal gastrointestinal stromal tumor status post resection  He is 3 years out and is doing well  No evidence of disease recurrence    Plan on 6 month follow-up with surveillance scan at that time

## 2018-10-10 NOTE — LETTER
October 10, 2018     Ivette Sun  [address] [facility] [address]    Patient: Nyasia Quinteros   YOB: 1950   Date of Visit: 10/10/2018       Dear Dr Samantha Love: Thank you for referring Nyasia Quinteros to me for evaluation  Below are my notes for this consultation  If you have questions, please do not hesitate to call me  I look forward to following your patient along with you  Sincerely,        Eladio Arora MD        CC: MD Kavon Jaimes MD Assunta Emmer, MD Diedra Primas, MD Gaither Levine, MD  10/10/2018 11:33 AM  Sign at close encounter     Surgical Oncology Follow Up       [address] [place] [address]  1950  [id number]  [address]50 [facility],[address]  [facility] [place] [id number]    Chief Complaint   Patient presents with    Follow-up     Patient is here for a 6 month follow up with CT scan  Assessment/Plan:    No problem-specific Assessment & Plan notes found for this encounter  Diagnoses and all orders for this visit:    Gastrointestinal stromal tumor (GIST) of duodenum ([address] )  -     CT chest abdomen pelvis w contrast; Future  -     Basic metabolic panel; Future        Advance Care Planning/Advance Directives:  Discussed disease status, cancer treatment plans and/or cancer treatment goals with the patient  No history exists  History of Present Illness: Diagnosis and Staging: stage I gastrointestinal stromal tumor, duodenum         Treatment History: duodenal resection July 2015      Interval History: Mr Mina Mckeon is a 46-year-old man here for follow-up visit approximately 3 years post duodenal resection for low-grade gastrointestinal stromal tumor   He is doing well and has no major complaints to report except for sporadic bouts of belching and diarrhea, for which she is seeing Dr Robert Ospina  He had a CT scan done in anticipation of today's visit  Review of Systems:  Review of Systems   Constitutional: Negative  HENT: Negative  Eyes: Negative  Respiratory: Negative  Cardiovascular: Negative  Gastrointestinal: Positive for diarrhea  Endocrine: Negative  Genitourinary: Negative  Skin: Negative  Allergic/Immunologic: Negative  Hematological: Negative  Psychiatric/Behavioral: Negative          Patient Active Problem List   Diagnosis    Subacromial impingement of right shoulder    Enlarged prostate without lower urinary tract symptoms (luts)    Esophageal reflux    Essential hypertriglyceridemia    Hyperlipidemia    Splenomegaly    Tremor, essential    Primary osteoarthritis of knee    Gastrointestinal stromal tumor (GIST) of duodenum (HCC)    Belching    Gastroesophageal reflux disease without esophagitis    Screening for prostate cancer    Diarrhea    Body aches    Myalgia    GIST (gastrointestinal stroma tumor), malignant, colon (HCC)    Melena    Atypical chest pain    IBS (irritable bowel syndrome)     Past Medical History:   Diagnosis Date    Abnormal weight loss     RESOLVED: [id number]    Anemia     RESOLVED: 20YAR9320    Atypical chest pain     RESOLVED: 51IJV1140    BPH (benign prostatic hyperplasia)     Cancer (HCC)     DUODENAL    Luis Miguel's syndrome     Depression     RESOLVED: 64ALK6684    Diverticulitis of colon     LAST ASSESSED: 89JWG4453    Duodenal nodule     RESOLVED: 26OHZ4352    Gastrointestinal stromal tumor (GIST) ([facility] [address] )     MALIGNANT; RESOLVED: 23HZL3429    GERD (gastroesophageal reflux disease)     Glaucoma     RESOLVED: 31NSW0032    Insomnia     RESOLVED: 67UHY4593    Lactose intolerance     LAST ASSESSED: 80YNW4076     Past Surgical History:   Procedure Laterality Date    BACK SURGERY      CERVICAL FUSION      c4 and c5    CHOLECYSTECTOMY      ELBOW SURGERY Left     REPAIR    EYE SURGERY  KNEE ARTHROSCOPY Left     LUMBAR DISCECTOMY      L5 S1    ORIF RADIAL SHAFT FRACTURE      [place] ESOPHAGOGASTRODUODENOSCOPY TRANSORAL DIAGNOSTIC N/A 11/1/2016    Procedure: ESOPHAGOGASTRODUODENOSCOPY (EGD); Surgeon: Wilmer Ortez MD;  [facility]; Service: Gastroenterology    [place] ESOPHAGOGASTRODUODENOSCOPY TRANSORAL DIAGNOSTIC N/A 5/31/2018    Procedure: ESOPHAGOGASTRODUODENOSCOPY (EGD); Surgeon: Wilmer Ortez MD;  [facility]; Service: Gastroenterology    SMALL INTESTINE SURGERY      GIST surgery     Family History   Problem Relation Age of Onset    Thyroid disease Mother     Thyroid disease Brother     Diabetes Paternal Grandmother     Hypertension Family      Social History     Social History    Marital status: /Civil Union     Spouse name: N/A    Number of children: N/A    Years of education: N/A     Occupational History    Not on file       Social History Main Topics    Smoking status: Never Smoker    Smokeless tobacco: Never Used    Alcohol use Yes      Comment: occasional    Drug use: No    Sexual activity: Yes     Other Topics Concern    Not on file     Social History Narrative    No narrative on file       Current Outpatient Prescriptions:     B Complex-Biotin-FA (SUPER B-50 B COMPLEX PO), Take 1 tablet by mouth daily, Disp: , Rfl:     bimatoprost (LUMIGAN) 0 01 % ophthalmic drops, 1 drop daily at bedtime, Disp: , Rfl:     Cholecalciferol (VITAMIN D-3) 5000 units TABS, Take 1 capsule by mouth daily, Disp: , Rfl:     cholestyramine (QUESTRAN) 4 GM/DOSE powder, Take 1 packet by mouth every other day, Disp: , Rfl:     dorzolamide-timolol (COSOPT) 22 3-6 8 MG/ML ophthalmic solution, INSTILL 1 DROP INTO RIGHT EYE 3 TIMES A DAY, Disp: , Rfl: 4    fluticasone (FLONASE) 50 mcg/act nasal spray, 2 sprays into each nostril, Disp: , Rfl:     GLUCOSAMINE CHONDROITIN COMPLX PO, Take 1 tablet by mouth daily 4500mg  daily , Disp: , Rfl:     Multiple Vitamins-Minerals (MULTIVITAL) tablet, Take 1 tablet by mouth daily Spectravite , Disp: , Rfl:     pantoprazole (PROTONIX) 40 mg tablet, Take 1 tablet (40 mg total) by mouth 2 (two) times a day, Disp: 60 tablet, Rfl: 3    tamsulosin (FLOMAX) 0 4 mg, Take 1 capsule (0 4 mg total) by mouth daily with dinner, Disp: 90 capsule, Rfl: 3    Vitamin E 400 UNITS TABS, Take 1 tablet by mouth daily, Disp: , Rfl:     diflorasone (PSORCON) 0 05 % ointment, APPLY EVERY DAY AT BEDTIME WITH OCCLUSION, Disp: , Rfl: 3  No Known Allergies  Vitals:    10/10/18 1048   BP: 120/72   Pulse: 72   Resp: 16   Temp: 97 7 °F (36 5 °C)       Physical Exam   Constitutional: He is oriented to person, place, and time  He appears well-developed and well-nourished  HENT:   Head: Normocephalic and atraumatic  Right Ear: External ear normal    Left Ear: External ear normal    Eyes: Pupils are equal, round, and reactive to light  Conjunctivae and EOM are normal    Neck: Normal range of motion  Neck supple  Cardiovascular: Normal rate, regular rhythm, normal heart sounds and intact distal pulses  Pulmonary/Chest: Effort normal and breath sounds normal    Abdominal: Soft  Bowel sounds are normal    Musculoskeletal: Normal range of motion  Neurological: He is alert and oriented to person, place, and time  Skin: Skin is warm and dry  Psychiatric: He has a normal mood and affect  His behavior is normal  Judgment and thought content normal          Results:  Labs:  None    Imaging  CT CHEST, ABDOMEN AND PELVIS WITH IV CONTRAST     INDICATION:   C49  A3: Gastrointestinal stromal tumor of small intestine      COMPARISON:  CT chest abdomen pelvis dated March 26, 2018      TECHNIQUE: CT examination of the chest, abdomen and pelvis was performed  Axial, sagittal, and coronal 2D reformatted images were created from the source data and submitted for interpretation      Radiation dose length product (DLP) for this visit:  939 32 mGy-cm     This examination, like all CT scans performed in the [facility], was performed utilizing techniques to minimize radiation dose exposure, including the use of iterative   reconstruction and automated exposure control      IV Contrast:  100 mL of iohexol (OMNIPAQUE)  Enteric Contrast: Enteric contrast was administered      FINDINGS:     CHEST     LUNGS:  Lungs are clear  There is no tracheal or endobronchial lesion      PLEURA:  Unremarkable      HEART/GREAT VESSELS:  Unremarkable for patient's age      MEDIASTINUM AND ARIAN:  Unremarkable      CHEST WALL AND LOWER NECK:   Unremarkable      ABDOMEN     LIVER/BILIARY TREE:  Liver is diffusely decreased in density consistent with mild fatty change  No CT evidence of suspicious hepatic mass  Normal hepatic contours  No biliary dilatation      GALLBLADDER:  Gallbladder is surgically absent      SPLEEN:  Unremarkable      PANCREAS:  Unremarkable      ADRENAL GLANDS:  Unremarkable      KIDNEYS/URETERS:  No hydronephrosis  One or more sharply circumscribed subcentimeter renal hypodensities are noted  These lesions are too small to accurately characterize, but are statistically most likely to represent benign cortical renal cyst(s)  According to the guidelines published in   the [facility] Paper of the ACR Incidental Findings Committee (Radiology 2010), no further workup of these lesions is recommended      STOMACH AND BOWEL:  There is colonic diverticulosis without evidence of acute diverticulitis      APPENDIX:  A normal appendix was visualized      ABDOMINOPELVIC CAVITY:  No ascites or free intraperitoneal air  No lymphadenopathy      VESSELS:  Unremarkable for patient's age      PELVIS     REPRODUCTIVE ORGANS:  Unremarkable for patient's age      URINARY BLADDER:  Unremarkable      ABDOMINAL WALL/INGUINAL REGIONS:  Unremarkable      OSSEOUS STRUCTURES:  No acute fracture or destructive osseous lesion    Partially visualized postoperative changes of prior anterior cervical fusion are noted      IMPRESSION:     No evidence of metastatic disease to the chest, abdomen or pelvis      Scattered colonic diverticulosis with no inflammatory changes present to suggest acute diverticulitis      Workstation performed: [id number]     No results found  I reviewed the above laboratory and imaging data  Discussion/Summary:  71-year-old man with history of duodenal gastrointestinal stromal tumor status post resection  He is 3 years out and is doing well  No evidence of disease recurrence  Plan on 6 month follow-up with surveillance scan at that time

## 2018-10-11 ENCOUNTER — TELEPHONE (OUTPATIENT)
Dept: GASTROENTEROLOGY | Facility: AMBULARY SURGERY CENTER | Age: 68
End: 2018-10-11

## 2018-10-11 NOTE — TELEPHONE ENCOUNTER
Submitted PA through covermymeds for [employer], for the pts Amitriptyline      CB# to check on auth is [de-identified]  Key; [id number]  Rx#; [id number]

## 2018-10-12 ENCOUNTER — TELEPHONE (OUTPATIENT)
Dept: GASTROENTEROLOGY | Facility: MEDICAL CENTER | Age: 68
End: 2018-10-12

## 2018-10-12 NOTE — TELEPHONE ENCOUNTER
Dr Kelsey Lino pt    [facility] called stating pt has a hx of glaucoma and there is a contraindication with the Amitriptyline   Pharmacist can be reached at [phone]

## 2018-11-07 ENCOUNTER — TELEPHONE (OUTPATIENT)
Dept: GASTROENTEROLOGY | Facility: AMBULARY SURGERY CENTER | Age: 68
End: 2018-11-07

## 2018-11-07 DIAGNOSIS — R19.7 DIARRHEA, UNSPECIFIED TYPE: Primary | ICD-10-CM

## 2018-11-07 RX ORDER — CHOLESTYRAMINE 4 G/9G
4 POWDER, FOR SUSPENSION ORAL EVERY OTHER DAY
Qty: 378 G | Refills: 0 | Status: SHIPPED | OUTPATIENT
Start: 2018-11-07 | End: 2019-01-23 | Stop reason: SDUPTHER

## 2018-11-07 NOTE — TELEPHONE ENCOUNTER
[address]  called needing clarification on the [employer]   Pharmacist can be reached at [phone]

## 2018-11-07 NOTE — TELEPHONE ENCOUNTER
DR Katie Valenzuela PT    Pt called requesting a refill of cholestyramine 4mg  [facility] [phone]

## 2018-12-05 ENCOUNTER — OFFICE VISIT (OUTPATIENT)
Dept: OBGYN CLINIC | Facility: MEDICAL CENTER | Age: 68
End: 2018-12-05
Payer: COMMERCIAL

## 2018-12-05 VITALS
HEART RATE: 69 BPM | SYSTOLIC BLOOD PRESSURE: 124 MMHG | BODY MASS INDEX: 27.43 KG/M2 | HEIGHT: 68 IN | DIASTOLIC BLOOD PRESSURE: 82 MMHG | WEIGHT: 181 LBS

## 2018-12-05 DIAGNOSIS — M17.12 PRIMARY OSTEOARTHRITIS OF LEFT KNEE: ICD-10-CM

## 2018-12-05 DIAGNOSIS — M71.22 BAKER CYST, LEFT: Primary | ICD-10-CM

## 2018-12-05 PROCEDURE — 99213 OFFICE O/P EST LOW 20 MIN: CPT | Performed by: ORTHOPAEDIC SURGERY

## 2018-12-05 NOTE — PROGRESS NOTES
Assessment:     Symptomatic Baker's cyst knee, left knee  Left knee DJD    Plan:     Symptomatic baker's cyst, left   Weight Bear as Tolerated   Schedule Left knee US guided baker's cyst aspiration   Can consider Synvisc injection if cyst aspiration does not provide relief   Discussed treatment plan with patient and he is in agreement treatment plan  Thank you    To do next visit:  No Follow-up on file  The above stated was discussed in layman's terms and the patient expressed understanding  All questions were answered to the patient's satisfaction  Subjective:   Lizzie Galo is a 79 y o  male who presents for left knee pain and baker's cyst   He is s/p left knee Baker's cyst sporation of 40ml fluid and steroid injection, 4/18/18 with benefit until 3-4 weeks ago  He is s/p left knee Synvisc One injection 4/10/18 with lasting benefit  Today he complains of minimal knee pain with main complaints of baker's cyst   He does have catching and clicking  He feels stable  He is active and had been doing  He denies recent injury or aggravating factors  He will use glucosamine and chondroitin with benefit  He denies previous surgery          Review of systems negative unless otherwise specified in HPI    Past Medical History:   Diagnosis Date    Abnormal weight loss     RESOLVED: [id number]    Anemia     RESOLVED: 27SJL1632    Atypical chest pain     RESOLVED: 46VQY2525    BPH (benign prostatic hyperplasia)     Cancer (HCC)     DUODENAL    Luis Miguel's syndrome     Depression     RESOLVED: 68BOZ2625    Diverticulitis of colon     LAST ASSESSED: 71NKM8439    Duodenal nodule     RESOLVED: 95HGL1501    Gastrointestinal stromal tumor (GIST) (HCC)     MALIGNANT; RESOLVED: 62JRU8866    GERD (gastroesophageal reflux disease)     Glaucoma     RESOLVED: 96ECA4366    Insomnia     RESOLVED: 11CXZ8797    Lactose intolerance     LAST ASSESSED: 15WFT9012       Past Surgical History:   Procedure Laterality Date    BACK SURGERY      CERVICAL FUSION      c4 and c5    CHOLECYSTECTOMY      ELBOW SURGERY Left     REPAIR    EYE SURGERY      KNEE ARTHROSCOPY Left     LUMBAR DISCECTOMY      L5 S1    ORIF RADIAL SHAFT FRACTURE      [place] ESOPHAGOGASTRODUODENOSCOPY TRANSORAL DIAGNOSTIC N/A 11/1/2016    Procedure: ESOPHAGOGASTRODUODENOSCOPY (EGD); Surgeon: Rekha Pearce MD;  [facility]; Service: Gastroenterology    [place] ESOPHAGOGASTRODUODENOSCOPY TRANSORAL DIAGNOSTIC N/A 5/31/2018    Procedure: ESOPHAGOGASTRODUODENOSCOPY (EGD); Surgeon: Rekha Pearce MD;  [facility]; Service: Gastroenterology    SMALL INTESTINE SURGERY      GIST surgery       Family History   Problem Relation Age of Onset    Thyroid disease Mother     Thyroid disease Brother     Diabetes Paternal Grandmother     Hypertension Family        Social History     Occupational History    Not on file       Social History Main Topics    Smoking status: Never Smoker    Smokeless tobacco: Never Used    Alcohol use Yes      Comment: occasional    Drug use: No    Sexual activity: Yes         Current Outpatient Prescriptions:     amitriptyline (ELAVIL) 25 mg tablet, Take 1 tablet (25 mg total) by mouth daily at bedtime, Disp: 30 tablet, Rfl: 5    B Complex-Biotin-FA (SUPER B-50 B COMPLEX PO), Take 1 tablet by mouth daily, Disp: , Rfl:     bimatoprost (LUMIGAN) 0 01 % ophthalmic drops, 1 drop daily at bedtime, Disp: , Rfl:     Cholecalciferol (VITAMIN D-3) 5000 units TABS, Take 1 capsule by mouth daily, Disp: , Rfl:     cholestyramine (QUESTRAN) 4 GM/DOSE powder, Take 1 packet (4 g total) by mouth every other day, Disp: 378 g, Rfl: 0    diflorasone (PSORCON) 0 05 % ointment, APPLY EVERY DAY AT BEDTIME WITH OCCLUSION, Disp: , Rfl: 3    dorzolamide-timolol (COSOPT) 22 3-6 8 MG/ML ophthalmic solution, INSTILL 1 DROP INTO RIGHT EYE 3 TIMES A DAY, Disp: , Rfl: 4    fluticasone (FLONASE) 50 mcg/act nasal spray, 2 sprays into each nostril, Disp: , Rfl:     GLUCOSAMINE CHONDROITIN COMPLX PO, Take 1 tablet by mouth daily 4500mg  daily , Disp: , Rfl:     Multiple Vitamins-Minerals (MULTIVITAL) tablet, Take 1 tablet by mouth daily Spectravite , Disp: , Rfl:     pantoprazole (PROTONIX) 40 mg tablet, Take 1 tablet (40 mg total) by mouth 2 (two) times a day, Disp: 60 tablet, Rfl: 3    tamsulosin (FLOMAX) 0 4 mg, Take 1 capsule (0 4 mg total) by mouth daily with dinner, Disp: 90 capsule, Rfl: 3    Vitamin E 400 UNITS TABS, Take 1 tablet by mouth daily, Disp: , Rfl:     No Known Allergies         Vitals:    12/05/18 0907   BP: 124/82   Pulse: 69       Objective:            Physical Exam  · General: Awake, Alert, Oriented  · Eyes: Pupils equal, round and reactive to light  · Heart: regular rate and rhythm  · Lungs: No audible wheezing  · Abdomen: soft                    Ortho Exam  Left Knee:  Palpable mass posteriorly over popliteal region   ROM: full  Stable to varus and valgus stress  No JLT medial or lateral   No effusion  Neurovascularly Intact Distally     Diagnostics, reviewed and taken today if performed as documented: The attending physician has personally reviewed the pertinent films in PACS and interpretation is as follows:  None performed today      Procedures, if performed today:    Procedures    None performed      Scribe Attestation    I,:   Maryse Israel am acting as a scribe while in the presence of the attending physician :        I,:   Fabio Ott MD personally performed the services described in this documentation    as scribed in my presence :              Portions of the record may have been created with voice recognition software  Occasional wrong word or "sound a like" substitutions may have occurred due to the inherent limitations of voice recognition software  Read the chart carefully and recognize, using context, where substitutions have occurred

## 2018-12-07 DIAGNOSIS — M25.562 ACUTE PAIN OF LEFT KNEE: Primary | ICD-10-CM

## 2018-12-19 ENCOUNTER — TRANSCRIBE ORDERS (OUTPATIENT)
Dept: RADIOLOGY | Facility: HOSPITAL | Age: 68
End: 2018-12-19

## 2018-12-19 ENCOUNTER — HOSPITAL ENCOUNTER (OUTPATIENT)
Dept: RADIOLOGY | Facility: HOSPITAL | Age: 68
Discharge: HOME | End: 2018-12-19
Attending: ORTHOPAEDIC SURGERY
Payer: COMMERCIAL

## 2018-12-19 DIAGNOSIS — M17.12 PRIMARY OSTEOARTHRITIS OF LEFT KNEE: ICD-10-CM

## 2018-12-19 DIAGNOSIS — M71.22 BAKER CYST, LEFT: ICD-10-CM

## 2018-12-19 PROCEDURE — 20611 DRAIN/INJ JOINT/BURSA W/US: CPT

## 2018-12-19 RX ORDER — METHYLPREDNISOLONE ACETATE 80 MG/ML
80 INJECTION, SUSPENSION INTRA-ARTICULAR; INTRALESIONAL; INTRAMUSCULAR; SOFT TISSUE ONCE
Status: COMPLETED | OUTPATIENT
Start: 2018-12-19 | End: 2018-12-19

## 2018-12-19 RX ORDER — LIDOCAINE HYDROCHLORIDE 10 MG/ML
5 INJECTION, SOLUTION EPIDURAL; INFILTRATION; INTRACAUDAL; PERINEURAL ONCE
Status: COMPLETED | OUTPATIENT
Start: 2018-12-19 | End: 2018-12-19

## 2018-12-19 RX ORDER — BUPIVACAINE HYDROCHLORIDE 2.5 MG/ML
10 INJECTION, SOLUTION EPIDURAL; INFILTRATION; INTRACAUDAL ONCE
Status: COMPLETED | OUTPATIENT
Start: 2018-12-19 | End: 2018-12-19

## 2018-12-19 RX ADMIN — METHYLPREDNISOLONE ACETATE 80 MG: 80 INJECTION, SUSPENSION INTRA-ARTICULAR; INTRALESIONAL; INTRAMUSCULAR; SOFT TISSUE at 08:35

## 2018-12-19 RX ADMIN — BUPIVACAINE HYDROCHLORIDE 1 ML: 2.5 INJECTION, SOLUTION EPIDURAL; INFILTRATION; INTRACAUDAL; PERINEURAL at 08:35

## 2018-12-19 RX ADMIN — LIDOCAINE HYDROCHLORIDE 5 ML: 10 INJECTION, SOLUTION EPIDURAL; INFILTRATION; INTRACAUDAL; PERINEURAL at 08:35

## 2018-12-20 ENCOUNTER — TELEPHONE (OUTPATIENT)
Dept: OBGYN CLINIC | Facility: HOSPITAL | Age: 68
End: 2018-12-20

## 2018-12-20 NOTE — TELEPHONE ENCOUNTER
Nazia Suh Patient  Ph- [phone]  Patient called to report that he received an approval letter for synvisc injection  He wants to know when he can come in for this  Also, he said he wants to have the injections administered in [facility]

## 2019-01-04 ENCOUNTER — TELEPHONE (OUTPATIENT)
Dept: GASTROENTEROLOGY | Facility: AMBULARY SURGERY CENTER | Age: 69
End: 2019-01-04

## 2019-01-04 DIAGNOSIS — K21.9 GASTROESOPHAGEAL REFLUX DISEASE WITHOUT ESOPHAGITIS: ICD-10-CM

## 2019-01-04 RX ORDER — PANTOPRAZOLE SODIUM 40 MG/1
40 TABLET, DELAYED RELEASE ORAL 2 TIMES DAILY
Qty: 60 TABLET | Refills: 3 | Status: SHIPPED | OUTPATIENT
Start: 2019-01-04 | End: 2019-05-17 | Stop reason: SDUPTHER

## 2019-01-04 NOTE — TELEPHONE ENCOUNTER
I have renewed patient's prescription for pantoprazole BID, which was close to running out at this time  Please let me know if further action is needed    Thanks

## 2019-01-14 ENCOUNTER — TELEPHONE (OUTPATIENT)
Dept: GASTROENTEROLOGY | Facility: CLINIC | Age: 69
End: 2019-01-14

## 2019-01-23 ENCOUNTER — OFFICE VISIT (OUTPATIENT)
Dept: GASTROENTEROLOGY | Facility: CLINIC | Age: 69
End: 2019-01-23
Payer: COMMERCIAL

## 2019-01-23 ENCOUNTER — TELEPHONE (OUTPATIENT)
Dept: GASTROENTEROLOGY | Facility: AMBULARY SURGERY CENTER | Age: 69
End: 2019-01-23

## 2019-01-23 VITALS
SYSTOLIC BLOOD PRESSURE: 118 MMHG | TEMPERATURE: 97.7 F | WEIGHT: 186.2 LBS | BODY MASS INDEX: 28.22 KG/M2 | HEIGHT: 68 IN | HEART RATE: 69 BPM | DIASTOLIC BLOOD PRESSURE: 64 MMHG

## 2019-01-23 DIAGNOSIS — R19.7 DIARRHEA, UNSPECIFIED TYPE: ICD-10-CM

## 2019-01-23 PROCEDURE — 99213 OFFICE O/P EST LOW 20 MIN: CPT | Performed by: INTERNAL MEDICINE

## 2019-01-23 RX ORDER — CHOLESTYRAMINE 4 G/9G
4 POWDER, FOR SUSPENSION ORAL EVERY OTHER DAY
Qty: 378 G | Refills: 6 | Status: SHIPPED | OUTPATIENT
Start: 2019-01-23 | End: 2022-06-02

## 2019-01-23 RX ORDER — CHOLESTYRAMINE 4 G/9G
1 POWDER, FOR SUSPENSION ORAL
Qty: 30 PACKET | Refills: 0 | Status: SHIPPED | OUTPATIENT
Start: 2019-01-23 | End: 2019-01-23

## 2019-01-23 RX ORDER — LATANOPROST 50 UG/ML
1 SOLUTION/ DROPS OPHTHALMIC
Refills: 4 | COMMUNITY
Start: 2018-12-21 | End: 2021-09-24

## 2019-01-23 NOTE — PROGRESS NOTES
[facility]  Martir Burns 81 y o  male MRN: [id number]            Assessment & Plan:    Very pleasant 79-year-old male here for follow-up for bile acid induced diarrhea, belching, remote history of gastrointestinal stromal tumor  1  Bile acid induced diarrhea:  -at this time much improved  -continue cholestyramine powder  -continue dietary modification including lactose avoidance    2  Belching:  Overall much improved, may have been reflux versus psychogenic  -at this point would reduce pantoprazole to once daily, continue ranitidine twice daily    3  History of gastrointestinal stromal tumor:  -patient follow-up with Surgical Oncology    The patient will follow-up in 1 year for routine follow-up and refills, he was asked to call with any questions or concerns or change in symptoms in the interim  _____________________________________________________________        CC:  Here for follow-up    HPI:  Martir Burns is a 81 y o male who is here for follow-up  As you know this is a pleasant 79-year-old gentleman with history of a gastrointestinal stromal tumor which was resected by Surgical Oncology, we had seen him for diverticular disease as well, he has also been followed for chronic diarrhea and belching which has been quite persistent  The patient is doing very well at this time, he reports that he takes cholestyramine every other day for presumed bile acid induced diarrhea and he reports with this regimen he is doing very well  Occasional has loose stools with certain foods but for the most part has been doing well  He also notes that his belching has all but completely resolve, he is very for episodes of belching anymore  He was taking pantoprazole twice daily as well as ranitidine twice daily  Denies any nausea, vomiting, dysphagia  We had in fact tried him on amitriptyline for suspected neurogenic belching  He has stopped taking that        ROS:  The remainder of the ROS was negative except for the pertinent positives mentioned in HPI  Allergies: Patient has no known allergies      Medications:   Current Outpatient Prescriptions:     B Complex-Biotin-FA (SUPER B-50 B COMPLEX PO), Take 1 tablet by mouth daily, Disp: , Rfl:     Cholecalciferol (VITAMIN D-3) 5000 units TABS, Take 1 capsule by mouth daily, Disp: , Rfl:     cholestyramine (QUESTRAN) 4 GM/DOSE powder, Take 1 packet (4 g total) by mouth every other day, Disp: 378 g, Rfl: 6    diflorasone (PSORCON) 0 05 % ointment, APPLY EVERY DAY AT BEDTIME WITH OCCLUSION, Disp: , Rfl: 3    dorzolamide-timolol (COSOPT) 22 3-6 8 MG/ML ophthalmic solution, INSTILL 1 DROP INTO RIGHT EYE 3 TIMES A DAY, Disp: , Rfl: 4    GLUCOSAMINE CHONDROITIN COMPLX PO, Take 1 tablet by mouth daily 4500mg  daily , Disp: , Rfl:     latanoprost (XALATAN) 0 005 % ophthalmic solution, INSTIL 1 GTT IN OD QHS, Disp: , Rfl: 4    Multiple Vitamins-Minerals (MULTIVITAL) tablet, Take 1 tablet by mouth daily Spectravite , Disp: , Rfl:     pantoprazole (PROTONIX) 40 mg tablet, Take 1 tablet (40 mg total) by mouth 2 (two) times a day, Disp: 60 tablet, Rfl: 3    tamsulosin (FLOMAX) 0 4 mg, Take 1 capsule (0 4 mg total) by mouth daily with dinner, Disp: 90 capsule, Rfl: 3    Vitamin E 400 UNITS TABS, Take 1 tablet by mouth daily, Disp: , Rfl:     cholestyramine (QUESTRAN) 4 g packet, Take 1 packet (4 g total) by mouth 3 (three) times a day with meals, Disp: 30 packet, Rfl: 0    Past Medical History:   Diagnosis Date    Abnormal weight loss     RESOLVED: [id number]    Anemia     RESOLVED: 33RQD5764    Atypical chest pain     RESOLVED: 83FMG3666    BPH (benign prostatic hyperplasia)     Cancer (HCC)     DUODENAL    Luis Miguel's syndrome     Depression     RESOLVED: 83LBN5261    Diverticulitis of colon     LAST ASSESSED: 58NJV9251    Duodenal nodule     RESOLVED: 29UDT6907    Gastrointestinal stromal tumor (GIST) ([facility] [address] )     MALIGNANT; RESOLVED: 62IEA4007  GERD (gastroesophageal reflux disease)     Glaucoma     RESOLVED: 45BGP0712    Insomnia     RESOLVED: 86BRL4675    Lactose intolerance     LAST ASSESSED: 74AYS9442       Past Surgical History:   Procedure Laterality Date    BACK SURGERY      CERVICAL FUSION      c4 and c5    CHOLECYSTECTOMY      COLONOSCOPY  2014    kutty    ELBOW SURGERY Left     REPAIR    EYE SURGERY      KNEE ARTHROSCOPY Left     LUMBAR DISCECTOMY      L5 S1    ORIF RADIAL SHAFT FRACTURE      [place] ESOPHAGOGASTRODUODENOSCOPY TRANSORAL DIAGNOSTIC N/A 11/1/2016    Procedure: ESOPHAGOGASTRODUODENOSCOPY (EGD); Surgeon: Sahil Cisneros MD;  [facility]; Service: Gastroenterology    [place] ESOPHAGOGASTRODUODENOSCOPY TRANSORAL DIAGNOSTIC N/A 5/31/2018    Procedure: ESOPHAGOGASTRODUODENOSCOPY (EGD); Surgeon: Sahil Cisneros MD;  [facility]; Service: Gastroenterology    SMALL INTESTINE SURGERY      GIST surgery       Family History   Problem Relation Age of Onset    Thyroid disease Mother     Thyroid disease Brother     Diabetes Paternal Grandmother     Hypertension Family         reports that he has never smoked  He has never used smokeless tobacco  He reports that he drinks alcohol  He reports that he does not use drugs        Physical Exam:    /64 (BP Location: Right arm, Patient Position: Sitting, Cuff Size: Standard)   Pulse 69   Temp 97 7 °F (36 5 °C) (Tympanic)   Ht 5' 8" (1 727 m)   Wt 84 5 kg (186 lb 3 2 oz)   BMI 28 31 kg/m²     Gen: wn/wd, NAD, healthy-appearing male  HEENT: anicteric, MMM, no cervical LAD  CVS: RRR, no m/r/g  CHEST: CTA b/l  ABD: +BS, soft, NT,ND, no hepatosplenomegaly  EXT: no c/c/e  NEURO: aaox3  SKIN: NO rashes

## 2019-01-23 NOTE — LETTER
January 23, 2019     Ivette Pierre  [address]  [address] [place] [address]    Patient: Martir Burns   YOB: 1950   Date of Visit: 1/23/2019       Dear Dr Mirna Parish: Thank you for referring Martir Burns to me for evaluation  Below are my notes for this consultation  If you have questions, please do not hesitate to call me  I look forward to following your patient along with you  Sincerely,        Arley Baxter MD        CC: MD Arley Ruiz MD  1/23/2019  9:51 AM  Sign at close encounter  [facility]  Martir Burns 81 y o  male MRN: [id number]            Assessment & Plan:    Very pleasant 72-year-old male here for follow-up for bile acid induced diarrhea, belching, remote history of gastrointestinal stromal tumor  1  Bile acid induced diarrhea:  -at this time much improved  -continue cholestyramine powder  -continue dietary modification including lactose avoidance    2  Belching:  Overall much improved, may have been reflux versus psychogenic  -at this point would reduce pantoprazole to once daily, continue ranitidine twice daily    3  History of gastrointestinal stromal tumor:  -patient follow-up with Surgical Oncology    The patient will follow-up in 1 year for routine follow-up and refills, he was asked to call with any questions or concerns or change in symptoms in the interim  _____________________________________________________________        CC:  Here for follow-up    HPI:  Martir Burns is a 81 y o male who is here for follow-up  As you know this is a pleasant 72-year-old gentleman with history of a gastrointestinal stromal tumor which was resected by Surgical Oncology, we had seen him for diverticular disease as well, he has also been followed for chronic diarrhea and belching which has been quite persistent      The patient is doing very well at this time, he reports that he takes cholestyramine every other day for presumed bile acid induced diarrhea and he reports with this regimen he is doing very well  Occasional has loose stools with certain foods but for the most part has been doing well  He also notes that his belching has all but completely resolve, he is very for episodes of belching anymore  He was taking pantoprazole twice daily as well as ranitidine twice daily  Denies any nausea, vomiting, dysphagia  We had in fact tried him on amitriptyline for suspected neurogenic belching  He has stopped taking that  ROS:  The remainder of the ROS was negative except for the pertinent positives mentioned in HPI  Allergies: Patient has no known allergies      Medications:   Current Outpatient Prescriptions:     B Complex-Biotin-FA (SUPER B-50 B COMPLEX PO), Take 1 tablet by mouth daily, Disp: , Rfl:     Cholecalciferol (VITAMIN D-3) 5000 units TABS, Take 1 capsule by mouth daily, Disp: , Rfl:     cholestyramine (QUESTRAN) 4 GM/DOSE powder, Take 1 packet (4 g total) by mouth every other day, Disp: 378 g, Rfl: 6    diflorasone (PSORCON) 0 05 % ointment, APPLY EVERY DAY AT BEDTIME WITH OCCLUSION, Disp: , Rfl: 3    dorzolamide-timolol (COSOPT) 22 3-6 8 MG/ML ophthalmic solution, INSTILL 1 DROP INTO RIGHT EYE 3 TIMES A DAY, Disp: , Rfl: 4    GLUCOSAMINE CHONDROITIN COMPLX PO, Take 1 tablet by mouth daily 4500mg  daily , Disp: , Rfl:     latanoprost (XALATAN) 0 005 % ophthalmic solution, INSTIL 1 GTT IN OD QHS, Disp: , Rfl: 4    Multiple Vitamins-Minerals (MULTIVITAL) tablet, Take 1 tablet by mouth daily Spectravite , Disp: , Rfl:     pantoprazole (PROTONIX) 40 mg tablet, Take 1 tablet (40 mg total) by mouth 2 (two) times a day, Disp: 60 tablet, Rfl: 3    tamsulosin (FLOMAX) 0 4 mg, Take 1 capsule (0 4 mg total) by mouth daily with dinner, Disp: 90 capsule, Rfl: 3    Vitamin E 400 UNITS TABS, Take 1 tablet by mouth daily, Disp: , Rfl:     cholestyramine (QUESTRAN) 4 g packet, Take 1 packet (4 g total) by mouth 3 (three) times a day with meals, Disp: 30 packet, Rfl: 0    Past Medical History:   Diagnosis Date    Abnormal weight loss     RESOLVED: 36JDC1944    Anemia     RESOLVED: 34VMS6697    Atypical chest pain     RESOLVED: 75EUA6703    BPH (benign prostatic hyperplasia)     Cancer (HCC)     DUODENAL    Luis Miguel's syndrome     Depression     RESOLVED: 17YMI1799    Diverticulitis of colon     LAST ASSESSED: 53EMG4347    Duodenal nodule     RESOLVED: 91ECC8125    Gastrointestinal stromal tumor (GIST) (HCC)     MALIGNANT; RESOLVED: 96PER3033    GERD (gastroesophageal reflux disease)     Glaucoma     RESOLVED: 93WMP1917    Insomnia     RESOLVED: 44FLM6063    Lactose intolerance     LAST ASSESSED: 72KIN5377       Past Surgical History:   Procedure Laterality Date    BACK SURGERY      CERVICAL FUSION      c4 and c5    CHOLECYSTECTOMY      COLONOSCOPY  2014    kutty    ELBOW SURGERY Left     REPAIR    EYE SURGERY      KNEE ARTHROSCOPY Left     LUMBAR DISCECTOMY      L5 S1    ORIF RADIAL SHAFT FRACTURE      [place] ESOPHAGOGASTRODUODENOSCOPY TRANSORAL DIAGNOSTIC N/A 11/1/2016    Procedure: ESOPHAGOGASTRODUODENOSCOPY (EGD); Surgeon: Josue Araujo MD;  [facility]; Service: Gastroenterology    [place] ESOPHAGOGASTRODUODENOSCOPY TRANSORAL DIAGNOSTIC N/A 5/31/2018    Procedure: ESOPHAGOGASTRODUODENOSCOPY (EGD); Surgeon: Josue Araujo MD;  [facility]; Service: Gastroenterology    SMALL INTESTINE SURGERY      GIST surgery       Family History   Problem Relation Age of Onset    Thyroid disease Mother     Thyroid disease Brother     Diabetes Paternal Grandmother     Hypertension Family         reports that he has never smoked  He has never used smokeless tobacco  He reports that he drinks alcohol  He reports that he does not use drugs        Physical Exam:    /64 (BP Location: Right arm, Patient Position: Sitting, Cuff Size: Standard)   Pulse 69   Temp 97 7 °F (36 5 °C) (Tympanic) Ht 5' 8" (1 727 m)   Wt 84 5 kg (186 lb 3 2 oz)   BMI 28 31 kg/m²      Gen: wn/wd, NAD, healthy-appearing male  HEENT: anicteric, MMM, no cervical LAD  CVS: RRR, no m/r/g  CHEST: CTA b/l  ABD: +BS, soft, NT,ND, no hepatosplenomegaly  EXT: no c/c/e  NEURO: aaox3  SKIN: NO rashes

## 2019-01-23 NOTE — TELEPHONE ENCOUNTER
Pt is calling checking to see the status of his injection  He stated he already has approval  from insurance    Pt can be reached at [phone]

## 2019-02-06 NOTE — TELEPHONE ENCOUNTER
Patient calling in to check status of synvisc injection, as he already received an approval letter from his insurance company, [employer]  Patient states he has not received a call from a speciality pharmacy, or the office  Patient authorizes the office to leave a message on [phone]

## 2019-02-07 NOTE — TELEPHONE ENCOUNTER
Spoke with patient's wife I explain she will received a call when we received a letter of approved we didn't yet and also we Don't have the injection  I did ask her to provide us please the Authorization number of her letter which is [id number] she check there is not validation number but she provide the service code# ,[id number]  Could you please Millie call them as soon you get an answer from insurance

## 2019-02-19 ENCOUNTER — OFFICE VISIT (OUTPATIENT)
Dept: OBGYN CLINIC | Facility: HOSPITAL | Age: 69
End: 2019-02-19
Payer: COMMERCIAL

## 2019-02-19 VITALS
HEART RATE: 77 BPM | WEIGHT: 187.6 LBS | DIASTOLIC BLOOD PRESSURE: 93 MMHG | BODY MASS INDEX: 28.52 KG/M2 | SYSTOLIC BLOOD PRESSURE: 137 MMHG

## 2019-02-19 DIAGNOSIS — M17.12 PRIMARY OSTEOARTHRITIS OF LEFT KNEE: Primary | ICD-10-CM

## 2019-02-19 PROCEDURE — 20610 DRAIN/INJ JOINT/BURSA W/O US: CPT | Performed by: ORTHOPAEDIC SURGERY

## 2019-02-19 RX ORDER — BUPIVACAINE HYDROCHLORIDE 2.5 MG/ML
2 INJECTION, SOLUTION INFILTRATION; PERINEURAL
Status: COMPLETED | OUTPATIENT
Start: 2019-02-19 | End: 2019-02-19

## 2019-02-19 RX ADMIN — BUPIVACAINE HYDROCHLORIDE 2 ML: 2.5 INJECTION, SOLUTION INFILTRATION; PERINEURAL at 17:30

## 2019-02-19 NOTE — PROGRESS NOTES
Orthopedics          Carlos Garces 81 y o  male MRN: [id number]      Chief Complaint:   left knee pain    HPI:   81 y o male complaining of left knee pain  Patient presents office today regarding left knee pain osteoarthritis well as Baker cyst   Patient did have a Baker cyst aspiration which significantly alleviate his pain  States however he noticed increasing swelling posterior aspect of his left knee as well as pain in his left knee  His in states the pain is aching nature worse with weight-bearing mildly relieved with rest   Denies any instability clicking popping catching  Denies any numbness or tingling  Denies any right knee pain  Patient denies any calf pain chest pain shortness of breath swelling of his left lower extremity otherwise                  Review Of Systems:   · Skin: Normal  · Neuro: See HPI  · Musculoskeletal: See HPI  · All other systems reviewed and are negative    Past Medical History:   Past Medical History:   Diagnosis Date    Abnormal weight loss     RESOLVED: [id number]    Anemia     RESOLVED: 58OMD5877    Atypical chest pain     RESOLVED: 83BMM4220    BPH (benign prostatic hyperplasia)     Cancer (HCC)     DUODENAL    Luis Miguel's syndrome     Depression     RESOLVED: [id number]    Diverticulitis of colon     LAST ASSESSED: [id number]    Duodenal nodule     RESOLVED: 12GSG6784    Gastrointestinal stromal tumor (GIST) (HCC)     MALIGNANT; RESOLVED: 33OVJ5446    GERD (gastroesophageal reflux disease)     Glaucoma     RESOLVED: 04EPH1968    Insomnia     RESOLVED: 57LNP1392    Lactose intolerance     LAST ASSESSED: 34VXP9760       Past Surgical History:   Past Surgical History:   Procedure Laterality Date    BACK SURGERY      CERVICAL FUSION      c4 and c5    CHOLECYSTECTOMY      COLONOSCOPY  2014    kutty    ELBOW SURGERY Left     REPAIR    EYE SURGERY      KNEE ARTHROSCOPY Left     LUMBAR DISCECTOMY      L5 S1    ORIF RADIAL SHAFT FRACTURE      DE ESOPHAGOGASTRODUODENOSCOPY TRANSORAL DIAGNOSTIC N/A 11/1/2016    Procedure: ESOPHAGOGASTRODUODENOSCOPY (EGD); Surgeon: Jaime Muniz MD;  [facility]; Service: Gastroenterology    ID ESOPHAGOGASTRODUODENOSCOPY TRANSORAL DIAGNOSTIC N/A 5/31/2018    Procedure: ESOPHAGOGASTRODUODENOSCOPY (EGD); Surgeon: Jaime Muniz MD;  [facility];   Service: Gastroenterology    SMALL INTESTINE SURGERY      GIST surgery       Family History:  Family history reviewed and non-contributory  Family History   Problem Relation Age of Onset    Thyroid disease Mother     Thyroid disease Brother     Diabetes Paternal Grandmother     Hypertension Family          Social History:  Social History     Socioeconomic History    Marital status: /Civil Union     Spouse name: None    Number of children: None    Years of education: None    Highest education level: None   Occupational History    None   Social Needs    Financial resource strain: None    Food insecurity:     Worry: None     Inability: None    Transportation needs:     Medical: None     Non-medical: None   Tobacco Use    Smoking status: Never Smoker    Smokeless tobacco: Never Used   Substance and Sexual Activity    Alcohol use: Yes     Comment: occasional    Drug use: No    Sexual activity: Yes   Lifestyle    Physical activity:     Days per week: None     Minutes per session: None    Stress: None   Relationships    Social connections:     Talks on phone: None     Gets together: None     Attends Buddhism service: None     Active member of club or organization: None     Attends meetings of clubs or organizations: None     Relationship status: None    Intimate partner violence:     Fear of current or ex partner: None     Emotionally abused: None     Physically abused: None     Forced sexual activity: None   Other Topics Concern    None   Social History Narrative    None       Allergies:   No Known Allergies    Labs:  0   Lab Value Date/Time HCT 44 8 08/20/2018 1430    HCT 46 8 08/16/2018 1511    HCT 49 9 (H) 12/06/2016 1032    HCT 39 1 08/05/2015 0649    HCT 40 5 08/02/2015 0604    HCT 39 1 07/31/2015 0657    HGB 15 1 08/20/2018 1430    HGB 15 8 08/16/2018 1511    HGB 17 4 (H) 12/06/2016 1032    HGB 13 2 08/05/2015 0649    HGB 13 8 08/02/2015 0604    HGB 13 1 07/31/2015 0657    INR 0 96 07/01/2015 0944    WBC 6 46 08/20/2018 1430    WBC 2 75 (L) 08/16/2018 1511    WBC 7 53 12/06/2016 1032    WBC 5 23 08/05/2015 0649    WBC 9 51 08/02/2015 0604    WBC 8 05 07/31/2015 0657    ESR 7 12/06/2016 1032       Meds:    Current Outpatient Medications:     B Complex-Biotin-FA (SUPER B-50 B COMPLEX PO), Take 1 tablet by mouth daily, Disp: , Rfl:     Cholecalciferol (VITAMIN D-3) 5000 units TABS, Take 1 capsule by mouth daily, Disp: , Rfl:     cholestyramine (QUESTRAN) 4 GM/DOSE powder, Take 1 packet (4 g total) by mouth every other day, Disp: 378 g, Rfl: 6    diflorasone (PSORCON) 0 05 % ointment, APPLY EVERY DAY AT BEDTIME WITH OCCLUSION, Disp: , Rfl: 3    dorzolamide-timolol (COSOPT) 22 3-6 8 MG/ML ophthalmic solution, INSTILL 1 DROP INTO RIGHT EYE 3 TIMES A DAY, Disp: , Rfl: 4    GLUCOSAMINE CHONDROITIN COMPLX PO, Take 1 tablet by mouth daily 4500mg  daily , Disp: , Rfl:     latanoprost (XALATAN) 0 005 % ophthalmic solution, INSTIL 1 GTT IN OD QHS, Disp: , Rfl: 4    Multiple Vitamins-Minerals (MULTIVITAL) tablet, Take 1 tablet by mouth daily Spectravite , Disp: , Rfl:     pantoprazole (PROTONIX) 40 mg tablet, Take 1 tablet (40 mg total) by mouth 2 (two) times a day, Disp: 60 tablet, Rfl: 3    tamsulosin (FLOMAX) 0 4 mg, Take 1 capsule (0 4 mg total) by mouth daily with dinner, Disp: 90 capsule, Rfl: 3    Vitamin E 400 UNITS TABS, Take 1 tablet by mouth daily, Disp: , Rfl:       Physical Exam:     General Appearance:    Alert, cooperative, no distress, appears stated age   Head:    Normocephalic, without obvious abnormality, atraumatic   Eyes: conjunctiva/corneas clear, both eyes        Nose:   Nares normal, septum midline, no drainage    Throat:   Lips normal; teeth and gums normal   Neck:    symmetrical, trachea midline, ;     thyroid:  no enlargement/   Back:     Symmetric, no curvature, ROM normal   Lungs:   No audible wheezing or labored breathing   Chest Wall:    No tenderness or deformity    Heart:    Regular rate and rhythm               Pulses:   2+ and symmetric all extremities   Skin:   Skin color, texture, turgor normal, no rashes or lesions   Neurologic:   normal strength, sensation and reflexes     throughout       Musculoskeletal: left lower extremity  · On examination of the left knee there is no effusion, no erythema  Range of motion to full active extension and flexion to greater than 120°  Pain on palpation medial and lateral joint lines  There is crepitus with range of motion, no warmth to palpation, bony enlargement noted  No pain on palpation pes anserine bursa region or distal iliotibial band  Stable to varus and valgus stress without pain or gapping  Negative anterior and posterior drawer testing  Sensation intact distal pulses present  Large joint arthrocentesis: L knee  Date/Time: 2/19/2019 5:30 PM  Consent given by: patient  Supporting Documentation  Indications: pain   Procedure Details  Location: knee - L knee  Needle size: 18 G  Ultrasound guidance: no  Approach: superior  Medications administered: 2 mL bupivacaine 0 25 %; 48 mg hylan 48 MG/6ML            _*_*_*_*_*_*_*_*_*_*_*_*_*_*_*_*_*_*_*_*_*_*_*_*_*_*_*_*_*_*_*_*_*_*_*_*_*_*_*_*_*    Assessment:  69 y o male with left knee pain osteoarthritis Baker cyst    Plan:   · Weight bearing as tolerated  left lower extremity  · Left knee Synvisc-One injection given as noted above  · Advised no significant activity or increased ambulation over the next 24-48 hours and warm compresses to the knee no greater 20 minutes 3-4 times 24 hours following the injection    Patient advised should they develop any increasing pain, redness, drainage, numbness, tingling or swelling surrounding the injection sight, they are to contact our office or present to the emergency department    · Continue home range of motion exercise  · Should the patient Villanueva sister may consider repeat ultrasound-guided aspiration and injection  · Follow up in 3 months or sooner if needed      Cassandra He PA-C

## 2019-03-15 ENCOUNTER — APPOINTMENT (OUTPATIENT)
Dept: LAB | Age: 69
End: 2019-03-15
Payer: COMMERCIAL

## 2019-03-15 ENCOUNTER — TRANSCRIBE ORDERS (OUTPATIENT)
Dept: ADMINISTRATIVE | Age: 69
End: 2019-03-15

## 2019-03-15 DIAGNOSIS — R53.83 FATIGUE, UNSPECIFIED TYPE: ICD-10-CM

## 2019-03-15 DIAGNOSIS — Z11.59 ENCOUNTER FOR HEPATITIS C SCREENING TEST FOR LOW RISK PATIENT: ICD-10-CM

## 2019-03-15 DIAGNOSIS — C49.A3 GASTROINTESTINAL STROMAL TUMOR (GIST) OF DUODENUM (HCC): ICD-10-CM

## 2019-03-15 DIAGNOSIS — Z11.59 ENCOUNTER FOR HEPATITIS C SCREENING TEST FOR LOW RISK PATIENT: Primary | ICD-10-CM

## 2019-03-15 LAB
ANION GAP SERPL CALCULATED.3IONS-SCNC: 5 MMOL/L (ref 4–13)
BUN SERPL-MCNC: 9 MG/DL (ref 5–25)
CALCIUM SERPL-MCNC: 9.3 MG/DL (ref 8.3–10.1)
CHLORIDE SERPL-SCNC: 104 MMOL/L (ref 100–108)
CO2 SERPL-SCNC: 27 MMOL/L (ref 21–32)
CREAT SERPL-MCNC: 0.81 MG/DL (ref 0.6–1.3)
GFR SERPL CREATININE-BSD FRML MDRD: 92 ML/MIN/1.73SQ M
GLUCOSE P FAST SERPL-MCNC: 79 MG/DL (ref 65–99)
POTASSIUM SERPL-SCNC: 3.4 MMOL/L (ref 3.5–5.3)
SODIUM SERPL-SCNC: 136 MMOL/L (ref 136–145)
T3FREE SERPL-MCNC: 3.02 PG/ML (ref 2.3–4.2)
T4 FREE SERPL-MCNC: 1.12 NG/DL (ref 0.76–1.46)
TSH SERPL DL<=0.05 MIU/L-ACNC: 0.55 UIU/ML (ref 0.36–3.74)

## 2019-03-15 PROCEDURE — 86803 HEPATITIS C AB TEST: CPT

## 2019-03-15 PROCEDURE — 36415 COLL VENOUS BLD VENIPUNCTURE: CPT

## 2019-03-15 PROCEDURE — 84443 ASSAY THYROID STIM HORMONE: CPT

## 2019-03-15 PROCEDURE — 84481 FREE ASSAY (FT-3): CPT

## 2019-03-15 PROCEDURE — 80048 BASIC METABOLIC PNL TOTAL CA: CPT

## 2019-03-15 PROCEDURE — 84439 ASSAY OF FREE THYROXINE: CPT

## 2019-03-16 LAB — HCV AB SER QL: NORMAL

## 2019-03-20 ENCOUNTER — OFFICE VISIT (OUTPATIENT)
Dept: INTERNAL MEDICINE CLINIC | Facility: CLINIC | Age: 69
End: 2019-03-20
Payer: COMMERCIAL

## 2019-03-20 VITALS
RESPIRATION RATE: 16 BRPM | WEIGHT: 187.8 LBS | BODY MASS INDEX: 28.46 KG/M2 | OXYGEN SATURATION: 98 % | HEIGHT: 68 IN | HEART RATE: 69 BPM | DIASTOLIC BLOOD PRESSURE: 84 MMHG | SYSTOLIC BLOOD PRESSURE: 124 MMHG

## 2019-03-20 DIAGNOSIS — E78.5 HYPERLIPIDEMIA, UNSPECIFIED HYPERLIPIDEMIA TYPE: Primary | ICD-10-CM

## 2019-03-20 DIAGNOSIS — K21.9 GASTROESOPHAGEAL REFLUX DISEASE WITHOUT ESOPHAGITIS: ICD-10-CM

## 2019-03-20 DIAGNOSIS — C49.A4 GIST (GASTROINTESTINAL STROMA TUMOR), MALIGNANT, COLON (HCC): ICD-10-CM

## 2019-03-20 DIAGNOSIS — E87.6 HYPOKALEMIA: ICD-10-CM

## 2019-03-20 DIAGNOSIS — C49.A3 GASTROINTESTINAL STROMAL TUMOR (GIST) OF DUODENUM (HCC): ICD-10-CM

## 2019-03-20 DIAGNOSIS — Z11.59 ENCOUNTER FOR HEPATITIS C SCREENING TEST FOR LOW RISK PATIENT: ICD-10-CM

## 2019-03-20 DIAGNOSIS — E78.1 ESSENTIAL HYPERTRIGLYCERIDEMIA: ICD-10-CM

## 2019-03-20 PROCEDURE — 99214 OFFICE O/P EST MOD 30 MIN: CPT | Performed by: INTERNAL MEDICINE

## 2019-03-20 NOTE — PROGRESS NOTES
Assessment/Plan:    Esophageal reflux  Ulcer free diet continue PPI patient is seen GI   GIEST tumor currently stable    Gastrointestinal stromal tumor (GIST) of duodenum ([facility] [address] )  Currently stable patient is the GI and Surgical Oncology    Encounter for hepatitis C screening test for low risk patient  Counseled, check hepatitis C antibody    Essential hypertriglyceridemia  Reduce carbohydrates, reduce sweets check lipid profile routine exercise  Hyperlipidemia  Hyperlipidemia controlled continue with current medical regiment recommend a low-cholesterol diet and recommend routine exercise we will continue to monitor the progress  Hypokalemia  Increase potassium diet will recheck a potassium level in 1-2 weeks         Problem List Items Addressed This Visit        Digestive    Esophageal reflux     Ulcer free diet continue PPI patient is seen GI   GIEST tumor currently stable         Gastrointestinal stromal tumor (GIST) of duodenum ([facility] [address] )     Currently stable patient is the GI and Surgical Oncology         GIST (gastrointestinal stroma tumor), malignant, colon ([facility] [address] )       Other    Essential hypertriglyceridemia     Reduce carbohydrates, reduce sweets check lipid profile routine exercise  Hyperlipidemia - Primary     Hyperlipidemia controlled continue with current medical regiment recommend a low-cholesterol diet and recommend routine exercise we will continue to monitor the progress  Relevant Orders    Comprehensive metabolic panel    Lipid Panel with Direct LDL reflex    Encounter for hepatitis C screening test for low risk patient     Counseled, check hepatitis C antibody         Relevant Orders    Hepatitis C antibody    Hypokalemia     Increase potassium diet will recheck a potassium level in 1-2 weeks         Relevant Orders    Potassium          Return to office 6  months  call if any problems  Subjective:      Patient ID: Blayne David is a 81 y o  male      HPI a 81 male follow-up exam no hyperlipidemia, GERD, essential hypertriglyceridemia, hypokalemia, GIST tumor; The patient reports me compliant taking medications without untoward side effects the  The patient is here to review his medical condition, update me on the medical condition and the patient reports me no hospitalizations and no ER visits  Overall his are well he has become more active the brain  He is here to review lab    The following portions of the patient's history were reviewed and updated as appropriate: allergies, current medications, past family history, past medical history, past social history, past surgical history and problem list     Review of Systems   Constitutional: Negative for activity change, appetite change and unexpected weight change  HENT: Negative for congestion and postnasal drip  Eyes: Negative for visual disturbance  Respiratory: Negative for cough and shortness of breath  Cardiovascular: Negative for chest pain  Gastrointestinal: Negative for abdominal pain, diarrhea, nausea and vomiting  Neurological: Negative for dizziness, light-headedness and headaches  Hematological: Negative for adenopathy  Objective:    Return in about 6 months (around 9/27/2019)  No results found    Recent Results (from the past 01630 hour(s))   POCT ECG    Impression    Normal sinus rhythm no acute changes       No Known Allergies    Past Medical History:   Diagnosis Date    Abnormal weight loss     RESOLVED: [id number]    Anemia     RESOLVED: 29YVK3387    Atypical chest pain     RESOLVED: 32LCW9591    BPH (benign prostatic hyperplasia)     Cancer (HCC)     DUODENAL    Luis Miguel's syndrome     Depression     RESOLVED: [id number]    Diverticulitis of colon     LAST ASSESSED: 29APC6371    Duodenal nodule     RESOLVED: 73EWY1471    Gastrointestinal stromal tumor (GIST) (HCC)     MALIGNANT; RESOLVED: 30DLS2735    GERD (gastroesophageal reflux disease)     Glaucoma     RESOLVED: 18TSR9380    Insomnia RESOLVED: 67DTK7779    Lactose intolerance     LAST ASSESSED: 68DZA6383     Past Surgical History:   Procedure Laterality Date    BACK SURGERY      CERVICAL FUSION      c4 and c5    CHOLECYSTECTOMY      COLONOSCOPY  2014    kutty    ELBOW SURGERY Left     REPAIR    EYE SURGERY      KNEE ARTHROSCOPY Left     LUMBAR DISCECTOMY      L5 S1    ORIF RADIAL SHAFT FRACTURE      [place] ESOPHAGOGASTRODUODENOSCOPY TRANSORAL DIAGNOSTIC N/A 11/1/2016    Procedure: ESOPHAGOGASTRODUODENOSCOPY (EGD); Surgeon: Cherylene Needy, MD;  [facility]; Service: Gastroenterology    IA ESOPHAGOGASTRODUODENOSCOPY TRANSORAL DIAGNOSTIC N/A 5/31/2018    Procedure: ESOPHAGOGASTRODUODENOSCOPY (EGD); Surgeon: Cherylene Needy, MD;  [facility]; Service: Gastroenterology    SMALL INTESTINE SURGERY      GIST surgery     Current Outpatient Medications on File Prior to Visit   Medication Sig Dispense Refill    Cholecalciferol (VITAMIN D-3) 5000 units TABS Take 1 capsule by mouth daily      cholestyramine (QUESTRAN) 4 GM/DOSE powder Take 1 packet (4 g total) by mouth every other day 378 g 6    dorzolamide-timolol (COSOPT) 22 3-6 8 MG/ML ophthalmic solution INSTILL 1 DROP INTO RIGHT EYE 3 TIMES A DAY  4    GLUCOSAMINE CHONDROITIN COMPLX PO Take 1 tablet by mouth daily 4500mg  daily       latanoprost (XALATAN) 0 005 % ophthalmic solution INSTIL 1 GTT IN OD QHS  4    Multiple Vitamins-Minerals (MULTIVITAL) tablet Take 1 tablet by mouth daily Spectravite       pantoprazole (PROTONIX) 40 mg tablet Take 1 tablet (40 mg total) by mouth 2 (two) times a day 60 tablet 3    tamsulosin (FLOMAX) 0 4 mg Take 1 capsule (0 4 mg total) by mouth daily with dinner 90 capsule 3    Vitamin E 400 UNITS TABS Take 1 tablet by mouth daily       No current facility-administered medications on file prior to visit        Family History   Problem Relation Age of Onset    Thyroid disease Mother     Thyroid disease Brother     Diabetes Paternal Grandmother     Hypertension Family      Social History     Socioeconomic History    Marital status: /Civil Union     Spouse name: Not on file    Number of children: Not on file    Years of education: Not on file    Highest education level: Not on file   Occupational History    Not on file   Social Needs    Financial resource strain: Not on file    Food insecurity:     Worry: Not on file     Inability: Not on file    Transportation needs:     Medical: Not on file     Non-medical: Not on file   Tobacco Use    Smoking status: Never Smoker    Smokeless tobacco: Never Used   Substance and Sexual Activity    Alcohol use: Yes     Comment: occasional    Drug use: No    Sexual activity: Yes   Lifestyle    Physical activity:     Days per week: Not on file     Minutes per session: Not on file    Stress: Not on file   Relationships    Social connections:     Talks on phone: Not on file     Gets together: Not on file     Attends Bahai service: Not on file     Active member of club or organization: Not on file     Attends meetings of clubs or organizations: Not on file     Relationship status: Not on file    Intimate partner violence:     Fear of current or ex partner: Not on file     Emotionally abused: Not on file     Physically abused: Not on file     Forced sexual activity: Not on file   Other Topics Concern    Not on file   Social History Narrative    Not on file     Vitals:    03/20/19 1717   BP: 124/84   Pulse: 69   Resp: 16   SpO2: 98%   Weight: 85 2 kg (187 lb 12 8 oz)   Height: 5' 8" (1 727 m)     Results for orders placed or performed in visit on 03/15/19   TSH, 3rd generation   Result Value Ref Range    TSH 3RD GENERATON 0 552 0 358 - 3 740 uIU/mL   T4, free   Result Value Ref Range    Free T4 1 12 0 76 - 1 46 ng/dL   T3, free   Result Value Ref Range    T3, Free 3 02 2 30 - 4 20 pg/mL   Basic metabolic panel   Result Value Ref Range    Sodium 136 136 - 145 mmol/L    Potassium 3 4 (L) 3 5 - 5 3 mmol/L    Chloride 104 100 - 108 mmol/L    CO2 27 21 - 32 mmol/L    ANION GAP 5 4 - 13 mmol/L    BUN 9 5 - 25 mg/dL    Creatinine 0 81 0 60 - 1 30 mg/dL    Glucose, Fasting 79 65 - 99 mg/dL    Calcium 9 3 8 3 - 10 1 mg/dL    eGFR 92 ml/min/1 73sq m   Hepatitis C antibody   Result Value Ref Range    Hepatitis C Ab Non-reactive Non-reactive     Weight (last 2 days)     None        Body mass index is 28 55 kg/m²  BP      Temp      Pulse     Resp      SpO2        Vitals:    03/20/19 1717   Weight: 85 2 kg (187 lb 12 8 oz)     Vitals:    03/20/19 1717   Weight: 85 2 kg (187 lb 12 8 oz)       /84   Pulse 69   Resp 16   Ht 5' 8" (1 727 m)   Wt 85 2 kg (187 lb 12 8 oz)   SpO2 98%   BMI 28 55 kg/m²          Physical Exam   Constitutional: He appears well-developed and well-nourished  No distress  HENT:   Head: Normocephalic and atraumatic  Right Ear: External ear normal    Left Ear: External ear normal    Mouth/Throat: Oropharynx is clear and moist    Eyes: Pupils are equal, round, and reactive to light  Conjunctivae are normal  Right eye exhibits no discharge  Left eye exhibits no discharge  No scleral icterus  Neck: Neck supple  Cardiovascular: Normal rate, regular rhythm and normal heart sounds  Exam reveals no gallop and no friction rub  No murmur heard  Pulmonary/Chest: No respiratory distress  He has no wheezes  He has no rales  Abdominal: Soft  Bowel sounds are normal  He exhibits no distension and no mass  There is no tenderness  There is no rebound and no guarding  Musculoskeletal: He exhibits no edema or deformity  Lymphadenopathy:     He has no cervical adenopathy  Neurological: He is alert  Skin: He is not diaphoretic  Psychiatric: He has a normal mood and affect

## 2019-03-24 PROBLEM — E87.6 HYPOKALEMIA: Status: ACTIVE | Noted: 2019-03-24

## 2019-03-24 PROBLEM — Z11.59 ENCOUNTER FOR HEPATITIS C SCREENING TEST FOR LOW RISK PATIENT: Status: ACTIVE | Noted: 2019-03-24

## 2019-04-05 ENCOUNTER — APPOINTMENT (OUTPATIENT)
Dept: LAB | Age: 69
End: 2019-04-05
Payer: COMMERCIAL

## 2019-04-05 DIAGNOSIS — E87.6 HYPOKALEMIA: ICD-10-CM

## 2019-04-05 LAB — POTASSIUM SERPL-SCNC: 4.2 MMOL/L (ref 3.5–5.3)

## 2019-04-05 PROCEDURE — 36415 COLL VENOUS BLD VENIPUNCTURE: CPT

## 2019-04-05 PROCEDURE — 84132 ASSAY OF SERUM POTASSIUM: CPT

## 2019-04-09 ENCOUNTER — OFFICE VISIT (OUTPATIENT)
Dept: OBGYN CLINIC | Facility: HOSPITAL | Age: 69
End: 2019-04-09
Payer: COMMERCIAL

## 2019-04-09 ENCOUNTER — HOSPITAL ENCOUNTER (OUTPATIENT)
Dept: RADIOLOGY | Facility: HOSPITAL | Age: 69
Discharge: HOME | End: 2019-04-09
Attending: ORTHOPAEDIC SURGERY
Payer: COMMERCIAL

## 2019-04-09 VITALS
BODY MASS INDEX: 28.8 KG/M2 | SYSTOLIC BLOOD PRESSURE: 124 MMHG | WEIGHT: 189.4 LBS | DIASTOLIC BLOOD PRESSURE: 81 MMHG | HEART RATE: 68 BPM

## 2019-04-09 DIAGNOSIS — M71.22 BAKER CYST, LEFT: ICD-10-CM

## 2019-04-09 DIAGNOSIS — M17.12 ARTHRITIS OF LEFT KNEE: ICD-10-CM

## 2019-04-09 DIAGNOSIS — M25.562 LEFT KNEE PAIN, UNSPECIFIED CHRONICITY: ICD-10-CM

## 2019-04-09 DIAGNOSIS — M25.562 LEFT KNEE PAIN, UNSPECIFIED CHRONICITY: Primary | ICD-10-CM

## 2019-04-09 PROCEDURE — 73562 X-RAY EXAM OF KNEE 3: CPT

## 2019-04-09 PROCEDURE — 99213 OFFICE O/P EST LOW 20 MIN: CPT | Performed by: ORTHOPAEDIC SURGERY

## 2019-04-10 ENCOUNTER — HOSPITAL ENCOUNTER (OUTPATIENT)
Dept: RADIOLOGY | Facility: HOSPITAL | Age: 69
Discharge: HOME | End: 2019-04-10
Attending: SURGERY
Payer: COMMERCIAL

## 2019-04-10 DIAGNOSIS — C49.A3 GASTROINTESTINAL STROMAL TUMOR (GIST) OF DUODENUM (HCC): ICD-10-CM

## 2019-04-10 PROCEDURE — 71260 CT THORAX DX C+: CPT

## 2019-04-10 PROCEDURE — 74177 CT ABD & PELVIS W/CONTRAST: CPT

## 2019-04-10 RX ADMIN — IOHEXOL 100 ML: 350 INJECTION, SOLUTION INTRAVENOUS at 18:44

## 2019-04-17 ENCOUNTER — TELEPHONE (OUTPATIENT)
Dept: GASTROENTEROLOGY | Facility: AMBULARY SURGERY CENTER | Age: 69
End: 2019-04-17

## 2019-04-17 PROBLEM — Z85.09 HISTORY OF GASTROINTESTINAL STROMAL TUMOR (GIST): Status: ACTIVE | Noted: 2018-04-04

## 2019-04-22 PROBLEM — Z08 ENCOUNTER FOR FOLLOW-UP SURVEILLANCE OF MALIGNANT GASTROINTESTINAL STROMAL TUMOR (GIST): Status: ACTIVE | Noted: 2019-04-22

## 2019-04-22 PROBLEM — Z85.09 ENCOUNTER FOR FOLLOW-UP SURVEILLANCE OF MALIGNANT GASTROINTESTINAL STROMAL TUMOR (GIST): Status: ACTIVE | Noted: 2019-04-22

## 2019-04-24 ENCOUNTER — OFFICE VISIT (OUTPATIENT)
Dept: SURGICAL ONCOLOGY | Facility: CLINIC | Age: 69
End: 2019-04-24
Payer: COMMERCIAL

## 2019-04-24 VITALS
WEIGHT: 191 LBS | HEIGHT: 68 IN | HEART RATE: 74 BPM | TEMPERATURE: 98.4 F | DIASTOLIC BLOOD PRESSURE: 80 MMHG | BODY MASS INDEX: 28.95 KG/M2 | RESPIRATION RATE: 16 BRPM | SYSTOLIC BLOOD PRESSURE: 118 MMHG

## 2019-04-24 DIAGNOSIS — Z85.09 ENCOUNTER FOR FOLLOW-UP SURVEILLANCE OF MALIGNANT GASTROINTESTINAL STROMAL TUMOR (GIST): ICD-10-CM

## 2019-04-24 DIAGNOSIS — Z08 ENCOUNTER FOR FOLLOW-UP SURVEILLANCE OF MALIGNANT GASTROINTESTINAL STROMAL TUMOR (GIST): ICD-10-CM

## 2019-04-24 DIAGNOSIS — Z85.09 HISTORY OF GASTROINTESTINAL STROMAL TUMOR (GIST): Primary | ICD-10-CM

## 2019-04-24 PROCEDURE — 99213 OFFICE O/P EST LOW 20 MIN: CPT | Performed by: SURGERY

## 2019-04-28 ENCOUNTER — HOSPITAL ENCOUNTER (OUTPATIENT)
Dept: MRI IMAGING | Facility: HOSPITAL | Age: 69
Discharge: HOME | End: 2019-04-28
Attending: ORTHOPAEDIC SURGERY
Payer: COMMERCIAL

## 2019-04-28 DIAGNOSIS — M25.562 LEFT KNEE PAIN, UNSPECIFIED CHRONICITY: ICD-10-CM

## 2019-04-28 DIAGNOSIS — M71.22 BAKER CYST, LEFT: ICD-10-CM

## 2019-04-28 PROCEDURE — 73721 MRI JNT OF LWR EXTRE W/O DYE: CPT

## 2019-05-07 ENCOUNTER — OFFICE VISIT (OUTPATIENT)
Dept: OBGYN CLINIC | Facility: HOSPITAL | Age: 69
End: 2019-05-07
Payer: COMMERCIAL

## 2019-05-07 VITALS
SYSTOLIC BLOOD PRESSURE: 120 MMHG | HEART RATE: 76 BPM | BODY MASS INDEX: 28.22 KG/M2 | DIASTOLIC BLOOD PRESSURE: 81 MMHG | WEIGHT: 185.6 LBS

## 2019-05-07 DIAGNOSIS — M71.22 BAKER CYST, LEFT: Primary | ICD-10-CM

## 2019-05-07 DIAGNOSIS — M17.12 ARTHRITIS OF LEFT KNEE: ICD-10-CM

## 2019-05-07 PROCEDURE — 20610 DRAIN/INJ JOINT/BURSA W/O US: CPT | Performed by: ORTHOPAEDIC SURGERY

## 2019-05-07 PROCEDURE — 99213 OFFICE O/P EST LOW 20 MIN: CPT | Performed by: ORTHOPAEDIC SURGERY

## 2019-05-07 RX ORDER — BUPIVACAINE HYDROCHLORIDE 2.5 MG/ML
2 INJECTION, SOLUTION INFILTRATION; PERINEURAL
Status: COMPLETED | OUTPATIENT
Start: 2019-05-07 | End: 2019-05-07

## 2019-05-07 RX ORDER — METHYLPREDNISOLONE ACETATE 40 MG/ML
2 INJECTION, SUSPENSION INTRA-ARTICULAR; INTRALESIONAL; INTRAMUSCULAR; SOFT TISSUE
Status: COMPLETED | OUTPATIENT
Start: 2019-05-07 | End: 2019-05-07

## 2019-05-07 RX ADMIN — BUPIVACAINE HYDROCHLORIDE 2 ML: 2.5 INJECTION, SOLUTION INFILTRATION; PERINEURAL at 17:50

## 2019-05-07 RX ADMIN — METHYLPREDNISOLONE ACETATE 2 ML: 40 INJECTION, SUSPENSION INTRA-ARTICULAR; INTRALESIONAL; INTRAMUSCULAR; SOFT TISSUE at 17:50

## 2019-05-08 ENCOUNTER — TRANSCRIBE ORDERS (OUTPATIENT)
Dept: ADMINISTRATIVE | Facility: HOSPITAL | Age: 69
End: 2019-05-08

## 2019-05-08 ENCOUNTER — OFFICE VISIT (OUTPATIENT)
Dept: GASTROENTEROLOGY | Facility: CLINIC | Age: 69
End: 2019-05-08
Payer: COMMERCIAL

## 2019-05-08 VITALS
TEMPERATURE: 97.7 F | HEIGHT: 68 IN | SYSTOLIC BLOOD PRESSURE: 126 MMHG | BODY MASS INDEX: 28.19 KG/M2 | HEART RATE: 94 BPM | DIASTOLIC BLOOD PRESSURE: 95 MMHG | WEIGHT: 186 LBS | RESPIRATION RATE: 16 BRPM

## 2019-05-08 DIAGNOSIS — K21.9 GASTROESOPHAGEAL REFLUX DISEASE WITHOUT ESOPHAGITIS: Primary | ICD-10-CM

## 2019-05-08 DIAGNOSIS — M17.12 ARTHRITIS OF LEFT KNEE: ICD-10-CM

## 2019-05-08 DIAGNOSIS — M71.22 SYNOVIAL CYST OF LEFT POPLITEAL SPACE: ICD-10-CM

## 2019-05-08 DIAGNOSIS — M71.22: Primary | ICD-10-CM

## 2019-05-08 PROCEDURE — 99213 OFFICE O/P EST LOW 20 MIN: CPT | Performed by: INTERNAL MEDICINE

## 2019-05-15 ENCOUNTER — HOSPITAL ENCOUNTER (OUTPATIENT)
Dept: RADIOLOGY | Facility: HOSPITAL | Age: 69
Discharge: HOME | End: 2019-05-15
Attending: INTERNAL MEDICINE
Payer: COMMERCIAL

## 2019-05-15 DIAGNOSIS — K21.9 GASTROESOPHAGEAL REFLUX DISEASE WITHOUT ESOPHAGITIS: ICD-10-CM

## 2019-05-15 PROCEDURE — 74220 X-RAY XM ESOPHAGUS 1CNTRST: CPT

## 2019-05-16 ENCOUNTER — TELEPHONE (OUTPATIENT)
Dept: GASTROENTEROLOGY | Facility: MEDICAL CENTER | Age: 69
End: 2019-05-16

## 2019-05-17 DIAGNOSIS — K21.9 GASTROESOPHAGEAL REFLUX DISEASE WITHOUT ESOPHAGITIS: ICD-10-CM

## 2019-05-17 RX ORDER — PANTOPRAZOLE SODIUM 40 MG/1
40 TABLET, DELAYED RELEASE ORAL 2 TIMES DAILY
Qty: 60 TABLET | Refills: 3 | Status: SHIPPED | OUTPATIENT
Start: 2019-05-17 | End: 2019-05-17 | Stop reason: SDUPTHER

## 2019-05-17 RX ORDER — PANTOPRAZOLE SODIUM 40 MG/1
TABLET, DELAYED RELEASE ORAL
Qty: 180 TABLET | Refills: 1 | Status: SHIPPED | OUTPATIENT
Start: 2019-05-17 | End: 2019-05-20 | Stop reason: SDUPTHER

## 2019-05-20 ENCOUNTER — TELEPHONE (OUTPATIENT)
Dept: GASTROENTEROLOGY | Facility: CLINIC | Age: 69
End: 2019-05-20

## 2019-05-20 DIAGNOSIS — K21.9 GASTROESOPHAGEAL REFLUX DISEASE WITHOUT ESOPHAGITIS: ICD-10-CM

## 2019-05-20 RX ORDER — PANTOPRAZOLE SODIUM 40 MG/1
40 TABLET, DELAYED RELEASE ORAL 2 TIMES DAILY
Qty: 180 TABLET | Refills: 2 | Status: SHIPPED | OUTPATIENT
Start: 2019-05-20 | End: 2020-12-18 | Stop reason: SDUPTHER

## 2019-05-21 ENCOUNTER — HOSPITAL ENCOUNTER (OUTPATIENT)
Dept: RADIOLOGY | Facility: HOSPITAL | Age: 69
Discharge: HOME | End: 2019-05-21
Attending: ORTHOPAEDIC SURGERY
Payer: COMMERCIAL

## 2019-05-21 ENCOUNTER — TELEPHONE (OUTPATIENT)
Dept: GASTROENTEROLOGY | Facility: AMBULARY SURGERY CENTER | Age: 69
End: 2019-05-21

## 2019-05-21 DIAGNOSIS — M17.12 ARTHRITIS OF LEFT KNEE: ICD-10-CM

## 2019-05-21 DIAGNOSIS — M71.22: ICD-10-CM

## 2019-05-21 PROCEDURE — 20611 DRAIN/INJ JOINT/BURSA W/US: CPT

## 2019-05-21 RX ORDER — LIDOCAINE WITH 8.4% SOD BICARB 0.9%(10ML)
5 SYRINGE (ML) INJECTION ONCE
Status: COMPLETED | OUTPATIENT
Start: 2019-05-21 | End: 2019-05-21

## 2019-05-21 RX ADMIN — LIDOCAINE HYDROCHLORIDE 5 ML: 10 INJECTION, SOLUTION INFILTRATION; PERINEURAL at 08:30

## 2019-06-14 DIAGNOSIS — N40.0 ENLARGED PROSTATE WITHOUT LOWER URINARY TRACT SYMPTOMS (LUTS): ICD-10-CM

## 2019-06-14 RX ORDER — TAMSULOSIN HYDROCHLORIDE 0.4 MG/1
0.4 CAPSULE ORAL
Qty: 90 CAPSULE | Refills: 3 | Status: SHIPPED | OUTPATIENT
Start: 2019-06-14 | End: 2020-06-02

## 2019-06-19 ENCOUNTER — APPOINTMENT (OUTPATIENT)
Dept: LAB | Age: 69
End: 2019-06-19
Payer: COMMERCIAL

## 2019-06-19 ENCOUNTER — TRANSCRIBE ORDERS (OUTPATIENT)
Dept: ADMINISTRATIVE | Age: 69
End: 2019-06-19

## 2019-06-19 DIAGNOSIS — Z12.5 SPECIAL SCREENING FOR MALIGNANT NEOPLASM OF PROSTATE: ICD-10-CM

## 2019-06-19 DIAGNOSIS — Z12.5 SPECIAL SCREENING FOR MALIGNANT NEOPLASM OF PROSTATE: Primary | ICD-10-CM

## 2019-06-19 LAB — PSA SERPL-MCNC: 1.3 NG/ML (ref 0–4)

## 2019-06-19 PROCEDURE — G0103 PSA SCREENING: HCPCS

## 2019-06-19 PROCEDURE — 36415 COLL VENOUS BLD VENIPUNCTURE: CPT

## 2019-06-20 ENCOUNTER — TELEPHONE (OUTPATIENT)
Dept: GASTROENTEROLOGY | Facility: HOSPITAL | Age: 69
End: 2019-06-20

## 2019-06-24 ENCOUNTER — TELEPHONE (OUTPATIENT)
Dept: GASTROENTEROLOGY | Facility: HOSPITAL | Age: 69
End: 2019-06-24

## 2019-06-26 ENCOUNTER — HOSPITAL ENCOUNTER (OUTPATIENT)
Dept: GASTROENTEROLOGY | Facility: HOSPITAL | Age: 69
Discharge: HOME | End: 2019-06-26
Attending: INTERNAL MEDICINE
Payer: COMMERCIAL

## 2019-06-26 VITALS
HEART RATE: 58 BPM | DIASTOLIC BLOOD PRESSURE: 79 MMHG | SYSTOLIC BLOOD PRESSURE: 131 MMHG | RESPIRATION RATE: 16 BRPM | TEMPERATURE: 97.7 F | OXYGEN SATURATION: 98 %

## 2019-06-26 DIAGNOSIS — K21.9 GASTROESOPHAGEAL REFLUX DISEASE WITHOUT ESOPHAGITIS: ICD-10-CM

## 2019-06-26 PROCEDURE — 91020 GASTRIC MOTILITY STUDIES: CPT

## 2019-06-26 PROCEDURE — 91038 ESOPH IMPED FUNCT TEST > 1HR: CPT

## 2019-06-28 ENCOUNTER — OFFICE VISIT (OUTPATIENT)
Dept: UROLOGY | Facility: AMBULATORY SURGERY CENTER | Age: 69
End: 2019-06-28
Payer: COMMERCIAL

## 2019-06-28 VITALS
WEIGHT: 183 LBS | SYSTOLIC BLOOD PRESSURE: 110 MMHG | HEIGHT: 68 IN | BODY MASS INDEX: 27.74 KG/M2 | HEART RATE: 68 BPM | DIASTOLIC BLOOD PRESSURE: 68 MMHG

## 2019-06-28 DIAGNOSIS — Z12.5 SCREENING FOR PROSTATE CANCER: Primary | ICD-10-CM

## 2019-06-28 DIAGNOSIS — N40.1 BENIGN PROSTATIC HYPERPLASIA WITH WEAK URINARY STREAM: ICD-10-CM

## 2019-06-28 DIAGNOSIS — R39.12 BENIGN PROSTATIC HYPERPLASIA WITH WEAK URINARY STREAM: ICD-10-CM

## 2019-06-28 PROCEDURE — 99214 OFFICE O/P EST MOD 30 MIN: CPT | Performed by: NURSE PRACTITIONER

## 2019-07-22 ENCOUNTER — TELEPHONE (OUTPATIENT)
Dept: GASTROENTEROLOGY | Facility: AMBULARY SURGERY CENTER | Age: 69
End: 2019-07-22

## 2019-07-23 PROCEDURE — 91010 ESOPHAGUS MOTILITY STUDY: CPT | Performed by: INTERNAL MEDICINE

## 2019-07-23 PROCEDURE — 91038 ESOPH IMPED FUNCT TEST > 1HR: CPT | Performed by: INTERNAL MEDICINE

## 2019-07-23 NOTE — TELEPHONE ENCOUNTER
Please advise patient that I can see his results but I need a physician to review them prior to dicussing with him  I will ask them to do this ASAP  Is someone able to review this manometry study  The results are scanned into media but there is no impression   Patient is calling asking for results, was done 1 month ago

## 2019-07-24 NOTE — TELEPHONE ENCOUNTER
Please inform patient that his results show that the function of his esophagus motility/muscles is normal  The pressure at the bottom of his esophagus where it meets the stomach is also normal  He did have some exposure to acidic fluids in the upright position but none in the lying down position  His symptoms that he reported only correlated with the acid reflux 50% of the time  We may need to consider changing his PPI therapy  I will have Dr Shawna Biswas review as well and we will determine the next steps and reach out to him again

## 2019-07-25 NOTE — TELEPHONE ENCOUNTER
Spoke to pt pt aware of results  Pt is still having symptoms  Pt did say what I am telling him and what he was told by the radiologist is "2 different things" when he had the barium swallow test   He stated that he was told that the test did show acidic changes when he was lying in the supine position not in the upright position  He states that he is still having symptoms worseing at night    He would like a call from either dr Cornell Libman or a PA because he doesn't see what is happening here  Please advise

## 2019-07-30 ENCOUNTER — OFFICE VISIT (OUTPATIENT)
Dept: OBGYN CLINIC | Facility: HOSPITAL | Age: 69
End: 2019-07-30
Payer: COMMERCIAL

## 2019-07-30 VITALS
DIASTOLIC BLOOD PRESSURE: 98 MMHG | HEART RATE: 91 BPM | BODY MASS INDEX: 27.43 KG/M2 | WEIGHT: 181 LBS | SYSTOLIC BLOOD PRESSURE: 162 MMHG | HEIGHT: 68 IN

## 2019-07-30 DIAGNOSIS — M54.32 SCIATICA OF LEFT SIDE: Primary | ICD-10-CM

## 2019-07-30 PROCEDURE — 99213 OFFICE O/P EST LOW 20 MIN: CPT | Performed by: ORTHOPAEDIC SURGERY

## 2019-07-30 RX ORDER — METHYLPREDNISOLONE 4 MG/1
TABLET ORAL
Qty: 1 EACH | Refills: 0 | Status: SHIPPED | OUTPATIENT
Start: 2019-07-30 | End: 2019-09-27 | Stop reason: ALTCHOICE

## 2019-07-30 RX ORDER — METHOCARBAMOL 750 MG/1
750 TABLET, FILM COATED ORAL EVERY 6 HOURS PRN
Qty: 30 TABLET | Refills: 0 | Status: SHIPPED | OUTPATIENT
Start: 2019-07-30 | End: 2019-09-27 | Stop reason: ALTCHOICE

## 2019-07-30 NOTE — TELEPHONE ENCOUNTER
Dr Sania Centeno, did you want me to call him  I know you were planning on reviewing his ph study   Let me know, thanks

## 2019-07-30 NOTE — PROGRESS NOTES
Orthopedics          Eda Love 81 y o  male MRN: [id number]      Chief Complaint:   left hip and lower leg pain pain    HPI:   81 y o male complaining of left hip and lower leg pain  Patient states that 6 days ago he began experiencing pain in his left hip region radiating down below his left knee  Patient states he does have chronic swelling and knee pain due to Baker cyst   States however the pain is localized to his left lower back radiating down to below his left knee  States the pain is worse with motion of his back cleaning forward  Patient denies any bowel or bladder incontinence denies any lower back pain states his pain is localized on his left lower extremity  States he is able to ambulate however with pain he states taking ibuprofen is only minimally alleviates his pain  He denies any history of the symptoms    He denies any changes in numbness tingling left lower extremity                Review Of Systems:   · Skin: Normal  · Neuro: See HPI  · Musculoskeletal: See HPI  · All other systems reviewed and are negative    Past Medical History:   Past Medical History:   Diagnosis Date    Abnormal weight loss     RESOLVED: [id number]    Anemia     RESOLVED: 08ZDS7771    Atypical chest pain     RESOLVED: 45EQG3711    BPH (benign prostatic hyperplasia)     Cancer (HCC)     DUODENAL    Luis Miguel's syndrome     Depression     RESOLVED: 35DYT9354    Diverticulitis of colon     LAST ASSESSED: 78FRS9559    Duodenal nodule     RESOLVED: 30CXT6871    Gastrointestinal stromal tumor (GIST) (HCC)     MALIGNANT; RESOLVED: 06IJJ2203    GERD (gastroesophageal reflux disease)     Glaucoma     RESOLVED: 87GZJ2586    Insomnia     RESOLVED: 63GOD2854    Lactose intolerance     LAST ASSESSED: 07PEP1671       Past Surgical History:   Past Surgical History:   Procedure Laterality Date    BACK SURGERY      CERVICAL FUSION      c4 and c5    CHOLECYSTECTOMY      COLONOSCOPY  2014    kutty    ELBOW SURGERY Left     REPAIR    EYE SURGERY      KNEE ARTHROSCOPY Left     LUMBAR DISCECTOMY      L5 S1    ORIF RADIAL SHAFT FRACTURE      IN ESOPHAGOGASTRODUODENOSCOPY TRANSORAL DIAGNOSTIC N/A 11/1/2016    Procedure: ESOPHAGOGASTRODUODENOSCOPY (EGD); Surgeon: Hanny Herr MD;  [facility]; Service: Gastroenterology    IN ESOPHAGOGASTRODUODENOSCOPY TRANSORAL DIAGNOSTIC N/A 5/31/2018    Procedure: ESOPHAGOGASTRODUODENOSCOPY (EGD); Surgeon: Hanny Herr MD;  [facility];   Service: Gastroenterology    SMALL INTESTINE SURGERY      GIST surgery    US GUIDED MSK PROCEDURE  5/21/2019       Family History:  Family history reviewed and non-contributory  Family History   Problem Relation Age of Onset    Thyroid disease Mother     Thyroid disease Brother     Diabetes Paternal Grandmother     Hypertension Family          Social History:  Social History     Socioeconomic History    Marital status: /Civil Union     Spouse name: None    Number of children: None    Years of education: None    Highest education level: None   Occupational History    None   Social Needs    Financial resource strain: None    Food insecurity:     Worry: None     Inability: None    Transportation needs:     Medical: None     Non-medical: None   Tobacco Use    Smoking status: Never Smoker    Smokeless tobacco: Never Used   Substance and Sexual Activity    Alcohol use: Yes     Comment: occasional    Drug use: No    Sexual activity: Yes   Lifestyle    Physical activity:     Days per week: None     Minutes per session: None    Stress: None   Relationships    Social connections:     Talks on phone: None     Gets together: None     Attends [employer] service: None     Active member of club or organization: None     Attends meetings of clubs or organizations: None     Relationship status: None    Intimate partner violence:     Fear of current or ex partner: None     Emotionally abused: None     Physically abused: None Forced sexual activity: None   Other Topics Concern    None   Social History Narrative    None       Allergies:   No Known Allergies    Labs:  0   Lab Value Date/Time    HCT 44 8 08/20/2018 1430    HCT 46 8 08/16/2018 1511    HCT 49 9 (H) 12/06/2016 1032    HCT 39 1 08/05/2015 0649    HCT 40 5 08/02/2015 0604    HCT 39 1 07/31/2015 0657    HGB 15 1 08/20/2018 1430    HGB 15 8 08/16/2018 1511    HGB 17 4 (H) 12/06/2016 1032    HGB 13 2 08/05/2015 0649    HGB 13 8 08/02/2015 0604    HGB 13 1 07/31/2015 0657    INR 0 96 07/01/2015 0944    WBC 6 46 08/20/2018 1430    WBC 2 75 (L) 08/16/2018 1511    WBC 7 53 12/06/2016 1032    WBC 5 23 08/05/2015 0649    WBC 9 51 08/02/2015 0604    WBC 8 05 07/31/2015 0657    ESR 7 12/06/2016 1032       Meds:    Current Outpatient Medications:     Cholecalciferol (VITAMIN D-3) 5000 units TABS, Take 1 capsule by mouth daily, Disp: , Rfl:     cholestyramine (QUESTRAN) 4 GM/DOSE powder, Take 1 packet (4 g total) by mouth every other day, Disp: 378 g, Rfl: 6    dorzolamide-timolol (COSOPT) 22 3-6 8 MG/ML ophthalmic solution, INSTILL 1 DROP INTO RIGHT EYE 3 TIMES A DAY, Disp: , Rfl: 4    GLUCOSAMINE CHONDROITIN COMPLX PO, Take 1 tablet by mouth daily 4500mg  daily , Disp: , Rfl:     latanoprost (XALATAN) 0 005 % ophthalmic solution, INSTIL 1 GTT IN OD QHS, Disp: , Rfl: 4    Multiple Vitamins-Minerals (MULTIVITAL) tablet, Take 1 tablet by mouth daily Spectravite , Disp: , Rfl:     pantoprazole (PROTONIX) 40 mg tablet, Take 1 tablet (40 mg total) by mouth 2 (two) times a day, Disp: 180 tablet, Rfl: 2    tamsulosin (FLOMAX) 0 4 mg, Take 1 capsule (0 4 mg total) by mouth daily with dinner, Disp: 90 capsule, Rfl: 3    Vitamin E 400 UNITS TABS, Take 1 tablet by mouth daily, Disp: , Rfl:     methocarbamol (ROBAXIN) 750 mg tablet, Take 1 tablet (750 mg total) by mouth every 6 (six) hours as needed for muscle spasms, Disp: 30 tablet, Rfl: 0    methylPREDNISolone 4 MG tablet therapy pack, Use as directed on package, Disp: 1 each, Rfl: 0      Physical Exam:     General Appearance:    Alert, cooperative, no distress, appears stated age   Head:    Normocephalic, without obvious abnormality, atraumatic   Eyes:    conjunctiva/corneas clear, both eyes        Nose:   Nares normal, septum midline, no drainage    Throat:   Lips normal; teeth and gums normal   Neck:    symmetrical, trachea midline, ;     thyroid:  no enlargement/   Back:     Symmetric, no curvature, ROM normal   Lungs:   No audible wheezing or labored breathing   Chest Wall:    No tenderness or deformity    Heart:    Regular rate and rhythm               Pulses:   2+ and symmetric all extremities   Skin:   Skin color, texture, turgor normal, no rashes or lesions   Neurologic:   normal strength, sensation and reflexes     throughout       Musculoskeletal: bilateral lower extremity  · Examination patient's lumbar spine no pain palpation midline lumbar spine on palpation bilateral SI joints patient has limited lower back forward flexion due to pain active extension and rotational movements positive straight leg raise of the left negative on the right hip flexion adduction abduction strength 5/5 knee flexion extension strength 5/5 ankle dorsi plantar flexion strength 5/5 sensation intact distal pulses present bilateral lower extremities      _*_*_*_*_*_*_*_*_*_*_*_*_*_*_*_*_*_*_*_*_*_*_*_*_*_*_*_*_*_*_*_*_*_*_*_*_*_*_*_*_*    Assessment:  69 y o male with left sciatica symptoms left knee pain Baker cyst    Plan:   · Weight bearing as tolerated  left lower extremity  · Activity as tolerated  · Steroid Dosepak ordered for pain relief  · Muscle relaxers every 6 hours as needed order  · Follow-up in 4 weeks time repeat evaluation of left knee possible Baker cyst excision as stated in patient's last office note        Kb Chauhan PA-C

## 2019-08-06 ENCOUNTER — TELEPHONE (OUTPATIENT)
Dept: OBGYN CLINIC | Facility: HOSPITAL | Age: 69
End: 2019-08-06

## 2019-08-06 DIAGNOSIS — M54.42 LOW BACK PAIN WITH LEFT-SIDED SCIATICA, UNSPECIFIED BACK PAIN LATERALITY, UNSPECIFIED CHRONICITY: Primary | ICD-10-CM

## 2019-08-06 RX ORDER — TRAMADOL HYDROCHLORIDE 50 MG/1
50 TABLET ORAL EVERY 6 HOURS PRN
Qty: 30 TABLET | Refills: 0 | Status: SHIPPED | OUTPATIENT
Start: 2019-08-06 | End: 2019-08-16

## 2019-08-06 NOTE — TELEPHONE ENCOUNTER
Caller: Patient  C/B # [phone]  Dr Kory Nash    Patient states the steroid medication finished yesterday and his muscle relaxer is ending today, but he states he is not feeling any relief  His follow up isn't until 8/20 and he would like to speak with someone   Thanks

## 2019-08-06 NOTE — TELEPHONE ENCOUNTER
Spoke with patient by phone, Advised the next step is formal physical therapy can you please call patient to schedule?     Patient states he continues to have pain he has no bowel or bladder incontinence he has not had any formal therapy he has had steroids by mouth without significant pain relief lateral of muscle relaxers pain medications sent to pharmacy

## 2019-08-14 ENCOUNTER — OFFICE VISIT (OUTPATIENT)
Dept: OBGYN CLINIC | Facility: MEDICAL CENTER | Age: 69
End: 2019-08-14
Payer: COMMERCIAL

## 2019-08-14 ENCOUNTER — APPOINTMENT (OUTPATIENT)
Dept: LAB | Facility: MEDICAL CENTER | Age: 69
End: 2019-08-14
Payer: COMMERCIAL

## 2019-08-14 VITALS
DIASTOLIC BLOOD PRESSURE: 82 MMHG | HEIGHT: 68 IN | HEART RATE: 59 BPM | SYSTOLIC BLOOD PRESSURE: 133 MMHG | BODY MASS INDEX: 27.43 KG/M2 | WEIGHT: 181 LBS

## 2019-08-14 DIAGNOSIS — M54.16 RADICULOPATHY, LUMBAR REGION: Primary | ICD-10-CM

## 2019-08-14 DIAGNOSIS — M54.32 SCIATICA OF LEFT SIDE: ICD-10-CM

## 2019-08-14 DIAGNOSIS — M54.16 RADICULOPATHY, LUMBAR REGION: ICD-10-CM

## 2019-08-14 LAB
ALBUMIN SERPL BCP-MCNC: 4.2 G/DL (ref 3.5–5)
ALP SERPL-CCNC: 68 U/L (ref 46–116)
ALT SERPL W P-5'-P-CCNC: 24 U/L (ref 12–78)
ANION GAP SERPL CALCULATED.3IONS-SCNC: 5 MMOL/L (ref 4–13)
AST SERPL W P-5'-P-CCNC: 15 U/L (ref 5–45)
BILIRUB SERPL-MCNC: 0.65 MG/DL (ref 0.2–1)
BUN SERPL-MCNC: 16 MG/DL (ref 5–25)
CALCIUM SERPL-MCNC: 10.1 MG/DL (ref 8.3–10.1)
CHLORIDE SERPL-SCNC: 107 MMOL/L (ref 100–108)
CO2 SERPL-SCNC: 33 MMOL/L (ref 21–32)
CREAT SERPL-MCNC: 1.03 MG/DL (ref 0.6–1.3)
GFR SERPL CREATININE-BSD FRML MDRD: 75 ML/MIN/1.73SQ M
GLUCOSE P FAST SERPL-MCNC: 88 MG/DL (ref 65–99)
POTASSIUM SERPL-SCNC: 5.6 MMOL/L (ref 3.5–5.3)
PROT SERPL-MCNC: 7.2 G/DL (ref 6.4–8.2)
SODIUM SERPL-SCNC: 145 MMOL/L (ref 136–145)

## 2019-08-14 PROCEDURE — 36415 COLL VENOUS BLD VENIPUNCTURE: CPT

## 2019-08-14 PROCEDURE — 99213 OFFICE O/P EST LOW 20 MIN: CPT | Performed by: ORTHOPAEDIC SURGERY

## 2019-08-14 PROCEDURE — 80053 COMPREHEN METABOLIC PANEL: CPT

## 2019-08-14 NOTE — PROGRESS NOTES
Assessment:  1  Radiculopathy, lumbar region  MRI lumbar spine w wo contrast   2  Sciatica of left side         Plan:     Weight Bearing  as Tolerated   MRI lumbar spine r/o lumbar radiculopathy (patient with history of prior lumbar spine surgery)   Will contact patient with MRI results   Follow up PRN         The above stated was discussed in layman's terms and the patient expressed understanding  All questions were answered to the patient's satisfaction  Subjective:   Eda Love is a 81 y o  male who presents left leg pain  Patient states he is doing the same since the last office visit  Patient states he had no relief from taking Medrol dose anselmo and Robaxin with no relief  He also was prescribed tramadol with no relief  Patient states he is having pain in the left buttock region radiating down to he calf region  He notes tightness in the calf region  He is having paresthesia down the left posterior calf and plantar aspect of foot and this has worsened  He does have a history of lumbar surgery possible laminectomy  L4-L5 2000 by Dr Fadia Cha at [facility]  Denies any bladder or bowel changes        Review of systems negative unless otherwise specified in HPI    Past Medical History:   Diagnosis Date    Abnormal weight loss     RESOLVED: [id number]    Anemia     RESOLVED: 84RJC9324    Atypical chest pain     RESOLVED: 94MLR9043    BPH (benign prostatic hyperplasia)     Cancer (HCC)     DUODENAL    Luis Miguel's syndrome     Depression     RESOLVED: 89AFS3556    Diverticulitis of colon     LAST ASSESSED: 05CYG6163    Duodenal nodule     RESOLVED: 67AEA0523    Gastrointestinal stromal tumor (GIST) (HCC)     MALIGNANT; RESOLVED: 69DMX0446    GERD (gastroesophageal reflux disease)     Glaucoma     RESOLVED: 03KAD7863    Insomnia     RESOLVED: 34TBA0836    Lactose intolerance     LAST ASSESSED: 14NHN9666       Past Surgical History:   Procedure Laterality Date    BACK SURGERY      CERVICAL FUSION      c4 and c5    CHOLECYSTECTOMY      COLONOSCOPY  2014    kutty    ELBOW SURGERY Left     REPAIR    EYE SURGERY      KNEE ARTHROSCOPY Left     LUMBAR DISCECTOMY      L5 S1    ORIF RADIAL SHAFT FRACTURE      AL ESOPHAGOGASTRODUODENOSCOPY TRANSORAL DIAGNOSTIC N/A 11/1/2016    Procedure: ESOPHAGOGASTRODUODENOSCOPY (EGD); Surgeon: Petar Bethea MD;  [facility]; Service: Gastroenterology    AL ESOPHAGOGASTRODUODENOSCOPY TRANSORAL DIAGNOSTIC N/A 5/31/2018    Procedure: ESOPHAGOGASTRODUODENOSCOPY (EGD); Surgeon: Petar Bethea MD;  [facility];   Service: Gastroenterology    SMALL INTESTINE SURGERY      GIST surgery    US GUIDED MSK PROCEDURE  5/21/2019       Family History   Problem Relation Age of Onset    Thyroid disease Mother     Thyroid disease Brother     Diabetes Paternal Grandmother     Hypertension Family        Social History     Occupational History    Not on file   Tobacco Use    Smoking status: Never Smoker    Smokeless tobacco: Never Used   Substance and Sexual Activity    Alcohol use: Yes     Comment: occasional    Drug use: No    Sexual activity: Yes         Current Outpatient Medications:     Cholecalciferol (VITAMIN D-3) 5000 units TABS, Take 1 capsule by mouth daily, Disp: , Rfl:     cholestyramine (QUESTRAN) 4 GM/DOSE powder, Take 1 packet (4 g total) by mouth every other day, Disp: 378 g, Rfl: 6    dorzolamide-timolol (COSOPT) 22 3-6 8 MG/ML ophthalmic solution, INSTILL 1 DROP INTO RIGHT EYE 3 TIMES A DAY, Disp: , Rfl: 4    GLUCOSAMINE CHONDROITIN COMPLX PO, Take 1 tablet by mouth daily 4500mg  daily , Disp: , Rfl:     latanoprost (XALATAN) 0 005 % ophthalmic solution, INSTIL 1 GTT IN OD QHS, Disp: , Rfl: 4    Multiple Vitamins-Minerals (MULTIVITAL) tablet, Take 1 tablet by mouth daily Spectravite , Disp: , Rfl:     pantoprazole (PROTONIX) 40 mg tablet, Take 1 tablet (40 mg total) by mouth 2 (two) times a day, Disp: 180 tablet, Rfl: 2   tamsulosin (FLOMAX) 0 4 mg, Take 1 capsule (0 4 mg total) by mouth daily with dinner, Disp: 90 capsule, Rfl: 3    traMADol (ULTRAM) 50 mg tablet, Take 1 tablet (50 mg total) by mouth every 6 (six) hours as needed for moderate pain for up to 10 days, Disp: 30 tablet, Rfl: 0    Vitamin E 400 UNITS TABS, Take 1 tablet by mouth daily, Disp: , Rfl:     methocarbamol (ROBAXIN) 750 mg tablet, Take 1 tablet (750 mg total) by mouth every 6 (six) hours as needed for muscle spasms (Patient not taking: Reported on 8/14/2019), Disp: 30 tablet, Rfl: 0    methylPREDNISolone 4 MG tablet therapy pack, Use as directed on package (Patient not taking: Reported on 8/14/2019), Disp: 1 each, Rfl: 0    No Known Allergies         Vitals:    08/14/19 1100   BP: 133/82   Pulse: 59       Objective:            Physical Exam  · General: Awake, Alert, Oriented  · Eyes: Pupils equal, round and reactive to light  · Heart: regular rate and rhythm  · Lungs: No audible wheezing  · Abdomen: soft                    Ortho Exam  Left LE   No lacerations,no abrasions, no open wounds  No erythema  Positive straight leg raise  Negative logroll  Negative Stinchfield  No tenderness to palpation a greater trochanteric bursa  Pain in buttock with dorsiflexion of ankle  No tenderness to palpation over calf region  Pain in hamstring stress against resistance  No tenderness to palpation hamstring region  Motor strength +4/5  All other motor muscles intact superficial deep peroneal nerves      Diagnostics, reviewed and taken today if performed as documented:    None performed    Procedures, if performed today:    Procedures    None performed      Scribe Attestation    I,:   Shanda Dalton am acting as a scribe while in the presence of the attending physician :        I,:   Cornelius Magallon MD personally performed the services described in this documentation    as scribed in my presence :              Portions of the record may have been created with voice recognition software  Occasional wrong word or "sound a like" substitutions may have occurred due to the inherent limitations of voice recognition software  Read the chart carefully and recognize, using context, where substitutions have occurred

## 2019-08-15 ENCOUNTER — TELEPHONE (OUTPATIENT)
Dept: OBGYN CLINIC | Facility: HOSPITAL | Age: 69
End: 2019-08-15

## 2019-08-15 DIAGNOSIS — M25.569 KNEE PAIN, UNSPECIFIED CHRONICITY, UNSPECIFIED LATERALITY: Primary | ICD-10-CM

## 2019-08-15 RX ORDER — ACETAMINOPHEN AND CODEINE PHOSPHATE 300; 30 MG/1; MG/1
1 TABLET ORAL EVERY 4 HOURS PRN
Qty: 30 TABLET | Refills: 0 | Status: SHIPPED | OUTPATIENT
Start: 2019-08-15 | End: 2019-09-27 | Stop reason: ALTCHOICE

## 2019-08-15 NOTE — TELEPHONE ENCOUNTER
Patient is out of tramadol as of last Thursday, a week ago, but it didn't really work  Patient is requesting pain medication for his left leg, please advise if there is anything else he can take  Pharmacy on file is correct      Callback HR#[phone]

## 2019-08-22 ENCOUNTER — HOSPITAL ENCOUNTER (OUTPATIENT)
Dept: RADIOLOGY | Facility: HOSPITAL | Age: 69
Discharge: HOME | End: 2019-08-22
Attending: ORTHOPAEDIC SURGERY
Payer: COMMERCIAL

## 2019-08-22 DIAGNOSIS — M54.16 RADICULOPATHY, LUMBAR REGION: ICD-10-CM

## 2019-08-22 PROCEDURE — A9585 GADOBUTROL INJECTION: HCPCS | Performed by: ORTHOPAEDIC SURGERY

## 2019-08-22 PROCEDURE — 72158 MRI LUMBAR SPINE W/O & W/DYE: CPT

## 2019-08-22 RX ADMIN — GADOBUTROL 8 ML: 604.72 INJECTION INTRAVENOUS at 21:30

## 2019-08-27 ENCOUNTER — OFFICE VISIT (OUTPATIENT)
Dept: OBGYN CLINIC | Facility: HOSPITAL | Age: 69
End: 2019-08-27
Payer: COMMERCIAL

## 2019-08-27 VITALS
DIASTOLIC BLOOD PRESSURE: 87 MMHG | WEIGHT: 180.2 LBS | BODY MASS INDEX: 27.4 KG/M2 | HEART RATE: 64 BPM | SYSTOLIC BLOOD PRESSURE: 146 MMHG

## 2019-08-27 DIAGNOSIS — M54.32 SCIATICA OF LEFT SIDE: ICD-10-CM

## 2019-08-27 DIAGNOSIS — M54.16 RADICULOPATHY, LUMBAR REGION: Primary | ICD-10-CM

## 2019-08-27 DIAGNOSIS — M51.27 HERNIATION OF INTERVERTEBRAL DISC BETWEEN L5 AND S1: ICD-10-CM

## 2019-08-27 PROCEDURE — 99213 OFFICE O/P EST LOW 20 MIN: CPT | Performed by: ORTHOPAEDIC SURGERY

## 2019-08-27 NOTE — PROGRESS NOTES
Assessment:  1  Radiculopathy, lumbar region  Ambulatory referral to Orthopedic Surgery   2  Sciatica of left side  Ambulatory referral to Orthopedic Surgery   3  Herniation of intervertebral disc between L5 and S1  Ambulatory referral to Orthopedic Surgery       Plan:     Weight Bearing  as Tolerated   Refer patient to Dr Kavita Wu for L5-S1 disc extrusion / discuss MRI L-spine   Follow-up p r n  The above stated was discussed in layman's terms and the patient expressed understanding  All questions were answered to the patient's satisfaction  Subjective:   Lydia Ortez is a 81 y o  male who presents to discuss MRI lumbar spine  Patient states he is having constant pain in the left calf region  He states tingling that comes and goes  He does state radiating pain down the left posterior leg to the foot    He states he did have lumbar surgery in the past possible laminectomy at L4-L5 in 2000 by Dr Amos Pierson at [facility]       Review of systems negative unless otherwise specified in HPI    Past Medical History:   Diagnosis Date    Abnormal weight loss     RESOLVED: 47PDH5916    Anemia     RESOLVED: 18IOB4178    Atypical chest pain     RESOLVED: 43VME6762    BPH (benign prostatic hyperplasia)     Cancer (HCC)     DUODENAL    Luis Miguel's syndrome     Depression     RESOLVED: 51IGY9226    Diverticulitis of colon     LAST ASSESSED: 68ODG6496    Duodenal nodule     RESOLVED: 73BJO0565    Gastrointestinal stromal tumor (GIST) ([address] )     MALIGNANT; RESOLVED: 37TFU0311    GERD (gastroesophageal reflux disease)     Glaucoma     RESOLVED: 74QQH7010    Insomnia     RESOLVED: 96VBE2375    Lactose intolerance     LAST ASSESSED: 12NQF1026       Past Surgical History:   Procedure Laterality Date    BACK SURGERY      CERVICAL FUSION      c4 and c5    CHOLECYSTECTOMY      COLONOSCOPY  2014    kutty    ELBOW SURGERY Left     REPAIR    EYE SURGERY      KNEE ARTHROSCOPY Left     LUMBAR DISCECTOMY      L5 S1    ORIF RADIAL SHAFT FRACTURE      CA ESOPHAGOGASTRODUODENOSCOPY TRANSORAL DIAGNOSTIC N/A 11/1/2016    Procedure: ESOPHAGOGASTRODUODENOSCOPY (EGD); Surgeon: Kade Crockett MD;  [facility]; Service: Gastroenterology    CA ESOPHAGOGASTRODUODENOSCOPY TRANSORAL DIAGNOSTIC N/A 5/31/2018    Procedure: ESOPHAGOGASTRODUODENOSCOPY (EGD); Surgeon: Kade Crockett MD;  [facility];   Service: Gastroenterology    SMALL INTESTINE SURGERY      GIST surgery    US GUIDED MSK PROCEDURE  5/21/2019       Family History   Problem Relation Age of Onset    Thyroid disease Mother     Thyroid disease Brother     Diabetes Paternal Grandmother     Hypertension Family        Social History     Occupational History    Not on file   Tobacco Use    Smoking status: Never Smoker    Smokeless tobacco: Never Used   Substance and Sexual Activity    Alcohol use: Yes     Comment: occasional    Drug use: No    Sexual activity: Yes         Current Outpatient Medications:     acetaminophen-codeine (TYLENOL #3) 300-30 mg per tablet, Take 1 tablet by mouth every 4 (four) hours as needed for moderate pain, Disp: 30 tablet, Rfl: 0    Cholecalciferol (VITAMIN D-3) 5000 units TABS, Take 1 capsule by mouth daily, Disp: , Rfl:     cholestyramine (QUESTRAN) 4 GM/DOSE powder, Take 1 packet (4 g total) by mouth every other day, Disp: 378 g, Rfl: 6    dorzolamide-timolol (COSOPT) 22 3-6 8 MG/ML ophthalmic solution, INSTILL 1 DROP INTO RIGHT EYE 3 TIMES A DAY, Disp: , Rfl: 4    GLUCOSAMINE CHONDROITIN COMPLX PO, Take 1 tablet by mouth daily 4500mg  daily , Disp: , Rfl:     latanoprost (XALATAN) 0 005 % ophthalmic solution, INSTIL 1 GTT IN OD QHS, Disp: , Rfl: 4    Multiple Vitamins-Minerals (MULTIVITAL) tablet, Take 1 tablet by mouth daily Spectravite , Disp: , Rfl:     pantoprazole (PROTONIX) 40 mg tablet, Take 1 tablet (40 mg total) by mouth 2 (two) times a day, Disp: 180 tablet, Rfl: 2   tamsulosin (FLOMAX) 0 4 mg, Take 1 capsule (0 4 mg total) by mouth daily with dinner, Disp: 90 capsule, Rfl: 3    Vitamin E 400 UNITS TABS, Take 1 tablet by mouth daily, Disp: , Rfl:     methocarbamol (ROBAXIN) 750 mg tablet, Take 1 tablet (750 mg total) by mouth every 6 (six) hours as needed for muscle spasms (Patient not taking: Reported on 8/27/2019), Disp: 30 tablet, Rfl: 0    methylPREDNISolone 4 MG tablet therapy pack, Use as directed on package (Patient not taking: Reported on 8/27/2019), Disp: 1 each, Rfl: 0    No Known Allergies         Vitals:    08/27/19 1428   BP: 146/87   Pulse: 64       Objective:            Physical Exam  · General: Awake, Alert, Oriented  · Eyes: Pupils equal, round and reactive to light  · Heart: regular rate and rhythm  · Lungs: No audible wheezing  · Abdomen: soft                    Ortho Exam  Left LE   No lacerations,no abrasions, no open wounds  No erythema  Positive straight leg raise  Negative logroll  Negative Stinchfield  No tenderness to palpation a greater trochanteric bursa  Pain in buttock with dorsiflexion of ankle  No tenderness to palpation over calf region  Pain in hamstring stress against resistance  No tenderness to palpation hamstring region  Motor strength +4/5  All other motor muscles intact superficial deep peroneal nerves      Diagnostics, reviewed and taken today if performed as documented: The attending physician has personally reviewed the pertinent films in PACS and interpretation is as follows:  MRI lumbar spine:  Disc extrusion at L5-S1    Procedures, if performed today:    Procedures    None performed      Scribe Attestation    I,:    am acting as a scribe while in the presence of the attending physician :        I,:    personally performed the services described in this documentation    as scribed in my presence :              Portions of the record may have been created with voice recognition software    Occasional wrong word or "sound a like" substitutions may have occurred due to the inherent limitations of voice recognition software  Read the chart carefully and recognize, using context, where substitutions have occurred

## 2019-08-28 ENCOUNTER — OFFICE VISIT (OUTPATIENT)
Dept: OBGYN CLINIC | Facility: CLINIC | Age: 69
End: 2019-08-28
Payer: COMMERCIAL

## 2019-08-28 VITALS
HEIGHT: 68 IN | SYSTOLIC BLOOD PRESSURE: 140 MMHG | HEART RATE: 99 BPM | DIASTOLIC BLOOD PRESSURE: 77 MMHG | BODY MASS INDEX: 27.28 KG/M2 | WEIGHT: 180 LBS

## 2019-08-28 DIAGNOSIS — M54.32 SCIATICA OF LEFT SIDE: ICD-10-CM

## 2019-08-28 DIAGNOSIS — M51.27 HERNIATION OF INTERVERTEBRAL DISC BETWEEN L5 AND S1: Primary | ICD-10-CM

## 2019-08-28 DIAGNOSIS — M54.16 RADICULOPATHY, LUMBAR REGION: ICD-10-CM

## 2019-08-28 DIAGNOSIS — Z98.890 HISTORY OF LUMBAR LAMINECTOMY: ICD-10-CM

## 2019-08-28 DIAGNOSIS — M48.061 SPINAL STENOSIS OF LUMBAR REGION, UNSPECIFIED WHETHER NEUROGENIC CLAUDICATION PRESENT: ICD-10-CM

## 2019-08-28 PROCEDURE — 99214 OFFICE O/P EST MOD 30 MIN: CPT | Performed by: ORTHOPAEDIC SURGERY

## 2019-08-28 RX ORDER — GABAPENTIN 300 MG/1
300 CAPSULE ORAL DAILY
Qty: 90 CAPSULE | Refills: 0 | Status: SHIPPED | OUTPATIENT
Start: 2019-08-28 | End: 2019-10-22

## 2019-08-28 NOTE — ASSESSMENT & PLAN NOTE
Patient presents for evaluation of 6 weeks duration of severe left buttock and leg pain that radiates into his toes with associated numbness and tingling  He has a past surgical history significant for previous decompression on the left at L5-S1 performed over 10 years ago  He does have history of transitional anatomy at the lumbosacral junction  Recently in the last 6 weeks he has not tried any formal therapy or tried injections  On physical exam he appears stated age  He has a well-healed midline lumbar incision  Mildly tender to palpation left buttock and piriformis fossa  Positive straight leg raise left lower extremity  Positive contralateral straight leg raise right lower extremity  Absent reflex on the left at S1 otherwise 2+ on the right at S1 and 1+ at L4 bilaterally  5/5 strength L2-S1  Sensation is diminished light touch left lateral leg and dorsal foot    MRI lumbar spine demonstrates recurrent disc herniation and scar tissue with severe spine lateral recess stenosis on the left at L5-S1  Moderate central stenosis at L3-4 and moderate lateral recess stenosis bilaterally at L4-5    Assessment and plan    Recurrent disc herniation on the left L5-S1 with multilevel DDD and spinal stenosis  Recurrent lumbar radiculopathy left lower extremity with absent reflex on the left  Treatment options were discussed ranging from conservative to invasive  At this time recommended is consideration for epidural steroid injections as well as use of gabapentin plus physical therapy    If no improvement we will consider repeat intervention such as revision decompression plus the minus instrumentation/fusion

## 2019-08-28 NOTE — PROGRESS NOTES
Assessment/Plan:    Multilevel stenosis most pronounced at L3-S1 with history of lumbar laminectomy at L5-S1  · Referral to pain management for epidural steroid injections targeted at L5-S1 on the left  · Start physical therapy  · Start gabapentin 300 mg once daily at night  · Follow up 4-6 weeks       Problem List Items Addressed This Visit        Nervous and Auditory    Radiculopathy, lumbar region    Relevant Medications    gabapentin (NEURONTIN) 300 mg capsule       Musculoskeletal and Integument    Herniation of intervertebral disc between L5 and S1 - Primary    Relevant Orders    Ambulatory referral to Pain Management    Ambulatory referral to Physical Therapy       Other    Spinal stenosis of lumbar region     Patient presents for evaluation of 6 weeks duration of severe left buttock and leg pain that radiates into his toes with associated numbness and tingling  He has a past surgical history significant for previous decompression on the left at L5-S1 performed over 10 years ago  He does have history of transitional anatomy at the lumbosacral junction  Recently in the last 6 weeks he has not tried any formal therapy or tried injections  On physical exam he appears stated age  He has a well-healed midline lumbar incision  Mildly tender to palpation left buttock and piriformis fossa  Positive straight leg raise left lower extremity    Positive contralateral straight leg raise right lower extremity  Absent reflex on the left at S1 otherwise 2+ on the right at S1 and 1+ at L4 bilaterally  5/5 strength L2-S1  Sensation is diminished light touch left lateral leg and dorsal foot    MRI lumbar spine demonstrates recurrent disc herniation and scar tissue with severe spine lateral recess stenosis on the left at L5-S1  Moderate central stenosis at L3-4 and moderate lateral recess stenosis bilaterally at L4-5    Assessment and plan    Recurrent disc herniation on the left L5-S1 with multilevel DDD and spinal stenosis  Recurrent lumbar radiculopathy left lower extremity with absent reflex on the left  Treatment options were discussed ranging from conservative to invasive  At this time recommended is consideration for epidural steroid injections as well as use of gabapentin plus physical therapy  If no improvement we will consider repeat intervention such as revision decompression plus the minus instrumentation/fusion         Relevant Orders    Ambulatory referral to Pain Management    Ambulatory referral to Physical Therapy      Other Visit Diagnoses     Sciatica of left side        History of lumbar laminectomy        Relevant Orders    Ambulatory referral to Pain Management    Ambulatory referral to Physical Therapy            Subjective:      Patient ID: Manuelito Carmen is a 81 y o  male  HPI  Patient presents to the office for evaluation of low back and left leg pain ongoing for 5 weeks  He was referred to us by Dr Jes Wolff  He has history of lumbar laminectomy L5-S1 by Dr Arlene Posada at [facility] in 2000  He is experiencing low back pain that goes down his left leg to his left foot  He has numbness and tingling in his left lateral calf  He rates his leg pain on average at a 5-6/10 on VAS  He has tried a medrol dose anselmo without relief  The following portions of the patient's history were reviewed and updated as appropriate: current medications, past family history, past medical history, past social history, past surgical history and problem list     Review of Systems   Constitutional: Negative for fever and unexpected weight change  HENT: Negative for hearing loss, nosebleeds and sore throat  Eyes: Negative for pain, redness and visual disturbance  Respiratory: Negative for cough, shortness of breath and wheezing  Cardiovascular: Negative for chest pain, palpitations and leg swelling  Gastrointestinal: Negative for abdominal pain, nausea and vomiting  Endocrine: Negative for polydipsia and polyuria  Genitourinary: Negative for dysuria and hematuria  Musculoskeletal: Positive for back pain  Skin: Negative for rash and wound  Neurological: Negative for dizziness and headaches  Psychiatric/Behavioral: Negative for agitation and suicidal ideas  Objective:      /77   Pulse 99   Ht 5' 8" (1 727 m)   Wt 81 6 kg (180 lb)   BMI 27 37 kg/m²          Physical Exam   Constitutional: He is oriented to person, place, and time  He appears well-developed and well-nourished  HENT:   Head: Normocephalic and atraumatic  Eyes: Pupils are equal, round, and reactive to light  Neck: Neck supple  Cardiovascular: Intact distal pulses  Pulmonary/Chest: Breath sounds normal    Neurological: He is alert and oriented to person, place, and time  Skin: Skin is warm and dry  Psychiatric: He has a normal mood and affect  His behavior is normal        Patient ambulates without assistance  Non-tender to palpation   Modified straight leg raise positive on the left, negative on the right  Strength L2-S1 5/5 bilaterally  Sensation diminished left lateral leg and left dorsal foot  Reflexes 1+ at L4, absent S1 on the left and 2+ at S1 on the right    Imaging  MRI of lumbar spine 08/22/2019 was reviewed and shows recurrent disc herniation at L5-S1 with multilevel stenosis most pronounced at L3-5  Transitional anatomy noted      Scribe Attestation    I,:   [employer] am acting as a scribe while in the presence of the attending physician :        I,:   Meghna Kwok MD personally performed the services described in this documentation    as scribed in my presence :

## 2019-08-29 ENCOUNTER — EVALUATION (OUTPATIENT)
Dept: PHYSICAL THERAPY | Facility: REHABILITATION | Age: 69
End: 2019-08-29
Payer: COMMERCIAL

## 2019-08-29 ENCOUNTER — TELEPHONE (OUTPATIENT)
Dept: RADIOLOGY | Facility: CLINIC | Age: 69
End: 2019-08-29

## 2019-08-29 DIAGNOSIS — M54.42 ACUTE LEFT-SIDED LOW BACK PAIN WITH LEFT-SIDED SCIATICA: Primary | ICD-10-CM

## 2019-08-29 DIAGNOSIS — Z98.890 HISTORY OF LUMBAR LAMINECTOMY: ICD-10-CM

## 2019-08-29 DIAGNOSIS — M48.061 SPINAL STENOSIS OF LUMBAR REGION, UNSPECIFIED WHETHER NEUROGENIC CLAUDICATION PRESENT: ICD-10-CM

## 2019-08-29 DIAGNOSIS — M51.27 HERNIATION OF INTERVERTEBRAL DISC BETWEEN L5 AND S1: ICD-10-CM

## 2019-08-29 PROCEDURE — 97161 PT EVAL LOW COMPLEX 20 MIN: CPT | Performed by: PHYSICAL THERAPIST

## 2019-08-29 PROCEDURE — 97110 THERAPEUTIC EXERCISES: CPT | Performed by: PHYSICAL THERAPIST

## 2019-08-29 NOTE — TELEPHONE ENCOUNTER
Called pt, scheduled left L5 and S1 TFESI on Wed 09/04/19 arr at 10:40  Pt denies blood thinners  Went over pre procedure instructions, NPO 1 hr prior, if sick or on abx needs to call to rs, wear loose, comf clothing- no buttons/zippers, needs   Pt verbalized understanding  Gave our address/location and my direct number

## 2019-08-29 NOTE — PROGRESS NOTES
PT Evaluation     Today's date: 2019  Patient name: Glory Dailey  : 1950  MRN: [id number]  Referring provider: Sharyle Schlichter, MD  Dx:   Encounter Diagnosis     ICD-10-CM    1  Acute left-sided low back pain with left-sided sciatica M54 42    2  Herniation of intervertebral disc between L5 and S1 M51 27 Ambulatory referral to Physical Therapy   3  History of lumbar laminectomy Z98 890 Ambulatory referral to Physical Therapy   4  Spinal stenosis of lumbar region, unspecified whether neurogenic claudication present M48 061 Ambulatory referral to Physical Therapy                  Assessment  Assessment details: Patient is a 81 y o  male referred to PT by Dr Donny Roman with a dx of lumbar radiculopathy  Patient reports onset of pain complaints occurred 5 weeks ago for no apparent reason  His current pain complaints are constant in nature and of moderate to severe in intensity  The pain complaints are aggravated by prolonged sitting, forward bending and prolonged walking  He denies bowel and bladder abnormalities and displays no evidence of neurologically mediated weakness  No other referral appears necessary at this time  Impairments: abnormal or restricted ROM, activity intolerance, impaired physical strength, lacks appropriate home exercise program, pain with function, weight-bearing intolerance and poor posture   Functional limitations: IADLs; Recreational activities  Symptom irritability: moderateBarriers to therapy: None  Understanding of Dx/Px/POC: good   Prognosis: good    Goals  Short Term goals - 4 weeks  1  Patient will be independent and compliant with a HEP  2   Patient will report a 50% decrease in pain complaints  Long Term goals - 8 weeks  1  Patient will report elimination of pain complaints  2   Patient will return to all work related activities without restriction  3   Patient will return to all recreational activities without restriction        Plan  Patient would benefit from: skilled physical therapy  Planned modality interventions: cryotherapy  Planned therapy interventions: joint mobilization, manual therapy, abdominal trunk stabilization, neuromuscular re-education, patient education, postural training, therapeutic activities and home exercise program  Frequency: 1x week  Duration in visits: 6  Duration in weeks: 6  Plan of Care beginning date: 2019  Plan of Care expiration date: 10/29/2019  Treatment plan discussed with: patient        Subjective Evaluation    History of Present Illness  Date of onset: 2019  Mechanism of injury: No MICHAEL          Recurrent probem    Quality of life: good    Pain  Current pain ratin  At best pain ratin  At worst pain ratin  Location: L buttock with associated L LE pain in an L5/S1 pattern - pain/parasthesias  Quality: dull ache and radiating  Alleviating factors: standindg - short distance walking    Aggravating factors: sitting (driving; bending)  Progression: no change    Social Support  Lives with: spouse    Employment status: working ([place]  for [employer] - may have to drive as much as 14 hours in one day)    Diagnostic Tests  MRI studies: abnormal (Recurrent L5/S1 HNP)  Treatments  Previous treatment: medication  Current treatment: physical therapy  Patient Goals  Patient goals for therapy: decreased pain, increased motion, increased strength, independence with ADLs/IADLs and return to sport/leisure activities          Objective     Concurrent Complaints  Negative for night pain, disturbed sleep, bladder dysfunction, bowel dysfunction and saddle (S4) numbness    Postural Observations  Seated posture: poor  Standing posture: fair  Correction of posture: has no consistent effect        Neurological Testing     Sensation     Lumbar   Left   Diminished: light touch    Comments   Left light touch: lateral calf    Reflexes   Left   Patellar (L4): normal (2+)  Achilles (S1): absent (0)    Right   Patellar (L4): normal (2+)  Achilles (S1): trace (1+)    Active Range of Motion     Lumbar   Flexion:  with pain Restriction level: moderate  Extension:  Restriction level: moderate    Additional Active Range of Motion Details  L side glide - 25% limited p! R side glide - WNL  Mechanical Assessment    Cervical      Thoracic      Lumbar    Standing flexion: repeated movements   Pain location:peripheralized  Pain intensity: worse  Pain level: increased  Lying flexion: repeated movements  Pain location: peripheralized  Pain intensity: worse  Pain level: increased  Lying extension: repeated movements  Pain intensity: better  Pain level: decreased    Strength/Myotome Testing     Left Hip   Planes of Motion   Flexion: 5    Right Hip   Planes of Motion   Flexion: 5    Left Knee   Flexion: 4  Extension: 5    Right Knee   Flexion: 5  Extension: 5    Left Ankle/Foot   Dorsiflexion: 5    Right Ankle/Foot   Dorsiflexion: 5    Tests     Lumbar   Negative SIJ compression and sacral spring        Left   Positive passive SLR and slump test      Right   Negative crossed SLR, passive SLR and slump test               Precautions: Lumbar lami (2000); C4/5 fusion (2000)      Manual              Ext mob in prone                                                                     Exercise Diary  8/29            Treadmill             Prone press up             Standing back bends                                                                                                                                                                                                                                              Modalities

## 2019-08-29 NOTE — TELEPHONE ENCOUNTER
[facility]; P [facility] Coordinator             Ok to schedule left L5 and S1 TFESI on expedited basis    Previous Messages      ----- Message -----   From: Alesia Puentes   Sent: 8/28/2019   2:46 PM EDT   To: Chio Arrington DO   Subject: Sokunbi- direct referral please advise inj v*

## 2019-09-04 ENCOUNTER — HOSPITAL ENCOUNTER (OUTPATIENT)
Dept: RADIOLOGY | Facility: CLINIC | Age: 69
Discharge: HOME | End: 2019-09-04
Payer: COMMERCIAL

## 2019-09-04 VITALS
OXYGEN SATURATION: 99 % | SYSTOLIC BLOOD PRESSURE: 152 MMHG | RESPIRATION RATE: 20 BRPM | DIASTOLIC BLOOD PRESSURE: 91 MMHG | TEMPERATURE: 98 F | HEART RATE: 65 BPM

## 2019-09-04 DIAGNOSIS — M54.16 LUMBAR RADICULOPATHY: ICD-10-CM

## 2019-09-04 PROCEDURE — 64483 NJX AA&/STRD TFRM EPI L/S 1: CPT | Performed by: ANESTHESIOLOGY

## 2019-09-04 PROCEDURE — 64484 NJX AA&/STRD TFRM EPI L/S EA: CPT | Performed by: ANESTHESIOLOGY

## 2019-09-04 RX ORDER — PAPAVERINE HCL 150 MG
20 CAPSULE, EXTENDED RELEASE ORAL ONCE
Status: COMPLETED | OUTPATIENT
Start: 2019-09-04 | End: 2019-09-04

## 2019-09-04 RX ORDER — LIDOCAINE HYDROCHLORIDE 10 MG/ML
5 INJECTION, SOLUTION EPIDURAL; INFILTRATION; INTRACAUDAL; PERINEURAL ONCE
Status: COMPLETED | OUTPATIENT
Start: 2019-09-04 | End: 2019-09-04

## 2019-09-04 RX ADMIN — IOHEXOL 2 ML: 300 INJECTION, SOLUTION INTRAVENOUS at 10:37

## 2019-09-04 RX ADMIN — LIDOCAINE HYDROCHLORIDE 2 ML: 20 INJECTION, SOLUTION EPIDURAL; INFILTRATION; INTRACAUDAL; PERINEURAL at 10:41

## 2019-09-04 RX ADMIN — DEXAMETHASONE SODIUM PHOSPHATE 15 MG: 10 INJECTION, SOLUTION INTRAMUSCULAR; INTRAVENOUS at 10:41

## 2019-09-04 RX ADMIN — LIDOCAINE HYDROCHLORIDE 4 ML: 10 INJECTION, SOLUTION EPIDURAL; INFILTRATION; INTRACAUDAL; PERINEURAL at 10:32

## 2019-09-04 NOTE — H&P
History of Present Illness: The patient is a 81 y o  male who presents with complaints of low back and left leg pain      Patient Active Problem List   Diagnosis    Subacromial impingement of right shoulder    Enlarged prostate without lower urinary tract symptoms (luts)    Esophageal reflux    Essential hypertriglyceridemia    Hyperlipidemia    Splenomegaly    Tremor, essential    Primary osteoarthritis of knee    History of gastrointestinal stromal tumor (GIST)    Belching    Gastroesophageal reflux disease without esophagitis    Screening for prostate cancer    Diarrhea    Body aches    Myalgia    Melena    Atypical chest pain    IBS (irritable bowel syndrome)    Encounter for hepatitis C screening test for low risk patient    Hypokalemia    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)    Radiculopathy, lumbar region    Herniation of intervertebral disc between L5 and S1    Spinal stenosis of lumbar region       Past Medical History:   Diagnosis Date    Abnormal weight loss     RESOLVED: [id number]    Anemia     RESOLVED: [id number]    Atypical chest pain     RESOLVED: [id number]    BPH (benign prostatic hyperplasia)     Cancer (HCC)     DUODENAL    Luis Miguel's syndrome     Depression     RESOLVED: [id number]    Diverticulitis of colon     LAST ASSESSED: [id number]    Duodenal nodule     RESOLVED: [id number]    Gastrointestinal stromal tumor (GIST) (HCC)     MALIGNANT; RESOLVED: [id number]    GERD (gastroesophageal reflux disease)     Glaucoma     RESOLVED: [id number]    Insomnia     RESOLVED: [id number]    Lactose intolerance     LAST ASSESSED: [id number]       Past Surgical History:   Procedure Laterality Date    BACK SURGERY      CERVICAL FUSION      c4 and c5    CHOLECYSTECTOMY      COLONOSCOPY  2014    kutty    ELBOW SURGERY Left     REPAIR    EYE SURGERY      KNEE ARTHROSCOPY Left     LUMBAR DISCECTOMY      L5 S1    ORIF RADIAL SHAFT FRACTURE      NM ESOPHAGOGASTRODUODENOSCOPY TRANSORAL DIAGNOSTIC N/A 11/1/2016    Procedure: ESOPHAGOGASTRODUODENOSCOPY (EGD); Surgeon: Felicita Doran MD;  [facility]; Service: Gastroenterology    NM ESOPHAGOGASTRODUODENOSCOPY TRANSORAL DIAGNOSTIC N/A 5/31/2018    Procedure: ESOPHAGOGASTRODUODENOSCOPY (EGD); Surgeon: Felicita Doran MD;  [facility];   Service: Gastroenterology    SMALL INTESTINE SURGERY      GIST surgery    US GUIDED MSK PROCEDURE  5/21/2019         Current Outpatient Medications:     acetaminophen-codeine (TYLENOL #3) 300-30 mg per tablet, Take 1 tablet by mouth every 4 (four) hours as needed for moderate pain, Disp: 30 tablet, Rfl: 0    Cholecalciferol (VITAMIN D-3) 5000 units TABS, Take 1 capsule by mouth daily, Disp: , Rfl:     cholestyramine (QUESTRAN) 4 GM/DOSE powder, Take 1 packet (4 g total) by mouth every other day, Disp: 378 g, Rfl: 6    dorzolamide-timolol (COSOPT) 22 3-6 8 MG/ML ophthalmic solution, INSTILL 1 DROP INTO RIGHT EYE 3 TIMES A DAY, Disp: , Rfl: 4    gabapentin (NEURONTIN) 300 mg capsule, Take 1 capsule (300 mg total) by mouth daily, Disp: 90 capsule, Rfl: 0    GLUCOSAMINE CHONDROITIN COMPLX PO, Take 1 tablet by mouth daily 4500mg  daily , Disp: , Rfl:     latanoprost (XALATAN) 0 005 % ophthalmic solution, INSTIL 1 GTT IN OD QHS, Disp: , Rfl: 4    methocarbamol (ROBAXIN) 750 mg tablet, Take 1 tablet (750 mg total) by mouth every 6 (six) hours as needed for muscle spasms, Disp: 30 tablet, Rfl: 0    methylPREDNISolone 4 MG tablet therapy pack, Use as directed on package, Disp: 1 each, Rfl: 0    Multiple Vitamins-Minerals (MULTIVITAL) tablet, Take 1 tablet by mouth daily Spectravite , Disp: , Rfl:     pantoprazole (PROTONIX) 40 mg tablet, Take 1 tablet (40 mg total) by mouth 2 (two) times a day, Disp: 180 tablet, Rfl: 2    tamsulosin (FLOMAX) 0 4 mg, Take 1 capsule (0 4 mg total) by mouth daily with dinner, Disp: 90 capsule, Rfl: 3   Vitamin E 400 UNITS TABS, Take 1 tablet by mouth daily, Disp: , Rfl:     No Known Allergies    Physical Exam:   Vitals:    09/04/19 1000   BP: 145/81   Pulse: 63   Resp: 20   Temp: 98 °F (36 7 °C)   SpO2: 98%     General: Awake, Alert, Oriented x 3, Mood and affect appropriate  Respiratory: Respirations even and unlabored  Cardiovascular: Peripheral pulses intact; no edema  Musculoskeletal Exam:  Left lumbar paraspinals tender to palpation  ASA Score: 2    Patient/Chart Verification  Patient ID Verified: Verbal  ID Band Applied: No  Consents Confirmed: Procedural  H&P( within 30 days) Verified: To be obtained in the Pre-Procedure area  Interval H&P(within 24 hr) Complete (required for Outpatients and Surgery Admit only): To be obtained in the Pre-Procedure area  Allergies Reviewed: Yes  Anticoag/NSAID held?: No  Currently on antibiotics?: No    Assessment:   1   Lumbar radiculopathy        Plan: left L5 and S1 TFESI

## 2019-09-04 NOTE — DISCHARGE INSTR - LAB
Epidural Steroid Injection   WHAT YOU NEED TO KNOW:   An epidural steroid injection (SUZIE) is a procedure to inject steroid medicine into the epidural space  The epidural space is between your spinal cord and vertebrae  Steroids reduce inflammation and fluid buildup in your spine that may be causing pain  You may be given pain medicine along with the steroids  ACTIVITY  · Do not drive or operate machinery today  · No strenuous activity today - bending, lifting, etc   · You may resume normal activites starting tomorrow - start slowly and as tolerated  · You may shower today, but no tub baths or hot tubs  · You may have numbness for several hours from the local anesthetic  Please use caution and common sense, especially with weight-bearing activities  CARE OF THE INJECTION SITE  · If you have soreness or pain, apply ice to the area today (20 minutes on/20 minutes off)  · Starting tomorrow, you may use warm, moist heat or ice if needed  · You may have an increase or change in your discomfort for 36-48 hours after your treatment  · Apply ice and continue with any pain medication you have been prescribed  · Notify [facility] if you have any of the following: redness, drainage, swelling, headache, stiff neck or fever above 100°F     SPECIAL INSTRUCTIONS  · Our office will contact you in approximately 7 days for a progress report  MEDICATIONS  · Continue to take all routine medications  · Our office may have instructed you to hold some medications  If you have a problem specifically related to your procedure, please call our office at [phone]  Problems not related to your procedure should be directed to your primary care physician

## 2019-09-05 ENCOUNTER — OFFICE VISIT (OUTPATIENT)
Dept: PHYSICAL THERAPY | Facility: REHABILITATION | Age: 69
End: 2019-09-05
Payer: COMMERCIAL

## 2019-09-05 DIAGNOSIS — M51.27 HERNIATION OF INTERVERTEBRAL DISC BETWEEN L5 AND S1: Primary | ICD-10-CM

## 2019-09-05 DIAGNOSIS — Z98.890 HISTORY OF LUMBAR LAMINECTOMY: ICD-10-CM

## 2019-09-05 PROCEDURE — 97110 THERAPEUTIC EXERCISES: CPT

## 2019-09-05 NOTE — PROGRESS NOTES
Daily Note     Today's date: 2019  Patient name: Manuelito Carmen  : 1950  MRN: [id number]  Referring provider: Luis Angel Llanos MD  Dx:   Encounter Diagnosis     ICD-10-CM    1  Herniation of intervertebral disc between L5 and S1 M51 27    2  History of lumbar laminectomy Z98 890                   Subjective: Patient reports getting epidural yesterday and has since had pain move from lateral LLE to now medial LLE  Objective: See treatment diary below  Precautions: Lumbar lami (); C4/5 fusion ()      Manual              Ext mob in prone SE            Nerve glides HS                                                       Exercise Diary              Treadmill 10'            Prone press up 2x10            Standing back bends x10            Prone proop 2'            TA iso 5" 2x10            Standing hip abd/ext                                                                                                                                                                                                       Modalities                                                              Assessment: Patient reports pain with standing back bends  Plan: Continue per plan of care

## 2019-09-12 ENCOUNTER — OFFICE VISIT (OUTPATIENT)
Dept: PHYSICAL THERAPY | Facility: REHABILITATION | Age: 69
End: 2019-09-12
Payer: COMMERCIAL

## 2019-09-12 DIAGNOSIS — M51.27 HERNIATION OF INTERVERTEBRAL DISC BETWEEN L5 AND S1: Primary | ICD-10-CM

## 2019-09-12 PROCEDURE — 97110 THERAPEUTIC EXERCISES: CPT

## 2019-09-12 PROCEDURE — 97140 MANUAL THERAPY 1/> REGIONS: CPT

## 2019-09-12 NOTE — PROGRESS NOTES
Daily Note     Today's date: 2019  Patient name: Lydia Ortez  : 1950  MRN: [id number]  Referring provider: Helen Cavanaugh MD  Dx:   Encounter Diagnosis     ICD-10-CM    1  Herniation of intervertebral disc between L5 and S1 M51 27                   Subjective: Patient reports slight pain relief from epidural for 5 days and now has increased pain again  1:1 w/ PT/PTA 25 min, indep TE remaining  Objective: See treatment diary below  Precautions: Lumbar lami (); C4/5 fusion ()      Manual             Ext mob in prone SE ROGELIO           Nerve glides HS HS                                                      Exercise Diary             Treadmill 10' 10           Prone press up 2x10 2x10           Standing back bends x10 NP           Prone prop 2' 2'           TA iso 5" 2x10 5" 2x10            Standing hip abd/ext  2x10           TA w/ LE lower  x10 ea                                                                                                                                                                                        Modalities                                                              Assessment: Patient unable to complete standing back bends due to high pain radiating down LLE  Plan: Continue per plan of care

## 2019-09-18 ENCOUNTER — TELEPHONE (OUTPATIENT)
Dept: PAIN MEDICINE | Facility: CLINIC | Age: 69
End: 2019-09-18

## 2019-09-19 ENCOUNTER — OFFICE VISIT (OUTPATIENT)
Dept: PHYSICAL THERAPY | Facility: REHABILITATION | Age: 69
End: 2019-09-19
Payer: COMMERCIAL

## 2019-09-19 DIAGNOSIS — Z98.890 HISTORY OF LUMBAR LAMINECTOMY: ICD-10-CM

## 2019-09-19 DIAGNOSIS — M51.27 HERNIATION OF INTERVERTEBRAL DISC BETWEEN L5 AND S1: Primary | ICD-10-CM

## 2019-09-19 PROCEDURE — 97110 THERAPEUTIC EXERCISES: CPT

## 2019-09-19 PROCEDURE — 97140 MANUAL THERAPY 1/> REGIONS: CPT

## 2019-09-19 NOTE — PROGRESS NOTES
Daily Note     Today's date: 2019  Patient name: Jef Duff  : 1950  MRN: [id number]  Referring provider: Ethan Palomino MD  Dx:   Encounter Diagnosis     ICD-10-CM    1  Herniation of intervertebral disc between L5 and S1 M51 27    2  History of lumbar laminectomy Z98 890                   Subjective:  Patient reports having pain in his back now as well as down his LLE abdifatah his calf  Objective: See treatment diary below  Precautions: Lumbar lami (); C4/5 fusion ()      Manual            Ext mob in prone SE ROGELIO ROGELIO          Nerve glides HS HS                                                      Exercise Diary            Treadmill 10' 10' 10'          Prone press up 2x10 2x10 2x10          Standing back bends x10 NP NP          Prone prop 2' 2' 2'          TA iso 5" 2x10 5" 2x10  5" 2x10          Standing hip abd/ext  2x10 OTB 2x10          TA w/ LE lower  x10 ea           Slump sliders   2x10                                                                                                                                                                          Modalities                                                              Assessment: Patient has better tolerance for slump sliders than manual nerve glides  Plan: Continue per plan of care

## 2019-09-24 ENCOUNTER — APPOINTMENT (OUTPATIENT)
Dept: LAB | Age: 69
End: 2019-09-24
Payer: COMMERCIAL

## 2019-09-24 DIAGNOSIS — E78.5 HYPERLIPIDEMIA, UNSPECIFIED HYPERLIPIDEMIA TYPE: ICD-10-CM

## 2019-09-24 DIAGNOSIS — Z85.09 HISTORY OF GASTROINTESTINAL STROMAL TUMOR (GIST): ICD-10-CM

## 2019-09-24 DIAGNOSIS — Z11.59 ENCOUNTER FOR HEPATITIS C SCREENING TEST FOR LOW RISK PATIENT: ICD-10-CM

## 2019-09-24 LAB
ALBUMIN SERPL BCP-MCNC: 4.3 G/DL (ref 3.5–5)
ALP SERPL-CCNC: 73 U/L (ref 46–116)
ALT SERPL W P-5'-P-CCNC: 21 U/L (ref 12–78)
ANION GAP SERPL CALCULATED.3IONS-SCNC: 6 MMOL/L (ref 4–13)
AST SERPL W P-5'-P-CCNC: 16 U/L (ref 5–45)
BILIRUB SERPL-MCNC: 1.02 MG/DL (ref 0.2–1)
BUN SERPL-MCNC: 10 MG/DL (ref 5–25)
CALCIUM SERPL-MCNC: 9.7 MG/DL (ref 8.3–10.1)
CHLORIDE SERPL-SCNC: 107 MMOL/L (ref 100–108)
CHOLEST SERPL-MCNC: 189 MG/DL (ref 50–200)
CO2 SERPL-SCNC: 29 MMOL/L (ref 21–32)
CREAT SERPL-MCNC: 0.84 MG/DL (ref 0.6–1.3)
GFR SERPL CREATININE-BSD FRML MDRD: 91 ML/MIN/1.73SQ M
GLUCOSE P FAST SERPL-MCNC: 95 MG/DL (ref 65–99)
HCV AB SER QL: NORMAL
HDLC SERPL-MCNC: 42 MG/DL (ref 40–60)
LDLC SERPL CALC-MCNC: 110 MG/DL (ref 0–100)
POTASSIUM SERPL-SCNC: 4 MMOL/L (ref 3.5–5.3)
PROT SERPL-MCNC: 7.1 G/DL (ref 6.4–8.2)
SODIUM SERPL-SCNC: 142 MMOL/L (ref 136–145)
TRIGL SERPL-MCNC: 186 MG/DL

## 2019-09-24 PROCEDURE — 80053 COMPREHEN METABOLIC PANEL: CPT

## 2019-09-24 PROCEDURE — 86803 HEPATITIS C AB TEST: CPT

## 2019-09-24 PROCEDURE — 80061 LIPID PANEL: CPT

## 2019-09-24 PROCEDURE — 36415 COLL VENOUS BLD VENIPUNCTURE: CPT

## 2019-09-25 ENCOUNTER — TRANSCRIBE ORDERS (OUTPATIENT)
Dept: RADIOLOGY | Facility: HOSPITAL | Age: 69
End: 2019-09-25

## 2019-09-25 ENCOUNTER — OFFICE VISIT (OUTPATIENT)
Dept: OBGYN CLINIC | Facility: CLINIC | Age: 69
End: 2019-09-25
Payer: COMMERCIAL

## 2019-09-25 ENCOUNTER — HOSPITAL ENCOUNTER (OUTPATIENT)
Dept: RADIOLOGY | Facility: HOSPITAL | Age: 69
Discharge: HOME | End: 2019-09-25
Attending: ORTHOPAEDIC SURGERY
Payer: COMMERCIAL

## 2019-09-25 ENCOUNTER — APPOINTMENT (OUTPATIENT)
Dept: LAB | Facility: CLINIC | Age: 69
End: 2019-09-25
Payer: COMMERCIAL

## 2019-09-25 VITALS
HEIGHT: 68 IN | SYSTOLIC BLOOD PRESSURE: 132 MMHG | WEIGHT: 178 LBS | BODY MASS INDEX: 26.98 KG/M2 | DIASTOLIC BLOOD PRESSURE: 78 MMHG | HEART RATE: 76 BPM

## 2019-09-25 DIAGNOSIS — M51.16 LUMBAR DISC HERNIATION WITH RADICULOPATHY: ICD-10-CM

## 2019-09-25 DIAGNOSIS — M54.16 LUMBAR RADICULOPATHY: ICD-10-CM

## 2019-09-25 DIAGNOSIS — M51.16 LUMBAR DISC HERNIATION WITH RADICULOPATHY: Primary | ICD-10-CM

## 2019-09-25 LAB
ABO GROUP BLD: NORMAL
APTT PPP: 35 SECONDS (ref 23–37)
ATRIAL RATE: 56 BPM
BASOPHILS # BLD AUTO: 0.04 THOUSANDS/ΜL (ref 0–0.1)
BASOPHILS NFR BLD AUTO: 1 % (ref 0–1)
BILIRUB UR QL STRIP: NEGATIVE
BLD GP AB SCN SERPL QL: NEGATIVE
CLARITY UR: CLEAR
COLOR UR: YELLOW
EOSINOPHIL # BLD AUTO: 0.06 THOUSAND/ΜL (ref 0–0.61)
EOSINOPHIL NFR BLD AUTO: 1 % (ref 0–6)
ERYTHROCYTE [DISTWIDTH] IN BLOOD BY AUTOMATED COUNT: 12.8 % (ref 11.6–15.1)
EST. AVERAGE GLUCOSE BLD GHB EST-MCNC: 103 MG/DL
GLUCOSE UR STRIP-MCNC: NEGATIVE MG/DL
HBA1C MFR BLD: 5.2 % (ref 4.2–6.3)
HCT VFR BLD AUTO: 46.5 % (ref 36.5–49.3)
HGB BLD-MCNC: 15.6 G/DL (ref 12–17)
HGB UR QL STRIP.AUTO: NEGATIVE
IMM GRANULOCYTES # BLD AUTO: 0.01 THOUSAND/UL (ref 0–0.2)
IMM GRANULOCYTES NFR BLD AUTO: 0 % (ref 0–2)
INR PPP: 1.1 (ref 0.84–1.19)
KETONES UR STRIP-MCNC: NEGATIVE MG/DL
LEUKOCYTE ESTERASE UR QL STRIP: NEGATIVE
LYMPHOCYTES # BLD AUTO: 0.94 THOUSANDS/ΜL (ref 0.6–4.47)
LYMPHOCYTES NFR BLD AUTO: 21 % (ref 14–44)
MCH RBC QN AUTO: 31.2 PG (ref 26.8–34.3)
MCHC RBC AUTO-ENTMCNC: 33.5 G/DL (ref 31.4–37.4)
MCV RBC AUTO: 93 FL (ref 82–98)
MONOCYTES # BLD AUTO: 0.58 THOUSAND/ΜL (ref 0.17–1.22)
MONOCYTES NFR BLD AUTO: 13 % (ref 4–12)
NEUTROPHILS # BLD AUTO: 2.86 THOUSANDS/ΜL (ref 1.85–7.62)
NEUTS SEG NFR BLD AUTO: 64 % (ref 43–75)
NITRITE UR QL STRIP: NEGATIVE
NRBC BLD AUTO-RTO: 0 /100 WBCS
P AXIS: 21 DEGREES
PH UR STRIP.AUTO: 6.5 [PH]
PLATELET # BLD AUTO: 164 THOUSANDS/UL (ref 149–390)
PMV BLD AUTO: 11 FL (ref 8.9–12.7)
PR INTERVAL: 146 MS
PROT UR STRIP-MCNC: NEGATIVE MG/DL
PROTHROMBIN TIME: 13.6 SECONDS (ref 11.6–14.5)
QRS AXIS: 23 DEGREES
QRSD INTERVAL: 86 MS
QT INTERVAL: 418 MS
QTC INTERVAL: 403 MS
RBC # BLD AUTO: 5 MILLION/UL (ref 3.88–5.62)
RH BLD: NEGATIVE
SP GR UR STRIP.AUTO: <=1.005 (ref 1–1.03)
SPECIMEN EXPIRATION DATE: NORMAL
T WAVE AXIS: 22 DEGREES
UROBILINOGEN UR QL STRIP.AUTO: 0.2 E.U./DL
VENTRICULAR RATE: 56 BPM
WBC # BLD AUTO: 4.49 THOUSAND/UL (ref 4.31–10.16)

## 2019-09-25 PROCEDURE — 71046 X-RAY EXAM CHEST 2 VIEWS: CPT

## 2019-09-25 PROCEDURE — 99214 OFFICE O/P EST MOD 30 MIN: CPT | Performed by: ORTHOPAEDIC SURGERY

## 2019-09-25 PROCEDURE — 81003 URINALYSIS AUTO W/O SCOPE: CPT | Performed by: ORTHOPAEDIC SURGERY

## 2019-09-25 PROCEDURE — 93005 ELECTROCARDIOGRAM TRACING: CPT

## 2019-09-25 PROCEDURE — 85025 COMPLETE CBC W/AUTO DIFF WBC: CPT

## 2019-09-25 PROCEDURE — 85610 PROTHROMBIN TIME: CPT

## 2019-09-25 PROCEDURE — 86900 BLOOD TYPING SEROLOGIC ABO: CPT

## 2019-09-25 PROCEDURE — 83036 HEMOGLOBIN GLYCOSYLATED A1C: CPT

## 2019-09-25 PROCEDURE — 86850 RBC ANTIBODY SCREEN: CPT

## 2019-09-25 PROCEDURE — 86901 BLOOD TYPING SEROLOGIC RH(D): CPT

## 2019-09-25 PROCEDURE — 93010 ELECTROCARDIOGRAM REPORT: CPT | Performed by: INTERNAL MEDICINE

## 2019-09-25 PROCEDURE — 85730 THROMBOPLASTIN TIME PARTIAL: CPT

## 2019-09-25 PROCEDURE — 36415 COLL VENOUS BLD VENIPUNCTURE: CPT

## 2019-09-25 RX ORDER — GABAPENTIN 100 MG/1
300 CAPSULE ORAL ONCE
Status: CANCELLED | OUTPATIENT
Start: 2019-09-25 | End: 2019-09-25

## 2019-09-25 RX ORDER — CHLORHEXIDINE GLUCONATE 0.12 MG/ML
15 RINSE ORAL ONCE
Status: CANCELLED | OUTPATIENT
Start: 2019-09-25 | End: 2019-09-25

## 2019-09-25 RX ORDER — ACETAMINOPHEN 325 MG/1
975 TABLET ORAL ONCE
Status: CANCELLED | OUTPATIENT
Start: 2019-09-25 | End: 2019-09-25

## 2019-09-25 NOTE — PROGRESS NOTES
81 y o male presents for continued evaluation of lumbar back pain with radiculopathy  Patient was referred to Pain Management for menstruation of corticosteroid injections during his last visit  Patient obtain these injections roughly 3 weeks ago and notes very little improvement in his left leg radiculopathy  He also notes that is being have axial back pain on present examination, patient is visibly uncomfortable prefers to stand as sitting or bending over exacerbates his low back pain  He denies any tonia weakness, numbness or bowel/bladder dysfunction  He is interested in the prospect of possible surgery to address his radiculopathy  No other complaints at this time  Review of Systems  Review of systems negative unless otherwise specified in HPI    Past Medical History  Past Medical History:   Diagnosis Date    Abnormal weight loss     RESOLVED: [id number]    Anemia     RESOLVED: [id number]    Atypical chest pain     RESOLVED: [id number]    BPH (benign prostatic hyperplasia)     Cancer (HCC)     DUODENAL    Luis Miguel's syndrome     Depression     RESOLVED: [id number]    Diverticulitis of colon     LAST ASSESSED: [id number]    Duodenal nodule     RESOLVED: [id number]    Gastrointestinal stromal tumor (GIST) (HCC)     MALIGNANT; RESOLVED: [id number]    GERD (gastroesophageal reflux disease)     Glaucoma     RESOLVED: [id number]    Insomnia     RESOLVED: [id number]    Lactose intolerance     LAST ASSESSED: [id number]       Past Surgical History  Past Surgical History:   Procedure Laterality Date    BACK SURGERY      CERVICAL FUSION      c4 and c5    CHOLECYSTECTOMY      COLONOSCOPY  2014    kutty    ELBOW SURGERY Left     REPAIR    EYE SURGERY      KNEE ARTHROSCOPY Left     LUMBAR DISCECTOMY      L5 S1    ORIF RADIAL SHAFT FRACTURE      [place] ESOPHAGOGASTRODUODENOSCOPY TRANSORAL DIAGNOSTIC N/A 11/1/2016    Procedure: ESOPHAGOGASTRODUODENOSCOPY (EGD);   Surgeon: Christie Painting MD;  [facility];  Service: Gastroenterology    [place] ESOPHAGOGASTRODUODENOSCOPY TRANSORAL DIAGNOSTIC N/A 5/31/2018    Procedure: ESOPHAGOGASTRODUODENOSCOPY (EGD); Surgeon: Jackie Vargas MD;  [facility]; Service: Gastroenterology    SMALL INTESTINE SURGERY      GIST surgery    Jazmín Caldwell MSK PROCEDURE  5/21/2019       Current Medications  Current Outpatient Medications on File Prior to Visit   Medication Sig Dispense Refill    acetaminophen-codeine (TYLENOL #3) 300-30 mg per tablet Take 1 tablet by mouth every 4 (four) hours as needed for moderate pain 30 tablet 0    Cholecalciferol (VITAMIN D-3) 5000 units TABS Take 1 capsule by mouth daily      cholestyramine (QUESTRAN) 4 GM/DOSE powder Take 1 packet (4 g total) by mouth every other day 378 g 6    dorzolamide-timolol (COSOPT) 22 3-6 8 MG/ML ophthalmic solution INSTILL 1 DROP INTO RIGHT EYE 3 TIMES A DAY  4    gabapentin (NEURONTIN) 300 mg capsule Take 1 capsule (300 mg total) by mouth daily 90 capsule 0    GLUCOSAMINE CHONDROITIN COMPLX PO Take 1 tablet by mouth daily 4500mg  daily       latanoprost (XALATAN) 0 005 % ophthalmic solution INSTIL 1 GTT IN OD QHS  4    methocarbamol (ROBAXIN) 750 mg tablet Take 1 tablet (750 mg total) by mouth every 6 (six) hours as needed for muscle spasms 30 tablet 0    methylPREDNISolone 4 MG tablet therapy pack Use as directed on package 1 each 0    Multiple Vitamins-Minerals (MULTIVITAL) tablet Take 1 tablet by mouth daily Spectravite       pantoprazole (PROTONIX) 40 mg tablet Take 1 tablet (40 mg total) by mouth 2 (two) times a day 180 tablet 2    tamsulosin (FLOMAX) 0 4 mg Take 1 capsule (0 4 mg total) by mouth daily with dinner 90 capsule 3    Vitamin E 400 UNITS TABS Take 1 tablet by mouth daily       No current facility-administered medications on file prior to visit          Recent Labs [facility])  0   Lab Value Date/Time    HCT 44 8 08/20/2018 1430    HCT 39 1 08/05/2015 0649    HGB 15 1 08/20/2018 1430    HGB 13 2 08/05/2015 0649    WBC 6 46 08/20/2018 1430    WBC 5 23 08/05/2015 0649    INR 0 96 07/01/2015 0944    ESR 7 12/06/2016 1032    GLUCOSE 97 09/25/2015 1014         Physical exam  · General: Awake, Alert, Oriented  · Eyes: Pupils equal, round and reactive to light  · Heart: regular rate and rhythm  · Lungs: No audible wheezing  · Abdomen: soft  Back exam  · Tenderness palpation of the paraspinal musculature  · Positive straight leg raise on the left and positive contralateral straight leg raise  · 4/5 full ankle dorsiflexion on the left  · Hypoactive ankle reflexes on the left  · 5/5 motor other  · Sensation intact L2-S1  · Limb warm and well-perfused    Imaging  Previous MRI of the lumbar spine reviewed by myself and Dr Marylee Foreman  A reveals a recurrent paracentral herniation of the disc and L5-S1 with severe stenosis on the left  There is also DDD and facet hypertrophy appreciated at the level of L3-4 with significant foraminal stenosis  29-year-old male with recurrent disc herniation at L5-S1  · Index discussion was had with patient regarding the risks and benefits of possible surgery  He is informed that the surgery may address his radicular pain, it may not address his axillary back pain  Repeat diskectomy and decompression of L5-S1, decompression and fusion of L5-S1 or more aggressive surgery to address his stenosis at L3-4  Patient states that he would like to proceed with the most conservative intervention at this time and wishes to undergo revision decompression at the level of L5-S1 without fusion    · Consent was obtained during the clinic today  · Preop clearances and labs will be obtained as needed  · Surgery scheduled as per above

## 2019-09-25 NOTE — H&P (VIEW-ONLY)
81 y o male presents for continued evaluation of lumbar back pain with radiculopathy  Patient was referred to Pain Management for menstruation of corticosteroid injections during his last visit  Patient obtain these injections roughly 3 weeks ago and notes very little improvement in his left leg radiculopathy  He also notes that is being have axial back pain on present examination, patient is visibly uncomfortable prefers to stand as sitting or bending over exacerbates his low back pain  He denies any tonia weakness, numbness or bowel/bladder dysfunction  He is interested in the prospect of possible surgery to address his radiculopathy  No other complaints at this time  Review of Systems  Review of systems negative unless otherwise specified in HPI    Past Medical History  Past Medical History:   Diagnosis Date    Abnormal weight loss     RESOLVED: [id number]    Anemia     RESOLVED: [id number]    Atypical chest pain     RESOLVED: [id number]    BPH (benign prostatic hyperplasia)     Cancer (HCC)     DUODENAL    Luis Miguel's syndrome     Depression     RESOLVED: [id number]    Diverticulitis of colon     LAST ASSESSED: [id number]    Duodenal nodule     RESOLVED: [id number]    Gastrointestinal stromal tumor (GIST) (HCC)     MALIGNANT; RESOLVED: [id number]    GERD (gastroesophageal reflux disease)     Glaucoma     RESOLVED: 05PPS8166    Insomnia     RESOLVED: [id number]    Lactose intolerance     LAST ASSESSED: [id number]       Past Surgical History  Past Surgical History:   Procedure Laterality Date    BACK SURGERY      CERVICAL FUSION      c4 and c5    CHOLECYSTECTOMY      COLONOSCOPY  2014    kutty    ELBOW SURGERY Left     REPAIR    EYE SURGERY      KNEE ARTHROSCOPY Left     LUMBAR DISCECTOMY      L5 S1    ORIF RADIAL SHAFT FRACTURE      [place] ESOPHAGOGASTRODUODENOSCOPY TRANSORAL DIAGNOSTIC N/A 11/1/2016    Procedure: ESOPHAGOGASTRODUODENOSCOPY (EGD);   Surgeon: John Craven MD;  [facility];  Service: Gastroenterology    [place] ESOPHAGOGASTRODUODENOSCOPY TRANSORAL DIAGNOSTIC N/A 5/31/2018    Procedure: ESOPHAGOGASTRODUODENOSCOPY (EGD); Surgeon: Jaylen Shaikh MD;  [facility]; Service: Gastroenterology    SMALL INTESTINE SURGERY      GIST surgery    Jazmín Caldwell MSK PROCEDURE  5/21/2019       Current Medications  Current Outpatient Medications on File Prior to Visit   Medication Sig Dispense Refill    acetaminophen-codeine (TYLENOL #3) 300-30 mg per tablet Take 1 tablet by mouth every 4 (four) hours as needed for moderate pain 30 tablet 0    Cholecalciferol (VITAMIN D-3) 5000 units TABS Take 1 capsule by mouth daily      cholestyramine (QUESTRAN) 4 GM/DOSE powder Take 1 packet (4 g total) by mouth every other day 378 g 6    dorzolamide-timolol (COSOPT) 22 3-6 8 MG/ML ophthalmic solution INSTILL 1 DROP INTO RIGHT EYE 3 TIMES A DAY  4    gabapentin (NEURONTIN) 300 mg capsule Take 1 capsule (300 mg total) by mouth daily 90 capsule 0    GLUCOSAMINE CHONDROITIN COMPLX PO Take 1 tablet by mouth daily 4500mg  daily       latanoprost (XALATAN) 0 005 % ophthalmic solution INSTIL 1 GTT IN OD QHS  4    methocarbamol (ROBAXIN) 750 mg tablet Take 1 tablet (750 mg total) by mouth every 6 (six) hours as needed for muscle spasms 30 tablet 0    methylPREDNISolone 4 MG tablet therapy pack Use as directed on package 1 each 0    Multiple Vitamins-Minerals (MULTIVITAL) tablet Take 1 tablet by mouth daily Spectravite       pantoprazole (PROTONIX) 40 mg tablet Take 1 tablet (40 mg total) by mouth 2 (two) times a day 180 tablet 2    tamsulosin (FLOMAX) 0 4 mg Take 1 capsule (0 4 mg total) by mouth daily with dinner 90 capsule 3    Vitamin E 400 UNITS TABS Take 1 tablet by mouth daily       No current facility-administered medications on file prior to visit          Recent Labs [facility])  0   Lab Value Date/Time    HCT 44 8 08/20/2018 1430    HCT 39 1 08/05/2015 0649    HGB 15 1 08/20/2018 1430    HGB 13 2 08/05/2015 0649    WBC 6 46 08/20/2018 1430    WBC 5 23 08/05/2015 0649    INR 0 96 07/01/2015 0944    ESR 7 12/06/2016 1032    GLUCOSE 97 09/25/2015 1014         Physical exam  · General: Awake, Alert, Oriented  · Eyes: Pupils equal, round and reactive to light  · Heart: regular rate and rhythm  · Lungs: No audible wheezing  · Abdomen: soft  Back exam  · Tenderness palpation of the paraspinal musculature  · Positive straight leg raise on the left and positive contralateral straight leg raise  · 4/5 full ankle dorsiflexion on the left  · Hypoactive ankle reflexes on the left  · 5/5 motor other  · Sensation intact L2-S1  · Limb warm and well-perfused    Imaging  Previous MRI of the lumbar spine reviewed by myself and Dr Ovi GARCIA reveals a recurrent paracentral herniation of the disc and L5-S1 with severe stenosis on the left  There is also DDD and facet hypertrophy appreciated at the level of L3-4 with significant foraminal stenosis  61-year-old male with recurrent disc herniation at L5-S1  · Index discussion was had with patient regarding the risks and benefits of possible surgery  He is informed that the surgery may address his radicular pain, it may not address his axillary back pain  Repeat diskectomy and decompression of L5-S1, decompression and fusion of L5-S1 or more aggressive surgery to address his stenosis at L3-4  Patient states that he would like to proceed with the most conservative intervention at this time and wishes to undergo revision decompression at the level of L5-S1 without fusion    · Consent was obtained during the clinic today  · Preop clearances and labs will be obtained as needed  · Surgery scheduled as per above

## 2019-09-26 ENCOUNTER — APPOINTMENT (OUTPATIENT)
Dept: PHYSICAL THERAPY | Facility: REHABILITATION | Age: 69
End: 2019-09-26
Payer: COMMERCIAL

## 2019-09-27 ENCOUNTER — TELEPHONE (OUTPATIENT)
Dept: OBGYN CLINIC | Facility: HOSPITAL | Age: 69
End: 2019-09-27

## 2019-09-27 NOTE — TELEPHONE ENCOUNTER
Patient sees Dr Dinaa Ayon  His employer is asking for a letter stating that he is having surgery on 10/24 and his estimated return to work date  Please mail to his address on file

## 2019-09-30 NOTE — TELEPHONE ENCOUNTER
Work note placed in computer stating date of surgery and tentative return to work date  [employer]

## 2019-10-02 ENCOUNTER — OFFICE VISIT (OUTPATIENT)
Dept: INTERNAL MEDICINE CLINIC | Facility: CLINIC | Age: 69
End: 2019-10-02
Payer: COMMERCIAL

## 2019-10-02 VITALS
OXYGEN SATURATION: 98 % | DIASTOLIC BLOOD PRESSURE: 74 MMHG | RESPIRATION RATE: 16 BRPM | HEIGHT: 68 IN | WEIGHT: 176.6 LBS | HEART RATE: 71 BPM | BODY MASS INDEX: 26.76 KG/M2 | SYSTOLIC BLOOD PRESSURE: 124 MMHG

## 2019-10-02 DIAGNOSIS — E78.5 HYPERLIPIDEMIA, UNSPECIFIED HYPERLIPIDEMIA TYPE: Primary | ICD-10-CM

## 2019-10-02 DIAGNOSIS — M51.16 LUMBAR DISC HERNIATION WITH RADICULOPATHY: ICD-10-CM

## 2019-10-02 DIAGNOSIS — Z00.00 MEDICARE ANNUAL WELLNESS VISIT, SUBSEQUENT: ICD-10-CM

## 2019-10-02 DIAGNOSIS — E78.1 HYPERTRIGLYCERIDEMIA: ICD-10-CM

## 2019-10-02 PROCEDURE — 99213 OFFICE O/P EST LOW 20 MIN: CPT | Performed by: NURSE PRACTITIONER

## 2019-10-02 PROCEDURE — G0439 PPPS, SUBSEQ VISIT: HCPCS | Performed by: NURSE PRACTITIONER

## 2019-10-02 NOTE — PROGRESS NOTES
BMI Counseling: Body mass index is 26 85 kg/m²  Discussed the patient's BMI with him  The BMI is above normal  Nutrition recommendations include reducing portion sizes  Assessment/Plan:    Medicare annual wellness visit, subsequent  Assessment and plan 1  Medicare subsequent annual wellness examination overall the patient is clinically stable and doing well, we encouraged the patient to follow a healthy and balanced diet  We recommend that the patient exercise routinely approximately 30 minutes 5 times per week   We have reviewed the patient's vaccines and have made recommendations for updates if necessary consider the new shingles vaccine recommend annual flu shot       We will be ordering screening laboratories which are age appropriate  Return to the office in   6 months   call if any problems  Lumbar disc herniation with radiculopathy  Medically stable/low risk for intended procedure  Preop requested via Ortho          Problem List Items Addressed This Visit        Nervous and Auditory    Lumbar disc herniation with radiculopathy     Medically stable/low risk for intended procedure  Preop requested via Ortho             Other    Hyperlipidemia - Primary    Relevant Orders    Comprehensive metabolic panel    Lipid Panel with Direct LDL reflex    Medicare annual wellness visit, subsequent     Assessment and plan 1  Medicare subsequent annual wellness examination overall the patient is clinically stable and doing well, we encouraged the patient to follow a healthy and balanced diet  We recommend that the patient exercise routinely approximately 30 minutes 5 times per week   We have reviewed the patient's vaccines and have made recommendations for updates if necessary consider the new shingles vaccine recommend annual flu shot       We will be ordering screening laboratories which are age appropriate  Return to the office in   6 months   call if any problems             Other Visit Diagnoses Hypertriglyceridemia        Relevant Orders    Hemoglobin A1C          Return to office  6 months  call if any problems  Subjective:      Patient ID: Devyn Bell is a 81 y o  male  HPI preop evaluation requested through Orthopedics Dr Feng Baird patient is medically stable no chest pain no short of breath no blood clots no lung problems no heart problems overall he is feeling well he does have lower back pain with radiculopathy, he would like to have surgical intervention with Orthopedics  The following portions of the patient's history were reviewed and updated as appropriate: allergies, current medications, past family history, past medical history, past social history, past surgical history and problem list     Review of Systems   Constitutional: Negative for activity change, appetite change and unexpected weight change  HENT: Negative for congestion and postnasal drip  Eyes: Negative for visual disturbance  Respiratory: Negative for cough and shortness of breath  Cardiovascular: Negative for chest pain  Gastrointestinal: Negative for abdominal pain, diarrhea, nausea and vomiting  Neurological: Negative for dizziness, light-headedness and headaches  Hematological: Negative for adenopathy  Objective:    No follow-ups on file  Procedure: Xr Chest Pa & Lateral    Result Date: 9/26/2019  Narrative: CHEST INDICATION:   M51 16: Intervertebral disc disorders with radiculopathy, lumbar region  COMPARISON:  11/27/2017 EXAM PERFORMED/VIEWS:  XR CHEST PA & LATERAL  The frontal view was performed utilizing dual energy radiographic technique  FINDINGS: Cardiomediastinal silhouette appears unremarkable  The lungs are clear  No pneumothorax or pleural effusion  Mild thoracic spondylosis and cervical fusion again noted  Impression: No acute cardiopulmonary disease   Workstation performed: [id number]     Procedure: Ct Chest Abdomen Pelvis W Contrast    Result Date: 10/14/2019  Narrative: CT CHEST, ABDOMEN AND PELVIS WITH IV CONTRAST INDICATION:   Z85 09: Personal history of malignant neoplasm of other digestive organs  Gastrointestinal stromal tumor of the duodenum  COMPARISON:  4/10/2019 TECHNIQUE: CT examination of the chest, abdomen and pelvis was performed  Axial, sagittal, and coronal 2D reformatted images were created from the source data and submitted for interpretation  Radiation dose length product (DLP) for this visit:  756 mGy-cm   This examination, like all CT scans performed in the [facility], was performed utilizing techniques to minimize radiation dose exposure, including the use of iterative reconstruction and automated exposure control  IV Contrast:  100 mL of iohexol (OMNIPAQUE) Enteric Contrast: Enteric contrast was administered  FINDINGS: CHEST LUNGS:  Lungs are clear  There is no tracheal or endobronchial lesion  PLEURA:  Unremarkable  HEART/GREAT VESSELS:  Unremarkable for patient's age  MEDIASTINUM AND ARIAN:  Unremarkable  CHEST WALL AND LOWER NECK:   Unremarkable  ABDOMEN LIVER/BILIARY TREE:  Liver is diffusely decreased in density consistent with fatty change  No CT evidence of suspicious hepatic mass  Normal hepatic contours  No biliary dilatation  GALLBLADDER:  Gallbladder is surgically absent  SPLEEN:  Unremarkable  PANCREAS:  Unremarkable  ADRENAL GLANDS:  Unremarkable  KIDNEYS/URETERS:  Unremarkable  No hydronephrosis  STOMACH AND BOWEL:  There is colonic diverticulosis without evidence of acute diverticulitis  No discrete nodular enhancing lesions are seen at the level of the duodenum  APPENDIX:  No findings to suggest appendicitis  ABDOMINOPELVIC CAVITY:  No ascites or free intraperitoneal air  No lymphadenopathy  VESSELS:  Atherosclerotic changes are present  No evidence of aneurysm  PELVIS REPRODUCTIVE ORGANS:  The prostate is enlarged  URINARY BLADDER:  Unremarkable  ABDOMINAL WALL/INGUINAL REGIONS:  Unremarkable   OSSEOUS STRUCTURES:  There are age appropriate degenerative changes  No acute fracture or destructive osseous lesion  Impression: No significant interval change since prior examinations  No evidence for residual or recurrent disease at the level of the duodenum in this patient with a reported history of gastrointestinal stromal tumor  No evidence for metastatic disease in the chest, abdomen or pelvis   Workstation performed: [id number]     Recent Results (from the past 08094 hour(s))   ECG 12 lead   Result Value    Ventricular Rate 56    Atrial Rate 56    UT Interval 146    QRSD Interval 86    QT Interval 418    QTC Interval 403    P Axis 21    QRS Axis 23    T Wave Axis 22    Narrative    Sinus bradycardia  When compared with ECG of 23-DEC-2014 16:26,  No significant change was found  Confirmed by Kosta Cisneros ([id number]) on 9/25/2019 2:31:10 PM   POCT ECG    Impression    Normal sinus rhythm no acute changes       No Known Allergies    Past Medical History:   Diagnosis Date    Abnormal weight loss     RESOLVED: [id number]    Anemia     RESOLVED: 96VYG7099    Atypical chest pain     RESOLVED: 50GRM4814    BPH (benign prostatic hyperplasia)     Cancer (HCC)     DUODENAL    Luis Miguel's syndrome     Depression     RESOLVED: 85SUJ1733    Diverticulitis of colon     LAST ASSESSED: 04MVU0833    Duodenal nodule     RESOLVED: 17QGP1460    Gastrointestinal stromal tumor (GIST) ([facility] [address] )     MALIGNANT; RESOLVED: 17ZLT1272    GERD (gastroesophageal reflux disease)     Glaucoma     RESOLVED: 04ODA3362    Insomnia     RESOLVED: 40XVI1157    Lactose intolerance     LAST ASSESSED: 09UPY1230     Past Surgical History:   Procedure Laterality Date    BACK SURGERY      CERVICAL FUSION      c4 and c5    CHOLECYSTECTOMY      COLONOSCOPY  2014    kutty    ELBOW SURGERY Left     REPAIR    EYE SURGERY      KNEE ARTHROSCOPY Left     LUMBAR DISCECTOMY      L5 S1    ORIF RADIAL SHAFT FRACTURE      UT ESOPHAGOGASTRODUODENOSCOPY TRANSORAL DIAGNOSTIC N/A 11/1/2016    Procedure: ESOPHAGOGASTRODUODENOSCOPY (EGD); Surgeon: Raji Thompson MD;  [facility]; Service: Gastroenterology    [place] ESOPHAGOGASTRODUODENOSCOPY TRANSORAL DIAGNOSTIC N/A 5/31/2018    Procedure: ESOPHAGOGASTRODUODENOSCOPY (EGD); Surgeon: Raji Thompson MD;  [facility]; Service: Gastroenterology    SMALL INTESTINE SURGERY      GIST surgery    [address] PROCEDURE  5/21/2019     Current Outpatient Medications on File Prior to Visit   Medication Sig Dispense Refill    cholestyramine (QUESTRAN) 4 GM/DOSE powder Take 1 packet (4 g total) by mouth every other day 378 g 6    dorzolamide-timolol (COSOPT) 22 3-6 8 MG/ML ophthalmic solution INSTILL 1 DROP INTO RIGHT EYE 3 TIMES A DAY  4    GLUCOSAMINE CHONDROITIN COMPLX PO Take 1 tablet by mouth daily 4500mg  daily       latanoprost (XALATAN) 0 005 % ophthalmic solution INSTIL 1 GTT IN OD QHS  4    Multiple Vitamins-Minerals (MULTIVITAL) tablet Take 1 tablet by mouth daily Spectravite       pantoprazole (PROTONIX) 40 mg tablet Take 1 tablet (40 mg total) by mouth 2 (two) times a day 180 tablet 2    tamsulosin (FLOMAX) 0 4 mg Take 1 capsule (0 4 mg total) by mouth daily with dinner 90 capsule 3     No current facility-administered medications on file prior to visit        Family History   Problem Relation Age of Onset    Thyroid disease Mother     Thyroid disease Brother     Diabetes Paternal Grandmother     Hypertension Family      Social History     Socioeconomic History    Marital status: /Civil Union     Spouse name: Not on file    Number of children: Not on file    Years of education: Not on file    Highest education level: Not on file   Occupational History    Not on file   Social Needs    Financial resource strain: Not on file    Food insecurity:     Worry: Not on file     Inability: Not on file    Transportation needs:     Medical: Not on file     Non-medical: Not on file   Tobacco Use    Smoking status: Never Smoker    Smokeless tobacco: Never Used   Substance and Sexual Activity    Alcohol use: Yes     Comment: occasional    Drug use: No    Sexual activity: Yes   Lifestyle    Physical activity:     Days per week: Not on file     Minutes per session: Not on file    Stress: Not on file   Relationships    Social connections:     Talks on phone: Not on file     Gets together: Not on file     Attends Church service: Not on file     Active member of club or organization: Not on file     Attends meetings of clubs or organizations: Not on file     Relationship status: Not on file    Intimate partner violence:     Fear of current or ex partner: Not on file     Emotionally abused: Not on file     Physically abused: Not on file     Forced sexual activity: Not on file   Other Topics Concern    Not on file   Social History Narrative    Not on file     Vitals:    10/02/19 1751   BP: 124/74   Pulse: 71   Resp: 16   SpO2: 98%   Weight: 80 1 kg (176 lb 9 6 oz)   Height: 5' 8" (1 727 m)     Results for orders placed or performed during the hospital encounter of 09/25/19   ECG 12 lead   Result Value Ref Range    Ventricular Rate 56 BPM    Atrial Rate 56 BPM    OH Interval 146 ms    QRSD Interval 86 ms    QT Interval 418 ms    QTC Interval 403 ms    P [place] 21 degrees    QRS Axis 23 degrees    T Wave Axis 22 degrees     Weight (last 2 days)     None        Body mass index is 26 85 kg/m²  BP      Temp      Pulse     Resp      SpO2        Vitals:    10/02/19 1751   Weight: 80 1 kg (176 lb 9 6 oz)     Vitals:    10/02/19 1751   Weight: 80 1 kg (176 lb 9 6 oz)       /74   Pulse 71   Resp 16   Ht 5' 8" (1 727 m)   Wt 80 1 kg (176 lb 9 6 oz)   SpO2 98%   BMI 26 85 kg/m²          Physical Exam   Constitutional: He appears well-developed and well-nourished  No distress  HENT:   Head: Normocephalic and atraumatic     Right Ear: External ear normal    Left Ear: External ear normal    Mouth/Throat: Oropharynx is clear and moist    Eyes: Pupils are equal, round, and reactive to light  Conjunctivae are normal  Right eye exhibits no discharge  Left eye exhibits no discharge  No scleral icterus  Neck: Neck supple  Cardiovascular: Normal rate, regular rhythm and normal heart sounds  Exam reveals no gallop and no friction rub  No murmur heard  Pulmonary/Chest: No respiratory distress  He has no wheezes  He has no rales  Abdominal: Soft  Bowel sounds are normal  He exhibits no distension and no mass  There is no tenderness  There is no rebound and no guarding  Musculoskeletal: He exhibits no edema or deformity  Lymphadenopathy:     He has no cervical adenopathy  Neurological: He is alert  Skin: He is not diaphoretic  Psychiatric: He has a normal mood and affect

## 2019-10-02 NOTE — PROGRESS NOTES
Assessment and Plan:     Problem List Items Addressed This Visit        Nervous and Auditory    Lumbar disc herniation with radiculopathy     Medically stable/low risk for intended procedure  Preop requested via Ortho             Other    Hyperlipidemia - Primary    Relevant Orders    Comprehensive metabolic panel    Lipid Panel with Direct LDL reflex    Medicare annual wellness visit, subsequent     Assessment and plan 1  Medicare subsequent annual wellness examination overall the patient is clinically stable and doing well, we encouraged the patient to follow a healthy and balanced diet  We recommend that the patient exercise routinely approximately 30 minutes 5 times per week   We have reviewed the patient's vaccines and have made recommendations for updates if necessary consider the new shingles vaccine recommend annual flu shot       We will be ordering screening laboratories which are age appropriate  Return to the office in   6 months   call if any problems  Other Visit Diagnoses     Hypertriglyceridemia        Relevant Orders    Hemoglobin A1C           Preventive health issues were discussed with patient, and age appropriate screening tests were ordered as noted in patient's After Visit Summary  Personalized health advice and appropriate referrals for health education or preventive services given if needed, as noted in patient's After Visit Summary       History of Present Illness:     Patient presents for Medicare Annual Wellness visit    Patient Care Team:  Mace Gitelman, DO as PCP - MD Diego Mcgarry MD (Gastroenterology)  Gearline Helms, MD Merl Blackwater, MD Tonya Lesch, MD as Surgeon (Surgical Oncology)     Problem List:     Patient Active Problem List   Diagnosis    Subacromial impingement of right shoulder    Enlarged prostate without lower urinary tract symptoms (luts)    Esophageal reflux    Essential hypertriglyceridemia    Hyperlipidemia    Splenomegaly    Tremor, essential    Primary osteoarthritis of knee    History of gastrointestinal stromal tumor (GIST)    Belching    Gastroesophageal reflux disease without esophagitis    Screening for prostate cancer    Diarrhea    Body aches    Myalgia    Melena    Atypical chest pain    IBS (irritable bowel syndrome)    Encounter for hepatitis C screening test for low risk patient    Hypokalemia    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)    Lumbar radiculopathy    Herniation of intervertebral disc between L5 and S1    Spinal stenosis of lumbar region    Lumbar disc herniation with radiculopathy    Overweight (BMI 25 0-29  9)    Skin rash    Medicare annual wellness visit, subsequent      Past Medical and Surgical History:     Past Medical History:   Diagnosis Date    Abnormal weight loss     RESOLVED: [id number]    Anemia     RESOLVED: 62RGF5612    Atypical chest pain     RESOLVED: 01IBO1257    BPH (benign prostatic hyperplasia)     Cancer (HCC)     DUODENAL    Luis Miguel's syndrome     Depression     RESOLVED: 82GLL2971    Diverticulitis of colon     LAST ASSESSED: 81HBU4598    Duodenal nodule     RESOLVED: 80CIP0427    Gastrointestinal stromal tumor (GIST) (HCC)     MALIGNANT; RESOLVED: 93OBM3470    GERD (gastroesophageal reflux disease)     Glaucoma     RESOLVED: 73AOA5464    Insomnia     RESOLVED: 93UOE0218    Lactose intolerance     LAST ASSESSED: 54OIE2342     Past Surgical History:   Procedure Laterality Date    BACK SURGERY      CERVICAL FUSION      c4 and c5    CHOLECYSTECTOMY      COLONOSCOPY  2014    kutty    ELBOW SURGERY Left     REPAIR    EYE SURGERY      KNEE ARTHROSCOPY Left     LUMBAR DISCECTOMY      L5 S1    ORIF RADIAL SHAFT FRACTURE      MD ESOPHAGOGASTRODUODENOSCOPY TRANSORAL DIAGNOSTIC N/A 11/1/2016    Procedure: ESOPHAGOGASTRODUODENOSCOPY (EGD);   Surgeon: Marc Trammell MD;  [facility]; Service: Gastroenterology    [place] ESOPHAGOGASTRODUODENOSCOPY TRANSORAL DIAGNOSTIC N/A 5/31/2018    Procedure: ESOPHAGOGASTRODUODENOSCOPY (EGD); Surgeon: Tg Strong MD;  [facility];   Service: Gastroenterology    SMALL INTESTINE SURGERY      GIST surgery    US GUIDED MSK PROCEDURE  5/21/2019      Family History:     Family History   Problem Relation Age of Onset    Thyroid disease Mother     Thyroid disease Brother     Diabetes Paternal Grandmother     Hypertension Family       Social History:     Social History     Socioeconomic History    Marital status: /Civil Union     Spouse name: None    Number of children: None    Years of education: None    Highest education level: None   Occupational History    None   Social Needs    Financial resource strain: None    Food insecurity:     Worry: None     Inability: None    Transportation needs:     Medical: None     Non-medical: None   Tobacco Use    Smoking status: Never Smoker    Smokeless tobacco: Never Used   Substance and Sexual Activity    Alcohol use: Yes     Comment: occasional    Drug use: No    Sexual activity: Yes   Lifestyle    Physical activity:     Days per week: None     Minutes per session: None    Stress: None   Relationships    Social connections:     Talks on phone: None     Gets together: None     Attends Mosque service: None     Active member of club or organization: None     Attends meetings of clubs or organizations: None     Relationship status: None    Intimate partner violence:     Fear of current or ex partner: None     Emotionally abused: None     Physically abused: None     Forced sexual activity: None   Other Topics Concern    None   Social History Narrative    None       Medications and Allergies:     Current Outpatient Medications   Medication Sig Dispense Refill    cholestyramine (QUESTRAN) 4 GM/DOSE powder Take 1 packet (4 g total) by mouth every other day 378 g 6    dorzolamide-timolol (COSOPT) 22 3-6 8 MG/ML ophthalmic solution INSTILL 1 DROP INTO RIGHT EYE 3 TIMES A DAY  4    GLUCOSAMINE CHONDROITIN COMPLX PO Take 1 tablet by mouth daily 4500mg  daily       latanoprost (XALATAN) 0 005 % ophthalmic solution INSTIL 1 GTT IN OD QHS  4    Multiple Vitamins-Minerals (MULTIVITAL) tablet Take 1 tablet by mouth daily Spectravite       pantoprazole (PROTONIX) 40 mg tablet Take 1 tablet (40 mg total) by mouth 2 (two) times a day 180 tablet 2    tamsulosin (FLOMAX) 0 4 mg Take 1 capsule (0 4 mg total) by mouth daily with dinner 90 capsule 3     No current facility-administered medications for this visit  No Known Allergies   Immunizations:     Immunization History   Administered Date(s) Administered     Influenza (IM) Preservative Free 11/01/2012    INFLUENZA 12/02/2018    Influenza TIV (IM) 11/09/2014    MMR 01/12/2015    Pneumococcal Conjugate 13-Valent 03/01/2017    Pneumococcal Polysaccharide PPV23 09/28/2012, 09/12/2018    Td (adult), adsorbed 02/01/2003    Tdap 01/24/2013    Zoster 12/06/2012      Health Maintenance:         Topic Date Due    CRC Screening: Colonoscopy  12/12/2025    Hepatitis C Screening  Completed     There are no preventive care reminders to display for this patient  Medicare Health Risk Assessment:     /74   Pulse 71   Resp 16   Ht 5' 8" (1 727 m)   Wt 80 1 kg (176 lb 9 6 oz)   SpO2 98%   BMI 26 85 kg/m²      Lauren Mccray is here for his Subsequent Wellness visit  Health Risk Assessment:   Patient rates overall health as good  Patient feels that their physical health rating is same  Eyesight was rated as same  Hearing was rated as same  Patient feels that their emotional and mental health rating is same  Pain experienced in the last 7 days has been some  Patient's pain rating has been 6/10  Patient states that he has experienced no weight loss or gain in last 6 months   Left lower back pain radiates down the right leg (SUZIE pain mgt Dr Maribeth Zazueta) now has back pain    Depression Screening:   PHQ-2 Score: 0      Fall Risk Screening: In the past year, patient has experienced: no history of falling in past year      Home Safety:  Patient does not have trouble with stairs inside or outside of their home  Patient has working smoke alarms and has working carbon monoxide detector  Home safety hazards include: none  Nutrition:   Current diet is Regular and Limited junk food  Medications:   Patient is currently taking over-the-counter supplements  OTC medications include: see medication list  Patient is able to manage medications  Activities of Daily Living (ADLs)/Instrumental Activities of Daily Living (IADLs):   Walk and transfer into and out of bed and chair?: Yes  Dress and groom yourself?: Yes    Bathe or shower yourself?: Yes    Feed yourself? Yes  Do your laundry/housekeeping?: Yes  Manage your money, pay your bills and track your expenses?: Yes  Make your own meals?: Yes    Do your own shopping?: Yes    Previous Hospitalizations:   Any hospitalizations or ED visits within the last 12 months?: No      Advance Care Planning:   Living will: Yes    Durable POA for healthcare:  Yes    Advanced directive: Yes      Cognitive Screening:   Provider or family/friend/caregiver concerned regarding cognition?: No    PREVENTIVE SCREENINGS      Cardiovascular Screening:    General: Screening Not Indicated and History Lipid Disorder      Diabetes Screening:     General: Screening Current      Colorectal Cancer Screening:     General: Screening Current and History Colorectal Cancer      Prostate Cancer Screening:    General: Screening Current      Abdominal Aortic Aneurysm (AAA) Screening:    Risk factors include: age between 73-69 yo        General: Screening Not Indicated      Lung Cancer Screening:     General: Screening Not Indicated      Hepatitis C Screening:    General: Screening Current      Mace Gitelman, DO

## 2019-10-02 NOTE — PATIENT INSTRUCTIONS

## 2019-10-08 ENCOUNTER — HOSPITAL ENCOUNTER (OUTPATIENT)
Dept: RADIOLOGY | Facility: HOSPITAL | Age: 69
Discharge: HOME | End: 2019-10-08
Attending: SURGERY
Payer: COMMERCIAL

## 2019-10-08 DIAGNOSIS — Z85.09 HISTORY OF GASTROINTESTINAL STROMAL TUMOR (GIST): ICD-10-CM

## 2019-10-08 PROCEDURE — 74177 CT ABD & PELVIS W/CONTRAST: CPT

## 2019-10-08 PROCEDURE — 71260 CT THORAX DX C+: CPT

## 2019-10-08 RX ADMIN — IOHEXOL 100 ML: 350 INJECTION, SOLUTION INTRAVENOUS at 19:25

## 2019-10-09 ENCOUNTER — OFFICE VISIT (OUTPATIENT)
Dept: INTERNAL MEDICINE CLINIC | Facility: CLINIC | Age: 69
End: 2019-10-09
Payer: COMMERCIAL

## 2019-10-09 VITALS
WEIGHT: 177 LBS | BODY MASS INDEX: 26.83 KG/M2 | SYSTOLIC BLOOD PRESSURE: 132 MMHG | OXYGEN SATURATION: 96 % | RESPIRATION RATE: 16 BRPM | HEIGHT: 68 IN | HEART RATE: 80 BPM | TEMPERATURE: 97.6 F | DIASTOLIC BLOOD PRESSURE: 80 MMHG

## 2019-10-09 DIAGNOSIS — E66.3 OVERWEIGHT (BMI 25.0-29.9): ICD-10-CM

## 2019-10-09 DIAGNOSIS — R21 SKIN RASH: Primary | ICD-10-CM

## 2019-10-09 PROCEDURE — 99213 OFFICE O/P EST LOW 20 MIN: CPT | Performed by: NURSE PRACTITIONER

## 2019-10-09 RX ORDER — METHYLPREDNISOLONE 4 MG/1
TABLET ORAL
Qty: 21 EACH | Refills: 0 | Status: SHIPPED | OUTPATIENT
Start: 2019-10-09 | End: 2019-10-22

## 2019-10-09 RX ORDER — METHYLPREDNISOLONE ACETATE 40 MG/ML
40 INJECTION, SUSPENSION INTRA-ARTICULAR; INTRALESIONAL; INTRAMUSCULAR; SOFT TISSUE ONCE
Status: COMPLETED | OUTPATIENT
Start: 2019-10-09 | End: 2019-10-09

## 2019-10-09 RX ADMIN — METHYLPREDNISOLONE ACETATE 40 MG: 40 INJECTION, SUSPENSION INTRA-ARTICULAR; INTRALESIONAL; INTRAMUSCULAR; SOFT TISSUE at 18:12

## 2019-10-09 NOTE — ASSESSMENT & PLAN NOTE
BMI Counseling: Body mass index is 26 91 kg/m²  The BMI is above normal  Nutrition recommendations include reducing portion sizes and decreasing overall calorie intake  Exercise recommendations include exercising 3-5 times per week and joining a gym

## 2019-10-09 NOTE — PROGRESS NOTES
Assessment/Plan:    Overweight (BMI 25 0-29  9)  BMI Counseling: Body mass index is 26 91 kg/m²  The BMI is above normal  Nutrition recommendations include reducing portion sizes and decreasing overall calorie intake  Exercise recommendations include exercising 3-5 times per week and joining a gym  Skin rash  Steroid shot given in office  [facility] an[facility]mo  See dermatology for eval       Diagnoses and all orders for this visit:    Skin rash  -     methylPREDNISolone 4 MG tablet therapy pack; Use as directed on package  -     methylPREDNISolone acetate (DEPO-MEDROL) injection 40 mg    Overweight (BMI 25 0-29  9)          Subjective:      Patient ID: Aranza Delgado is a 81 y o  male  Patient woke up last night at 2 am with red rash on his head that is slowly spreading down his body now going to his thighs  Very itchy  Red and blotchy rash  Denies any changes in food, medication, lotion/detergent  He gets a ct abdomen with contrast every 6 months and has never had a reaction like this      The following portions of the patient's history were reviewed and updated as appropriate: allergies, current medications, past family history, past medical history, past social history, past surgical history and problem list     Review of Systems   Constitutional: Negative  HENT: Negative  Eyes: Negative  Respiratory: Negative  Cardiovascular: Negative  Gastrointestinal: Negative  Musculoskeletal: Negative  Skin: Positive for rash  Neurological: Negative  Objective:      /80   Pulse 80   Temp 97 6 °F (36 4 °C) (Oral)   Resp 16   Ht 5' 8" (1 727 m)   Wt 80 3 kg (177 lb)   SpO2 96%   BMI 26 91 kg/m²          Physical Exam   Constitutional: He is oriented to person, place, and time  He appears well-developed and well-nourished  HENT:   Head: Normocephalic and atraumatic     Right Ear: External ear normal    Left Ear: External ear normal    Nose: Nose normal    Mouth/Throat: Oropharynx is clear and moist    Eyes: Pupils are equal, round, and reactive to light  Conjunctivae are normal    Neck: Normal range of motion  Neck supple  Cardiovascular: Normal rate and regular rhythm  Pulmonary/Chest: Effort normal and breath sounds normal    Abdominal: Soft  Bowel sounds are normal    Musculoskeletal: Normal range of motion  Neurological: He is alert and oriented to person, place, and time  Skin: Skin is warm and dry  Nursing note and vitals reviewed

## 2019-10-22 NOTE — PRE-PROCEDURE INSTRUCTIONS
Pre-Surgery Instructions:   Medication Instructions    cholestyramine (QUESTRAN) 4 GM/DOSE powder Instructed patient per Anesthesia Guidelines   dorzolamide-timolol (COSOPT) 22 3-6 8 MG/ML ophthalmic solution Instructed patient per Anesthesia Guidelines   GLUCOSAMINE CHONDROITIN COMPLX PO Instructed patient per Anesthesia Guidelines   latanoprost (XALATAN) 0 005 % ophthalmic solution Instructed patient per Anesthesia Guidelines   Multiple Vitamins-Minerals (MULTIVITAL) tablet Instructed patient per Anesthesia Guidelines   pantoprazole (PROTONIX) 40 mg tablet Instructed patient per Anesthesia Guidelines   tamsulosin (FLOMAX) 0 4 mg Instructed patient per Anesthesia Guidelines  Spoke to pt  medication list reviewed & instructed   As of 10/22 pt to stop MV and glucosamine  Instructed on tylenol only   Am DOS pt to take protonix with 1-2 sips of water   Showering instructions per surgeon office, reviewed @ time of call   Pt saw PCP 10/9 for new onset rash, spoke to pt 10/22 rash resolved   All instructions verbally understood by patient   No further questions

## 2019-10-23 ENCOUNTER — OFFICE VISIT (OUTPATIENT)
Dept: SURGICAL ONCOLOGY | Facility: CLINIC | Age: 69
End: 2019-10-23
Payer: COMMERCIAL

## 2019-10-23 ENCOUNTER — ANESTHESIA EVENT (OUTPATIENT)
Dept: PERIOP | Facility: HOSPITAL | Age: 69
End: 2019-10-23
Payer: COMMERCIAL

## 2019-10-23 VITALS
RESPIRATION RATE: 16 BRPM | HEIGHT: 68 IN | WEIGHT: 177 LBS | HEART RATE: 80 BPM | DIASTOLIC BLOOD PRESSURE: 80 MMHG | SYSTOLIC BLOOD PRESSURE: 140 MMHG | TEMPERATURE: 97.6 F | BODY MASS INDEX: 26.83 KG/M2

## 2019-10-23 DIAGNOSIS — Z85.09 ENCOUNTER FOR FOLLOW-UP SURVEILLANCE OF MALIGNANT GASTROINTESTINAL STROMAL TUMOR (GIST): Primary | ICD-10-CM

## 2019-10-23 DIAGNOSIS — Z08 ENCOUNTER FOR FOLLOW-UP SURVEILLANCE OF MALIGNANT GASTROINTESTINAL STROMAL TUMOR (GIST): Primary | ICD-10-CM

## 2019-10-23 DIAGNOSIS — Z85.09 HISTORY OF GASTROINTESTINAL STROMAL TUMOR (GIST): ICD-10-CM

## 2019-10-23 PROBLEM — Z00.00 MEDICARE ANNUAL WELLNESS VISIT, SUBSEQUENT: Status: ACTIVE | Noted: 2019-10-23

## 2019-10-23 PROCEDURE — 99213 OFFICE O/P EST LOW 20 MIN: CPT | Performed by: SURGERY

## 2019-10-23 NOTE — LETTER
October 23, 2019     [address]  [address]  [address] [facility] [address]    Patient: Luis Angel Britt   YOB: 1950   Date of Visit: 10/23/2019       Dear Dr Magalie Natarajan: Thank you for referring Luis Angel Britt to me for evaluation  Below are my notes for this consultation  If you have questions, please do not hesitate to call me  I look forward to following your patient along with you  Sincerely,        Dayami Moore MD        CC: MD Chong Paul MD Karsten Burgess, MD Clifton Fujisawa, MD Cletis Loge, MD  10/23/2019  9:17 AM  Sign at close encounter     Surgical Oncology Follow Up       [address] [facility]  [address] [facility] [address] [place]  1950  [id number]  [address] [facility]  [facility] [place]  [address] [facility] [address]    Chief Complaint   Patient presents with    Follow-up     6 month follow up  Assessment/Plan:    No problem-specific Assessment & Plan notes found for this encounter  Diagnoses and all orders for this visit:    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)    History of gastrointestinal stromal tumor (GIST)        Advance Care Planning/Advance Directives:  Discussed disease status, cancer treatment plans and/or cancer treatment goals with the patient  History of gastrointestinal stromal tumor (GIST)    7/16/2015 Surgery     Specimen: Duodenum     - Procedure: Excisional biopsy     - Tumor size (pT1): Tumor 2 cm or less      - Low grade; mitotic rate =5/50 HPF  Margins: Negative for GIST  Stage I  -pT1, pNx, G1  History of Present Illness:  Patient is a 51-year-old man here for surveillance visit    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   -Interval History:  No new issues to report from a GI standpoint  He is having back surgery tomorrow  Review of Systems:  Review of Systems   Constitutional: Negative  HENT: Negative  Eyes: Negative  Respiratory: Negative  Cardiovascular: Negative  Gastrointestinal: Negative  Endocrine: Negative  Genitourinary: Negative  Musculoskeletal: Negative  Skin: Negative  Allergic/Immunologic: Negative  Neurological: Negative  Hematological: Negative  Psychiatric/Behavioral: Negative  Patient Active Problem List   Diagnosis    Subacromial impingement of right shoulder    Enlarged prostate without lower urinary tract symptoms (luts)    Esophageal reflux    Essential hypertriglyceridemia    Hyperlipidemia    Splenomegaly    Tremor, essential    Primary osteoarthritis of knee    History of gastrointestinal stromal tumor (GIST)    Belching    Gastroesophageal reflux disease without esophagitis    Screening for prostate cancer    Diarrhea    Body aches    Myalgia    Melena    Atypical chest pain    IBS (irritable bowel syndrome)    Encounter for hepatitis C screening test for low risk patient    Hypokalemia    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)    Lumbar radiculopathy    Herniation of intervertebral disc between L5 and S1    Spinal stenosis of lumbar region    Lumbar disc herniation with radiculopathy    Overweight (BMI 25 0-29  9)    Skin rash     Past Medical History:   Diagnosis Date    Abnormal weight loss     RESOLVED: [id number]    Anemia     RESOLVED: [id number]    Atypical chest pain     RESOLVED: [id number]    BPH (benign prostatic hyperplasia)     Cancer (HCC)     DUODENAL    Luis Miguel's syndrome     Depression     RESOLVED: [id number]    Diverticulitis of colon     LAST ASSESSED: [id number]    Duodenal nodule     RESOLVED: [id number]    Gastrointestinal stromal tumor (GIST) (HCC)     MALIGNANT; RESOLVED: [id number]    GERD (gastroesophageal reflux disease)     Glaucoma     RESOLVED: [id number]    Insomnia     RESOLVED: [id number]    Lactose intolerance     LAST ASSESSED: [id number]     Past Surgical History:   Procedure Laterality Date    BACK SURGERY      CERVICAL FUSION      c4 and c5    CHOLECYSTECTOMY      COLONOSCOPY  2014    kutty    ELBOW SURGERY Left     REPAIR    EYE SURGERY      KNEE ARTHROSCOPY Left     LUMBAR DISCECTOMY      L5 S1    ORIF RADIAL SHAFT FRACTURE      NC ESOPHAGOGASTRODUODENOSCOPY TRANSORAL DIAGNOSTIC N/A 11/1/2016    Procedure: ESOPHAGOGASTRODUODENOSCOPY (EGD); Surgeon: Kofi Zepeda MD;  [facility]; Service: Gastroenterology    NC ESOPHAGOGASTRODUODENOSCOPY TRANSORAL DIAGNOSTIC N/A 5/31/2018    Procedure: ESOPHAGOGASTRODUODENOSCOPY (EGD); Surgeon: Kofi Zepeda MD;  [facility];   Service: Gastroenterology    SMALL INTESTINE SURGERY      GIST surgery    US GUIDED MSK PROCEDURE  5/21/2019     Family History   Problem Relation Age of Onset    Thyroid disease Mother     Thyroid disease Brother     Diabetes Paternal Grandmother     Hypertension Family      Social History     Socioeconomic History    Marital status: /Civil Union     Spouse name: Not on file    Number of children: Not on file    Years of education: Not on file    Highest education level: Not on file   Occupational History    Not on file   Social Needs    Financial resource strain: Not on file    Food insecurity:     Worry: Not on file     Inability: Not on file    Transportation needs:     Medical: Not on file     Non-medical: Not on file   Tobacco Use    Smoking status: Never Smoker    Smokeless tobacco: Never Used   Substance and Sexual Activity    Alcohol use: Yes     Comment: occasional    Drug use: No    Sexual activity: Yes   Lifestyle    Physical activity:     Days per week: Not on file     Minutes per session: Not on file    Stress: Not on file   Relationships    Social connections:     Talks on phone: Not on file     Gets together: Not on file     Attends [employer] witness service: Not on file     Active member of club or organization: Not on file     Attends meetings of clubs or organizations: Not on file     Relationship status: Not on file    Intimate partner violence:     Fear of current or ex partner: Not on file     Emotionally abused: Not on file     Physically abused: Not on file     Forced sexual activity: Not on file   Other Topics Concern    Not on file   Social History Narrative    Not on file       Current Outpatient Medications:     cholestyramine (QUESTRAN) 4 GM/DOSE powder, Take 1 packet (4 g total) by mouth every other day, Disp: 378 g, Rfl: 6    dorzolamide-timolol (COSOPT) 22 3-6 8 MG/ML ophthalmic solution, INSTILL 1 DROP INTO RIGHT EYE 3 TIMES A DAY, Disp: , Rfl: 4    GLUCOSAMINE CHONDROITIN COMPLX PO, Take 1 tablet by mouth daily 4500mg  daily , Disp: , Rfl:     latanoprost (XALATAN) 0 005 % ophthalmic solution, INSTIL 1 GTT IN OD QHS, Disp: , Rfl: 4    Multiple Vitamins-Minerals (MULTIVITAL) tablet, Take 1 tablet by mouth daily Spectravite , Disp: , Rfl:     pantoprazole (PROTONIX) 40 mg tablet, Take 1 tablet (40 mg total) by mouth 2 (two) times a day, Disp: 180 tablet, Rfl: 2    tamsulosin (FLOMAX) 0 4 mg, Take 1 capsule (0 4 mg total) by mouth daily with dinner, Disp: 90 capsule, Rfl: 3  No Known Allergies  Vitals:    10/23/19 0857   BP: 140/80   Pulse: 80   Resp: 16   Temp: 97 6 °F (36 4 °C)       Physical Exam   Constitutional: He is oriented to person, place, and time  He appears well-developed and well-nourished  HENT:   Head: Normocephalic and atraumatic  Right Ear: External ear normal    Left Ear: External ear normal    Eyes: Pupils are equal, round, and reactive to light  EOM are normal    Neck: Normal range of motion  Neck supple  Cardiovascular: Normal rate, regular rhythm and normal heart sounds     Pulmonary/Chest: Effort normal and breath sounds normal  Abdominal: Soft  Bowel sounds are normal  He exhibits no distension and no mass  There is no tenderness  There is no rebound and no guarding  No hernia  Neurological: He is alert and oriented to person, place, and time  Skin: Skin is warm and dry  Psychiatric: He has a normal mood and affect  His behavior is normal  Judgment and thought content normal          Results:  Labs:  none    Imaging  Xr Chest Pa & Lateral    Result Date: 9/26/2019  Narrative: CHEST INDICATION:   M51 16: Intervertebral disc disorders with radiculopathy, lumbar region  COMPARISON:  11/27/2017 EXAM PERFORMED/VIEWS:  XR CHEST PA & LATERAL  The frontal view was performed utilizing dual energy radiographic technique  FINDINGS: Cardiomediastinal silhouette appears unremarkable  The lungs are clear  No pneumothorax or pleural effusion  Mild thoracic spondylosis and cervical fusion again noted  Impression: No acute cardiopulmonary disease  Workstation performed: [id number]     Ct Chest Abdomen Pelvis W Contrast    Result Date: 10/14/2019  Narrative: CT CHEST, ABDOMEN AND PELVIS WITH IV CONTRAST INDICATION:   Z85 09: Personal history of malignant neoplasm of other digestive organs  Gastrointestinal stromal tumor of the duodenum  COMPARISON:  4/10/2019 TECHNIQUE: CT examination of the chest, abdomen and pelvis was performed  Axial, sagittal, and coronal 2D reformatted images were created from the source data and submitted for interpretation  Radiation dose length product (DLP) for this visit:  756 mGy-cm   This examination, like all CT scans performed in the [facility], was performed utilizing techniques to minimize radiation dose exposure, including the use of iterative reconstruction and automated exposure control  IV Contrast:  100 mL of iohexol (OMNIPAQUE) Enteric Contrast: Enteric contrast was administered  FINDINGS: CHEST LUNGS:  Lungs are clear  There is no tracheal or endobronchial lesion   PLEURA: Unremarkable  HEART/GREAT VESSELS:  Unremarkable for patient's age  MEDIASTINUM AND ARIAN:  Unremarkable  CHEST WALL AND LOWER NECK:   Unremarkable  ABDOMEN LIVER/BILIARY TREE:  Liver is diffusely decreased in density consistent with fatty change  No CT evidence of suspicious hepatic mass  Normal hepatic contours  No biliary dilatation  GALLBLADDER:  Gallbladder is surgically absent  SPLEEN:  Unremarkable  PANCREAS:  Unremarkable  ADRENAL GLANDS:  Unremarkable  KIDNEYS/URETERS:  Unremarkable  No hydronephrosis  STOMACH AND BOWEL:  There is colonic diverticulosis without evidence of acute diverticulitis  No discrete nodular enhancing lesions are seen at the level of the duodenum  APPENDIX:  No findings to suggest appendicitis  ABDOMINOPELVIC CAVITY:  No ascites or free intraperitoneal air  No lymphadenopathy  VESSELS:  Atherosclerotic changes are present  No evidence of aneurysm  PELVIS REPRODUCTIVE ORGANS:  The prostate is enlarged  URINARY BLADDER:  Unremarkable  ABDOMINAL WALL/INGUINAL REGIONS:  Unremarkable  OSSEOUS STRUCTURES:  There are age appropriate degenerative changes  No acute fracture or destructive osseous lesion  Impression: No significant interval change since prior examinations  No evidence for residual or recurrent disease at the level of the duodenum in this patient with a reported history of gastrointestinal stromal tumor  No evidence for metastatic disease in the chest, abdomen or pelvis  Workstation performed: [id number]     I reviewed the above laboratory and imaging data  Discussion/Summary:  History of gastrointestinal stromal tumor, for half years post resection  He is doing well  Plan of follow-up in 6 months with blood work and CT scan at that time

## 2019-10-23 NOTE — PROGRESS NOTES
Surgical Oncology Follow Up       [address] [place], [place] [address]Th [facility]  [facility] [place]  [address] 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)    History of gastrointestinal stromal tumor (GIST)        Advance Care Planning/Advance Directives:  Discussed disease status, cancer treatment plans and/or cancer treatment goals with the patient  History of gastrointestinal stromal tumor (GIST)    7/16/2015 Surgery     Specimen: Duodenum     - Procedure: Excisional biopsy     - Tumor size (pT1): Tumor 2 cm or less      - Low grade; mitotic rate =5/50 HPF  Margins: Negative for GIST  Stage I  -pT1, pNx, G1  History of Present Illness:  Patient is a 26-year-old man here for surveillance visit  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   -Interval History:  No new issues to report from a GI standpoint  He is having back surgery tomorrow  Review of Systems:  Review of Systems   Constitutional: Negative  HENT: Negative  Eyes: Negative  Respiratory: Negative  Cardiovascular: Negative  Gastrointestinal: Negative  Endocrine: Negative  Genitourinary: Negative  Musculoskeletal: Negative  Skin: Negative  Allergic/Immunologic: Negative  Neurological: Negative  Hematological: Negative  Psychiatric/Behavioral: Negative          Patient Active Problem List   Diagnosis    Subacromial impingement of right shoulder    Enlarged prostate without lower urinary tract symptoms (luts)    Esophageal reflux    Essential hypertriglyceridemia    Hyperlipidemia    Splenomegaly    Tremor, essential    Primary osteoarthritis of knee    History of gastrointestinal stromal tumor (GIST)    Belching    Gastroesophageal reflux disease without esophagitis    Screening for prostate cancer    Diarrhea    Body aches    Myalgia    Melena    Atypical chest pain    IBS (irritable bowel syndrome)    Encounter for hepatitis C screening test for low risk patient    Hypokalemia    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)    Lumbar radiculopathy    Herniation of intervertebral disc between L5 and S1    Spinal stenosis of lumbar region    Lumbar disc herniation with radiculopathy    Overweight (BMI 25 0-29  9)    Skin rash     Past Medical History:   Diagnosis Date    Abnormal weight loss     RESOLVED: [id number]    Anemia     RESOLVED: 40ELL7642    Atypical chest pain     RESOLVED: 51CQT1077    BPH (benign prostatic hyperplasia)     Cancer (HCC)     DUODENAL    Luis Miguel's syndrome     Depression     RESOLVED: 20XVS7444    Diverticulitis of colon     LAST ASSESSED: 08SBL8973    Duodenal nodule     RESOLVED: [id number]    Gastrointestinal stromal tumor (GIST) (HCC)     MALIGNANT; RESOLVED: 26GQK0348    GERD (gastroesophageal reflux disease)     Glaucoma     RESOLVED: 78EXL0522    Insomnia     RESOLVED: [id number]    Lactose intolerance     LAST ASSESSED: 02UOG2517     Past Surgical History:   Procedure Laterality Date    BACK SURGERY      CERVICAL FUSION      c4 and c5    CHOLECYSTECTOMY      COLONOSCOPY  2014    kutty    ELBOW SURGERY Left     REPAIR    EYE SURGERY      KNEE ARTHROSCOPY Left     LUMBAR DISCECTOMY      L5 S1    ORIF RADIAL SHAFT FRACTURE      CO ESOPHAGOGASTRODUODENOSCOPY TRANSORAL DIAGNOSTIC N/A 11/1/2016    Procedure: ESOPHAGOGASTRODUODENOSCOPY (EGD);   Surgeon: Nereida Cogan, MD;  [facility];  Service: Gastroenterology    IL ESOPHAGOGASTRODUODENOSCOPY TRANSORAL DIAGNOSTIC N/A 5/31/2018    Procedure: ESOPHAGOGASTRODUODENOSCOPY (EGD); Surgeon: Ana Eid MD;  [facility];   Service: Gastroenterology    SMALL INTESTINE SURGERY      GIST surgery    US GUIDED MSK PROCEDURE  5/21/2019     Family History   Problem Relation Age of Onset    Thyroid disease Mother     Thyroid disease Brother     Diabetes Paternal Grandmother     Hypertension Family      Social History     Socioeconomic History    Marital status: /Civil Union     Spouse name: Not on file    Number of children: Not on file    Years of education: Not on file    Highest education level: Not on file   Occupational History    Not on file   Social Needs    Financial resource strain: Not on file    Food insecurity:     Worry: Not on file     Inability: Not on file    Transportation needs:     Medical: Not on file     Non-medical: Not on file   Tobacco Use    Smoking status: Never Smoker    Smokeless tobacco: Never Used   Substance and Sexual Activity    Alcohol use: Yes     Comment: occasional    Drug use: No    Sexual activity: Yes   Lifestyle    Physical activity:     Days per week: Not on file     Minutes per session: Not on file    Stress: Not on file   Relationships    Social connections:     Talks on phone: Not on file     Gets together: Not on file     Attends Faith service: Not on file     Active member of club or organization: Not on file     Attends meetings of clubs or organizations: Not on file     Relationship status: Not on file    Intimate partner violence:     Fear of current or ex partner: Not on file     Emotionally abused: Not on file     Physically abused: Not on file     Forced sexual activity: Not on file   Other Topics Concern    Not on file   Social History Narrative    Not on file       Current Outpatient Medications:     cholestyramine (QUESTRAN) 4 GM/DOSE powder, Take 1 packet (4 g total) by mouth every other day, Disp: 378 g, Rfl: 6    dorzolamide-timolol (COSOPT) 22 3-6 8 MG/ML ophthalmic solution, INSTILL 1 DROP INTO RIGHT EYE 3 TIMES A DAY, Disp: , Rfl: 4    GLUCOSAMINE CHONDROITIN COMPLX PO, Take 1 tablet by mouth daily 4500mg  daily , Disp: , Rfl:     latanoprost (XALATAN) 0 005 % ophthalmic solution, INSTIL 1 GTT IN OD QHS, Disp: , Rfl: 4    Multiple Vitamins-Minerals (MULTIVITAL) tablet, Take 1 tablet by mouth daily Spectravite , Disp: , Rfl:     pantoprazole (PROTONIX) 40 mg tablet, Take 1 tablet (40 mg total) by mouth 2 (two) times a day, Disp: 180 tablet, Rfl: 2    tamsulosin (FLOMAX) 0 4 mg, Take 1 capsule (0 4 mg total) by mouth daily with dinner, Disp: 90 capsule, Rfl: 3  No Known Allergies  Vitals:    10/23/19 0857   BP: 140/80   Pulse: 80   Resp: 16   Temp: 97 6 °F (36 4 °C)       Physical Exam   Constitutional: He is oriented to person, place, and time  He appears well-developed and well-nourished  HENT:   Head: Normocephalic and atraumatic  Right Ear: External ear normal    Left Ear: External ear normal    Eyes: Pupils are equal, round, and reactive to light  EOM are normal    Neck: Normal range of motion  Neck supple  Cardiovascular: Normal rate, regular rhythm and normal heart sounds  Pulmonary/Chest: Effort normal and breath sounds normal    Abdominal: Soft  Bowel sounds are normal  He exhibits no distension and no mass  There is no tenderness  There is no rebound and no guarding  No hernia  Neurological: He is alert and oriented to person, place, and time  Skin: Skin is warm and dry  Psychiatric: He has a normal mood and affect  His behavior is normal  Judgment and thought content normal          Results:  Labs:  none    Imaging  Xr Chest Pa & Lateral    Result Date: 9/26/2019  Narrative: CHEST INDICATION:   M51 16: Intervertebral disc disorders with radiculopathy, lumbar region   COMPARISON:  11/27/2017 EXAM PERFORMED/VIEWS:  XR CHEST PA & LATERAL  The frontal view was performed utilizing dual energy radiographic technique  FINDINGS: Cardiomediastinal silhouette appears unremarkable  The lungs are clear  No pneumothorax or pleural effusion  Mild thoracic spondylosis and cervical fusion again noted  Impression: No acute cardiopulmonary disease  Workstation performed: [id number]     Ct Chest Abdomen Pelvis W Contrast    Result Date: 10/14/2019  Narrative: CT CHEST, ABDOMEN AND PELVIS WITH IV CONTRAST INDICATION:   Z85 09: Personal history of malignant neoplasm of other digestive organs  Gastrointestinal stromal tumor of the duodenum  COMPARISON:  4/10/2019 TECHNIQUE: CT examination of the chest, abdomen and pelvis was performed  Axial, sagittal, and coronal 2D reformatted images were created from the source data and submitted for interpretation  Radiation dose length product (DLP) for this visit:  756 mGy-cm   This examination, like all CT scans performed in the [facility], was performed utilizing techniques to minimize radiation dose exposure, including the use of iterative reconstruction and automated exposure control  IV Contrast:  100 mL of iohexol (OMNIPAQUE) Enteric Contrast: Enteric contrast was administered  FINDINGS: CHEST LUNGS:  Lungs are clear  There is no tracheal or endobronchial lesion  PLEURA:  Unremarkable  HEART/GREAT VESSELS:  Unremarkable for patient's age  MEDIASTINUM AND ARIAN:  Unremarkable  CHEST WALL AND LOWER NECK:   Unremarkable  ABDOMEN LIVER/BILIARY TREE:  Liver is diffusely decreased in density consistent with fatty change  No CT evidence of suspicious hepatic mass  Normal hepatic contours  No biliary dilatation  GALLBLADDER:  Gallbladder is surgically absent  SPLEEN:  Unremarkable  PANCREAS:  Unremarkable  ADRENAL GLANDS:  Unremarkable  KIDNEYS/URETERS:  Unremarkable  No hydronephrosis  STOMACH AND BOWEL:  There is colonic diverticulosis without evidence of acute diverticulitis    No discrete nodular enhancing lesions are seen at the level of the duodenum  APPENDIX:  No findings to suggest appendicitis  ABDOMINOPELVIC CAVITY:  No ascites or free intraperitoneal air  No lymphadenopathy  VESSELS:  Atherosclerotic changes are present  No evidence of aneurysm  PELVIS REPRODUCTIVE ORGANS:  The prostate is enlarged  URINARY BLADDER:  Unremarkable  ABDOMINAL WALL/INGUINAL REGIONS:  Unremarkable  OSSEOUS STRUCTURES:  There are age appropriate degenerative changes  No acute fracture or destructive osseous lesion  Impression: No significant interval change since prior examinations  No evidence for residual or recurrent disease at the level of the duodenum in this patient with a reported history of gastrointestinal stromal tumor  No evidence for metastatic disease in the chest, abdomen or pelvis  Workstation performed: [id number]     I reviewed the above laboratory and imaging data  Discussion/Summary:  History of gastrointestinal stromal tumor, for half years post resection  He is doing well  Plan of follow-up in 6 months with blood work and CT scan at that time

## 2019-10-23 NOTE — LETTER
October 23, 2019     Mace Gitelman, [address]  [address]  [address] [place] [phone]    Patient: Bossman Lazar   YOB: 1950   Date of Visit: 10/23/2019       Dear Dr Tatianna Mcdonald: Thank you for referring Bossman Lazar to me for evaluation  Below are my notes for this consultation  If you have questions, please do not hesitate to call me  I look forward to following your patient along with you  Sincerely,        Tonya Lesch, MD        CC: Kennedy Huddle, MD Marcel Plate, MD Gearline Helms, MD Merl Blackwater, MD Tonya Lesch, MD  10/23/2019  9:17 AM  Sign at close encounter     Surgical Oncology Follow Up       [address] [facility]  [address] [facility] [place] [address] [place] [address]  1950  [id number]  [address] [facility]  [facility] [place]  [address] [place] [address]    Chief Complaint   Patient presents with    Follow-up     6 month follow up  Assessment/Plan:    No problem-specific Assessment & Plan notes found for this encounter  Diagnoses and all orders for this visit:    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)    History of gastrointestinal stromal tumor (GIST)        Advance Care Planning/Advance Directives:  Discussed disease status, cancer treatment plans and/or cancer treatment goals with the patient  History of gastrointestinal stromal tumor (GIST)    7/16/2015 Surgery     Specimen: Duodenum     - Procedure: Excisional biopsy     - Tumor size (pT1): Tumor 2 cm or less      - Low grade; mitotic rate =5/50 HPF  Margins: Negative for GIST  Stage I  -pT1, pNx, G1  History of Present Illness:  Patient is a 61-year-old man here for surveillance visit    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   -Interval History:  No new issues to report from a GI standpoint  He is having back surgery tomorrow  Review of Systems:  Review of Systems   Constitutional: Negative  HENT: Negative  Eyes: Negative  Respiratory: Negative  Cardiovascular: Negative  Gastrointestinal: Negative  Endocrine: Negative  Genitourinary: Negative  Musculoskeletal: Negative  Skin: Negative  Allergic/Immunologic: Negative  Neurological: Negative  Hematological: Negative  Psychiatric/Behavioral: Negative  Patient Active Problem List   Diagnosis    Subacromial impingement of right shoulder    Enlarged prostate without lower urinary tract symptoms (luts)    Esophageal reflux    Essential hypertriglyceridemia    Hyperlipidemia    Splenomegaly    Tremor, essential    Primary osteoarthritis of knee    History of gastrointestinal stromal tumor (GIST)    Belching    Gastroesophageal reflux disease without esophagitis    Screening for prostate cancer    Diarrhea    Body aches    Myalgia    Melena    Atypical chest pain    IBS (irritable bowel syndrome)    Encounter for hepatitis C screening test for low risk patient    Hypokalemia    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)    Lumbar radiculopathy    Herniation of intervertebral disc between L5 and S1    Spinal stenosis of lumbar region    Lumbar disc herniation with radiculopathy    Overweight (BMI 25 0-29  9)    Skin rash     Past Medical History:   Diagnosis Date    Abnormal weight loss     RESOLVED: [id number]    Anemia     RESOLVED: 17TCF1349    Atypical chest pain     RESOLVED: 37HWN8490    BPH (benign prostatic hyperplasia)     Cancer (HCC)     DUODENAL    Luis Miguel's syndrome     Depression     RESOLVED: 36KZL4249    Diverticulitis of colon     LAST ASSESSED: 30ATM3470    Duodenal nodule     RESOLVED: 98AQE6263    Gastrointestinal stromal tumor (GIST) (HCC)     MALIGNANT; RESOLVED: 06QRK7239    GERD (gastroesophageal reflux disease)     Glaucoma     RESOLVED: 36IUL7512    Insomnia     RESOLVED: 81TXO0577    Lactose intolerance     LAST ASSESSED: 34KNN1481     Past Surgical History:   Procedure Laterality Date    BACK SURGERY      CERVICAL FUSION      c4 and c5    CHOLECYSTECTOMY      COLONOSCOPY  2014    kutty    ELBOW SURGERY Left     REPAIR    EYE SURGERY      KNEE ARTHROSCOPY Left     LUMBAR DISCECTOMY      L5 S1    ORIF RADIAL SHAFT FRACTURE      DC ESOPHAGOGASTRODUODENOSCOPY TRANSORAL DIAGNOSTIC N/A 11/1/2016    Procedure: ESOPHAGOGASTRODUODENOSCOPY (EGD); Surgeon: Diego Fernández MD;  [facility]; Service: Gastroenterology    DC ESOPHAGOGASTRODUODENOSCOPY TRANSORAL DIAGNOSTIC N/A 5/31/2018    Procedure: ESOPHAGOGASTRODUODENOSCOPY (EGD); Surgeon: Diego Fernández MD;  [facility];   Service: Gastroenterology    SMALL INTESTINE SURGERY      GIST surgery    US GUIDED MSK PROCEDURE  5/21/2019     Family History   Problem Relation Age of Onset    Thyroid disease Mother     Thyroid disease Brother     Diabetes Paternal Grandmother     Hypertension Family      Social History     Socioeconomic History    Marital status: /Civil Union     Spouse name: Not on file    Number of children: Not on file    Years of education: Not on file    Highest education level: Not on file   Occupational History    Not on file   Social Needs    Financial resource strain: Not on file    Food insecurity:     Worry: Not on file     Inability: Not on file    Transportation needs:     Medical: Not on file     Non-medical: Not on file   Tobacco Use    Smoking status: Never Smoker    Smokeless tobacco: Never Used   Substance and Sexual Activity    Alcohol use: Yes     Comment: occasional    Drug use: No    Sexual activity: Yes   Lifestyle    Physical activity:     Days per week: Not on file     Minutes per session: Not on file    Stress: Not on file   Relationships    Social connections:     Talks on phone: Not on file     Gets together: Not on file     Attends Oriental orthodox service: Not on file     Active member of club or organization: Not on file     Attends meetings of clubs or organizations: Not on file     Relationship status: Not on file    Intimate partner violence:     Fear of current or ex partner: Not on file     Emotionally abused: Not on file     Physically abused: Not on file     Forced sexual activity: Not on file   Other Topics Concern    Not on file   Social History Narrative    Not on file       Current Outpatient Medications:     cholestyramine (QUESTRAN) 4 GM/DOSE powder, Take 1 packet (4 g total) by mouth every other day, Disp: 378 g, Rfl: 6    dorzolamide-timolol (COSOPT) 22 3-6 8 MG/ML ophthalmic solution, INSTILL 1 DROP INTO RIGHT EYE 3 TIMES A DAY, Disp: , Rfl: 4    GLUCOSAMINE CHONDROITIN COMPLX PO, Take 1 tablet by mouth daily 4500mg  daily , Disp: , Rfl:     latanoprost (XALATAN) 0 005 % ophthalmic solution, INSTIL 1 GTT IN OD QHS, Disp: , Rfl: 4    Multiple Vitamins-Minerals (MULTIVITAL) tablet, Take 1 tablet by mouth daily Spectravite , Disp: , Rfl:     pantoprazole (PROTONIX) 40 mg tablet, Take 1 tablet (40 mg total) by mouth 2 (two) times a day, Disp: 180 tablet, Rfl: 2    tamsulosin (FLOMAX) 0 4 mg, Take 1 capsule (0 4 mg total) by mouth daily with dinner, Disp: 90 capsule, Rfl: 3  No Known Allergies  Vitals:    10/23/19 0857   BP: 140/80   Pulse: 80   Resp: 16   Temp: 97 6 °F (36 4 °C)       Physical Exam   Constitutional: He is oriented to person, place, and time  He appears well-developed and well-nourished  HENT:   Head: Normocephalic and atraumatic  Right Ear: External ear normal    Left Ear: External ear normal    Eyes: Pupils are equal, round, and reactive to light  EOM are normal    Neck: Normal range of motion  Neck supple  Cardiovascular: Normal rate, regular rhythm and normal heart sounds     Pulmonary/Chest: Effort normal and breath sounds normal  Abdominal: Soft  Bowel sounds are normal  He exhibits no distension and no mass  There is no tenderness  There is no rebound and no guarding  No hernia  Neurological: He is alert and oriented to person, place, and time  Skin: Skin is warm and dry  Psychiatric: He has a normal mood and affect  His behavior is normal  Judgment and thought content normal          Results:  Labs:  none    Imaging  Xr Chest Pa & Lateral    Result Date: 9/26/2019  Narrative: CHEST INDICATION:   M51 16: Intervertebral disc disorders with radiculopathy, lumbar region  COMPARISON:  11/27/2017 EXAM PERFORMED/VIEWS:  XR CHEST PA & LATERAL  The frontal view was performed utilizing dual energy radiographic technique  FINDINGS: Cardiomediastinal silhouette appears unremarkable  The lungs are clear  No pneumothorax or pleural effusion  Mild thoracic spondylosis and cervical fusion again noted  Impression: No acute cardiopulmonary disease  Workstation performed: [id number]     Ct Chest Abdomen Pelvis W Contrast    Result Date: 10/14/2019  Narrative: CT CHEST, ABDOMEN AND PELVIS WITH IV CONTRAST INDICATION:   Z85 09: Personal history of malignant neoplasm of other digestive organs  Gastrointestinal stromal tumor of the duodenum  COMPARISON:  4/10/2019 TECHNIQUE: CT examination of the chest, abdomen and pelvis was performed  Axial, sagittal, and coronal 2D reformatted images were created from the source data and submitted for interpretation  Radiation dose length product (DLP) for this visit:  756 mGy-cm   This examination, like all CT scans performed in the [facility], was performed utilizing techniques to minimize radiation dose exposure, including the use of iterative reconstruction and automated exposure control  IV Contrast:  100 mL of iohexol (OMNIPAQUE) Enteric Contrast: Enteric contrast was administered  FINDINGS: CHEST LUNGS:  Lungs are clear  There is no tracheal or endobronchial lesion   PLEURA: Unremarkable  HEART/GREAT VESSELS:  Unremarkable for patient's age  MEDIASTINUM AND ARIAN:  Unremarkable  CHEST WALL AND LOWER NECK:   Unremarkable  ABDOMEN LIVER/BILIARY TREE:  Liver is diffusely decreased in density consistent with fatty change  No CT evidence of suspicious hepatic mass  Normal hepatic contours  No biliary dilatation  GALLBLADDER:  Gallbladder is surgically absent  SPLEEN:  Unremarkable  PANCREAS:  Unremarkable  ADRENAL GLANDS:  Unremarkable  KIDNEYS/URETERS:  Unremarkable  No hydronephrosis  STOMACH AND BOWEL:  There is colonic diverticulosis without evidence of acute diverticulitis  No discrete nodular enhancing lesions are seen at the level of the duodenum  APPENDIX:  No findings to suggest appendicitis  ABDOMINOPELVIC CAVITY:  No ascites or free intraperitoneal air  No lymphadenopathy  VESSELS:  Atherosclerotic changes are present  No evidence of aneurysm  PELVIS REPRODUCTIVE ORGANS:  The prostate is enlarged  URINARY BLADDER:  Unremarkable  ABDOMINAL WALL/INGUINAL REGIONS:  Unremarkable  OSSEOUS STRUCTURES:  There are age appropriate degenerative changes  No acute fracture or destructive osseous lesion  Impression: No significant interval change since prior examinations  No evidence for residual or recurrent disease at the level of the duodenum in this patient with a reported history of gastrointestinal stromal tumor  No evidence for metastatic disease in the chest, abdomen or pelvis  Workstation performed: [id number]     I reviewed the above laboratory and imaging data  Discussion/Summary:  History of gastrointestinal stromal tumor, for half years post resection  He is doing well  Plan of follow-up in 6 months with blood work and CT scan at that time

## 2019-10-23 NOTE — ASSESSMENT & PLAN NOTE
Assessment and plan 1  Medicare subsequent annual wellness examination overall the patient is clinically stable and doing well, we encouraged the patient to follow a healthy and balanced diet  We recommend that the patient exercise routinely approximately 30 minutes 5 times per week   We have reviewed the patient's vaccines and have made recommendations for updates if necessary consider the new shingles vaccine recommend annual flu shot       We will be ordering screening laboratories which are age appropriate  Return to the office in   6 months   call if any problems

## 2019-10-24 ENCOUNTER — ANESTHESIA (OUTPATIENT)
Dept: PERIOP | Facility: HOSPITAL | Age: 69
End: 2019-10-24
Payer: COMMERCIAL

## 2019-10-24 ENCOUNTER — HOSPITAL ENCOUNTER (OUTPATIENT)
Dept: RADIOLOGY | Facility: HOSPITAL | Age: 69
Setting detail: OUTPATIENT SURGERY
Discharge: HOME | End: 2019-10-24
Payer: COMMERCIAL

## 2019-10-24 ENCOUNTER — HOSPITAL ENCOUNTER (OUTPATIENT)
Facility: HOSPITAL | Age: 69
Setting detail: OUTPATIENT SURGERY
Discharge: HOME | End: 2019-10-24
Attending: ORTHOPAEDIC SURGERY | Admitting: ORTHOPAEDIC SURGERY
Payer: COMMERCIAL

## 2019-10-24 VITALS
DIASTOLIC BLOOD PRESSURE: 72 MMHG | BODY MASS INDEX: 26.83 KG/M2 | SYSTOLIC BLOOD PRESSURE: 109 MMHG | RESPIRATION RATE: 18 BRPM | WEIGHT: 177 LBS | HEART RATE: 68 BPM | TEMPERATURE: 96.8 F | HEIGHT: 68 IN | OXYGEN SATURATION: 99 %

## 2019-10-24 DIAGNOSIS — M51.16 LUMBAR DISC HERNIATION WITH RADICULOPATHY: Primary | ICD-10-CM

## 2019-10-24 DIAGNOSIS — M51.16 LUMBAR DISC HERNIATION WITH RADICULOPATHY: ICD-10-CM

## 2019-10-24 DIAGNOSIS — M54.16 LUMBAR RADICULOPATHY: Primary | ICD-10-CM

## 2019-10-24 LAB
ABO GROUP BLD: NORMAL
BLD GP AB SCN SERPL QL: NEGATIVE
RH BLD: NEGATIVE
SPECIMEN EXPIRATION DATE: NORMAL

## 2019-10-24 PROCEDURE — 86901 BLOOD TYPING SEROLOGIC RH(D): CPT | Performed by: ORTHOPAEDIC SURGERY

## 2019-10-24 PROCEDURE — 72020 X-RAY EXAM OF SPINE 1 VIEW: CPT

## 2019-10-24 PROCEDURE — 86850 RBC ANTIBODY SCREEN: CPT | Performed by: ORTHOPAEDIC SURGERY

## 2019-10-24 PROCEDURE — 86900 BLOOD TYPING SEROLOGIC ABO: CPT | Performed by: ORTHOPAEDIC SURGERY

## 2019-10-24 PROCEDURE — 63042 LAMINOTOMY SINGLE LUMBAR: CPT | Performed by: ORTHOPAEDIC SURGERY

## 2019-10-24 RX ORDER — CEFAZOLIN SODIUM 2 G/50ML
2000 SOLUTION INTRAVENOUS ONCE
Status: DISCONTINUED | OUTPATIENT
Start: 2019-10-24 | End: 2019-10-24 | Stop reason: SDUPTHER

## 2019-10-24 RX ORDER — EPHEDRINE SULFATE 50 MG/ML
INJECTION INTRAVENOUS AS NEEDED
Status: DISCONTINUED | OUTPATIENT
Start: 2019-10-24 | End: 2019-10-24 | Stop reason: SURG

## 2019-10-24 RX ORDER — ONDANSETRON 2 MG/ML
4 INJECTION INTRAMUSCULAR; INTRAVENOUS EVERY 6 HOURS PRN
Status: DISCONTINUED | OUTPATIENT
Start: 2019-10-24 | End: 2019-10-24 | Stop reason: HOSPADM

## 2019-10-24 RX ORDER — PROMETHAZINE HYDROCHLORIDE 25 MG/ML
12.5 INJECTION, SOLUTION INTRAMUSCULAR; INTRAVENOUS ONCE AS NEEDED
Status: DISCONTINUED | OUTPATIENT
Start: 2019-10-24 | End: 2019-10-24 | Stop reason: HOSPADM

## 2019-10-24 RX ORDER — CHLORHEXIDINE GLUCONATE 0.12 MG/ML
15 RINSE ORAL ONCE
Status: COMPLETED | OUTPATIENT
Start: 2019-10-24 | End: 2019-10-24

## 2019-10-24 RX ORDER — ALBUMIN, HUMAN INJ 5% 5 %
SOLUTION INTRAVENOUS CONTINUOUS PRN
Status: DISCONTINUED | OUTPATIENT
Start: 2019-10-24 | End: 2019-10-24 | Stop reason: SURG

## 2019-10-24 RX ORDER — KETOROLAC TROMETHAMINE 30 MG/ML
INJECTION, SOLUTION INTRAMUSCULAR; INTRAVENOUS AS NEEDED
Status: DISCONTINUED | OUTPATIENT
Start: 2019-10-24 | End: 2019-10-24 | Stop reason: SURG

## 2019-10-24 RX ORDER — METHOCARBAMOL 500 MG/1
500 TABLET, FILM COATED ORAL 2 TIMES DAILY PRN
Qty: 60 TABLET | Refills: 0 | OUTPATIENT
Start: 2019-10-24 | End: 2020-05-07 | Stop reason: ALTCHOICE

## 2019-10-24 RX ORDER — LIDOCAINE HYDROCHLORIDE 5 MG/ML
INJECTION, SOLUTION INFILTRATION; PERINEURAL AS NEEDED
Status: DISCONTINUED | OUTPATIENT
Start: 2019-10-24 | End: 2019-10-24 | Stop reason: SURG

## 2019-10-24 RX ORDER — NEOSTIGMINE METHYLSULFATE 1 MG/ML
INJECTION INTRAVENOUS AS NEEDED
Status: DISCONTINUED | OUTPATIENT
Start: 2019-10-24 | End: 2019-10-24 | Stop reason: SURG

## 2019-10-24 RX ORDER — SODIUM CHLORIDE, SODIUM LACTATE, POTASSIUM CHLORIDE, CALCIUM CHLORIDE 600; 310; 30; 20 MG/100ML; MG/100ML; MG/100ML; MG/100ML
125 INJECTION, SOLUTION INTRAVENOUS CONTINUOUS
Status: DISCONTINUED | OUTPATIENT
Start: 2019-10-24 | End: 2019-10-24 | Stop reason: HOSPADM

## 2019-10-24 RX ORDER — OXYCODONE HYDROCHLORIDE 5 MG/1
5 TABLET ORAL EVERY 4 HOURS PRN
Status: DISCONTINUED | OUTPATIENT
Start: 2019-10-24 | End: 2019-10-24 | Stop reason: HOSPADM

## 2019-10-24 RX ORDER — KETAMINE HYDROCHLORIDE 50 MG/ML
INJECTION, SOLUTION, CONCENTRATE INTRAMUSCULAR; INTRAVENOUS AS NEEDED
Status: DISCONTINUED | OUTPATIENT
Start: 2019-10-24 | End: 2019-10-24 | Stop reason: SURG

## 2019-10-24 RX ORDER — ACETAMINOPHEN 325 MG/1
975 TABLET ORAL ONCE
Status: COMPLETED | OUTPATIENT
Start: 2019-10-24 | End: 2019-10-24

## 2019-10-24 RX ORDER — HYDROMORPHONE HCL/PF 1 MG/ML
0.2 SYRINGE (ML) INJECTION
Status: DISCONTINUED | OUTPATIENT
Start: 2019-10-24 | End: 2019-10-24 | Stop reason: HOSPADM

## 2019-10-24 RX ORDER — LABETALOL 20 MG/4 ML (5 MG/ML) INTRAVENOUS SYRINGE
10
Status: DISCONTINUED | OUTPATIENT
Start: 2019-10-24 | End: 2019-10-24 | Stop reason: HOSPADM

## 2019-10-24 RX ORDER — BUPIVACAINE HYDROCHLORIDE 2.5 MG/ML
INJECTION, SOLUTION INFILTRATION; PERINEURAL AS NEEDED
Status: DISCONTINUED | OUTPATIENT
Start: 2019-10-24 | End: 2019-10-24 | Stop reason: HOSPADM

## 2019-10-24 RX ORDER — DEXAMETHASONE SODIUM PHOSPHATE 4 MG/ML
INJECTION, SOLUTION INTRA-ARTICULAR; INTRALESIONAL; INTRAMUSCULAR; INTRAVENOUS; SOFT TISSUE AS NEEDED
Status: DISCONTINUED | OUTPATIENT
Start: 2019-10-24 | End: 2019-10-24 | Stop reason: SURG

## 2019-10-24 RX ORDER — CHLORHEXIDINE GLUCONATE 0.12 MG/ML
15 RINSE ORAL ONCE
Status: DISCONTINUED | OUTPATIENT
Start: 2019-10-24 | End: 2019-10-24 | Stop reason: SDUPTHER

## 2019-10-24 RX ORDER — VANCOMYCIN HYDROCHLORIDE 1 G/20ML
INJECTION, POWDER, LYOPHILIZED, FOR SOLUTION INTRAVENOUS AS NEEDED
Status: DISCONTINUED | OUTPATIENT
Start: 2019-10-24 | End: 2019-10-24 | Stop reason: HOSPADM

## 2019-10-24 RX ORDER — FENTANYL CITRATE/PF 50 MCG/ML
25 SYRINGE (ML) INJECTION
Status: DISCONTINUED | OUTPATIENT
Start: 2019-10-24 | End: 2019-10-24 | Stop reason: HOSPADM

## 2019-10-24 RX ORDER — GABAPENTIN 300 MG/1
300 CAPSULE ORAL ONCE
Status: COMPLETED | OUTPATIENT
Start: 2019-10-24 | End: 2019-10-24

## 2019-10-24 RX ORDER — FENTANYL CITRATE 50 UG/ML
INJECTION, SOLUTION INTRAMUSCULAR; INTRAVENOUS AS NEEDED
Status: DISCONTINUED | OUTPATIENT
Start: 2019-10-24 | End: 2019-10-24 | Stop reason: SURG

## 2019-10-24 RX ORDER — SODIUM CHLORIDE, SODIUM LACTATE, POTASSIUM CHLORIDE, CALCIUM CHLORIDE 600; 310; 30; 20 MG/100ML; MG/100ML; MG/100ML; MG/100ML
20 INJECTION, SOLUTION INTRAVENOUS CONTINUOUS
Status: DISCONTINUED | OUTPATIENT
Start: 2019-10-24 | End: 2019-10-24 | Stop reason: HOSPADM

## 2019-10-24 RX ORDER — PROPOFOL 10 MG/ML
INJECTION, EMULSION INTRAVENOUS AS NEEDED
Status: DISCONTINUED | OUTPATIENT
Start: 2019-10-24 | End: 2019-10-24 | Stop reason: SURG

## 2019-10-24 RX ORDER — LIDOCAINE HYDROCHLORIDE 10 MG/ML
0.5 INJECTION, SOLUTION EPIDURAL; INFILTRATION; INTRACAUDAL; PERINEURAL ONCE AS NEEDED
Status: COMPLETED | OUTPATIENT
Start: 2019-10-24 | End: 2019-10-24

## 2019-10-24 RX ORDER — HYDROMORPHONE HCL/PF 1 MG/ML
0.5 SYRINGE (ML) INJECTION
Status: DISCONTINUED | OUTPATIENT
Start: 2019-10-24 | End: 2019-10-24 | Stop reason: HOSPADM

## 2019-10-24 RX ORDER — ONDANSETRON 2 MG/ML
4 INJECTION INTRAMUSCULAR; INTRAVENOUS ONCE AS NEEDED
Status: DISCONTINUED | OUTPATIENT
Start: 2019-10-24 | End: 2019-10-24

## 2019-10-24 RX ORDER — HYDRALAZINE HYDROCHLORIDE 20 MG/ML
5 INJECTION INTRAMUSCULAR; INTRAVENOUS
Status: DISCONTINUED | OUTPATIENT
Start: 2019-10-24 | End: 2019-10-24 | Stop reason: HOSPADM

## 2019-10-24 RX ORDER — METOCLOPRAMIDE HYDROCHLORIDE 5 MG/ML
10 INJECTION INTRAMUSCULAR; INTRAVENOUS ONCE AS NEEDED
Status: DISCONTINUED | OUTPATIENT
Start: 2019-10-24 | End: 2019-10-24 | Stop reason: HOSPADM

## 2019-10-24 RX ORDER — ALBUTEROL SULFATE 2.5 MG/3ML
2.5 SOLUTION RESPIRATORY (INHALATION) ONCE AS NEEDED
Status: DISCONTINUED | OUTPATIENT
Start: 2019-10-24 | End: 2019-10-24 | Stop reason: HOSPADM

## 2019-10-24 RX ORDER — GABAPENTIN 300 MG/1
300 CAPSULE ORAL 2 TIMES DAILY
Qty: 60 CAPSULE | Refills: 0 | OUTPATIENT
Start: 2019-10-24 | End: 2020-05-07 | Stop reason: ALTCHOICE

## 2019-10-24 RX ORDER — CEFAZOLIN SODIUM 2 G/50ML
2000 SOLUTION INTRAVENOUS ONCE
Status: COMPLETED | OUTPATIENT
Start: 2019-10-24 | End: 2019-10-24

## 2019-10-24 RX ORDER — OXYCODONE HYDROCHLORIDE 5 MG/1
10 TABLET ORAL EVERY 4 HOURS PRN
Status: DISCONTINUED | OUTPATIENT
Start: 2019-10-24 | End: 2019-10-24 | Stop reason: HOSPADM

## 2019-10-24 RX ORDER — OXYCODONE HYDROCHLORIDE 5 MG/1
5 TABLET ORAL EVERY 4 HOURS PRN
Qty: 30 TABLET | Refills: 0 | OUTPATIENT
Start: 2019-10-24 | End: 2019-10-24 | Stop reason: HOSPADM

## 2019-10-24 RX ORDER — GLYCOPYRROLATE 0.2 MG/ML
INJECTION INTRAMUSCULAR; INTRAVENOUS AS NEEDED
Status: DISCONTINUED | OUTPATIENT
Start: 2019-10-24 | End: 2019-10-24 | Stop reason: SURG

## 2019-10-24 RX ORDER — ROCURONIUM BROMIDE 10 MG/ML
INJECTION, SOLUTION INTRAVENOUS AS NEEDED
Status: DISCONTINUED | OUTPATIENT
Start: 2019-10-24 | End: 2019-10-24 | Stop reason: SURG

## 2019-10-24 RX ORDER — CEPHALEXIN 500 MG/1
500 CAPSULE ORAL EVERY 6 HOURS SCHEDULED
Qty: 20 CAPSULE | Refills: 0 | Status: SHIPPED | OUTPATIENT
Start: 2019-10-24 | End: 2019-10-29

## 2019-10-24 RX ORDER — ONDANSETRON 2 MG/ML
INJECTION INTRAMUSCULAR; INTRAVENOUS AS NEEDED
Status: DISCONTINUED | OUTPATIENT
Start: 2019-10-24 | End: 2019-10-24 | Stop reason: SURG

## 2019-10-24 RX ORDER — ACETAMINOPHEN 325 MG/1
650 TABLET ORAL EVERY 6 HOURS PRN
Status: DISCONTINUED | OUTPATIENT
Start: 2019-10-24 | End: 2019-10-24 | Stop reason: HOSPADM

## 2019-10-24 RX ADMIN — SODIUM CHLORIDE, SODIUM LACTATE, POTASSIUM CHLORIDE, AND CALCIUM CHLORIDE 125 ML/HR: .6; .31; .03; .02 INJECTION, SOLUTION INTRAVENOUS at 12:05

## 2019-10-24 RX ADMIN — LIDOCAINE HYDROCHLORIDE 10 ML: 5 INJECTION, SOLUTION INFILTRATION; PERINEURAL at 14:34

## 2019-10-24 RX ADMIN — ACETAMINOPHEN 975 MG: 325 TABLET ORAL at 11:46

## 2019-10-24 RX ADMIN — KETAMINE HYDROCHLORIDE 10 MG: 50 INJECTION, SOLUTION INTRAMUSCULAR; INTRAVENOUS at 15:04

## 2019-10-24 RX ADMIN — ROCURONIUM BROMIDE 40 MG: 50 INJECTION, SOLUTION INTRAVENOUS at 14:35

## 2019-10-24 RX ADMIN — LIDOCAINE HYDROCHLORIDE 0.5 ML: 10 INJECTION, SOLUTION EPIDURAL; INFILTRATION; INTRACAUDAL; PERINEURAL at 12:05

## 2019-10-24 RX ADMIN — EPHEDRINE SULFATE 5 MG: 50 INJECTION, SOLUTION INTRAVENOUS at 15:26

## 2019-10-24 RX ADMIN — ROCURONIUM BROMIDE 10 MG: 50 INJECTION, SOLUTION INTRAVENOUS at 15:00

## 2019-10-24 RX ADMIN — DEXAMETHASONE SODIUM PHOSPHATE 10 MG: 4 INJECTION, SOLUTION INTRAMUSCULAR; INTRAVENOUS at 15:08

## 2019-10-24 RX ADMIN — SODIUM CHLORIDE, SODIUM LACTATE, POTASSIUM CHLORIDE, AND CALCIUM CHLORIDE: .6; .31; .03; .02 INJECTION, SOLUTION INTRAVENOUS at 14:32

## 2019-10-24 RX ADMIN — KETOROLAC TROMETHAMINE 15 MG: 30 INJECTION, SOLUTION INTRAMUSCULAR at 16:05

## 2019-10-24 RX ADMIN — CHLORHEXIDINE GLUCONATE 0.12% ORAL RINSE 15 ML: 1.2 LIQUID ORAL at 11:47

## 2019-10-24 RX ADMIN — ALBUMIN (HUMAN): 12.5 INJECTION, SOLUTION INTRAVENOUS at 14:50

## 2019-10-24 RX ADMIN — CEFAZOLIN SODIUM 2000 MG: 2 SOLUTION INTRAVENOUS at 14:54

## 2019-10-24 RX ADMIN — NEOSTIGMINE METHYLSULFATE 3 MG: 1 INJECTION, SOLUTION INTRAVENOUS at 16:01

## 2019-10-24 RX ADMIN — GLYCOPYRROLATE 0.4 MG: 0.2 INJECTION, SOLUTION INTRAMUSCULAR; INTRAVENOUS at 16:01

## 2019-10-24 RX ADMIN — KETAMINE HYDROCHLORIDE 10 MG: 50 INJECTION, SOLUTION INTRAMUSCULAR; INTRAVENOUS at 14:34

## 2019-10-24 RX ADMIN — ONDANSETRON 4 MG: 2 INJECTION INTRAMUSCULAR; INTRAVENOUS at 16:01

## 2019-10-24 RX ADMIN — FENTANYL CITRATE 50 MCG: 50 INJECTION, SOLUTION INTRAMUSCULAR; INTRAVENOUS at 14:34

## 2019-10-24 RX ADMIN — PROPOFOL 200 MG: 10 INJECTION, EMULSION INTRAVENOUS at 14:34

## 2019-10-24 RX ADMIN — GABAPENTIN 300 MG: 300 CAPSULE ORAL at 11:46

## 2019-10-24 NOTE — ANESTHESIA PREPROCEDURE EVALUATION
Review of Systems/Medical History  Patient summary reviewed  Chart reviewed  No history of anesthetic complications     Cardiovascular  EKG reviewed, Exercise tolerance (METS): >4,  Hyperlipidemia,    Pulmonary  Negative pulmonary ROS Not a smoker , No shortness of breath, No recent URI ,        GI/Hepatic    GERD well controlled, GI malignancy (GIST),        Prostatic disorder, benign prostatic hyperplasia       Endo/Other     GYN  Negative gynecology ROS          Hematology  Anemia ,     Musculoskeletal  Back pain , lumbar pain and spinal stenosis,   Arthritis     Neurology  Negative neurology ROS      Psychology   Depression ,              Physical Exam    Airway    Mallampati score: II  TM Distance: >3 FB  Neck ROM: full     Dental   No notable dental hx     Cardiovascular      Pulmonary      Other Findings        Anesthesia Plan  ASA Score- 2     Anesthesia Type- general with ASA Monitors  Additional Monitors:   Airway Plan: ETT  Comment:     Plan Factors-    Induction- intravenous  Postoperative Plan- Plan for postoperative opioid use  Informed Consent- Anesthetic plan and risks discussed with patient  I personally reviewed this patient with the CRNA  Discussed and agreed on the Anesthesia Plan with the CRNA  Edward Sweeney

## 2019-10-24 NOTE — ANESTHESIA POSTPROCEDURE EVALUATION
Post-Op Assessment Note    CV Status:  Stable  Pain Score: 0    Pain management: adequate     Mental Status:  Alert and awake   Hydration Status:  Euvolemic   PONV Controlled:  Controlled   Airway Patency:  Patent   Post Op Vitals Reviewed: Yes      Staff: CRNA   Comments: Pt VSS    Report to RN          /68 (10/24/19 1619)    Temp 97 8 °F (36 6 °C) (10/24/19 1619)    Pulse 72 (10/24/19 1619)   Resp 18 (10/24/19 1619)    SpO2 100 % (10/24/19 1619)

## 2019-10-24 NOTE — DISCHARGE INSTRUCTIONS
Discharge Instruction - Orthopaedics  Jorge Magana 81 y o  male MRN: [id number]  Unit/Bed#: Operating Room    Weight Bearing Status:                                           Full weight bearing    DVT prophylaxis:  DO NOT take blood thinners until cleared by surgeon    Pain:  Continue analgesics as directed    Showering Instructions:   Do not shower until 2-3 days after the procedure  Dressing Instructions:   Keep surgical incision clean and dry at all times  A dry atmosphere helps the incision heal   No soaking or scrubbing of wound at any time  No ointment or soap on the incision  May change dressing in 2-3 days, apply dry bandage  OK to change dressing as needed if saturated  Driving Instructions:  No driving until cleared by Orthopaedic Surgery  PT/OT:  No PT/OT required until directed by surgeon at followup appointment    Appt Instructions: If you do not have your appointment, please call the clinic at [phone]  Otherwise followup as scheduled below: Follow-up with Dr Devante Victor in the office in 2 weeks  Contact the office sooner if you experience any increased numbness/tingling in the extremities  Miscellaneous:  No lifting greater than 5 lbs  No strenuous exercise  No repetitive bending or twisting

## 2019-10-24 NOTE — INTERVAL H&P NOTE
H&P reviewed  After examining the patient I find no changes in the patients condition since the H&P had been written      Vitals:    10/24/19 1134   BP: 124/76   Pulse: 60   Resp: 16   Temp: (!) 96 9 °F (36 1 °C)   SpO2: 99%     Foot dorsiflexion on left is 4/5, EHL on left is 3+/5

## 2019-10-24 NOTE — OP NOTE
OPERATIVE REPORT  PATIENT NAME: Jorge Magana    :  1950  MRN: [id number]  Pt Location: [facility] OR ROOM 18    SURGERY DATE: 10/24/2019    Surgeon(s) and Role:     * Nisreen Laboy MD - Primary     * Rodney Couch MD - [address], [place] - Assisting    Preop Diagnosis:  Lumbar disc herniation with radiculopathy [M51 16]    Post-Op Diagnosis Codes:     * Lumbar disc herniation with radiculopathy [M51 16]    Revision hemilaminectomy decompression diskectomy left L5-S1    Specimen(s):  * No specimens in log *    Estimated Blood Loss:   Minimal    Drains:  * No LDAs found *    Anesthesia Type:   General    Operative Indications:  Lumbar disc herniation with radiculopathy [M51 16]      Operative Findings:  Scar tissue left L5-S1 interspace  Moderate size disc herniation left I6-L6    Complications:   None    Procedure and Technique:    Revision hemilaminectomy decompression diskectomy left L5-S1  Patient was correctly identified by both the anesthesia department and myself  His back was marked  He was taken to the operating room where general anesthesia was induced in the supine position  Antibiotics were administered preoperatively  He was positioned prone onto radiolucent flat top table on gel rolls ensuring that all bony prominences and neurovascular structures were adequately padded and protected  The back was then prepped and draped in the usual sterile fashion  A time-out was performed  A 10 blade was utilized to open the old midline incision overlying L5-S1  Dissection was carried to level of the fascia with the use of the Bovie electric cautery  Subperiosteal dissection was performed with the use of the Bovie exposing the lamina out to the facet joint at L5-S1  Butterfield self-retaining retractor was placed  Significant scar tissue was encountered into the interlaminar space  The appropriate level was confirmed with Radiology    The facet joint was delineated and a partial medial facetectomy was performed with the use of a high-speed bur  The ligamentum flavum most laterally was identified in teased off with the use of pituitary rongeur  Disc herniation was identified the nerve root was found to be medial to this  Annulotomy was created in several pieces of disc material were removed with the use of a pituitary rongeur  Following this the nerve was found to pass freely through the lateral recess without significant tenting  A ball-tip probe was passed alongside the nerve root to confirm this  Copious irrigation was performed to the entire wound  Hemostasis was achieved  The fascial layer was then closed in a watertight fashion using 0 Vicryl suture  Vancomycin powder was sprinkled into the superficial soft tissues  The subcutaneous layer was closed 0 Vicryl suture  The skin layer was closed with 2 nylon suture in interrupted fashion  Marcaine was injected into the soft tissues  Sterile dressings were applied to the wound    The patient was taking off the operating room table and subsequently extubated     I was present for the entire procedure    Patient Disposition:  PACU     SIGNATURE: Yvonne Keys MD  DATE: October 24, 2019  TIME: 4:10 PM

## 2019-10-25 ENCOUNTER — TELEPHONE (OUTPATIENT)
Dept: INTERNAL MEDICINE CLINIC | Facility: CLINIC | Age: 69
End: 2019-10-25

## 2019-10-25 DIAGNOSIS — M54.16 LUMBAR RADICULOPATHY: Primary | ICD-10-CM

## 2019-10-25 RX ORDER — GABAPENTIN 300 MG/1
300 CAPSULE ORAL 2 TIMES DAILY
Qty: 60 CAPSULE | Refills: 1 | Status: SHIPPED | OUTPATIENT
Start: 2019-10-25 | End: 2020-05-07 | Stop reason: SDUPTHER

## 2019-10-25 RX ORDER — METHOCARBAMOL 750 MG/1
750 TABLET, FILM COATED ORAL EVERY 6 HOURS PRN
Qty: 30 TABLET | Refills: 0 | Status: SHIPPED | OUTPATIENT
Start: 2019-10-25 | End: 2020-05-07 | Stop reason: ALTCHOICE

## 2019-10-28 NOTE — TELEPHONE ENCOUNTER
Pt left a message to clarify his request   He does NOT need a referral he simply asking for a recommendation for a [facility]'s dermatologist as the one he is currently seeing (Dr Unique Interiano) no longer accepts his insurance     Please advise ty

## 2019-11-06 ENCOUNTER — OFFICE VISIT (OUTPATIENT)
Dept: OBGYN CLINIC | Facility: CLINIC | Age: 69
End: 2019-11-06

## 2019-11-06 VITALS
WEIGHT: 179 LBS | HEART RATE: 84 BPM | DIASTOLIC BLOOD PRESSURE: 65 MMHG | SYSTOLIC BLOOD PRESSURE: 103 MMHG | HEIGHT: 68 IN | BODY MASS INDEX: 27.13 KG/M2

## 2019-11-06 DIAGNOSIS — Z48.89 AFTERCARE FOLLOWING SURGERY: Primary | ICD-10-CM

## 2019-11-06 PROCEDURE — 99024 POSTOP FOLLOW-UP VISIT: CPT | Performed by: ORTHOPAEDIC SURGERY

## 2019-11-06 NOTE — PROGRESS NOTES
Assessment/Plan:    Patient seen and examined with Dr Celestine Waters  He is roughly 2 weeks s/p  Revision hemilaminectomy decompression diskectomy left L5-S1 done on 10/24/2019  Overall he is doing well however he notes continued symptoms specifically pain in the left lower extremity  His lumbar incision is without signs of infection or dehiscence  Sutures removed and Steri-Strips applied  From our standpoint, he is to maintain lumbar spine precautions  He was counseled on limiting exercise to walking, And giving time to let the body recover from surgery  Work note provided today  Follow-up in four weeks for re-evaluation  Problem List Items Addressed This Visit        Other    Aftercare following surgery - Primary            Subjective:      Patient ID: Moncho Bee is a 81 y o  male  HPI     The patient is a 71-year-old male who presents for a postop visit  He is roughly 2 weeks s/p  Revision hemilaminectomy decompression diskectomy left L5-S1 done on 10/24/2019  Today, he states overall his left lower extremity numbness and tingling has significantly improved however his dull thigh pain is about the same  He also has increased surgical lower back pain  He does admit to subjective chills however he has not taken his temperature  He denies any drainage or erythema around his lumbar incision  He does have ecchymosis hood-incisional region  The following portions of the patient's history were reviewed and updated as appropriate: allergies, current medications, past family history, past medical history, past social history, past surgical history and problem list     Review of Systems      Objective:      Vitals:    11/06/19 0858   BP: 103/65   Pulse: 84            Physical Exam        No acute distress  Gait is without assistance  Lumbar incision is clean dry intact, no erythema drainage or wound dehiscence  There is hood-incisional ecchymosis      Negative modified straight leg raise bilaterally  Strength is 5/5 L2-S1 bilaterally, sensation is equal intact  There is no calf tenderness or pitting edema  Suture removal  Date/Time: 11/6/2019 9:13 AM  Performed by: Seda Neri PA-C  Authorized by: Seda Neri PA-C     Consent:     Consent obtained:  Verbal    Consent given by:  Patient    Risks discussed:  Bleeding    Alternatives discussed:  No treatment  Universal protocol:     Patient identity confirmed:  Verbally with patient  Location:     Location:  Trunk    Trunk location:  Back  Procedure details: Tools used:  Suture removal kit    Wound appearance:  No sign(s) of infection, good wound healing and clean  Post-procedure details:     Post-removal:  Steri-Strips applied    Patient tolerance of procedure:   Tolerated well, no immediate complications

## 2019-11-06 NOTE — LETTER
November 6, 2019     Patient: Luis Angel Britt   YOB: 1950   Date of Visit: 11/6/2019       To Whom it May Concern:    Luis Angel Britt is under my professional care  He was seen in my office on 11/6/2019  He is to remain out of work until his next follow-up appointment with us in 4 weeks time  Further work restrictions will be discussed at that time  If you have any questions or concerns, please don't hesitate to call           Sincerely,          Marguerite Chan MD        CC: No Recipients

## 2019-11-08 ENCOUNTER — TELEPHONE (OUTPATIENT)
Dept: GASTROENTEROLOGY | Facility: CLINIC | Age: 69
End: 2019-11-08

## 2019-11-08 NOTE — TELEPHONE ENCOUNTER
Called patient to inform of the scheduled appointment, spoke with wife and aware of the appt for him

## 2019-11-08 NOTE — TELEPHONE ENCOUNTER
Recall apt: 1 year recall with Dr Donna Anne 1/23 at 80p with Dr Donna Anne at [facility]

## 2019-11-23 DIAGNOSIS — M54.16 RADICULOPATHY, LUMBAR REGION: ICD-10-CM

## 2019-11-25 RX ORDER — GABAPENTIN 300 MG/1
CAPSULE ORAL
Qty: 90 CAPSULE | Refills: 0 | Status: SHIPPED | OUTPATIENT
Start: 2019-11-25 | End: 2020-05-07 | Stop reason: ALTCHOICE

## 2019-12-04 ENCOUNTER — OFFICE VISIT (OUTPATIENT)
Dept: OBGYN CLINIC | Facility: CLINIC | Age: 69
End: 2019-12-04

## 2019-12-04 VITALS
HEART RATE: 78 BPM | BODY MASS INDEX: 27.13 KG/M2 | WEIGHT: 179 LBS | DIASTOLIC BLOOD PRESSURE: 72 MMHG | SYSTOLIC BLOOD PRESSURE: 115 MMHG | HEIGHT: 68 IN

## 2019-12-04 DIAGNOSIS — Z48.89 AFTERCARE FOLLOWING SURGERY: Primary | ICD-10-CM

## 2019-12-04 PROCEDURE — 99024 POSTOP FOLLOW-UP VISIT: CPT | Performed by: ORTHOPAEDIC SURGERY

## 2019-12-04 NOTE — PROGRESS NOTES
Assessment/Plan:    Patient seen and examined with Dr Misael Oh  He is now roughly six weeks status post revision hemilaminectomy decompression diskectomy left L5-S1 done 10/24/2019  Overall the patient is doing well and notes 75% improvement in preop left lower extremity radicular symptoms  He is happy with the results of the procedure  From our standpoint, lumbar spine restrictions are lifted  Lower extremity and lower back stretching and strengthening exercises are encouraged  He can return to work  Follow-up in six weeks for re-evaluation  Problem List Items Addressed This Visit        Other    Aftercare following surgery - Primary            Subjective:      Patient ID: Olaf Doran is a 81 y o  male  HPI     Patient is a 65-year-old male presents for postop visit  He is now roughly six weeks status post revision hemilaminectomy decompression diskectomy left L5-S1 done 10/24/2019  Overall the patient is doing well and notes significant improvement as preop left lower extremity radicular symptoms  He states his symptoms are about 75% improved  His main problem was left lower extremity numbness which is only occasional now  He still gets some left thigh cramping however it is not consistent  He also has left lower back soreness that is mild  He denies any other symptoms at this time  No fever chills or constitutional symptoms  The following portions of the patient's history were reviewed and updated as appropriate: allergies, current medications, past family history, past medical history, past social history, past surgical history and problem list     Review of Systems      Objective:      Vitals:    12/04/19 0941   BP: 115/72   Pulse: 78            Physical Exam      No acute distress  Gait is without assistance  Lumbar incision is healed  Mild left lumbar paraspinal musculature tenderness to palpation    Negative modified straight leg raise bilaterally  Strength is 5/5 L2-S1 bilaterally, sensation is equal intact  Feet are warm and well perfused there is no calf tenderness or pitting edema

## 2019-12-04 NOTE — LETTER
December 4, 2019     Patient: Brad Zaidi   YOB: 1950   Date of Visit: 12/4/2019       To Whom it May Concern:    Brad Zaidi is under my professional care  He was seen in my office on 12/4/2019  He is able to return to work full-time without restrictions on 12/5/2019  If you have any questions or concerns, please don't hesitate to call           Sincerely,          Bert Craven MD        CC: No Recipients

## 2020-02-04 ENCOUNTER — OFFICE VISIT (OUTPATIENT)
Dept: GASTROENTEROLOGY | Facility: CLINIC | Age: 70
End: 2020-02-04
Payer: COMMERCIAL

## 2020-02-04 VITALS
HEIGHT: 68 IN | HEART RATE: 76 BPM | BODY MASS INDEX: 27.91 KG/M2 | DIASTOLIC BLOOD PRESSURE: 78 MMHG | WEIGHT: 184.13 LBS | TEMPERATURE: 97.9 F | SYSTOLIC BLOOD PRESSURE: 124 MMHG

## 2020-02-04 DIAGNOSIS — K21.9 GASTROESOPHAGEAL REFLUX DISEASE WITHOUT ESOPHAGITIS: Primary | ICD-10-CM

## 2020-02-04 DIAGNOSIS — K58.0 IRRITABLE BOWEL SYNDROME WITH DIARRHEA: ICD-10-CM

## 2020-02-04 PROCEDURE — 3078F DIAST BP <80 MM HG: CPT | Performed by: INTERNAL MEDICINE

## 2020-02-04 PROCEDURE — 3008F BODY MASS INDEX DOCD: CPT | Performed by: INTERNAL MEDICINE

## 2020-02-04 PROCEDURE — 99213 OFFICE O/P EST LOW 20 MIN: CPT | Performed by: INTERNAL MEDICINE

## 2020-02-04 PROCEDURE — 1036F TOBACCO NON-USER: CPT | Performed by: INTERNAL MEDICINE

## 2020-02-04 PROCEDURE — 3074F SYST BP LT 130 MM HG: CPT | Performed by: INTERNAL MEDICINE

## 2020-02-04 PROCEDURE — 4040F PNEUMOC VAC/ADMIN/RCVD: CPT | Performed by: INTERNAL MEDICINE

## 2020-02-04 PROCEDURE — 1160F RVW MEDS BY RX/DR IN RCRD: CPT | Performed by: INTERNAL MEDICINE

## 2020-02-04 NOTE — PATIENT INSTRUCTIONS
Take pantoprazole 40mg in am,   Famotidine 20mg before bed  Gaviscon with dinner, you can take additional doses

## 2020-02-04 NOTE — PROGRESS NOTES
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, history of reflux, dyspepsia  1  Reflux and dyspepsia:  24 hour pH study did not show any significant acid reflux, symptoms did not correlate well with reflux  Manometry portion was normal  -suspect component of esophageal hypersensitivity and non ulcer dyspepsia  -patient would like to reduce PPI therapy which I think is very reasonable  -recommend pantoprazole 40 mg in the morning, famotidine 20 mg in the evening  -recommended trial of Gaviscon with meals as this may help reduce some of his eructation  -continue with dietary management  -I tried to reassure the patient that belching and flatulence were relatively benign conditions other than there social implications  -patient was instructed to give us call with any change or worsening symptoms in the future  -we will see him back in about 6 months time              _____________________________________________________________        CC: Follow-up    HPI:  Moshe Barlow is a 78 y o male who is here for follow-up  As you know this is a pleasant 29-year-old gentleman with 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, we had seen him in the past for reflux and dyspepsia  Had an esophageal manometry with 24 hour pH study which did not demonstrate any significant reflux  He has actually normal esophageal motility  Has minimal episodes of upright reflux, no episodes of recumbent reflux, his symptoms did not correlate with reflux  Patient has been on twice daily PPI therapy, H2 blockers without significant improvement in symptoms  Had also has a history of bile acid induced diarrhea, he reports that since receiving gabapentin for his lumbosacral pain his diarrhea has actually improved    He has been off of gabapentin, taking cholestyramine only as needed  Denies any melena or rectal bleeding  Reports that he still has significant flatulence, eructation at night  Though notably he had no episodes of reflux on his recent 24 hour pH study occurring while recumbent  Still reports intermittent dysphagia to certain foods      ROS:  The remainder of the ROS was negative except for the pertinent positives mentioned in HPI  Allergies: Patient has no known allergies      Medications:   Current Outpatient Medications:     cholestyramine (QUESTRAN) 4 GM/DOSE powder, Take 1 packet (4 g total) by mouth every other day (Patient taking differently: Take 4 g by mouth every other day ), Disp: 378 g, Rfl: 6    dorzolamide-timolol (COSOPT) 22 3-6 8 MG/ML ophthalmic solution, INSTILL 1 DROP INTO RIGHT EYE 3 TIMES A DAY, Disp: , Rfl: 4    GLUCOSAMINE CHONDROITIN COMPLX PO, Take 1 tablet by mouth daily 4500mg  daily , Disp: , Rfl:     latanoprost (XALATAN) 0 005 % ophthalmic solution, Administer 1 drop to the right eye , Disp: , Rfl: 4    Multiple Vitamins-Minerals (MULTIVITAL) tablet, Take 1 tablet by mouth daily Spectravite , Disp: , Rfl:     pantoprazole (PROTONIX) 40 mg tablet, Take 1 tablet (40 mg total) by mouth 2 (two) times a day, Disp: 180 tablet, Rfl: 2    tamsulosin (FLOMAX) 0 4 mg, Take 1 capsule (0 4 mg total) by mouth daily with dinner (Patient taking differently: Take 0 4 mg by mouth daily ), Disp: 90 capsule, Rfl: 3    gabapentin (NEURONTIN) 300 mg capsule, Take 1 capsule (300 mg total) by mouth 2 (two) times a day, Disp: 60 capsule, Rfl: 0    gabapentin (NEURONTIN) 300 mg capsule, Take 1 capsule (300 mg total) by mouth 2 (two) times a day (Patient not taking: Reported on 2/4/2020), Disp: 60 capsule, Rfl: 1    gabapentin (NEURONTIN) 300 mg capsule, TAKE ONE CAPSULE BY MOUTH DAILY (Patient not taking: Reported on 2/4/2020), Disp: 90 capsule, Rfl: 0    methocarbamol (ROBAXIN) 500 mg tablet, Take 1 tablet (500 mg total) by mouth 2 (two) times a day as needed for muscle spasms (Patient not taking: Reported on 2/4/2020), Disp: 60 tablet, Rfl: 0    methocarbamol (ROBAXIN) 750 mg tablet, Take 1 tablet (750 mg total) by mouth every 6 (six) hours as needed for muscle spasms (Patient not taking: Reported on 2/4/2020), Disp: 30 tablet, Rfl: 0    Past Medical History:   Diagnosis Date    Abnormal weight loss     RESOLVED: [id number]    Anemia     RESOLVED: [id number]    Atypical chest pain     RESOLVED: [id number]    BPH (benign prostatic hyperplasia)     Cancer (HCC)     DUODENAL    Luis Miguel's syndrome     Depression     RESOLVED: [id number]    Diverticulitis of colon     LAST ASSESSED: [id number]    Duodenal nodule     RESOLVED: [id number]    Gastrointestinal stromal tumor (GIST) ([address] )     MALIGNANT; RESOLVED: [id number]    GERD (gastroesophageal reflux disease)     Glaucoma     RESOLVED: [id number]    Insomnia     RESOLVED: [id number]    Lactose intolerance     LAST ASSESSED: [id number]       Past Surgical History:   Procedure Laterality Date    BACK SURGERY      BACK SURGERY      l4 s1 surgery     CERVICAL FUSION      c4 and c5    CHOLECYSTECTOMY      COLONOSCOPY  2014    kutty    ELBOW SURGERY Left     REPAIR    EYE SURGERY      KNEE ARTHROSCOPY Left     LUMBAR DISCECTOMY      L5 S1    ORIF RADIAL SHAFT FRACTURE      NE ESOPHAGOGASTRODUODENOSCOPY TRANSORAL DIAGNOSTIC N/A 11/1/2016    Procedure: ESOPHAGOGASTRODUODENOSCOPY (EGD); Surgeon: Leigh Bucio MD;  [facility]; Service: Gastroenterology    NE ESOPHAGOGASTRODUODENOSCOPY TRANSORAL DIAGNOSTIC N/A 5/31/2018    Procedure: ESOPHAGOGASTRODUODENOSCOPY (EGD); Surgeon: Leigh Bucio MD;  [facility];   Service: Gastroenterology    NE REDO EXCIS LUMBAR DISK N/A 10/24/2019    Procedure: Revision laminectomy and decompression at L5-S1;  Surgeon: Sara Kelly MD;  Location:  MAIN OR;  Service: [address] [place] [address] GIST surgery    US GUIDED MSK PROCEDURE  5/21/2019       Family History   Problem Relation Age of Onset    Thyroid disease Mother     Thyroid disease Brother     Diabetes Paternal Grandmother     Hypertension Family         reports that he has never smoked  He has never used smokeless tobacco  He reports that he drinks alcohol  He reports that he does not use drugs        Physical Exam:    /78 (BP Location: Right arm, Patient Position: Sitting, Cuff Size: Standard)   Pulse 76   Temp 97 9 °F (36 6 °C)   Ht 5' 8" (1 727 m)   Wt 83 5 kg (184 lb 2 oz)   BMI 28 00 kg/m²     Gen: wn/wd, NAD, healthy-appearing gentleman  HEENT: anicteric, MMM, no cervical LAD  CVS: RRR, no m/r/g  CHEST: CTA b/l  ABD: +BS, soft, NT,ND, no hepatosplenomegaly  EXT: no c/c/e  NEURO: aaox3  SKIN: NO rashes

## 2020-02-04 NOTE — LETTER
February 4, 2020     Colton Valles [place]  [address] [place] [address]    Patient: Hillard Shone   YOB: 1950   Date of Visit: 2/4/2020       Dear Dr Win Fischer: Thank you for referring Hillard Shone to me for evaluation  Below are my notes for this consultation  If you have questions, please do not hesitate to call me  I look forward to following your patient along with you  Sincerely,        Ana Eid MD        CC: No Recipients  Ana Eid MD  2/4/2020 10:59 AM  Sign at close encounter  [facility]  Hillard Shone 78 y o  male MRN: [id number]            Assessment & Plan:    Pleasant 73-year-old gentleman, 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, history of reflux, dyspepsia  1  Reflux and dyspepsia:  24 hour pH study did not show any significant acid reflux, symptoms did not correlate well with reflux  Manometry portion was normal  -suspect component of esophageal hypersensitivity and non ulcer dyspepsia  -patient would like to reduce PPI therapy which I think is very reasonable  -recommend pantoprazole 40 mg in the morning, famotidine 20 mg in the evening  -recommended trial of Gaviscon with meals as this may help reduce some of his eructation  -continue with dietary management  -I tried to reassure the patient that belching and flatulence were relatively benign conditions other than there social implications  -patient was instructed to give us call with any change or worsening symptoms in the future  -we will see him back in about 6 months time              _____________________________________________________________        CC: Follow-up    HPI:  Hillard Shone is a 78 y o male who is here for follow-up  As you know this is a pleasant 73-year-old gentleman with 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, we had seen him in the past for reflux and dyspepsia    Had an esophageal manometry with 24 hour pH study which did not demonstrate any significant reflux  He has actually normal esophageal motility  Has minimal episodes of upright reflux, no episodes of recumbent reflux, his symptoms did not correlate with reflux  Patient has been on twice daily PPI therapy, H2 blockers without significant improvement in symptoms  Had also has a history of bile acid induced diarrhea, he reports that since receiving gabapentin for his lumbosacral pain his diarrhea has actually improved  He has been off of gabapentin, taking cholestyramine only as needed  Denies any melena or rectal bleeding  Reports that he still has significant flatulence, eructation at night  Though notably he had no episodes of reflux on his recent 24 hour pH study occurring while recumbent  Still reports intermittent dysphagia to certain foods      ROS:  The remainder of the ROS was negative except for the pertinent positives mentioned in HPI  Allergies: Patient has no known allergies      Medications:   Current Outpatient Medications:     cholestyramine (QUESTRAN) 4 GM/DOSE powder, Take 1 packet (4 g total) by mouth every other day (Patient taking differently: Take 4 g by mouth every other day ), Disp: 378 g, Rfl: 6    dorzolamide-timolol (COSOPT) 22 3-6 8 MG/ML ophthalmic solution, INSTILL 1 DROP INTO RIGHT EYE 3 TIMES A DAY, Disp: , Rfl: 4    GLUCOSAMINE CHONDROITIN COMPLX PO, Take 1 tablet by mouth daily 4500mg  daily , Disp: , Rfl:     latanoprost (XALATAN) 0 005 % ophthalmic solution, Administer 1 drop to the right eye , Disp: , Rfl: 4    Multiple Vitamins-Minerals (MULTIVITAL) tablet, Take 1 tablet by mouth daily Spectravite , Disp: , Rfl:     pantoprazole (PROTONIX) 40 mg tablet, Take 1 tablet (40 mg total) by mouth 2 (two) times a day, Disp: 180 tablet, Rfl: 2    tamsulosin (FLOMAX) 0 4 mg, Take 1 capsule (0 4 mg total) by mouth daily with dinner (Patient taking differently: Take 0 4 mg by mouth daily ), Disp: 90 capsule, Rfl: 3    gabapentin (NEURONTIN) 300 mg capsule, Take 1 capsule (300 mg total) by mouth 2 (two) times a day, Disp: 60 capsule, Rfl: 0    gabapentin (NEURONTIN) 300 mg capsule, Take 1 capsule (300 mg total) by mouth 2 (two) times a day (Patient not taking: Reported on 2/4/2020), Disp: 60 capsule, Rfl: 1    gabapentin (NEURONTIN) 300 mg capsule, TAKE ONE CAPSULE BY MOUTH DAILY (Patient not taking: Reported on 2/4/2020), Disp: 90 capsule, Rfl: 0    methocarbamol (ROBAXIN) 500 mg tablet, Take 1 tablet (500 mg total) by mouth 2 (two) times a day as needed for muscle spasms (Patient not taking: Reported on 2/4/2020), Disp: 60 tablet, Rfl: 0    methocarbamol (ROBAXIN) 750 mg tablet, Take 1 tablet (750 mg total) by mouth every 6 (six) hours as needed for muscle spasms (Patient not taking: Reported on 2/4/2020), Disp: 30 tablet, Rfl: 0    Past Medical History:   Diagnosis Date    Abnormal weight loss     RESOLVED: [id number]    Anemia     RESOLVED: [id number]    Atypical chest pain     RESOLVED: [id number]    BPH (benign prostatic hyperplasia)     Cancer (HCC)     DUODENAL    Luis Miguel's syndrome     Depression     RESOLVED: [id number]    Diverticulitis of colon     LAST ASSESSED: [id number]    Duodenal nodule     RESOLVED: [id number]    Gastrointestinal stromal tumor (GIST) (HCC)     MALIGNANT; RESOLVED: [id number]    GERD (gastroesophageal reflux disease)     Glaucoma     RESOLVED: [id number]    Insomnia     RESOLVED: [id number]    Lactose intolerance     LAST ASSESSED: [id number]       Past Surgical History:   Procedure Laterality Date    BACK SURGERY      BACK SURGERY      l4 s1 surgery     CERVICAL FUSION      c4 and c5    CHOLECYSTECTOMY      COLONOSCOPY  2014    kutty    ELBOW SURGERY Left     REPAIR    EYE SURGERY      KNEE ARTHROSCOPY Left     LUMBAR DISCECTOMY      L5 S1    ORIF RADIAL SHAFT FRACTURE      NC ESOPHAGOGASTRODUODENOSCOPY TRANSORAL DIAGNOSTIC N/A 11/1/2016    Procedure: ESOPHAGOGASTRODUODENOSCOPY (EGD); Surgeon: Marc Trammell MD;  [facility]; Service: Gastroenterology    CO ESOPHAGOGASTRODUODENOSCOPY TRANSORAL DIAGNOSTIC N/A 5/31/2018    Procedure: ESOPHAGOGASTRODUODENOSCOPY (EGD); Surgeon: Marc Trammell MD;  [facility]; Service: Gastroenterology    CO REDO EXCIS LUMBAR DISK N/A 10/24/2019    Procedure: Revision laminectomy and decompression at L5-S1;  Surgeon: Rose Mary Khanna MD;  [facility];  Service: Orthopedics    SMALL INTESTINE SURGERY      GIST surgery    US GUIDED MSK PROCEDURE  5/21/2019       Family History   Problem Relation Age of Onset    Thyroid disease Mother     Thyroid disease Brother     Diabetes Paternal Grandmother     Hypertension Family         reports that he has never smoked  He has never used smokeless tobacco  He reports that he drinks alcohol  He reports that he does not use drugs        Physical Exam:    /78 (BP Location: Right arm, Patient Position: Sitting, Cuff Size: Standard)   Pulse 76   Temp 97 9 °F (36 6 °C)   Ht 5' 8" (1 727 m)   Wt 83 5 kg (184 lb 2 oz)   BMI 28 00 kg/m²      Gen: wn/wd, NAD, healthy-appearing gentleman  HEENT: anicteric, MMM, no cervical LAD  CVS: RRR, no m/r/g  CHEST: CTA b/l  ABD: +BS, soft, NT,ND, no hepatosplenomegaly  EXT: no c/c/e  NEURO: aaox3  SKIN: NO rashes

## 2020-04-02 ENCOUNTER — APPOINTMENT (OUTPATIENT)
Dept: LAB | Age: 70
End: 2020-04-02
Payer: COMMERCIAL

## 2020-04-02 DIAGNOSIS — E78.1 HYPERTRIGLYCERIDEMIA: ICD-10-CM

## 2020-04-02 DIAGNOSIS — Z85.09 ENCOUNTER FOR FOLLOW-UP SURVEILLANCE OF MALIGNANT GASTROINTESTINAL STROMAL TUMOR (GIST): ICD-10-CM

## 2020-04-02 DIAGNOSIS — E78.5 HYPERLIPIDEMIA, UNSPECIFIED HYPERLIPIDEMIA TYPE: ICD-10-CM

## 2020-04-02 DIAGNOSIS — Z08 ENCOUNTER FOR FOLLOW-UP SURVEILLANCE OF MALIGNANT GASTROINTESTINAL STROMAL TUMOR (GIST): ICD-10-CM

## 2020-04-02 LAB
ALBUMIN SERPL BCP-MCNC: 4 G/DL (ref 3.5–5)
ALP SERPL-CCNC: 89 U/L (ref 46–116)
ALT SERPL W P-5'-P-CCNC: 28 U/L (ref 12–78)
ANION GAP SERPL CALCULATED.3IONS-SCNC: 2 MMOL/L (ref 4–13)
AST SERPL W P-5'-P-CCNC: 19 U/L (ref 5–45)
BILIRUB SERPL-MCNC: 0.81 MG/DL (ref 0.2–1)
BUN SERPL-MCNC: 19 MG/DL (ref 5–25)
CALCIUM SERPL-MCNC: 9.1 MG/DL (ref 8.3–10.1)
CHLORIDE SERPL-SCNC: 103 MMOL/L (ref 100–108)
CHOLEST SERPL-MCNC: 194 MG/DL (ref 50–200)
CO2 SERPL-SCNC: 32 MMOL/L (ref 21–32)
CREAT SERPL-MCNC: 0.91 MG/DL (ref 0.6–1.3)
EST. AVERAGE GLUCOSE BLD GHB EST-MCNC: 97 MG/DL
GFR SERPL CREATININE-BSD FRML MDRD: 86 ML/MIN/1.73SQ M
GLUCOSE P FAST SERPL-MCNC: 99 MG/DL (ref 65–99)
HBA1C MFR BLD: 5 %
HDLC SERPL-MCNC: 40 MG/DL
LDLC SERPL CALC-MCNC: 112 MG/DL (ref 0–100)
POTASSIUM SERPL-SCNC: 4.3 MMOL/L (ref 3.5–5.3)
PROT SERPL-MCNC: 7.3 G/DL (ref 6.4–8.2)
SODIUM SERPL-SCNC: 137 MMOL/L (ref 136–145)
TRIGL SERPL-MCNC: 211 MG/DL

## 2020-04-02 PROCEDURE — 80053 COMPREHEN METABOLIC PANEL: CPT

## 2020-04-02 PROCEDURE — 80061 LIPID PANEL: CPT

## 2020-04-02 PROCEDURE — 36415 COLL VENOUS BLD VENIPUNCTURE: CPT

## 2020-04-02 PROCEDURE — 83036 HEMOGLOBIN GLYCOSYLATED A1C: CPT

## 2020-04-23 ENCOUNTER — TRANSCRIBE ORDERS (OUTPATIENT)
Dept: RADIOLOGY | Facility: HOSPITAL | Age: 70
End: 2020-04-23

## 2020-04-23 ENCOUNTER — HOSPITAL ENCOUNTER (OUTPATIENT)
Dept: RADIOLOGY | Facility: HOSPITAL | Age: 70
Discharge: HOME | End: 2020-04-23
Attending: SURGERY
Payer: COMMERCIAL

## 2020-04-23 DIAGNOSIS — Z85.09 ENCOUNTER FOR FOLLOW-UP SURVEILLANCE OF MALIGNANT GASTROINTESTINAL STROMAL TUMOR (GIST): ICD-10-CM

## 2020-04-23 DIAGNOSIS — Z08 ENCOUNTER FOR FOLLOW-UP SURVEILLANCE OF MALIGNANT GASTROINTESTINAL STROMAL TUMOR (GIST): ICD-10-CM

## 2020-04-23 PROCEDURE — 71260 CT THORAX DX C+: CPT

## 2020-04-23 PROCEDURE — 74177 CT ABD & PELVIS W/CONTRAST: CPT

## 2020-04-23 RX ADMIN — IOHEXOL 100 ML: 350 INJECTION, SOLUTION INTRAVENOUS at 14:04

## 2020-04-30 ENCOUNTER — OFFICE VISIT (OUTPATIENT)
Dept: OBGYN CLINIC | Facility: HOSPITAL | Age: 70
End: 2020-04-30
Payer: COMMERCIAL

## 2020-04-30 VITALS
HEART RATE: 81 BPM | BODY MASS INDEX: 27.58 KG/M2 | SYSTOLIC BLOOD PRESSURE: 127 MMHG | WEIGHT: 181.4 LBS | DIASTOLIC BLOOD PRESSURE: 85 MMHG

## 2020-04-30 DIAGNOSIS — M71.22 BAKER CYST, LEFT: Primary | ICD-10-CM

## 2020-04-30 DIAGNOSIS — Z01.818 PREOPERATIVE TESTING: ICD-10-CM

## 2020-04-30 DIAGNOSIS — M17.12 PRIMARY OSTEOARTHRITIS OF LEFT KNEE: ICD-10-CM

## 2020-04-30 PROCEDURE — 20610 DRAIN/INJ JOINT/BURSA W/O US: CPT | Performed by: ORTHOPAEDIC SURGERY

## 2020-04-30 PROCEDURE — 1036F TOBACCO NON-USER: CPT | Performed by: ORTHOPAEDIC SURGERY

## 2020-04-30 PROCEDURE — 1160F RVW MEDS BY RX/DR IN RCRD: CPT | Performed by: ORTHOPAEDIC SURGERY

## 2020-04-30 PROCEDURE — 4040F PNEUMOC VAC/ADMIN/RCVD: CPT | Performed by: ORTHOPAEDIC SURGERY

## 2020-04-30 PROCEDURE — 99214 OFFICE O/P EST MOD 30 MIN: CPT | Performed by: ORTHOPAEDIC SURGERY

## 2020-04-30 PROCEDURE — 3079F DIAST BP 80-89 MM HG: CPT | Performed by: ORTHOPAEDIC SURGERY

## 2020-04-30 PROCEDURE — 3074F SYST BP LT 130 MM HG: CPT | Performed by: ORTHOPAEDIC SURGERY

## 2020-04-30 RX ORDER — CEFAZOLIN SODIUM 2 G/50ML
2000 SOLUTION INTRAVENOUS ONCE
Status: CANCELLED | OUTPATIENT
Start: 2020-04-30 | End: 2020-04-30

## 2020-04-30 RX ORDER — BUPIVACAINE HYDROCHLORIDE 2.5 MG/ML
2 INJECTION, SOLUTION INFILTRATION; PERINEURAL
Status: COMPLETED | OUTPATIENT
Start: 2020-04-30 | End: 2020-04-30

## 2020-04-30 RX ADMIN — BUPIVACAINE HYDROCHLORIDE 2 ML: 2.5 INJECTION, SOLUTION INFILTRATION; PERINEURAL at 14:13

## 2020-05-01 ENCOUNTER — TELEPHONE (OUTPATIENT)
Dept: OBGYN CLINIC | Facility: HOSPITAL | Age: 70
End: 2020-05-01

## 2020-05-01 ENCOUNTER — APPOINTMENT (OUTPATIENT)
Dept: LAB | Age: 70
End: 2020-05-01
Payer: COMMERCIAL

## 2020-05-01 DIAGNOSIS — M71.22 BAKER CYST, LEFT: ICD-10-CM

## 2020-05-01 DIAGNOSIS — Z01.818 PREOPERATIVE TESTING: ICD-10-CM

## 2020-05-01 LAB
ALBUMIN SERPL BCP-MCNC: 3.9 G/DL (ref 3.5–5)
ALP SERPL-CCNC: 94 U/L (ref 46–116)
ALT SERPL W P-5'-P-CCNC: 25 U/L (ref 12–78)
ANION GAP SERPL CALCULATED.3IONS-SCNC: 5 MMOL/L (ref 4–13)
APTT PPP: 37 SECONDS (ref 23–37)
AST SERPL W P-5'-P-CCNC: 17 U/L (ref 5–45)
BASOPHILS # BLD AUTO: 0.04 THOUSANDS/ΜL (ref 0–0.1)
BASOPHILS NFR BLD AUTO: 1 % (ref 0–1)
BILIRUB SERPL-MCNC: 0.79 MG/DL (ref 0.2–1)
BUN SERPL-MCNC: 16 MG/DL (ref 5–25)
CALCIUM ALBUM COR SERPL-MCNC: 10.6 MG/DL (ref 8.3–10.1)
CALCIUM SERPL-MCNC: 10.5 MG/DL (ref 8.3–10.1)
CHLORIDE SERPL-SCNC: 104 MMOL/L (ref 100–108)
CO2 SERPL-SCNC: 31 MMOL/L (ref 21–32)
CREAT SERPL-MCNC: 1 MG/DL (ref 0.6–1.3)
EOSINOPHIL # BLD AUTO: 0.1 THOUSAND/ΜL (ref 0–0.61)
EOSINOPHIL NFR BLD AUTO: 2 % (ref 0–6)
ERYTHROCYTE [DISTWIDTH] IN BLOOD BY AUTOMATED COUNT: 12.1 % (ref 11.6–15.1)
GFR SERPL CREATININE-BSD FRML MDRD: 77 ML/MIN/1.73SQ M
GLUCOSE P FAST SERPL-MCNC: 85 MG/DL (ref 65–99)
HCT VFR BLD AUTO: 46.1 % (ref 36.5–49.3)
HGB BLD-MCNC: 15.9 G/DL (ref 12–17)
IMM GRANULOCYTES # BLD AUTO: 0.02 THOUSAND/UL (ref 0–0.2)
IMM GRANULOCYTES NFR BLD AUTO: 0 % (ref 0–2)
INR PPP: 1 (ref 0.84–1.19)
LYMPHOCYTES # BLD AUTO: 1.02 THOUSANDS/ΜL (ref 0.6–4.47)
LYMPHOCYTES NFR BLD AUTO: 17 % (ref 14–44)
MCH RBC QN AUTO: 31.5 PG (ref 26.8–34.3)
MCHC RBC AUTO-ENTMCNC: 34.5 G/DL (ref 31.4–37.4)
MCV RBC AUTO: 92 FL (ref 82–98)
MONOCYTES # BLD AUTO: 0.56 THOUSAND/ΜL (ref 0.17–1.22)
MONOCYTES NFR BLD AUTO: 9 % (ref 4–12)
NEUTROPHILS # BLD AUTO: 4.41 THOUSANDS/ΜL (ref 1.85–7.62)
NEUTS SEG NFR BLD AUTO: 71 % (ref 43–75)
NRBC BLD AUTO-RTO: 0 /100 WBCS
PLATELET # BLD AUTO: 156 THOUSANDS/UL (ref 149–390)
PMV BLD AUTO: 12 FL (ref 8.9–12.7)
POTASSIUM SERPL-SCNC: 4.4 MMOL/L (ref 3.5–5.3)
PROT SERPL-MCNC: 7.6 G/DL (ref 6.4–8.2)
PROTHROMBIN TIME: 12.8 SECONDS (ref 11.6–14.5)
RBC # BLD AUTO: 5.04 MILLION/UL (ref 3.88–5.62)
SODIUM SERPL-SCNC: 140 MMOL/L (ref 136–145)
WBC # BLD AUTO: 6.15 THOUSAND/UL (ref 4.31–10.16)

## 2020-05-01 PROCEDURE — 36415 COLL VENOUS BLD VENIPUNCTURE: CPT

## 2020-05-01 PROCEDURE — 80053 COMPREHEN METABOLIC PANEL: CPT

## 2020-05-01 PROCEDURE — 85730 THROMBOPLASTIN TIME PARTIAL: CPT

## 2020-05-01 PROCEDURE — 85610 PROTHROMBIN TIME: CPT

## 2020-05-01 PROCEDURE — 85025 COMPLETE CBC W/AUTO DIFF WBC: CPT

## 2020-05-07 ENCOUNTER — CONSULT (OUTPATIENT)
Dept: INTERNAL MEDICINE CLINIC | Facility: CLINIC | Age: 70
End: 2020-05-07
Payer: COMMERCIAL

## 2020-05-07 VITALS
HEIGHT: 68 IN | TEMPERATURE: 98.5 F | OXYGEN SATURATION: 99 % | HEART RATE: 84 BPM | BODY MASS INDEX: 27.86 KG/M2 | WEIGHT: 183.8 LBS | DIASTOLIC BLOOD PRESSURE: 82 MMHG | RESPIRATION RATE: 16 BRPM | SYSTOLIC BLOOD PRESSURE: 122 MMHG

## 2020-05-07 DIAGNOSIS — H18.519 FUCHS' CORNEAL DYSTROPHY: Primary | ICD-10-CM

## 2020-05-07 DIAGNOSIS — Z01.818 PREOPERATIVE TESTING: ICD-10-CM

## 2020-05-07 DIAGNOSIS — M71.22 BAKER CYST, LEFT: ICD-10-CM

## 2020-05-07 PROCEDURE — 93000 ELECTROCARDIOGRAM COMPLETE: CPT | Performed by: INTERNAL MEDICINE

## 2020-05-07 PROCEDURE — 99214 OFFICE O/P EST MOD 30 MIN: CPT | Performed by: INTERNAL MEDICINE

## 2020-05-11 ENCOUNTER — TELEPHONE (OUTPATIENT)
Dept: SURGICAL ONCOLOGY | Facility: CLINIC | Age: 70
End: 2020-05-11

## 2020-05-13 ENCOUNTER — OFFICE VISIT (OUTPATIENT)
Dept: SURGICAL ONCOLOGY | Facility: CLINIC | Age: 70
End: 2020-05-13
Payer: COMMERCIAL

## 2020-05-13 VITALS
BODY MASS INDEX: 28.04 KG/M2 | WEIGHT: 185 LBS | RESPIRATION RATE: 16 BRPM | TEMPERATURE: 98.1 F | HEART RATE: 76 BPM | SYSTOLIC BLOOD PRESSURE: 120 MMHG | HEIGHT: 68 IN | DIASTOLIC BLOOD PRESSURE: 80 MMHG

## 2020-05-13 DIAGNOSIS — Z08 ENCOUNTER FOR FOLLOW-UP SURVEILLANCE OF MALIGNANT GASTROINTESTINAL STROMAL TUMOR (GIST): Primary | ICD-10-CM

## 2020-05-13 DIAGNOSIS — Z85.09 HISTORY OF GASTROINTESTINAL STROMAL TUMOR (GIST): ICD-10-CM

## 2020-05-13 DIAGNOSIS — Z85.09 ENCOUNTER FOR FOLLOW-UP SURVEILLANCE OF MALIGNANT GASTROINTESTINAL STROMAL TUMOR (GIST): Primary | ICD-10-CM

## 2020-05-13 PROCEDURE — 1036F TOBACCO NON-USER: CPT | Performed by: SURGERY

## 2020-05-13 PROCEDURE — 3008F BODY MASS INDEX DOCD: CPT | Performed by: SURGERY

## 2020-05-13 PROCEDURE — 1160F RVW MEDS BY RX/DR IN RCRD: CPT | Performed by: SURGERY

## 2020-05-13 PROCEDURE — 3079F DIAST BP 80-89 MM HG: CPT | Performed by: SURGERY

## 2020-05-13 PROCEDURE — 4040F PNEUMOC VAC/ADMIN/RCVD: CPT | Performed by: SURGERY

## 2020-05-13 PROCEDURE — 99214 OFFICE O/P EST MOD 30 MIN: CPT | Performed by: SURGERY

## 2020-05-13 PROCEDURE — 3074F SYST BP LT 130 MM HG: CPT | Performed by: SURGERY

## 2020-05-14 ENCOUNTER — APPOINTMENT (OUTPATIENT)
Dept: LAB | Age: 70
End: 2020-05-14
Payer: COMMERCIAL

## 2020-05-14 ENCOUNTER — TELEPHONE (OUTPATIENT)
Dept: SURGICAL ONCOLOGY | Facility: CLINIC | Age: 70
End: 2020-05-14

## 2020-05-14 DIAGNOSIS — Z12.5 SCREENING FOR PROSTATE CANCER: ICD-10-CM

## 2020-05-14 DIAGNOSIS — Z08 ENCOUNTER FOR FOLLOW-UP SURVEILLANCE OF MALIGNANT GASTROINTESTINAL STROMAL TUMOR (GIST): ICD-10-CM

## 2020-05-14 DIAGNOSIS — Z85.09 ENCOUNTER FOR FOLLOW-UP SURVEILLANCE OF MALIGNANT GASTROINTESTINAL STROMAL TUMOR (GIST): ICD-10-CM

## 2020-05-14 LAB
CALCIUM SERPL-MCNC: 9.7 MG/DL (ref 8.3–10.1)
PSA SERPL-MCNC: 1.3 NG/ML (ref 0–4)
PTH-INTACT SERPL-MCNC: 29.3 PG/ML (ref 18.4–80.1)

## 2020-05-14 PROCEDURE — 82310 ASSAY OF CALCIUM: CPT

## 2020-05-14 PROCEDURE — 83970 ASSAY OF PARATHORMONE: CPT

## 2020-05-14 PROCEDURE — 36415 COLL VENOUS BLD VENIPUNCTURE: CPT

## 2020-05-14 PROCEDURE — G0103 PSA SCREENING: HCPCS

## 2020-05-15 ENCOUNTER — TELEPHONE (OUTPATIENT)
Dept: OBGYN CLINIC | Facility: HOSPITAL | Age: 70
End: 2020-05-15

## 2020-05-27 ENCOUNTER — TELEPHONE (OUTPATIENT)
Dept: OBGYN CLINIC | Facility: HOSPITAL | Age: 70
End: 2020-05-27

## 2020-05-29 DIAGNOSIS — Z11.59 SCREENING FOR VIRAL DISEASE: ICD-10-CM

## 2020-05-29 PROCEDURE — U0003 INFECTIOUS AGENT DETECTION BY NUCLEIC ACID (DNA OR RNA); SEVERE ACUTE RESPIRATORY SYNDROME CORONAVIRUS 2 (SARS-COV-2) (CORONAVIRUS DISEASE [COVID-19]), AMPLIFIED PROBE TECHNIQUE, MAKING USE OF HIGH THROUGHPUT TECHNOLOGIES AS DESCRIBED BY CMS-2020-01-R: HCPCS

## 2020-05-29 RX ORDER — FAMOTIDINE 20 MG/1
20 TABLET, FILM COATED ORAL DAILY
COMMUNITY
End: 2020-12-18

## 2020-05-31 LAB — SARS-COV-2 RNA SPEC QL NAA+PROBE: NOT DETECTED

## 2020-06-02 ENCOUNTER — TELEPHONE (OUTPATIENT)
Dept: INTERNAL MEDICINE CLINIC | Facility: CLINIC | Age: 70
End: 2020-06-02

## 2020-06-02 ENCOUNTER — OFFICE VISIT (OUTPATIENT)
Dept: UROLOGY | Facility: AMBULATORY SURGERY CENTER | Age: 70
End: 2020-06-02
Payer: COMMERCIAL

## 2020-06-02 VITALS
TEMPERATURE: 98.2 F | HEART RATE: 78 BPM | BODY MASS INDEX: 27.28 KG/M2 | SYSTOLIC BLOOD PRESSURE: 120 MMHG | WEIGHT: 180 LBS | DIASTOLIC BLOOD PRESSURE: 62 MMHG | HEIGHT: 68 IN

## 2020-06-02 DIAGNOSIS — N40.0 ENLARGED PROSTATE WITHOUT LOWER URINARY TRACT SYMPTOMS (LUTS): ICD-10-CM

## 2020-06-02 DIAGNOSIS — Z12.5 PROSTATE CANCER SCREENING: Primary | ICD-10-CM

## 2020-06-02 LAB
POST-VOID RESIDUAL VOLUME, ML POC: 0 ML
SL AMB  POCT GLUCOSE, UA: NORMAL
SL AMB LEUKOCYTE ESTERASE,UA: NORMAL
SL AMB POCT BILIRUBIN,UA: NORMAL
SL AMB POCT BLOOD,UA: NORMAL
SL AMB POCT CLARITY,UA: CLEAR
SL AMB POCT COLOR,UA: YELLOW
SL AMB POCT KETONES,UA: NORMAL
SL AMB POCT NITRITE,UA: NORMAL
SL AMB POCT PH,UA: 6
SL AMB POCT SPECIFIC GRAVITY,UA: 1.03
SL AMB POCT URINE PROTEIN: NORMAL
SL AMB POCT UROBILINOGEN: 0.2

## 2020-06-02 PROCEDURE — 81002 URINALYSIS NONAUTO W/O SCOPE: CPT | Performed by: NURSE PRACTITIONER

## 2020-06-02 PROCEDURE — 1036F TOBACCO NON-USER: CPT | Performed by: NURSE PRACTITIONER

## 2020-06-02 PROCEDURE — 4040F PNEUMOC VAC/ADMIN/RCVD: CPT | Performed by: NURSE PRACTITIONER

## 2020-06-02 PROCEDURE — 51798 US URINE CAPACITY MEASURE: CPT | Performed by: NURSE PRACTITIONER

## 2020-06-02 PROCEDURE — 1160F RVW MEDS BY RX/DR IN RCRD: CPT | Performed by: NURSE PRACTITIONER

## 2020-06-02 PROCEDURE — 99213 OFFICE O/P EST LOW 20 MIN: CPT | Performed by: NURSE PRACTITIONER

## 2020-06-02 PROCEDURE — 3008F BODY MASS INDEX DOCD: CPT | Performed by: NURSE PRACTITIONER

## 2020-06-02 PROCEDURE — 3074F SYST BP LT 130 MM HG: CPT | Performed by: NURSE PRACTITIONER

## 2020-06-02 PROCEDURE — 3078F DIAST BP <80 MM HG: CPT | Performed by: NURSE PRACTITIONER

## 2020-06-02 RX ORDER — TAMSULOSIN HYDROCHLORIDE 0.4 MG/1
0.8 CAPSULE ORAL
Qty: 180 CAPSULE | Refills: 3 | Status: SHIPPED | OUTPATIENT
Start: 2020-06-02 | End: 2021-07-16 | Stop reason: SDUPTHER

## 2020-06-04 ENCOUNTER — ANESTHESIA EVENT (OUTPATIENT)
Dept: PERIOP | Facility: HOSPITAL | Age: 70
End: 2020-06-04
Payer: COMMERCIAL

## 2020-06-05 ENCOUNTER — ANESTHESIA (OUTPATIENT)
Dept: PERIOP | Facility: HOSPITAL | Age: 70
End: 2020-06-05
Payer: COMMERCIAL

## 2020-06-05 ENCOUNTER — HOSPITAL ENCOUNTER (OUTPATIENT)
Facility: HOSPITAL | Age: 70
Setting detail: OUTPATIENT SURGERY
Discharge: HOME | End: 2020-06-05
Attending: ORTHOPAEDIC SURGERY | Admitting: ORTHOPAEDIC SURGERY
Payer: COMMERCIAL

## 2020-06-05 VITALS
SYSTOLIC BLOOD PRESSURE: 112 MMHG | HEIGHT: 68 IN | RESPIRATION RATE: 18 BRPM | TEMPERATURE: 96.6 F | BODY MASS INDEX: 27.28 KG/M2 | DIASTOLIC BLOOD PRESSURE: 66 MMHG | OXYGEN SATURATION: 99 % | HEART RATE: 72 BPM | WEIGHT: 180 LBS

## 2020-06-05 DIAGNOSIS — Z98.890 STATUS POST SURGERY: Primary | ICD-10-CM

## 2020-06-05 DIAGNOSIS — M71.22 BAKER CYST, LEFT: ICD-10-CM

## 2020-06-05 PROCEDURE — 88304 TISSUE EXAM BY PATHOLOGIST: CPT | Performed by: PATHOLOGY

## 2020-06-05 PROCEDURE — 27345 REMOVAL OF KNEE CYST: CPT | Performed by: ORTHOPAEDIC SURGERY

## 2020-06-05 RX ORDER — LIDOCAINE HYDROCHLORIDE 10 MG/ML
0.5 INJECTION, SOLUTION EPIDURAL; INFILTRATION; INTRACAUDAL; PERINEURAL ONCE AS NEEDED
Status: COMPLETED | OUTPATIENT
Start: 2020-06-05 | End: 2020-06-05

## 2020-06-05 RX ORDER — EPHEDRINE SULFATE 50 MG/ML
INJECTION INTRAVENOUS AS NEEDED
Status: DISCONTINUED | OUTPATIENT
Start: 2020-06-05 | End: 2020-06-05 | Stop reason: SURG

## 2020-06-05 RX ORDER — SODIUM CHLORIDE, SODIUM LACTATE, POTASSIUM CHLORIDE, CALCIUM CHLORIDE 600; 310; 30; 20 MG/100ML; MG/100ML; MG/100ML; MG/100ML
50 INJECTION, SOLUTION INTRAVENOUS CONTINUOUS
Status: DISCONTINUED | OUTPATIENT
Start: 2020-06-05 | End: 2020-06-05 | Stop reason: HOSPADM

## 2020-06-05 RX ORDER — FENTANYL CITRATE/PF 50 MCG/ML
50 SYRINGE (ML) INJECTION
Status: COMPLETED | OUTPATIENT
Start: 2020-06-05 | End: 2020-06-05

## 2020-06-05 RX ORDER — MAGNESIUM HYDROXIDE 1200 MG/15ML
LIQUID ORAL AS NEEDED
Status: DISCONTINUED | OUTPATIENT
Start: 2020-06-05 | End: 2020-06-05 | Stop reason: HOSPADM

## 2020-06-05 RX ORDER — OXYCODONE HYDROCHLORIDE 5 MG/1
TABLET ORAL
Qty: 20 TABLET | Refills: 0 | Status: SHIPPED | OUTPATIENT
Start: 2020-06-05 | End: 2020-07-16

## 2020-06-05 RX ORDER — ONDANSETRON 2 MG/ML
4 INJECTION INTRAMUSCULAR; INTRAVENOUS ONCE AS NEEDED
Status: DISCONTINUED | OUTPATIENT
Start: 2020-06-05 | End: 2020-06-05 | Stop reason: HOSPADM

## 2020-06-05 RX ORDER — FENTANYL CITRATE 50 UG/ML
INJECTION, SOLUTION INTRAMUSCULAR; INTRAVENOUS AS NEEDED
Status: DISCONTINUED | OUTPATIENT
Start: 2020-06-05 | End: 2020-06-05 | Stop reason: SURG

## 2020-06-05 RX ORDER — CEFAZOLIN SODIUM 2 G/50ML
2000 SOLUTION INTRAVENOUS ONCE
Status: COMPLETED | OUTPATIENT
Start: 2020-06-05 | End: 2020-06-05

## 2020-06-05 RX ORDER — ONDANSETRON 2 MG/ML
4 INJECTION INTRAMUSCULAR; INTRAVENOUS EVERY 6 HOURS PRN
Status: DISCONTINUED | OUTPATIENT
Start: 2020-06-05 | End: 2020-06-05 | Stop reason: HOSPADM

## 2020-06-05 RX ORDER — ACETAMINOPHEN 325 MG/1
650 TABLET ORAL EVERY 6 HOURS PRN
Status: DISCONTINUED | OUTPATIENT
Start: 2020-06-05 | End: 2020-06-05 | Stop reason: HOSPADM

## 2020-06-05 RX ORDER — METOCLOPRAMIDE HYDROCHLORIDE 5 MG/ML
10 INJECTION INTRAMUSCULAR; INTRAVENOUS ONCE AS NEEDED
Status: DISCONTINUED | OUTPATIENT
Start: 2020-06-05 | End: 2020-06-05 | Stop reason: HOSPADM

## 2020-06-05 RX ORDER — MIDAZOLAM HYDROCHLORIDE 2 MG/2ML
INJECTION, SOLUTION INTRAMUSCULAR; INTRAVENOUS AS NEEDED
Status: DISCONTINUED | OUTPATIENT
Start: 2020-06-05 | End: 2020-06-05 | Stop reason: SURG

## 2020-06-05 RX ORDER — OXYCODONE HYDROCHLORIDE 5 MG/1
5 TABLET ORAL EVERY 4 HOURS PRN
Status: DISCONTINUED | OUTPATIENT
Start: 2020-06-05 | End: 2020-06-05 | Stop reason: HOSPADM

## 2020-06-05 RX ORDER — PROPOFOL 10 MG/ML
INJECTION, EMULSION INTRAVENOUS AS NEEDED
Status: DISCONTINUED | OUTPATIENT
Start: 2020-06-05 | End: 2020-06-05 | Stop reason: SURG

## 2020-06-05 RX ORDER — ONDANSETRON 2 MG/ML
INJECTION INTRAMUSCULAR; INTRAVENOUS AS NEEDED
Status: DISCONTINUED | OUTPATIENT
Start: 2020-06-05 | End: 2020-06-05 | Stop reason: SURG

## 2020-06-05 RX ORDER — DEXAMETHASONE SODIUM PHOSPHATE 10 MG/ML
INJECTION, SOLUTION INTRAMUSCULAR; INTRAVENOUS AS NEEDED
Status: DISCONTINUED | OUTPATIENT
Start: 2020-06-05 | End: 2020-06-05 | Stop reason: SURG

## 2020-06-05 RX ADMIN — LIDOCAINE HYDROCHLORIDE 0.2 ML: 10 INJECTION, SOLUTION EPIDURAL; INFILTRATION; INTRACAUDAL; PERINEURAL at 09:55

## 2020-06-05 RX ADMIN — FENTANYL CITRATE 50 MCG: 50 INJECTION, SOLUTION INTRAMUSCULAR; INTRAVENOUS at 14:53

## 2020-06-05 RX ADMIN — FENTANYL CITRATE 50 MCG: 50 INJECTION, SOLUTION INTRAMUSCULAR; INTRAVENOUS at 14:33

## 2020-06-05 RX ADMIN — SODIUM CHLORIDE, SODIUM LACTATE, POTASSIUM CHLORIDE, AND CALCIUM CHLORIDE: .6; .31; .03; .02 INJECTION, SOLUTION INTRAVENOUS at 12:07

## 2020-06-05 RX ADMIN — MIDAZOLAM 2 MG: 1 INJECTION INTRAMUSCULAR; INTRAVENOUS at 12:16

## 2020-06-05 RX ADMIN — DEXAMETHASONE SODIUM PHOSPHATE 5 MG: 10 INJECTION, SOLUTION INTRAMUSCULAR; INTRAVENOUS at 13:41

## 2020-06-05 RX ADMIN — SODIUM CHLORIDE, SODIUM LACTATE, POTASSIUM CHLORIDE, AND CALCIUM CHLORIDE 50 ML/HR: .6; .31; .03; .02 INJECTION, SOLUTION INTRAVENOUS at 09:55

## 2020-06-05 RX ADMIN — ONDANSETRON 4 MG: 2 INJECTION INTRAMUSCULAR; INTRAVENOUS at 13:41

## 2020-06-05 RX ADMIN — CEFAZOLIN SODIUM 2000 MG: 2 SOLUTION INTRAVENOUS at 12:33

## 2020-06-05 RX ADMIN — EPHEDRINE SULFATE 5 MG: 50 INJECTION, SOLUTION INTRAVENOUS at 12:57

## 2020-06-05 RX ADMIN — FENTANYL CITRATE 25 MCG: 50 INJECTION, SOLUTION INTRAMUSCULAR; INTRAVENOUS at 12:49

## 2020-06-05 RX ADMIN — PROPOFOL 200 MG: 10 INJECTION, EMULSION INTRAVENOUS at 12:27

## 2020-06-05 RX ADMIN — FENTANYL CITRATE 50 MCG: 50 INJECTION, SOLUTION INTRAMUSCULAR; INTRAVENOUS at 14:43

## 2020-06-05 RX ADMIN — FENTANYL CITRATE 50 MCG: 50 INJECTION, SOLUTION INTRAMUSCULAR; INTRAVENOUS at 12:20

## 2020-06-05 RX ADMIN — FENTANYL CITRATE 50 MCG: 50 INJECTION, SOLUTION INTRAMUSCULAR; INTRAVENOUS at 14:22

## 2020-06-05 RX ADMIN — FENTANYL CITRATE 25 MCG: 50 INJECTION, SOLUTION INTRAMUSCULAR; INTRAVENOUS at 13:04

## 2020-06-11 ENCOUNTER — OFFICE VISIT (OUTPATIENT)
Dept: OBGYN CLINIC | Facility: HOSPITAL | Age: 70
End: 2020-06-11

## 2020-06-11 ENCOUNTER — TELEPHONE (OUTPATIENT)
Dept: INTERNAL MEDICINE CLINIC | Facility: CLINIC | Age: 70
End: 2020-06-11

## 2020-06-11 ENCOUNTER — TELEPHONE (OUTPATIENT)
Dept: OBGYN CLINIC | Facility: HOSPITAL | Age: 70
End: 2020-06-11

## 2020-06-11 VITALS
SYSTOLIC BLOOD PRESSURE: 125 MMHG | BODY MASS INDEX: 27.28 KG/M2 | WEIGHT: 179.4 LBS | DIASTOLIC BLOOD PRESSURE: 78 MMHG | HEART RATE: 64 BPM

## 2020-06-11 DIAGNOSIS — Z98.890 STATUS POST SURGERY: Primary | ICD-10-CM

## 2020-06-11 PROCEDURE — 1160F RVW MEDS BY RX/DR IN RCRD: CPT | Performed by: ORTHOPAEDIC SURGERY

## 2020-06-11 PROCEDURE — 3074F SYST BP LT 130 MM HG: CPT | Performed by: ORTHOPAEDIC SURGERY

## 2020-06-11 PROCEDURE — 99024 POSTOP FOLLOW-UP VISIT: CPT | Performed by: ORTHOPAEDIC SURGERY

## 2020-06-11 PROCEDURE — 3078F DIAST BP <80 MM HG: CPT | Performed by: ORTHOPAEDIC SURGERY

## 2020-06-11 PROCEDURE — 4040F PNEUMOC VAC/ADMIN/RCVD: CPT | Performed by: ORTHOPAEDIC SURGERY

## 2020-06-16 ENCOUNTER — EVALUATION (OUTPATIENT)
Dept: PHYSICAL THERAPY | Facility: REHABILITATION | Age: 70
End: 2020-06-16
Payer: COMMERCIAL

## 2020-06-16 DIAGNOSIS — Z98.890 STATUS POST SURGERY: Primary | ICD-10-CM

## 2020-06-16 DIAGNOSIS — M71.22 BAKER CYST, LEFT: ICD-10-CM

## 2020-06-16 PROCEDURE — 97161 PT EVAL LOW COMPLEX 20 MIN: CPT | Performed by: PHYSICAL THERAPIST

## 2020-06-18 ENCOUNTER — OFFICE VISIT (OUTPATIENT)
Dept: OBGYN CLINIC | Facility: HOSPITAL | Age: 70
End: 2020-06-18

## 2020-06-18 VITALS
WEIGHT: 178.8 LBS | BODY MASS INDEX: 27.19 KG/M2 | SYSTOLIC BLOOD PRESSURE: 103 MMHG | DIASTOLIC BLOOD PRESSURE: 68 MMHG | HEART RATE: 66 BPM

## 2020-06-18 DIAGNOSIS — Z98.890 STATUS POST SURGERY: Primary | ICD-10-CM

## 2020-06-18 PROCEDURE — 4040F PNEUMOC VAC/ADMIN/RCVD: CPT | Performed by: ORTHOPAEDIC SURGERY

## 2020-06-18 PROCEDURE — 99024 POSTOP FOLLOW-UP VISIT: CPT | Performed by: ORTHOPAEDIC SURGERY

## 2020-06-18 PROCEDURE — 1160F RVW MEDS BY RX/DR IN RCRD: CPT | Performed by: ORTHOPAEDIC SURGERY

## 2020-06-18 PROCEDURE — 3074F SYST BP LT 130 MM HG: CPT | Performed by: ORTHOPAEDIC SURGERY

## 2020-06-18 PROCEDURE — 3078F DIAST BP <80 MM HG: CPT | Performed by: ORTHOPAEDIC SURGERY

## 2020-06-19 ENCOUNTER — OFFICE VISIT (OUTPATIENT)
Dept: PHYSICAL THERAPY | Facility: REHABILITATION | Age: 70
End: 2020-06-19
Payer: COMMERCIAL

## 2020-06-19 DIAGNOSIS — Z98.890 STATUS POST SURGERY: Primary | ICD-10-CM

## 2020-06-19 DIAGNOSIS — M71.22 BAKER CYST, LEFT: ICD-10-CM

## 2020-06-19 PROCEDURE — 97140 MANUAL THERAPY 1/> REGIONS: CPT | Performed by: PHYSICAL THERAPIST

## 2020-06-19 PROCEDURE — 97110 THERAPEUTIC EXERCISES: CPT | Performed by: PHYSICAL THERAPIST

## 2020-06-20 NOTE — TELEPHONE ENCOUNTER
[facility]    Surgical Progress Note  Post Operative Day: 1    Assessment & Plan   Nury Gilbert is a 26 year old female who was admitted on 2020.     Principal Problem:    History of  section  Active Problems:    S/P  section    Assessment: pod 1    Plan: continue with post op and post partum cares.     H/O  section    # Pain Assessment:  Current Pain Score 2020   Patient currently in pain? yes   Pain score (0-10) 3   Pain location -   Pain descriptors -     DVT Prophylaxis: Pneumatic Compression Devices  Code Status: No Order    Joaquina Marquez    Interval History   doing better today now that the baby can be with her. Pumping. Pain controlled. Has voided since johnson out. No nausea. Minimal drainage on dressing. Not bleeding much.   -Data reviewed today: I reviewed all new labs and imaging results over the last 24 hours.    Physical Exam   Temp: 97  F (36.1  C) Temp src: Tympanic BP: 102/75 Pulse: 79 Heart Rate: 84 Resp: 16 SpO2: 100 % O2 Device: None (Room air)    Vitals:    20 1020   Weight: 90.5 kg (199 lb 8 oz)     Vital Signs with Ranges  Temp:  [96.8  F (36  C)-97.6  F (36.4  C)] 97  F (36.1  C)  Pulse:  [64-82] 79  Heart Rate:  [67-89] 84  Resp:  [10-91] 16  BP: ()/(52-75) 102/75  SpO2:  [97 %-100 %] 100 %  I/O last 3 completed shifts:  In: 2679 [I.V.:2679]  Out: 3450 [Urine:2850; Blood:600]    Constitutional: No acute distress, pleasant and cooperative  Extremities: minimal peripheral edema  GI: abdomen soft, appropriate tenderness, dressing clean dry and intact  Skin/Integument: pink warm and dry, no rash    Medications     dextrose 5% lactated ringers Stopped (20 0903)     - MEDICATION INSTRUCTIONS -       NO Rho (D) immune globulin (RhoGam) needed - mother Rh POSITIVE       - MEDICATION INSTRUCTIONS -       oxytocin in 0.9% NaCl       oxytocin in 0.9% NaCl         acetaminophen  975 mg Oral Q6H     ibuprofen  800 mg Oral Q6H      Yes this is a true contraindication  Please make patient aware we will be unable to start this medication with his glaucoma history  Please have him trial OTC ranitidine at bedtime  senna-docusate  1 tablet Oral BID    Or     senna-docusate  2 tablet Oral BID     sodium chloride (PF)  3 mL Intracatheter Q8H       Data   Recent Labs   Lab 06/20/20  0540 06/19/20  1020   WBC  --  10.5   HGB 11.3* 12.5   MCV  --  91   PLT  --  270       No results found for this or any previous visit (from the past 24 hour(s)).

## 2020-06-23 ENCOUNTER — OFFICE VISIT (OUTPATIENT)
Dept: PHYSICAL THERAPY | Facility: REHABILITATION | Age: 70
End: 2020-06-23
Payer: COMMERCIAL

## 2020-06-23 DIAGNOSIS — M71.22 BAKER CYST, LEFT: ICD-10-CM

## 2020-06-23 DIAGNOSIS — Z98.890 STATUS POST SURGERY: Primary | ICD-10-CM

## 2020-06-23 PROCEDURE — 97530 THERAPEUTIC ACTIVITIES: CPT | Performed by: PHYSICAL THERAPIST

## 2020-06-23 PROCEDURE — 97110 THERAPEUTIC EXERCISES: CPT | Performed by: PHYSICAL THERAPIST

## 2020-06-23 PROCEDURE — 97140 MANUAL THERAPY 1/> REGIONS: CPT | Performed by: PHYSICAL THERAPIST

## 2020-06-25 ENCOUNTER — OFFICE VISIT (OUTPATIENT)
Dept: PHYSICAL THERAPY | Facility: REHABILITATION | Age: 70
End: 2020-06-25
Payer: COMMERCIAL

## 2020-06-25 DIAGNOSIS — Z98.890 STATUS POST SURGERY: Primary | ICD-10-CM

## 2020-06-25 DIAGNOSIS — M71.22 BAKER CYST, LEFT: ICD-10-CM

## 2020-06-25 PROCEDURE — 97140 MANUAL THERAPY 1/> REGIONS: CPT | Performed by: PHYSICAL THERAPIST

## 2020-06-25 PROCEDURE — 97110 THERAPEUTIC EXERCISES: CPT | Performed by: PHYSICAL THERAPIST

## 2020-06-25 PROCEDURE — 97530 THERAPEUTIC ACTIVITIES: CPT | Performed by: PHYSICAL THERAPIST

## 2020-06-30 ENCOUNTER — OFFICE VISIT (OUTPATIENT)
Dept: PHYSICAL THERAPY | Facility: REHABILITATION | Age: 70
End: 2020-06-30
Payer: COMMERCIAL

## 2020-06-30 DIAGNOSIS — M71.22 BAKER CYST, LEFT: ICD-10-CM

## 2020-06-30 DIAGNOSIS — Z98.890 STATUS POST SURGERY: Primary | ICD-10-CM

## 2020-06-30 PROCEDURE — 97530 THERAPEUTIC ACTIVITIES: CPT | Performed by: PHYSICAL THERAPIST

## 2020-06-30 PROCEDURE — 97110 THERAPEUTIC EXERCISES: CPT | Performed by: PHYSICAL THERAPIST

## 2020-06-30 PROCEDURE — 97140 MANUAL THERAPY 1/> REGIONS: CPT | Performed by: PHYSICAL THERAPIST

## 2020-07-02 ENCOUNTER — OFFICE VISIT (OUTPATIENT)
Dept: PHYSICAL THERAPY | Facility: REHABILITATION | Age: 70
End: 2020-07-02
Payer: COMMERCIAL

## 2020-07-02 DIAGNOSIS — M71.22 BAKER CYST, LEFT: ICD-10-CM

## 2020-07-02 DIAGNOSIS — Z98.890 STATUS POST SURGERY: Primary | ICD-10-CM

## 2020-07-02 PROCEDURE — 97140 MANUAL THERAPY 1/> REGIONS: CPT | Performed by: PHYSICAL THERAPIST

## 2020-07-02 PROCEDURE — 97110 THERAPEUTIC EXERCISES: CPT | Performed by: PHYSICAL THERAPIST

## 2020-07-02 NOTE — PROGRESS NOTES
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                   Subjective: Pt reports having a pulsing sensation medial aspect of knee while attempting to wear his compression socks twice today  Objective: See treatment diary below  Added prone quads stretch to address quads tightness  Assessment: Tolerated treatment well  Patient able to add resistance to LAQ and HS curls this session  Good tolerance to progressed repetitions performed  VC's required for correct technique with SLR flex and abd  Plan: Progress treatment as tolerated         Precautions: WBAT L LE, hx of duodenal cancer, lumbar surgery      Manuals  7/2        PROM left knee TP 10 mins TP 10 mins TP 10 mins TP 10 mins TP 8 mins        Prone quads stretch     TP 2 mins                                  Neuro Re-Ed             rockerboard a/p, m/l   x20ea x20ea x20/30"ea                                                                                      Ther Ex             Bike 10' 10' 10' 10' 10'        Heel slides 10"x10 10"x10 10"x10 10"x10 10"x10        Gastroc towel stretch 10"x10 10"x10 10"x10 10"x10 10"x10        HS stretch 10"x10 10"x10 10"x10 10"x10 10"x10        SLR flex/abd 2x10ea 2x10ea 2x15ea 2x15ea 2x15ea        Clamshells 3"x15 3" 2x10 3" 2x15 3" 2x15 3" 2x15        SAQ 3" 2x10 3" 2x10 3" 2x15 3" 2x15 dc        LAQ 3" 2x10 3" 2x10 3" 2x15 3" 2x15 2# 2x15        HS curls 3" 2x10 3" 2x10 3" 2x15 3" 2x15 2# 2x15                                                            Ther Activity             Fwd step ups 6" x20 6"x20 6"x20 6"x20 6"x25        Lat step downs  6"x15 6"x15 6"x20 6"x25        Mini squats pain-free  3"x15 3"x15 3"x20 3"x30        Gait Training Modalities             CP PRN

## 2020-07-07 ENCOUNTER — OFFICE VISIT (OUTPATIENT)
Dept: PHYSICAL THERAPY | Facility: REHABILITATION | Age: 70
End: 2020-07-07
Payer: COMMERCIAL

## 2020-07-07 DIAGNOSIS — Z98.890 STATUS POST SURGERY: Primary | ICD-10-CM

## 2020-07-07 DIAGNOSIS — M71.22 BAKER CYST, LEFT: ICD-10-CM

## 2020-07-07 PROCEDURE — 97110 THERAPEUTIC EXERCISES: CPT | Performed by: PHYSICAL THERAPIST

## 2020-07-07 PROCEDURE — 97140 MANUAL THERAPY 1/> REGIONS: CPT | Performed by: PHYSICAL THERAPIST

## 2020-07-07 NOTE — PROGRESS NOTES
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                   Subjective: Pt continues with complaints of numbness medial aspect of leg from knee to mid calf  Objective: See treatment diary below      Assessment: Tolerated treatment well  Patient denies pain with all TE's and manuals performed  Pt continues to be challenged with balancing on rockerboard  Plan: Progress treatment as tolerated         Precautions: WBAT L LE, hx of duodenal cancer, lumbar surgery      Manuals        PROM left knee TP 10 mins TP 10 mins TP 10 mins TP 10 mins TP 8 mins TP 8 mins       Prone quads stretch     TP 2 mins TP 2 mins                                 Neuro Re-Ed             rockerboard a/p, m/l   x20ea x20ea x20/30"ea x25x/30"ea                                                                                     Ther Ex             Bike 10' 10' 10' 10' 10' 10'       Heel slides 10"x10 10"x10 10"x10 10"x10 10"x10 10"x10       Gastroc towel stretch 10"x10 10"x10 10"x10 10"x10 10"x10 10"x10       HS stretch 10"x10 10"x10 10"x10 10"x10 10"x10 10"x10       SLR flex/abd 2x10ea 2x10ea 2x15ea 2x15ea 2x15ea 2x15ea       Clamshells 3"x15 3" 2x10 3" 2x15 3" 2x15 3" 2x15 3" 2x15       SAQ 3" 2x10 3" 2x10 3" 2x15 3" 2x15 dc dc       LAQ 3" 2x10 3" 2x10 3" 2x15 3" 2x15 2# 2x15 2# 2x15       HS curls 3" 2x10 3" 2x10 3" 2x15 3" 2x15 2# 2x15 2# 2x15       Bridges w/TB                                                    Ther Activity             Fwd step ups 6" x20 6"x20 6"x20 6"x20 6"x25 6"x25       Lat step downs  6"x15 6"x15 6"x20 6"x25 6"x25       Mini squats pain-free  3"x15 3"x15 3"x20 3"x30 3"x30       Gait Training                                       Modalities             CP PRN

## 2020-07-09 ENCOUNTER — OFFICE VISIT (OUTPATIENT)
Dept: PHYSICAL THERAPY | Facility: REHABILITATION | Age: 70
End: 2020-07-09
Payer: COMMERCIAL

## 2020-07-09 DIAGNOSIS — M71.22 BAKER CYST, LEFT: ICD-10-CM

## 2020-07-09 DIAGNOSIS — Z98.890 STATUS POST SURGERY: Primary | ICD-10-CM

## 2020-07-09 PROCEDURE — 97140 MANUAL THERAPY 1/> REGIONS: CPT

## 2020-07-09 PROCEDURE — 97110 THERAPEUTIC EXERCISES: CPT

## 2020-07-09 NOTE — PROGRESS NOTES
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                   Subjective: Pt reported throbbing nerve pain persists  Sometimes has difficulty sleeping  MD appt 7      Objective: See treatment diary below      Assessment: Tolerated treatment well  Patient demonstrated fatigue post treatment and exhibited good technique with therapeutic exercises  VC's needed for correct technique throughout session  No reports of increased pain post session  Plan: Continue per plan of care  Precautions: WBAT L LE, hx of duodenal cancer, lumbar surgery      Manuals       PROM left knee TP 10 mins TP 10 mins TP 10 mins TP 10 mins TP 8 mins TP 8 mins TC 8 min      Prone quads stretch     TP 2 mins TP 2 mins TC 2 min                                Neuro Re-Ed             rockerboard a/p, m/l   x20ea x20ea x20/30"ea x25x/30"ea 25/30" ea  Ther Ex             Bike 10' 10' 10' 10' 10' 10' 10'      Heel slides 10"x10 10"x10 10"x10 10"x10 10"x10 10"x10 10"x10      Gastroc towel stretch 10"x10 10"x10 10"x10 10"x10 10"x10 10"x10 10"x10      HS stretch 10"x10 10"x10 10"x10 10"x10 10"x10 10"x10 10"x10      SLR flex/abd 2x10ea 2x10ea 2x15ea 2x15ea 2x15ea 2x15ea 2x15 ea        Clamshells 3"x15 3" 2x10 3" 2x15 3" 2x15 3" 2x15 3" 2x15 3"  2x15      SAQ 3" 2x10 3" 2x10 3" 2x15 3" 2x15 dc dc dc      LAQ 3" 2x10 3" 2x10 3" 2x15 3" 2x15 2# 2x15 2# 2x15 2#  2x15      HS curls 3" 2x10 3" 2x10 3" 2x15 3" 2x15 2# 2x15 2# 2x15 2#  2x15      Bridges w/TB                                                    Ther Activity             Fwd step ups 6" x20 6"x20 6"x20 6"x20 6"x25 6"x25 6"x25      Lat step downs  6"x15 6"x15 6"x20 6"x25 6"x25 6"x25      Mini squats pain-free  3"x15 3"x15 3"x20 3"x30 3"x30 3"x30      Gait Training                                       Modalities             CP PRN

## 2020-07-14 ENCOUNTER — EVALUATION (OUTPATIENT)
Dept: PHYSICAL THERAPY | Facility: REHABILITATION | Age: 70
End: 2020-07-14
Payer: COMMERCIAL

## 2020-07-14 DIAGNOSIS — M71.22 BAKER CYST, LEFT: ICD-10-CM

## 2020-07-14 DIAGNOSIS — Z98.890 STATUS POST SURGERY: Primary | ICD-10-CM

## 2020-07-14 PROCEDURE — 97110 THERAPEUTIC EXERCISES: CPT | Performed by: PHYSICAL THERAPIST

## 2020-07-14 PROCEDURE — 97140 MANUAL THERAPY 1/> REGIONS: CPT | Performed by: PHYSICAL THERAPIST

## 2020-07-14 NOTE — PROGRESS NOTES
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                   Assessment  Assessment details: Pt has demonstrated improvement in knee ROM, LE flexibility, LE strength, LE edema, and function with a decrease in pain since starting therapy  Pt would benefit from continued skilled PT intervention to address these issues and to maximize function  Pt is schedule to f/u with MD 20  Please advise  Thank you  Impairments: abnormal or restricted ROM, activity intolerance, impaired physical strength and pain with function  Understanding of Dx/Px/POC: good   Prognosis: good    Goals  Short Term:  Pt will report decreased levels of pain by at least 2 subjective ratings in 4 weeks-partially met  Pt will demonstrate improved ROM by at least 10 degrees in 4 weeks-met  Pt will demonstrate improved strength by 1/2 grade MMT in 4 weeks-met  Long Term:   Pt will be independent in their HEP in 8 weeks-partially met  Pt will demonstrate improved FOTO, > 77-partially met (69)  Pt will be independent with all ADL's-partially met  Pt will resume taking recreational walks at PLOF-not met    Plan  Plan details: Patient was educated in HEP and Plan of Care  All questions were answered to pt's satisfaction      Patient would benefit from: skilled physical therapy  Planned therapy interventions: manual therapy, joint mobilization, neuromuscular re-education, patient education, balance, flexibility, therapeutic activities, therapeutic exercise, graded exercise and home exercise program  Frequency: 2x week  Duration in weeks: 4  Plan of Care beginning date: 2020  Plan of Care expiration date: 2020  Treatment plan discussed with: patient        Subjective Evaluation    History of Present Illness  Mechanism of injury: Pt reports overall improvement in symptoms and function since starting therapy  Pt's chief complaint is that of persistent numbness from distal end of incision, medially down to his ankle  Pt notes some improvement in numbness, as he now has sensation t/o most of incision and his anterior aspect of knee  Pt notes improvement in his LE edema  Pt has not attempted cutting his grass, but has performed yardwork to good tolerance  Pt also has not taken a prolonged walk with his wife at this time  Pt notes performing steps, reciprocally without issue      Pain  At best pain ratin  At worst pain rating: 3  Location: along incisional area medial left knee    Treatments  Current treatment: physical therapy  Patient Goals  Patient goals for therapy: decreased pain, increased motion, improved balance, decreased edema, increased strength, independence with ADLs/IADLs and return to sport/leisure activities          Objective     Active Range of Motion   Left Knee   Flexion: 125 degrees   Extension: 2 degrees     Passive Range of Motion   Left Knee   Flexion: 129 degrees   Extension: 0 degrees     Strength/Myotome Testing     Left Hip   Planes of Motion   Flexion: 4+  Abduction: 4+  External rotation: 4+    Left Knee   Flexion: 4+  Extension: 5             Precautions: WBAT L LE, hx of duodenal cancer, lumbar surgery        Manuals  7       PROM left knee TP 10 mins TP 10 mins TP 10 mins TP 10 mins TP 8 mins TP 8 mins TC 8 min  TP 6 min       Prone quads stretch         TP 2 mins TP 2 mins TC 2 min TP 2 mins                                                       Neuro Re-Ed                       rockerboard a/p, m/l     x20ea x20ea x20/30"ea x25x/30"ea 25/30" ea   25x/30"ea                                                                                                                                                       Ther Ex                       Bike 10' 10' 10' 10' 10 10' 10'  10'       Heel slides 10"x10 10"x10 10"x10 10"x10 10"x10 10"x10 10"x10  10"x10       Gastroc towel stretch 10"x10 10"x10 10"x10 10"x10 10"x10 10"x10 10"x10  10"x10       HS stretch 10"x10 10"x10 10"x10 10"x10 10"x10 10"x10 10"x10  10"x10       SLR flex/abd 2x10ea 2x10ea 2x15ea 2x15ea 2x15ea 2x15ea 2x15 ea   2x10 ea 2#       Clamshells 3"x15 3" 2x10 3" 2x15 3" 2x15 3" 2x15 3" 2x15 3"  2x15  otb 3" 2x10       SAQ 3" 2x10 3" 2x10 3" 2x15 3" 2x15 dc dc dc  dc       LAQ 3" 2x10 3" 2x10 3" 2x15 3" 2x15 2# 2x15 2# 2x15 2#  2x15 4# 2x10       HS curls 3" 2x10 3" 2x10 3" 2x15 3" 2x15 2# 2x15 2# 2x15 2#  2x15  4# 2x10       Bridges w/TB                                                                                               Ther Activity                       Fwd step ups 6" x20 6"x20 6"x20 6"x20 6"x25 6"x25 6"x25  6"x25       Lat step downs   6"x15 6"x15 6"x20 6"x25 6"x25 6"x25  6"x25       Mini squats pain-free   3"x15 3"x15 3"x20 3"x30 3"x30 3"x30  x30       Gait Training                                                                       Modalities                       CP PRN                                                   Updated measurements and functional status taken this session

## 2020-07-16 ENCOUNTER — OFFICE VISIT (OUTPATIENT)
Dept: OBGYN CLINIC | Facility: HOSPITAL | Age: 70
End: 2020-07-16

## 2020-07-16 VITALS
HEART RATE: 71 BPM | SYSTOLIC BLOOD PRESSURE: 134 MMHG | WEIGHT: 179 LBS | DIASTOLIC BLOOD PRESSURE: 89 MMHG | BODY MASS INDEX: 27.22 KG/M2

## 2020-07-16 DIAGNOSIS — Z98.890 STATUS POST SURGERY: Primary | ICD-10-CM

## 2020-07-16 PROCEDURE — 1160F RVW MEDS BY RX/DR IN RCRD: CPT | Performed by: ORTHOPAEDIC SURGERY

## 2020-07-16 PROCEDURE — 4040F PNEUMOC VAC/ADMIN/RCVD: CPT | Performed by: ORTHOPAEDIC SURGERY

## 2020-07-16 PROCEDURE — 3079F DIAST BP 80-89 MM HG: CPT | Performed by: ORTHOPAEDIC SURGERY

## 2020-07-16 PROCEDURE — 3075F SYST BP GE 130 - 139MM HG: CPT | Performed by: ORTHOPAEDIC SURGERY

## 2020-07-16 PROCEDURE — 99024 POSTOP FOLLOW-UP VISIT: CPT | Performed by: ORTHOPAEDIC SURGERY

## 2020-07-16 RX ORDER — GABAPENTIN 300 MG/1
300 CAPSULE ORAL 3 TIMES DAILY
Qty: 30 CAPSULE | Refills: 0 | Status: SHIPPED | OUTPATIENT
Start: 2020-07-16 | End: 2020-09-15

## 2020-07-16 NOTE — PROGRESS NOTES
78 y o male presents for 6 weeks postoperative visit status post left Baker cyst excision  Patient denies significant decrease in swelling does continue to have medial-sided numbness  Patient states the numbness does wake him from sleep at night  Patient denies any chest pain shortness of breath states the swelling significantly improving  Review of Systems  Review of systems negative unless otherwise specified in HPI    Past Medical History  Past Medical History:   Diagnosis Date    Abnormal weight loss     RESOLVED: [id number]    Anemia     RESOLVED: 09XHT4962    Atypical chest pain     RESOLVED: 86WEJ7407    BPH (benign prostatic hyperplasia)     Cancer (HCC)     DUODENAL    Luis Miguel's syndrome     Depression     RESOLVED: 89LEB7224    Diverticulitis of colon     LAST ASSESSED: 53VYF5807    Duodenal nodule     RESOLVED: 92LNR2652    Gastrointestinal stromal tumor (GIST) (HCC)     MALIGNANT; RESOLVED: 92WVQ8455    GERD (gastroesophageal reflux disease)     Glaucoma     RESOLVED: 68IPY4568    Insomnia     RESOLVED: [id number]    Lactose intolerance     LAST ASSESSED: 80RTG7754       Past Surgical History  Past Surgical History:   Procedure Laterality Date    BACK SURGERY      BACK SURGERY      l4 s1 surgery     CERVICAL FUSION      c4 and c5    CHOLECYSTECTOMY      COLONOSCOPY  2014    kutty    ELBOW SURGERY Left     REPAIR    EYE SURGERY      GANGLION CYST EXCISION Left 6/5/2020    Procedure: KNEE EXCISION BAKERS CYST;  Surgeon: Kane Haynes MD;  [facility];  Service: Orthopedics    KNEE ARTHROSCOPY Left     LUMBAR DISCECTOMY      L5 S1    ORIF RADIAL SHAFT FRACTURE      ID ESOPHAGOGASTRODUODENOSCOPY TRANSORAL DIAGNOSTIC N/A 11/1/2016    Procedure: ESOPHAGOGASTRODUODENOSCOPY (EGD); Surgeon: Susie Can MD;  [facility];   Service: Gastroenterology    ID ESOPHAGOGASTRODUODENOSCOPY TRANSORAL DIAGNOSTIC N/A 5/31/2018    Procedure: ESOPHAGOGASTRODUODENOSCOPY (EGD); Surgeon: Arley Baxter MD;  [facility]; Service: Gastroenterology    SC REDO EXCIS LUMBAR DISK N/A 10/24/2019    Procedure: Revision laminectomy and decompression at L5-S1;  Surgeon: Waqas Manning MD;  [facility];  Service: Orthopedics    SMALL INTESTINE SURGERY      GIST surgery   [facility] MSK PROCEDURE  5/21/2019       Current Medications  Current Outpatient Medications on File Prior to Visit   Medication Sig Dispense Refill    cholestyramine (QUESTRAN) 4 GM/DOSE powder Take 1 packet (4 g total) by mouth every other day (Patient taking differently: Take 4 g by mouth every other day ) 378 g 6    dorzolamide-timolol (COSOPT) 22 3-6 8 MG/ML ophthalmic solution INSTILL 1 DROP INTO RIGHT EYE 3 TIMES A DAY  4    famotidine (PEPCID) 20 mg tablet Take 20 mg by mouth daily       GLUCOSAMINE CHONDROITIN COMPLX PO Take 1 tablet by mouth daily 4500mg  daily       latanoprost (XALATAN) 0 005 % ophthalmic solution Administer 1 drop to the right eye   4    Multiple Vitamins-Minerals (MULTIVITAL) tablet Take 1 tablet by mouth daily Spectravite       pantoprazole (PROTONIX) 40 mg tablet Take 1 tablet (40 mg total) by mouth 2 (two) times a day (Patient taking differently: Take 40 mg by mouth daily ) 180 tablet 2    tamsulosin (FLOMAX) 0 4 mg Take 2 capsules (0 8 mg total) by mouth daily with dinner 180 capsule 3    [DISCONTINUED] oxyCODONE (ROXICODONE) 5 mg immediate release tablet Take 1 tablets every 6 hrs as needed for pain control 20 tablet 0     No current facility-administered medications on file prior to visit          Recent Labs [facility])  0   Lab Value Date/Time    HCT 46 1 05/01/2020 0953    HCT 39 1 08/05/2015 0649    HGB 15 9 05/01/2020 0953    HGB 13 2 08/05/2015 0649    WBC 6 15 05/01/2020 0953    WBC 5 23 08/05/2015 0649    INR 1 00 05/01/2020 0953    INR 0 96 07/01/2015 0944    ESR 7 12/06/2016 1032    GLUCOSE 97 09/25/2015 1014    HGBA1C 5 0 04/02/2020 0731         Physical exam  · General: Awake, Alert, Oriented  · Eyes: Pupils equal, round and reactive to light  · Heart: regular rate and rhythm  · Lungs: No audible wheezing    right Knee exam  · Examination patient's right lower extremity well-healed medial incision full active extension flexion greater than 120° quadriceps hamstring strength 5/5 without pain mild decreased sensation of the saphenous nerve distribution ankle dorsi plantar flexion strength 5/5 inversion eversion strength 5/5 distal pulses present    Assessment:  Status post 6 weeks right Baker cyst excision  Plan:  Weightbearing as tolerated lower extremity  Compression bracing for swelling  Gabapentin 300 mg at night prescribed for numbness  Follow-up in 2 months time repeat evaluation right lower extremity

## 2020-09-15 ENCOUNTER — OFFICE VISIT (OUTPATIENT)
Dept: OBGYN CLINIC | Facility: HOSPITAL | Age: 70
End: 2020-09-15
Payer: COMMERCIAL

## 2020-09-15 ENCOUNTER — HOSPITAL ENCOUNTER (OUTPATIENT)
Dept: RADIOLOGY | Facility: HOSPITAL | Age: 70
Discharge: HOME | End: 2020-09-15
Attending: ORTHOPAEDIC SURGERY
Payer: COMMERCIAL

## 2020-09-15 VITALS
SYSTOLIC BLOOD PRESSURE: 125 MMHG | WEIGHT: 178 LBS | BODY MASS INDEX: 27.06 KG/M2 | DIASTOLIC BLOOD PRESSURE: 77 MMHG | HEART RATE: 58 BPM

## 2020-09-15 DIAGNOSIS — Z98.890 STATUS POST SURGERY: ICD-10-CM

## 2020-09-15 DIAGNOSIS — M71.22 BAKER CYST, LEFT: ICD-10-CM

## 2020-09-15 DIAGNOSIS — M71.22 BAKER CYST, LEFT: Primary | ICD-10-CM

## 2020-09-15 PROCEDURE — 99213 OFFICE O/P EST LOW 20 MIN: CPT | Performed by: ORTHOPAEDIC SURGERY

## 2020-09-15 PROCEDURE — 73562 X-RAY EXAM OF KNEE 3: CPT

## 2020-09-15 RX ORDER — BESIFLOXACIN 6 MG/ML
SUSPENSION OPHTHALMIC
COMMUNITY
Start: 2020-08-19 | End: 2021-09-24

## 2020-09-15 NOTE — PROGRESS NOTES
Assessment:  1  Baker cyst, left  XR knee 3 vw left non injury    MRI knee left  wo contrast   2  Status post surgery  XR knee 3 vw left non injury    MRI knee left  wo contrast       Plan:  · Patient has a reoccurrence of the left knee baker's cyst following excision 6/05/20  · MRI left knee for further evaluation   · Explained it is going to take time for the nerve sensation to improve  · Follow up to discuss treatment options    To do next visit:  Return for discussion of diagnostic studies  The above stated was discussed in layman's terms and the patient expressed understanding  All questions were answered to the patient's satisfaction  Subjective:   Cnadice Schulte is a 78 y o  male who presents s/p left knee baker's cyst excision 6/05/20  Patient was instructed to compression for persistent swelling  He was also givne Gabapentin for the numbness  Patient states there have been no changes in his numbness or swelling  He states he has also had a reoccurrence of his baker's cyst   Patient needs any knee pain         Past Medical History:   Diagnosis Date    Abnormal weight loss     RESOLVED: 83SMQ2335    Anemia     RESOLVED: 72ZSR9435    Atypical chest pain     RESOLVED: 97DAM1507    BPH (benign prostatic hyperplasia)     Cancer (HCC)     DUODENAL    Luis Miguel's syndrome     Depression     RESOLVED: 55PJT2236    Diverticulitis of colon     LAST ASSESSED: 97AAT3748    Duodenal nodule     RESOLVED: 98MMU9016    Gastrointestinal stromal tumor (GIST) (HCC)     MALIGNANT; RESOLVED: 43RBN7542    GERD (gastroesophageal reflux disease)     Glaucoma     RESOLVED: 96TLK6225    Insomnia     RESOLVED: 25OVK6481    Lactose intolerance     LAST ASSESSED: 07SHH5759       Past Surgical History:   Procedure Laterality Date    BACK SURGERY      BACK SURGERY      l4 s1 surgery     CERVICAL FUSION      c4 and c5    CHOLECYSTECTOMY      COLONOSCOPY  2014    kutty    ELBOW SURGERY Left     REPAIR  EYE SURGERY      GANGLION CYST EXCISION Left 6/5/2020    Procedure: KNEE EXCISION BAKERS CYST;  Surgeon: Mariela Gibson MD;  [facility];  Service: Orthopedics    KNEE ARTHROSCOPY Left     LUMBAR DISCECTOMY      L5 S1    ORIF RADIAL SHAFT FRACTURE      [place] ESOPHAGOGASTRODUODENOSCOPY TRANSORAL DIAGNOSTIC N/A 11/1/2016    Procedure: ESOPHAGOGASTRODUODENOSCOPY (EGD); Surgeon: Wes Nelson MD;  [facility]; Service: Gastroenterology    [place] ESOPHAGOGASTRODUODENOSCOPY TRANSORAL DIAGNOSTIC N/A 5/31/2018    Procedure: ESOPHAGOGASTRODUODENOSCOPY (EGD); Surgeon: Wes Nelson MD;  [facility]; Service: Gastroenterology    OH REDO EXCIS LUMBAR DISK N/A 10/24/2019    Procedure: Revision laminectomy and decompression at L5-S1;  Surgeon: Radha Avalos MD;  [facility];  Service: Orthopedics    SMALL INTESTINE SURGERY      GIST surgery    [facility]       Family History   Problem Relation Age of Onset    Thyroid disease Mother     Thyroid disease Brother     Diabetes Paternal Grandmother     Hypertension Family        Social History     Occupational History    Not on file   Tobacco Use    Smoking status: Never Smoker    Smokeless tobacco: Never Used   Substance and Sexual Activity    Alcohol use: Yes     Frequency: Monthly or less     Drinks per session: 1 or 2     Binge frequency: Never     Comment: occasional    Drug use: No    Sexual activity: Yes         Current Outpatient Medications:     Besivance 0 6 % SUSP, INSTILL 1 DROP BY OPHTHALMIC ROUTE 3 TIMES EVERY DAY INTO THE RIGHT EYE   FOR AGITATION, Disp: , Rfl:     cholestyramine (QUESTRAN) 4 GM/DOSE powder, Take 1 packet (4 g total) by mouth every other day (Patient taking differently: Take 4 g by mouth every other day ), Disp: 378 g, Rfl: 6    dorzolamide-timolol (COSOPT) 22 3-6 8 MG/ML ophthalmic solution, INSTILL 1 DROP INTO RIGHT EYE 3 TIMES A DAY, Disp: , Rfl: 4    famotidine (PEPCID) 20 mg tablet, Take 20 mg by mouth daily , Disp: , Rfl:     GLUCOSAMINE CHONDROITIN COMPLX PO, Take 1 tablet by mouth daily 4500mg  daily , Disp: , Rfl:     latanoprost (XALATAN) 0 005 % ophthalmic solution, Administer 1 drop to the right eye , Disp: , Rfl: 4    Multiple Vitamins-Minerals (MULTIVITAL) tablet, Take 1 tablet by mouth daily Spectravite , Disp: , Rfl:     pantoprazole (PROTONIX) 40 mg tablet, Take 1 tablet (40 mg total) by mouth 2 (two) times a day (Patient taking differently: Take 40 mg by mouth daily ), Disp: 180 tablet, Rfl: 2    tamsulosin (FLOMAX) 0 4 mg, Take 2 capsules (0 8 mg total) by mouth daily with dinner, Disp: 180 capsule, Rfl: 3    No Known Allergies      Objective:  Vitals:    09/15/20 1543   BP: 125/77   Pulse: 58       Review of Systems   Constitutional: Negative for chills and fever  HENT: Negative for drooling and hearing loss  Eyes: Negative for visual disturbance  Respiratory: Negative for cough and shortness of breath  Cardiovascular: Negative for chest pain  Gastrointestinal: Negative for abdominal pain  Skin: Negative for rash  Psychiatric/Behavioral: Negative for agitation  Ortho Exam   Left knee:  Examination patient's right lower extremity well-healed medial incision full active extension flexion greater than 120° quadriceps hamstring strength 5/5 without pain mild decreased sensation of the saphenous nerve distribution, ankle dorsi plantar flexion strength 5/5 inversion eversion strength 5/5 distal pulses present, non TTP medial and lateral joint lines  Physical Exam  Vitals signs reviewed  Constitutional:       Appearance: He is well-developed  HENT:      Head: Normocephalic  Eyes:      Pupils: Pupils are equal, round, and reactive to light  Pulmonary:      Effort: Pulmonary effort is normal    Skin:     General: Skin is warm and dry  Diagnostics, reviewed and taken today if performed as documented:   The attending physician has personally reviewed the pertinent films in PACS and interpretation is as follows: left knee x-rays demonstrates no fracture or dislocation, medial joint line narrowing  Procedures, if performed today:    Procedures    None performed        Scribe Attestation    I,:   Jace am acting as a scribe while in the presence of the attending physician :        I,:   Yuni Oseguera MD personally performed the services described in this documentation    as scribed in my presence :                Portions of the record may have been created with voice recognition software  Occasional wrong word or "sound a like" substitutions may have occurred due to the inherent limitations of voice recognition software  Read the chart carefully and recognize, using context, where substitutions have occurred

## 2020-09-22 ENCOUNTER — HOSPITAL ENCOUNTER (OUTPATIENT)
Dept: RADIOLOGY | Facility: HOSPITAL | Age: 70
Discharge: HOME | End: 2020-09-22
Attending: ORTHOPAEDIC SURGERY
Payer: COMMERCIAL

## 2020-09-22 DIAGNOSIS — Z98.890 STATUS POST SURGERY: ICD-10-CM

## 2020-09-22 DIAGNOSIS — M71.22 BAKER CYST, LEFT: ICD-10-CM

## 2020-09-22 PROCEDURE — 73721 MRI JNT OF LWR EXTRE W/O DYE: CPT

## 2020-09-22 PROCEDURE — G1004 CDSM NDSC: HCPCS

## 2020-10-13 ENCOUNTER — OFFICE VISIT (OUTPATIENT)
Dept: OBGYN CLINIC | Facility: HOSPITAL | Age: 70
End: 2020-10-13
Payer: COMMERCIAL

## 2020-10-13 VITALS
DIASTOLIC BLOOD PRESSURE: 81 MMHG | HEART RATE: 74 BPM | SYSTOLIC BLOOD PRESSURE: 124 MMHG | BODY MASS INDEX: 26.94 KG/M2 | WEIGHT: 177.2 LBS

## 2020-10-13 DIAGNOSIS — M25.462 EFFUSION OF LEFT KNEE: ICD-10-CM

## 2020-10-13 DIAGNOSIS — M17.12 PRIMARY OSTEOARTHRITIS OF LEFT KNEE: ICD-10-CM

## 2020-10-13 DIAGNOSIS — M71.22 BAKER CYST, LEFT: Primary | ICD-10-CM

## 2020-10-13 PROCEDURE — S0020 INJECTION, BUPIVICAINE HYDRO: HCPCS | Performed by: ORTHOPAEDIC SURGERY

## 2020-10-13 PROCEDURE — 20610 DRAIN/INJ JOINT/BURSA W/O US: CPT | Performed by: ORTHOPAEDIC SURGERY

## 2020-10-13 PROCEDURE — 99213 OFFICE O/P EST LOW 20 MIN: CPT | Performed by: ORTHOPAEDIC SURGERY

## 2020-10-13 RX ORDER — BUPIVACAINE HYDROCHLORIDE 2.5 MG/ML
2 INJECTION, SOLUTION INFILTRATION; PERINEURAL
Status: COMPLETED | OUTPATIENT
Start: 2020-10-13 | End: 2020-10-13

## 2020-10-13 RX ORDER — METHYLPREDNISOLONE ACETATE 40 MG/ML
2 INJECTION, SUSPENSION INTRA-ARTICULAR; INTRALESIONAL; INTRAMUSCULAR; SOFT TISSUE
Status: COMPLETED | OUTPATIENT
Start: 2020-10-13 | End: 2020-10-13

## 2020-10-13 RX ADMIN — BUPIVACAINE HYDROCHLORIDE 2 ML: 2.5 INJECTION, SOLUTION INFILTRATION; PERINEURAL at 16:19

## 2020-10-13 RX ADMIN — METHYLPREDNISOLONE ACETATE 2 ML: 40 INJECTION, SUSPENSION INTRA-ARTICULAR; INTRALESIONAL; INTRAMUSCULAR; SOFT TISSUE at 16:19

## 2020-10-30 ENCOUNTER — HOSPITAL ENCOUNTER (OUTPATIENT)
Dept: RADIOLOGY | Facility: HOSPITAL | Age: 70
Discharge: HOME | End: 2020-10-30
Attending: ORTHOPAEDIC SURGERY | Admitting: RADIOLOGY
Payer: COMMERCIAL

## 2020-10-30 ENCOUNTER — TRANSCRIBE ORDERS (OUTPATIENT)
Dept: RADIOLOGY | Facility: HOSPITAL | Age: 70
End: 2020-10-30

## 2020-10-30 DIAGNOSIS — M71.22 BAKER CYST, LEFT: ICD-10-CM

## 2020-10-30 DIAGNOSIS — M25.462 EFFUSION OF LEFT KNEE: ICD-10-CM

## 2020-10-30 PROCEDURE — 20611 DRAIN/INJ JOINT/BURSA W/US: CPT

## 2020-10-30 RX ORDER — LIDOCAINE HYDROCHLORIDE 10 MG/ML
3 INJECTION, SOLUTION EPIDURAL; INFILTRATION; INTRACAUDAL; PERINEURAL ONCE
Status: COMPLETED | OUTPATIENT
Start: 2020-10-30 | End: 2020-10-30

## 2020-10-30 RX ADMIN — LIDOCAINE HYDROCHLORIDE 3 ML: 10 INJECTION, SOLUTION EPIDURAL; INFILTRATION; INTRACAUDAL; PERINEURAL at 09:40

## 2020-11-12 ENCOUNTER — TELEPHONE (OUTPATIENT)
Dept: OBGYN CLINIC | Facility: HOSPITAL | Age: 70
End: 2020-11-12

## 2020-12-09 ENCOUNTER — OFFICE VISIT (OUTPATIENT)
Dept: OBGYN CLINIC | Facility: OTHER | Age: 70
End: 2020-12-09
Payer: COMMERCIAL

## 2020-12-09 VITALS
DIASTOLIC BLOOD PRESSURE: 82 MMHG | WEIGHT: 181 LBS | HEART RATE: 97 BPM | BODY MASS INDEX: 27.52 KG/M2 | SYSTOLIC BLOOD PRESSURE: 127 MMHG

## 2020-12-09 DIAGNOSIS — M71.22 BAKER CYST, LEFT: Primary | ICD-10-CM

## 2020-12-09 PROCEDURE — 99213 OFFICE O/P EST LOW 20 MIN: CPT | Performed by: ORTHOPAEDIC SURGERY

## 2020-12-09 RX ORDER — POLYMYXIN B SULFATE AND TRIMETHOPRIM 1; 10000 MG/ML; [USP'U]/ML
1 SOLUTION OPHTHALMIC 4 TIMES DAILY
COMMUNITY
Start: 2020-12-08 | End: 2021-09-24

## 2020-12-10 ENCOUNTER — OFFICE VISIT (OUTPATIENT)
Dept: LAB | Age: 70
End: 2020-12-10
Payer: COMMERCIAL

## 2020-12-10 ENCOUNTER — APPOINTMENT (OUTPATIENT)
Dept: LAB | Age: 70
End: 2020-12-10
Payer: COMMERCIAL

## 2020-12-10 DIAGNOSIS — M71.22 BAKER CYST, LEFT: ICD-10-CM

## 2020-12-10 LAB
ATRIAL RATE: 69 BPM
BASOPHILS # BLD AUTO: 0.05 THOUSANDS/ΜL (ref 0–0.1)
BASOPHILS NFR BLD AUTO: 1 % (ref 0–1)
EOSINOPHIL # BLD AUTO: 0.06 THOUSAND/ΜL (ref 0–0.61)
EOSINOPHIL NFR BLD AUTO: 1 % (ref 0–6)
ERYTHROCYTE [DISTWIDTH] IN BLOOD BY AUTOMATED COUNT: 12.5 % (ref 11.6–15.1)
HCT VFR BLD AUTO: 48.1 % (ref 36.5–49.3)
HGB BLD-MCNC: 15.8 G/DL (ref 12–17)
IMM GRANULOCYTES # BLD AUTO: 0 THOUSAND/UL (ref 0–0.2)
IMM GRANULOCYTES NFR BLD AUTO: 0 % (ref 0–2)
LYMPHOCYTES # BLD AUTO: 1.14 THOUSANDS/ΜL (ref 0.6–4.47)
LYMPHOCYTES NFR BLD AUTO: 22 % (ref 14–44)
MCH RBC QN AUTO: 30.5 PG (ref 26.8–34.3)
MCHC RBC AUTO-ENTMCNC: 32.8 G/DL (ref 31.4–37.4)
MCV RBC AUTO: 93 FL (ref 82–98)
MONOCYTES # BLD AUTO: 0.61 THOUSAND/ΜL (ref 0.17–1.22)
MONOCYTES NFR BLD AUTO: 12 % (ref 4–12)
NEUTROPHILS # BLD AUTO: 3.34 THOUSANDS/ΜL (ref 1.85–7.62)
NEUTS SEG NFR BLD AUTO: 64 % (ref 43–75)
NRBC BLD AUTO-RTO: 0 /100 WBCS
P AXIS: 31 DEGREES
PLATELET # BLD AUTO: 165 THOUSANDS/UL (ref 149–390)
PMV BLD AUTO: 11.7 FL (ref 8.9–12.7)
PR INTERVAL: 174 MS
QRS AXIS: 23 DEGREES
QRSD INTERVAL: 90 MS
QT INTERVAL: 400 MS
QTC INTERVAL: 428 MS
RBC # BLD AUTO: 5.18 MILLION/UL (ref 3.88–5.62)
T WAVE AXIS: 35 DEGREES
VENTRICULAR RATE: 69 BPM
WBC # BLD AUTO: 5.2 THOUSAND/UL (ref 4.31–10.16)

## 2020-12-10 PROCEDURE — 93005 ELECTROCARDIOGRAM TRACING: CPT

## 2020-12-10 PROCEDURE — 36415 COLL VENOUS BLD VENIPUNCTURE: CPT

## 2020-12-10 PROCEDURE — 93010 ELECTROCARDIOGRAM REPORT: CPT | Performed by: INTERNAL MEDICINE

## 2020-12-10 PROCEDURE — 85025 COMPLETE CBC W/AUTO DIFF WBC: CPT

## 2020-12-10 RX ORDER — CEFAZOLIN SODIUM 2 G/50ML
2000 SOLUTION INTRAVENOUS ONCE
Status: CANCELLED | OUTPATIENT
Start: 2020-12-21 | End: 2020-12-10

## 2020-12-16 ENCOUNTER — ANESTHESIA EVENT (OUTPATIENT)
Dept: PERIOP | Facility: HOSPITAL | Age: 70
End: 2020-12-16
Payer: COMMERCIAL

## 2020-12-17 ENCOUNTER — ANESTHESIA (OUTPATIENT)
Dept: PERIOP | Facility: HOSPITAL | Age: 70
End: 2020-12-17
Payer: COMMERCIAL

## 2020-12-18 ENCOUNTER — OFFICE VISIT (OUTPATIENT)
Dept: GASTROENTEROLOGY | Facility: CLINIC | Age: 70
End: 2020-12-18
Payer: COMMERCIAL

## 2020-12-18 VITALS — HEIGHT: 68 IN | BODY MASS INDEX: 27.58 KG/M2 | WEIGHT: 182 LBS

## 2020-12-18 DIAGNOSIS — K21.9 GASTROESOPHAGEAL REFLUX DISEASE WITHOUT ESOPHAGITIS: Primary | ICD-10-CM

## 2020-12-18 DIAGNOSIS — R10.13 DYSPEPSIA: ICD-10-CM

## 2020-12-18 DIAGNOSIS — K21.9 GASTROESOPHAGEAL REFLUX DISEASE WITHOUT ESOPHAGITIS: ICD-10-CM

## 2020-12-18 PROCEDURE — 99213 OFFICE O/P EST LOW 20 MIN: CPT | Performed by: PHYSICIAN ASSISTANT

## 2020-12-18 RX ORDER — FAMOTIDINE 20 MG/1
20 TABLET, FILM COATED ORAL 2 TIMES DAILY
Qty: 60 TABLET | Refills: 2 | Status: SHIPPED | OUTPATIENT
Start: 2020-12-18 | End: 2021-03-19

## 2020-12-18 RX ORDER — PANTOPRAZOLE SODIUM 40 MG/1
40 TABLET, DELAYED RELEASE ORAL DAILY
Qty: 30 TABLET | Refills: 0 | Status: SHIPPED | OUTPATIENT
Start: 2020-12-18 | End: 2021-01-11

## 2020-12-21 ENCOUNTER — HOSPITAL ENCOUNTER (OUTPATIENT)
Facility: HOSPITAL | Age: 70
Setting detail: OUTPATIENT SURGERY
Discharge: HOME | End: 2020-12-21
Attending: ORTHOPAEDIC SURGERY | Admitting: ORTHOPAEDIC SURGERY
Payer: COMMERCIAL

## 2020-12-21 VITALS
SYSTOLIC BLOOD PRESSURE: 142 MMHG | DIASTOLIC BLOOD PRESSURE: 89 MMHG | HEIGHT: 68 IN | BODY MASS INDEX: 26.6 KG/M2 | HEART RATE: 67 BPM | WEIGHT: 175.49 LBS | TEMPERATURE: 97.4 F | OXYGEN SATURATION: 99 % | RESPIRATION RATE: 18 BRPM

## 2020-12-21 VITALS — HEART RATE: 82 BPM

## 2020-12-21 DIAGNOSIS — M71.22 BAKER CYST, LEFT: Primary | ICD-10-CM

## 2020-12-21 PROCEDURE — 29875 ARTHRS KNEE SURG SYNVCT LMTD: CPT | Performed by: ORTHOPAEDIC SURGERY

## 2020-12-21 RX ORDER — EPHEDRINE SULFATE 50 MG/ML
INJECTION INTRAVENOUS AS NEEDED
Status: DISCONTINUED | OUTPATIENT
Start: 2020-12-21 | End: 2020-12-21

## 2020-12-21 RX ORDER — LIDOCAINE HYDROCHLORIDE 10 MG/ML
INJECTION, SOLUTION EPIDURAL; INFILTRATION; INTRACAUDAL; PERINEURAL AS NEEDED
Status: DISCONTINUED | OUTPATIENT
Start: 2020-12-21 | End: 2020-12-21

## 2020-12-21 RX ORDER — OXYCODONE HYDROCHLORIDE 5 MG/1
5 TABLET ORAL EVERY 4 HOURS PRN
Qty: 30 TABLET | Refills: 0 | Status: SHIPPED | OUTPATIENT
Start: 2020-12-21 | End: 2020-12-31

## 2020-12-21 RX ORDER — ONDANSETRON 4 MG/1
4 TABLET, ORALLY DISINTEGRATING ORAL EVERY 8 HOURS PRN
Qty: 15 TABLET | Refills: 0 | Status: SHIPPED | OUTPATIENT
Start: 2020-12-21 | End: 2021-01-20 | Stop reason: ALTCHOICE

## 2020-12-21 RX ORDER — ASPIRIN 325 MG
325 TABLET, DELAYED RELEASE (ENTERIC COATED) ORAL 2 TIMES DAILY
Qty: 28 TABLET | Refills: 0 | Status: SHIPPED | OUTPATIENT
Start: 2020-12-21 | End: 2021-01-20 | Stop reason: ALTCHOICE

## 2020-12-21 RX ORDER — DEXAMETHASONE SODIUM PHOSPHATE 10 MG/ML
INJECTION, SOLUTION INTRAMUSCULAR; INTRAVENOUS AS NEEDED
Status: DISCONTINUED | OUTPATIENT
Start: 2020-12-21 | End: 2020-12-21

## 2020-12-21 RX ORDER — SODIUM CHLORIDE 9 MG/ML
125 INJECTION, SOLUTION INTRAVENOUS CONTINUOUS
Status: DISCONTINUED | OUTPATIENT
Start: 2020-12-21 | End: 2020-12-21 | Stop reason: HOSPADM

## 2020-12-21 RX ORDER — OXYCODONE HYDROCHLORIDE 5 MG/1
5 TABLET ORAL EVERY 4 HOURS PRN
Status: DISCONTINUED | OUTPATIENT
Start: 2020-12-21 | End: 2020-12-21 | Stop reason: HOSPADM

## 2020-12-21 RX ORDER — PROPOFOL 10 MG/ML
INJECTION, EMULSION INTRAVENOUS AS NEEDED
Status: DISCONTINUED | OUTPATIENT
Start: 2020-12-21 | End: 2020-12-21

## 2020-12-21 RX ORDER — ONDANSETRON 2 MG/ML
INJECTION INTRAMUSCULAR; INTRAVENOUS AS NEEDED
Status: DISCONTINUED | OUTPATIENT
Start: 2020-12-21 | End: 2020-12-21

## 2020-12-21 RX ORDER — LIDOCAINE HYDROCHLORIDE AND EPINEPHRINE 20; 5 MG/ML; UG/ML
INJECTION, SOLUTION EPIDURAL; INFILTRATION; INTRACAUDAL; PERINEURAL AS NEEDED
Status: DISCONTINUED | OUTPATIENT
Start: 2020-12-21 | End: 2020-12-21

## 2020-12-21 RX ORDER — OXYCODONE HYDROCHLORIDE 5 MG/1
10 TABLET ORAL EVERY 4 HOURS PRN
Status: DISCONTINUED | OUTPATIENT
Start: 2020-12-21 | End: 2020-12-21 | Stop reason: HOSPADM

## 2020-12-21 RX ORDER — CEFAZOLIN SODIUM 2 G/50ML
2000 SOLUTION INTRAVENOUS ONCE
Status: COMPLETED | OUTPATIENT
Start: 2020-12-21 | End: 2020-12-21

## 2020-12-21 RX ORDER — MIDAZOLAM HYDROCHLORIDE 2 MG/2ML
INJECTION, SOLUTION INTRAMUSCULAR; INTRAVENOUS AS NEEDED
Status: DISCONTINUED | OUTPATIENT
Start: 2020-12-21 | End: 2020-12-21

## 2020-12-21 RX ORDER — ROPIVACAINE HYDROCHLORIDE 5 MG/ML
INJECTION, SOLUTION EPIDURAL; INFILTRATION; PERINEURAL AS NEEDED
Status: DISCONTINUED | OUTPATIENT
Start: 2020-12-21 | End: 2020-12-21

## 2020-12-21 RX ORDER — FENTANYL CITRATE 50 UG/ML
INJECTION, SOLUTION INTRAMUSCULAR; INTRAVENOUS AS NEEDED
Status: DISCONTINUED | OUTPATIENT
Start: 2020-12-21 | End: 2020-12-21

## 2020-12-21 RX ADMIN — LIDOCAINE HYDROCHLORIDE 60 MG: 10 INJECTION, SOLUTION EPIDURAL; INFILTRATION; INTRACAUDAL; PERINEURAL at 14:27

## 2020-12-21 RX ADMIN — CEFAZOLIN SODIUM 2000 MG: 2 SOLUTION INTRAVENOUS at 14:00

## 2020-12-21 RX ADMIN — SODIUM CHLORIDE: 0.9 INJECTION, SOLUTION INTRAVENOUS at 14:46

## 2020-12-21 RX ADMIN — LIDOCAINE HYDROCHLORIDE,EPINEPHRINE BITARTRATE 20 ML: 20; .005 INJECTION, SOLUTION EPIDURAL; INFILTRATION; INTRACAUDAL; PERINEURAL at 14:08

## 2020-12-21 RX ADMIN — EPHEDRINE SULFATE 7.5 MG: 50 INJECTION, SOLUTION INTRAVENOUS at 15:05

## 2020-12-21 RX ADMIN — MIDAZOLAM 2 MG: 1 INJECTION INTRAMUSCULAR; INTRAVENOUS at 14:04

## 2020-12-21 RX ADMIN — ONDANSETRON 4 MG: 2 INJECTION INTRAMUSCULAR; INTRAVENOUS at 14:38

## 2020-12-21 RX ADMIN — PROPOFOL 200 MG: 10 INJECTION, EMULSION INTRAVENOUS at 14:27

## 2020-12-21 RX ADMIN — DEXAMETHASONE SODIUM PHOSPHATE 4 MG: 10 INJECTION, SOLUTION INTRAMUSCULAR; INTRAVENOUS at 14:38

## 2020-12-21 RX ADMIN — ROPIVACAINE HYDROCHLORIDE 30 ML: 5 INJECTION, SOLUTION EPIDURAL; INFILTRATION; PERINEURAL at 14:08

## 2020-12-21 RX ADMIN — EPHEDRINE SULFATE 7.5 MG: 50 INJECTION, SOLUTION INTRAVENOUS at 14:53

## 2020-12-21 RX ADMIN — FENTANYL CITRATE 100 MCG: 50 INJECTION, SOLUTION INTRAMUSCULAR; INTRAVENOUS at 14:04

## 2020-12-21 RX ADMIN — SODIUM CHLORIDE 125 ML/HR: 0.9 INJECTION, SOLUTION INTRAVENOUS at 12:04

## 2020-12-29 ENCOUNTER — OFFICE VISIT (OUTPATIENT)
Dept: OBGYN CLINIC | Facility: MEDICAL CENTER | Age: 70
End: 2020-12-29

## 2020-12-29 VITALS
SYSTOLIC BLOOD PRESSURE: 129 MMHG | WEIGHT: 175 LBS | BODY MASS INDEX: 26.52 KG/M2 | DIASTOLIC BLOOD PRESSURE: 81 MMHG | HEART RATE: 78 BPM | HEIGHT: 68 IN

## 2020-12-29 DIAGNOSIS — M71.22 BAKER CYST, LEFT: ICD-10-CM

## 2020-12-29 DIAGNOSIS — Z98.890 STATUS POST SURGERY: Primary | ICD-10-CM

## 2020-12-29 PROCEDURE — 99024 POSTOP FOLLOW-UP VISIT: CPT | Performed by: ORTHOPAEDIC SURGERY

## 2021-01-10 DIAGNOSIS — K21.9 GASTROESOPHAGEAL REFLUX DISEASE WITHOUT ESOPHAGITIS: ICD-10-CM

## 2021-01-11 RX ORDER — PANTOPRAZOLE SODIUM 40 MG/1
TABLET, DELAYED RELEASE ORAL
Qty: 30 TABLET | Refills: 5 | Status: SHIPPED | OUTPATIENT
Start: 2021-01-11 | End: 2021-07-07 | Stop reason: SDUPTHER

## 2021-01-12 ENCOUNTER — TELEPHONE (OUTPATIENT)
Dept: INTERNAL MEDICINE CLINIC | Facility: CLINIC | Age: 71
End: 2021-01-12

## 2021-01-12 NOTE — TELEPHONE ENCOUNTER
Pt calling in to advice we will be getting pre-op forms for his upcoming surgery attention to myself, will CB to schedule an appt      P: [phone]  F: [phone]   [address] [facility]

## 2021-01-20 ENCOUNTER — CONSULT (OUTPATIENT)
Dept: INTERNAL MEDICINE CLINIC | Facility: CLINIC | Age: 71
End: 2021-01-20
Payer: COMMERCIAL

## 2021-01-20 VITALS
WEIGHT: 181.8 LBS | RESPIRATION RATE: 16 BRPM | DIASTOLIC BLOOD PRESSURE: 82 MMHG | BODY MASS INDEX: 27.55 KG/M2 | SYSTOLIC BLOOD PRESSURE: 130 MMHG | HEIGHT: 68 IN | OXYGEN SATURATION: 99 % | HEART RATE: 85 BPM

## 2021-01-20 DIAGNOSIS — Z01.818 PREOP EXAM FOR INTERNAL MEDICINE: Primary | ICD-10-CM

## 2021-01-20 PROCEDURE — 99214 OFFICE O/P EST MOD 30 MIN: CPT | Performed by: INTERNAL MEDICINE

## 2021-01-20 NOTE — PROGRESS NOTES
Assessment/Plan:    Preop exam for internal medicine  Preop evaluation requested by Ophthalmology patient is low risk and medically stable for the intended procedure paperwork was completed EKG shows normal sinus rhythm no acute changes and reviewed by myself a copy of this report will be sent along with the paperwork completed and EKG to be sent to Ophthalmology  RTO as scheduled call if any problems  Problem List Items Addressed This Visit        Other    Preop exam for internal medicine - Primary     Preop evaluation requested by Ophthalmology patient is low risk and medically stable for the intended procedure paperwork was completed EKG shows normal sinus rhythm no acute changes and reviewed by myself a copy of this report will be sent along with the paperwork completed and EKG to be sent to Ophthalmology  RTO as scheduled call if any problems  Subjective:      Patient ID: Cris Cortez is a 78 y o  male  HPI preop evaluation, 78year old male coming in for his preop evaluation which is being requested to Ophthalmology Dr Alicia Perkins; no chest pain no short of breath with walking up a full flight of steps or walking 2 blocks  No previous heart problems EKG does show normal sinus rhythm without any major concerns  No bleeding/clotting problems  No previous problems with anesthetics  This is being completed on 02/02/2021 please stable doing well  Initially dx in 1993 barry, change of vision    The following portions of the patient's history were reviewed and updated as appropriate: allergies, current medications, past family history, past medical history, past social history, past surgical history and problem list     Review of Systems   Constitutional: Negative for activity change, appetite change and unexpected weight change  HENT: Negative for congestion and postnasal drip  Eyes: Positive for visual disturbance  Respiratory: Negative for cough and shortness of breath  Cardiovascular: Negative for chest pain  Gastrointestinal: Negative for abdominal pain, diarrhea, nausea and vomiting  Neurological: Negative for dizziness, light-headedness and headaches  Objective:    No follow-ups on file  No results found  No Known Allergies    Past Medical History:   Diagnosis Date    Abnormal weight loss     RESOLVED: [id number]    Anemia     RESOLVED: 70CYR2031    Atypical chest pain     RESOLVED: 70LIA5198    BPH (benign prostatic hyperplasia)     Cancer (HCC)     DUODENAL    Luis Miguel's syndrome     Depression     RESOLVED: 96RNV9693    Diverticulitis of colon     LAST ASSESSED: 44NWP5902    Duodenal nodule     RESOLVED: 26OIC0794    Gastrointestinal stromal tumor (GIST) (HCC)     MALIGNANT; RESOLVED: 72HRJ9934    GERD (gastroesophageal reflux disease)     Glaucoma     RESOLVED: 89JBX8056    Insomnia     RESOLVED: 96IEV7808    Lactose intolerance     LAST ASSESSED: 45CAJ0601     Past Surgical History:   Procedure Laterality Date    BACK SURGERY      BACK SURGERY      l4 s1 surgery     CERVICAL FUSION      c4 and c5    CHOLECYSTECTOMY      COLONOSCOPY  2014    kutty    ELBOW SURGERY Left     REPAIR    EYE SURGERY      GANGLION CYST EXCISION Left 6/5/2020    Procedure: KNEE EXCISION BAKERS CYST;  Surgeon: Argelia Mcdaniels MD;  [facility];  Service: Orthopedics    KNEE ARTHROSCOPY Left     LUMBAR DISCECTOMY      L5 S1    ORIF RADIAL SHAFT FRACTURE      MT ESOPHAGOGASTRODUODENOSCOPY TRANSORAL DIAGNOSTIC N/A 11/1/2016    Procedure: ESOPHAGOGASTRODUODENOSCOPY (EGD); Surgeon: Tg Strong MD;  [facility]; Service: Gastroenterology    MT ESOPHAGOGASTRODUODENOSCOPY TRANSORAL DIAGNOSTIC N/A 5/31/2018    Procedure: ESOPHAGOGASTRODUODENOSCOPY (EGD); Surgeon: Tg Strong MD;  [facility];   Service: Gastroenterology    MT [facility] Left 12/21/2020    Procedure: KNEE ARTHROSCOPY, popliteal cyst decompression; Surgeon: Bert Carranza MD;  [facility];  Service: Orthopedics    [place] REDO EXCIS LUMBAR DISK N/A 10/24/2019    Procedure: Revision laminectomy and decompression at L5-S1;  Surgeon: Celestina Wagoner MD;  [facility];  Service: Orthopedics    SMALL INTESTINE SURGERY      GIST surgery    [facility] MSK PROCEDURE  5/21/2019    [facility] MSK PROCEDURE  10/30/2020     Current Outpatient Medications on File Prior to Visit   Medication Sig Dispense Refill    cholestyramine (QUESTRAN) 4 GM/DOSE powder Take 1 packet (4 g total) by mouth every other day (Patient taking differently: Take 4 g by mouth every other day ) 378 g 6    dorzolamide-timolol (COSOPT) 22 3-6 8 MG/ML ophthalmic solution INSTILL 1 DROP INTO RIGHT EYE 3 TIMES A DAY  4    famotidine (PEPCID) 20 mg tablet Take 1 tablet (20 mg total) by mouth 2 (two) times a day 60 tablet 2    GLUCOSAMINE CHONDROITIN COMPLX PO Take 1 tablet by mouth daily 4500mg  daily       latanoprost (XALATAN) 0 005 % ophthalmic solution Administer 1 drop to the right eye   4    Multiple Vitamins-Minerals (MULTIVITAL) tablet Take 1 tablet by mouth daily Spectravite       pantoprazole (PROTONIX) 40 mg tablet TAKE 1 TABLET BY MOUTH EVERY DAY 30 tablet 5    polymyxin b-trimethoprim (POLYTRIM) ophthalmic solution       tamsulosin (FLOMAX) 0 4 mg Take 2 capsules (0 8 mg total) by mouth daily with dinner 180 capsule 3    Besivance 0 6 % SUSP INSTILL 1 DROP BY OPHTHALMIC ROUTE 3 TIMES EVERY DAY INTO THE RIGHT EYE  FOR AGITATION      [DISCONTINUED] aspirin (ECOTRIN) 325 mg EC tablet Take 1 tablet (325 mg total) by mouth 2 (two) times a day for 14 days 28 tablet 0    [DISCONTINUED] ondansetron (ZOFRAN-ODT) 4 mg disintegrating tablet Take 1 tablet (4 mg total) by mouth every 8 (eight) hours as needed for nausea or vomiting 15 tablet 0     No current facility-administered medications on file prior to visit        Family History   Problem Relation Age of Onset    Thyroid disease Mother     Thyroid disease Brother     Diabetes Paternal Grandmother     Hypertension Family      Social History     Socioeconomic History    Marital status: /Civil Union     Spouse name: Not on file    Number of children: Not on file    Years of education: Not on file    Highest education level: Not on file   Occupational History    Not on file   Social Needs    Financial resource strain: Not on file    Food insecurity     Worry: Not on file     Inability: Not on file    Transportation needs     Medical: Not on file     Non-medical: Not on file   Tobacco Use    Smoking status: Never Smoker    Smokeless tobacco: Never Used   Substance and Sexual Activity    Alcohol use: Yes     Frequency: Monthly or less     Drinks per session: 1 or 2     Binge frequency: Never     Comment: occasional    Drug use: No    Sexual activity: Yes   Lifestyle    Physical activity     Days per week: Not on file     Minutes per session: Not on file    Stress: Not on file   Relationships    Social connections     Talks on phone: Not on file     Gets together: Not on file     Attends L[employer] service: Not on file     Active member of club or organization: Not on file     Attends meetings of clubs or organizations: Not on file     Relationship status: Not on file    Intimate partner violence     Fear of current or ex partner: Not on file     Emotionally abused: Not on file     Physically abused: Not on file     Forced sexual activity: Not on file   Other Topics Concern    Not on file   Social History Narrative    Not on file     Vitals:    01/20/21 1431   BP: 130/82   Pulse: 85   Resp: 16   SpO2: 99%   Weight: 82 5 kg (181 lb 12 8 oz)   Height: 5' 8" (1 727 m)     Results for orders placed or performed in visit on 12/10/20   EKG 12 lead   Result Value Ref Range    Ventricular Rate 69 BPM    Atrial Rate 69 BPM    ID Interval 174 ms    QRSD Interval 90 ms    QT Interval 400 ms    QTC Interval 428 ms P Axis 31 degrees    QRS Axis 23 degrees    T Wave Axis 35 degrees     Weight (last 2 days)     Date/Time   Weight    01/20/21 1431   82 5 (181 8)            Body mass index is 27 64 kg/m²  BP      Temp      Pulse     Resp      SpO2        Vitals:    01/20/21 1431   Weight: 82 5 kg (181 lb 12 8 oz)     Vitals:    01/20/21 1431   Weight: 82 5 kg (181 lb 12 8 oz)       /82   Pulse 85   Resp 16   Ht 5' 8" (1 727 m)   Wt 82 5 kg (181 lb 12 8 oz)   SpO2 99%   BMI 27 64 kg/m²          Physical Exam  Constitutional:       General: He is not in acute distress  Appearance: He is well-developed  He is not diaphoretic  HENT:      Head: Normocephalic and atraumatic  Right Ear: External ear normal       Left Ear: External ear normal    Eyes:      General: No scleral icterus  Right eye: No discharge  Left eye: No discharge  Conjunctiva/sclera: Conjunctivae normal       Pupils: Pupils are equal, round, and reactive to light  Neck:      Musculoskeletal: Neck supple  Cardiovascular:      Rate and Rhythm: Normal rate and regular rhythm  Heart sounds: Normal heart sounds  No murmur  No friction rub  No gallop  Pulmonary:      Effort: No respiratory distress  Breath sounds: No wheezing or rales  Abdominal:      General: Bowel sounds are normal  There is no distension  Palpations: Abdomen is soft  There is no mass  Tenderness: There is no abdominal tenderness  There is no guarding or rebound  Musculoskeletal:         General: No deformity  Lymphadenopathy:      Cervical: No cervical adenopathy  Neurological:      Mental Status: He is alert

## 2021-01-21 NOTE — ASSESSMENT & PLAN NOTE
Preop evaluation requested by Ophthalmology patient is low risk and medically stable for the intended procedure paperwork was completed EKG shows normal sinus rhythm no acute changes and reviewed by myself a copy of this report will be sent along with the paperwork completed and EKG to be sent to Ophthalmology  RTO as scheduled call if any problems

## 2021-01-27 ENCOUNTER — OFFICE VISIT (OUTPATIENT)
Dept: OBGYN CLINIC | Facility: OTHER | Age: 71
End: 2021-01-27

## 2021-01-27 VITALS
DIASTOLIC BLOOD PRESSURE: 85 MMHG | BODY MASS INDEX: 26.52 KG/M2 | HEIGHT: 68 IN | WEIGHT: 175 LBS | SYSTOLIC BLOOD PRESSURE: 138 MMHG | HEART RATE: 67 BPM

## 2021-01-27 DIAGNOSIS — M71.22 BAKER CYST, LEFT: Primary | ICD-10-CM

## 2021-01-27 PROCEDURE — 99024 POSTOP FOLLOW-UP VISIT: CPT | Performed by: ORTHOPAEDIC SURGERY

## 2021-01-27 NOTE — PROGRESS NOTES
Orthopaedic Surgery - Office Note  Anjelica Pérez (25 y o  male)   : 1950   MRN: [id number]  Encounter Date: 2021    Chief Complaint   Patient presents with    Left Knee - Follow-up       Assessment / Plan  S/p left knee arthroscopy with popliteal cyst decompression 2020    · We discussed at length further surgical intervention and patient understands risk and benefits  The patient is undergoing an eye surgery this week and would like to take some time to consider his options  He will call if he decides to move further with another surgery   · Continue with HEP   · Continue to use compression as needed  Return if symptoms worsen or fail to improve  History of Present Illness  Anjelica Pérez is a 73 y o  male who presents for follow up S/p left knee arthroscopy with popliteal cyst decompression 2020  Today he presents in the office with the cyst refilling and most prominent on the medial aspect of his knee  He states that it is not painful but does cause discomfort when he sleeps as he is a side sleeper  He has continued to use compression which he states becomes uncomfortable after a few hours of wearing it  He has also continued to do his HEP  He still complains of some lingering decrease sensation from the saphenous nerve which begin after his initial surgery in 2020  Patient has been previously treated by Dr Gilles Barton  In 2020 he was diagnosed with bakers cyst of the left knee  On 2020 he underwent a bakers cyst excision by Dr Gilles Barton  Patient reports doing postoperative physical therapy and injury to the saphenous nerve with decreased sensation along the medial aspect of his left leg postoperatively   Patient reports ultimately the cyst recurred   On 10/13/2020 Dr Tc Salazar left knee arthrocentesis and removed 20 mL of fluid   At this time Dr Piter Ontiveros ultrasound-guided aspiration of large Baker's cyst   On 10/30/2020 patient underwent ultrasound-guided drainage of left Baker's cyst were 80 mL of fluid was drained by Interventional Radiology   Patient reports the following day the cyst had refilled  Review of Systems  Pertinent items are noted in HPI  All other systems were reviewed and are negative  Physical Exam  /85   Pulse 67   Ht 5' 8" (1 727 m)   Wt 79 4 kg (175 lb)   BMI 26 61 kg/m²   Cons: Appears well  No apparent distress  Psych: Alert  Oriented x3  Mood and affect normal   Eyes: PERRLA, EOMI  Resp: Normal effort  No audible wheezing or stridor  CV: Palpable pulse  No discernable arrhythmia  No LE edema  Lymph:  No palpable cervical, axillary, or inguinal lymphadenopathy  Skin: Warm  No palpable masses  No visible lesions  Neuro: Normal muscle tone  Normal and symmetric DTR's  Left Knee Exam  Alignment:  Normal knee alignment  Inspection:  filling of popliteal cyst most promient on the medial aspect of the knee  Palpation:  No tenderness  ROM:  Normal knee ROM  Strength:  Able to actively extend knee against gravity  Stability:  Not tested  Tests:  Not tested  Patella:  Normal patellar mobility  Neurovascular:  Sensation intact in DP/SP/Corbin/Sa/T nerve distributions  2+ DP & PT pulses  Gait:  Smooth  Studies Reviewed  No studies to review    Procedures  No procedures today  Medical, Surgical, Family, and Social History  The patient's medical history, family history, and social history, were reviewed and updated as appropriate      Past Medical History:   Diagnosis Date    Abnormal weight loss     RESOLVED: [id number]    Anemia     RESOLVED: [id number]    Atypical chest pain     RESOLVED: [id number]    BPH (benign prostatic hyperplasia)     Cancer (HCC)     DUODENAL    Luis Miguel's syndrome     Depression     RESOLVED: [id number]    Diverticulitis of colon     LAST ASSESSED: [id number]    Duodenal nodule     RESOLVED: [id number]    Gastrointestinal stromal tumor (GIST) ([facility] [address] )     MALIGNANT; RESOLVED: [id number]    GERD (gastroesophageal reflux disease)     Glaucoma     RESOLVED: [id number]    Insomnia     RESOLVED: [id number]    Lactose intolerance     LAST ASSESSED: [id number]       Past Surgical History:   Procedure Laterality Date    BACK SURGERY      BACK SURGERY      l4 s1 surgery     CERVICAL FUSION      c4 and c5    CHOLECYSTECTOMY      COLONOSCOPY  2014    kutty    ELBOW SURGERY Left     REPAIR    EYE SURGERY      GANGLION CYST EXCISION Left 6/5/2020    Procedure: KNEE EXCISION BAKERS CYST;  Surgeon: Awilda Askew MD;  [facility];  Service: Orthopedics    KNEE ARTHROSCOPY Left     LUMBAR DISCECTOMY      L5 S1    ORIF RADIAL SHAFT FRACTURE      [place] ESOPHAGOGASTRODUODENOSCOPY TRANSORAL DIAGNOSTIC N/A 11/1/2016    Procedure: ESOPHAGOGASTRODUODENOSCOPY (EGD); Surgeon: Kofi Zepeda MD;  [facility]; Service: Gastroenterology    [place] ESOPHAGOGASTRODUODENOSCOPY TRANSORAL DIAGNOSTIC N/A 5/31/2018    Procedure: ESOPHAGOGASTRODUODENOSCOPY (EGD); Surgeon: Kofi Zepeda MD;  [facility]; Service: Gastroenterology    [facility] Left 12/21/2020    Procedure: KNEE ARTHROSCOPY, popliteal cyst decompression;  Surgeon: Yulia Vargas MD;  [facility];  Service: Orthopedics    [place] REDO EXCIS LUMBAR DISK N/A 10/24/2019    Procedure: Revision laminectomy and decompression at L5-S1;  Surgeon: Nisreen Laboy MD;  [facility];  Service: Orthopedics    SMALL INTESTINE SURGERY      GIST surgery    [facility] [address] MSK PROCEDURE  5/21/2019    US GUIDED MSK PROCEDURE  10/30/2020       Family History   Problem Relation Age of Onset    Thyroid disease Mother     Thyroid disease Brother     Diabetes Paternal Grandmother     Hypertension Family        Social History     Occupational History    Not on file   Tobacco Use    Smoking status: Never Smoker    Smokeless tobacco: Never Used   Substance and Sexual Activity    Alcohol use:  Yes Frequency: Monthly or less     Drinks per session: 1 or 2     Binge frequency: Never     Comment: occasional    Drug use: No    Sexual activity: Yes       No Known Allergies      Current Outpatient Medications:     Besivance 0 6 % SUSP, INSTILL 1 DROP BY OPHTHALMIC ROUTE 3 TIMES EVERY DAY INTO THE RIGHT EYE   FOR AGITATION, Disp: , Rfl:     cholestyramine (QUESTRAN) 4 GM/DOSE powder, Take 1 packet (4 g total) by mouth every other day (Patient taking differently: Take 4 g by mouth every other day ), Disp: 378 g, Rfl: 6    dorzolamide-timolol (COSOPT) 22 3-6 8 MG/ML ophthalmic solution, INSTILL 1 DROP INTO RIGHT EYE 3 TIMES A DAY, Disp: , Rfl: 4    famotidine (PEPCID) 20 mg tablet, Take 1 tablet (20 mg total) by mouth 2 (two) times a day, Disp: 60 tablet, Rfl: 2    GLUCOSAMINE CHONDROITIN COMPLX PO, Take 1 tablet by mouth daily 4500mg  daily , Disp: , Rfl:     latanoprost (XALATAN) 0 005 % ophthalmic solution, Administer 1 drop to the right eye , Disp: , Rfl: 4    Multiple Vitamins-Minerals (MULTIVITAL) tablet, Take 1 tablet by mouth daily Spectravite , Disp: , Rfl:     pantoprazole (PROTONIX) 40 mg tablet, TAKE 1 TABLET BY MOUTH EVERY DAY, Disp: 30 tablet, Rfl: 5    polymyxin b-trimethoprim (POLYTRIM) ophthalmic solution, , Disp: , Rfl:     tamsulosin (FLOMAX) 0 4 mg, Take 2 capsules (0 8 mg total) by mouth daily with dinner, Disp: 180 capsule, Rfl: 3      [employer]    I,:  Ceasr Ledbetter am acting as a scribe while in the presence of the attending physician :       I,:  Asya Avelar MD personally performed the services described in this documentation    as scribed in my presence :

## 2021-03-09 ENCOUNTER — CONSULT (OUTPATIENT)
Dept: INTERNAL MEDICINE CLINIC | Facility: CLINIC | Age: 71
End: 2021-03-09
Payer: COMMERCIAL

## 2021-03-09 VITALS
SYSTOLIC BLOOD PRESSURE: 132 MMHG | OXYGEN SATURATION: 99 % | WEIGHT: 174.4 LBS | BODY MASS INDEX: 26.43 KG/M2 | HEIGHT: 68 IN | RESPIRATION RATE: 16 BRPM | DIASTOLIC BLOOD PRESSURE: 78 MMHG

## 2021-03-09 DIAGNOSIS — Z01.818 PREOPERATIVE TESTING: Primary | ICD-10-CM

## 2021-03-09 PROCEDURE — 99213 OFFICE O/P EST LOW 20 MIN: CPT | Performed by: GENERAL ACUTE CARE HOSPITAL

## 2021-03-09 RX ORDER — OFLOXACIN 3 MG/ML
1 SOLUTION/ DROPS OPHTHALMIC 4 TIMES DAILY
COMMUNITY
Start: 2021-01-26 | End: 2021-09-24

## 2021-03-09 RX ORDER — PREDNISOLONE ACETATE 10 MG/ML
SUSPENSION/ DROPS OPHTHALMIC
COMMUNITY
Start: 2021-01-26 | End: 2021-09-24

## 2021-03-09 RX ORDER — BRIMONIDINE TARTRATE 0.1 %
DROPS OPHTHALMIC (EYE)
COMMUNITY
Start: 2021-03-08 | End: 2021-09-24

## 2021-03-09 RX ORDER — NEPAFENAC 0.3 %
1 SUSPENSION, DROPS(FINAL DOSAGE FORM)(ML) OPHTHALMIC (EYE) DAILY
COMMUNITY
Start: 2021-01-27 | End: 2021-09-24

## 2021-03-09 NOTE — PROGRESS NOTES
INTERNAL MEDICINE CONSULT OFFICE VISIT  [facility]    NAME: Erinn Kimball  AGE: 73 y o  SEX: male  : 1950     DATE: 3/9/2021     Assessment and Plan:     Preoperative testing  Preoperative clearance is requested by Ophthalmology  patient is at standard risk and medically stable for the intended procedure  EKG in December shows normal sinus rhythm no acute changes  Return Medicare [facility] , patient will call to make appointment per his request      Chief Complaint:     Chief Complaint   Patient presents with    Pre-op Exam        History of Present Illness:    73year old male with diagnosis of chandlers is here for his preoperative clearance which is being requested by Ophthalmology Dr Kory Kline; He denies  chest pain or short of breath with walking up a full flight of steps or walking 2 blocks  He has no previous heart problems and his most recent EKG does show normal sinus rhythm without any major concerns  No bleeding/clotting problems  No previous problems with anesthetics  He is scheduled for [facility] on 3/30/2021 at [facility] due to vision disturbance         The following portions of the patient's history were reviewed and updated as appropriate: allergies, current medications, past family history, past medical history, past social history, past surgical history and problem list      Review of Systems:     Review of Systems   Eyes: Positive for visual disturbance  All other systems reviewed and are negative         Problem List:     Patient Active Problem List   Diagnosis    Subacromial impingement of right shoulder    Enlarged prostate without lower urinary tract symptoms (luts)    Esophageal reflux    Essential hypertriglyceridemia    Hyperlipidemia    Splenomegaly    Tremor, essential    Primary osteoarthritis of knee    History of gastrointestinal stromal tumor (GIST)    Belching    Gastroesophageal reflux disease without esophagitis    Screening for prostate cancer    Diarrhea    Body aches    Myalgia    Melena    Atypical chest pain    IBS (irritable bowel syndrome)    Encounter for hepatitis C screening test for low risk patient    Hypokalemia    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)    Lumbar radiculopathy    Herniation of intervertebral disc between L5 and S1    Spinal stenosis of lumbar region    Lumbar disc herniation with radiculopathy    Overweight (BMI 25 0-29  9)    Skin rash    Medicare annual wellness visit, subsequent    Aftercare following surgery    Preop examination    Baker cyst, left    Preoperative testing    Fuchs' corneal dystrophy    Preop exam for internal medicine    Preoperative clearance     Past Surgical History:  Procedure Laterality Date   BACK SURGERY       BACK SURGERY        l4 s1 surgery    CERVICAL FUSION        c4 and c5   CHOLECYSTECTOMY       COLONOSCOPY   2014    kutty   ELBOW SURGERY Left      REPAIR   EYE SURGERY       GANGLION CYST EXCISION Left 6/5/2020    Procedure: KNEE EXCISION BAKERS CYST;  Surgeon: Jose Cruz MD;  [facility];  Service: Orthopedics   KNEE ARTHROSCOPY Left     LUMBAR DISCECTOMY        L5 S1   ORIF RADIAL SHAFT FRACTURE       [place] ESOPHAGOGASTRODUODENOSCOPY TRANSORAL DIAGNOSTIC N/A 11/1/2016    Procedure: ESOPHAGOGASTRODUODENOSCOPY (EGD); Surgeon: Nereida Cogan, MD;  [facility]; Service: Gastroenterology   [place] ESOPHAGOGASTRODUODENOSCOPY TRANSORAL DIAGNOSTIC N/A 5/31/2018    Procedure: ESOPHAGOGASTRODUODENOSCOPY (EGD); Surgeon: Nereida Cogan, MD;  [facility];   Service: Gastroenterology   HI [facility] Left 12/21/2020    Procedure: KNEE ARTHROSCOPY, popliteal cyst decompression;  Surgeon: Brionna Win MD;  [facility];  Service: Orthopedics   [place] REDO EXCIS LUMBAR DISK N/A 10/24/2019    Procedure: Revision laminectomy and decompression at L5-S1;  Surgeon: Franky Bloch, MD;  [facility];  Service: Orthopedics   SMALL INTESTINE SURGERY        GIST surgery   Doshoshana aCldwell MSK PROCEDURE   5/21/2019   Jazmín Caldwell MSK PROCEDURE   10/30/2020      Current Outpatient Medications on File Prior to Visit  Medication Sig Dispense Refill   cholestyramine (QUESTRAN) 4 GM/DOSE powder Take 1 packet (4 g total) by mouth every other day (Patient taking differently: Take 4 g by mouth every other day ) 378 g 6   dorzolamide-timolol (COSOPT) 22 3-6 8 MG/ML ophthalmic solution INSTILL 1 DROP INTO RIGHT EYE 3 TIMES A DAY   4   famotidine (PEPCID) 20 mg tablet Take 1 tablet (20 mg total) by mouth 2 (two) times a day 60 tablet 2   GLUCOSAMINE CHONDROITIN COMPLX PO Take 1 tablet by mouth daily 4500mg  daily        latanoprost (XALATAN) 0 005 % ophthalmic solution Administer 1 drop to the right eye    4   Multiple Vitamins-Minerals (MULTIVITAL) tablet Take 1 tablet by mouth daily Spectravite        pantoprazole (PROTONIX) 40 mg tablet TAKE 1 TABLET BY MOUTH EVERY DAY 30 tablet 5   polymyxin b-trimethoprim (POLYTRIM) ophthalmic solution         tamsulosin (FLOMAX) 0 4 mg Take 2 capsules (0 8 mg total) by mouth daily with dinner 180 capsule 3   Besivance 0 6 % SUSP INSTILL 1 DROP BY OPHTHALMIC ROUTE 3 TIMES EVERY DAY INTO THE RIGHT EYE  FOR AGITATION           Objective:     /78   Resp 16   Ht 5' 8" (1 727 m)   Wt 79 1 kg (174 lb 6 4 oz)   SpO2 99%   BMI 26 52 kg/m²     Physical Exam  Vitals signs and nursing note reviewed  Constitutional:       Appearance: Normal appearance  He is not ill-appearing  HENT:      Head: Normocephalic and atraumatic  Eyes:      Extraocular Movements: Extraocular movements intact  Pupils: Pupils are equal, round, and reactive to light  Neck:      Musculoskeletal: Neck supple  Vascular: No carotid bruit  Cardiovascular:      Rate and Rhythm: Normal rate and regular rhythm        Pulses: Normal pulses  Heart sounds: Normal heart sounds  Pulmonary:      Effort: Pulmonary effort is normal       Breath sounds: Normal breath sounds  Abdominal:      General: Bowel sounds are normal       Palpations: Abdomen is soft  Tenderness: There is no abdominal tenderness  Musculoskeletal:      Right lower leg: No edema  Left lower leg: No edema  Skin:     General: Skin is warm  Neurological:      General: No focal deficit present  Mental Status: He is alert and oriented to person, place, and time  Psychiatric:         Mood and Affect: Mood normal          Behavior: Behavior normal          Thought Content: Thought content normal          Judgment: Judgment normal          Pertinent Laboratory/Diagnostic Studies:    Laboratory Results: I have personally reviewed the pertinent laboratory results/reports         Radiology/Other Diagnostic Testing Results: I have personally reviewed pertinent reports        Albina Sandoval MD  [facility] [id number]

## 2021-03-09 NOTE — ASSESSMENT & PLAN NOTE
Preoperative clearance is requested by Ophthalmology  patient is at standard risk and medically stable for the intended procedure  EKG in December shows normal sinus rhythm no acute changes

## 2021-03-19 DIAGNOSIS — K21.9 GASTROESOPHAGEAL REFLUX DISEASE WITHOUT ESOPHAGITIS: ICD-10-CM

## 2021-03-19 RX ORDER — FAMOTIDINE 20 MG/1
TABLET, FILM COATED ORAL
Qty: 60 TABLET | Refills: 5 | Status: SHIPPED | OUTPATIENT
Start: 2021-03-19 | End: 2021-07-07 | Stop reason: SDUPTHER

## 2021-05-04 ENCOUNTER — OFFICE VISIT (OUTPATIENT)
Dept: INTERNAL MEDICINE CLINIC | Facility: CLINIC | Age: 71
End: 2021-05-04
Payer: COMMERCIAL

## 2021-05-04 VITALS
HEART RATE: 77 BPM | SYSTOLIC BLOOD PRESSURE: 140 MMHG | TEMPERATURE: 97.7 F | DIASTOLIC BLOOD PRESSURE: 100 MMHG | WEIGHT: 186.4 LBS | HEIGHT: 68 IN | BODY MASS INDEX: 28.25 KG/M2 | OXYGEN SATURATION: 98 %

## 2021-05-04 DIAGNOSIS — H60.502 ACUTE OTITIS EXTERNA OF LEFT EAR, UNSPECIFIED TYPE: Primary | ICD-10-CM

## 2021-05-04 DIAGNOSIS — H18.519 FUCHS' CORNEAL DYSTROPHY: ICD-10-CM

## 2021-05-04 DIAGNOSIS — H92.02 LEFT EAR PAIN: ICD-10-CM

## 2021-05-04 PROCEDURE — 99213 OFFICE O/P EST LOW 20 MIN: CPT | Performed by: GENERAL ACUTE CARE HOSPITAL

## 2021-05-04 RX ORDER — OFLOXACIN 3 MG/ML
10 SOLUTION AURICULAR (OTIC) DAILY
Qty: 10 ML | Refills: 0 | Status: SHIPPED | OUTPATIENT
Start: 2021-05-04 | End: 2021-05-11

## 2021-05-04 NOTE — ASSESSMENT & PLAN NOTE
·  7 days of ofloxacin otic solution x 10 drops daily  · Return to clinic in 7 days if no improvement

## 2021-05-04 NOTE — PROGRESS NOTES
INTERNAL MEDICINE FOLLOW-UP OFFICE VISIT  [facility]    NAME: Viet Monroy  AGE: 73 y o  SEX: male  : 1950     DATE: 2021     Assessment and Plan:     Left ear pain  · Gradually worsening for the last 4 days  · Denies ear drainage or hearing loss  · Aggravated by pressure on the pinna and sleeping on the left side  · Denies fever or chills  · Denies swimming in the pool  · Physical exam noted for left ear canal redness and swelling and partial visualization of TM  · Symptoms likely related to external otitis  · Plan is 7 days of ofloxacin otic solution x 10 drops daily    Acute otitis externa of left ear  ·  7 days of ofloxacin otic solution x 10 drops daily  · Return to clinic in 7 days if no improvement  Fuchs' corneal dystrophy  · S/p repair  · Follow with specialist        Return if symptoms worsen or fail to improve  Chief Complaint:     Chief Complaint   Patient presents with    Earache     left ear, painful        History of Present Illness:     73year old male patient presents today for complaints of left ear pain , worsening for the last 4 days , 4/10 in severity  Denies fever , chills , sore throat , ear drainage and hearing loss  Reports that pain is worse when he sleep on his left side and sometimes wakes him up at night  No recent swimming in the pool or trauma  The following portions of the patient's history were reviewed and updated as appropriate: allergies, current medications, past family history, past medical history, past social history, past surgical history and problem list      Review of Systems:     Review of Systems   Constitutional: Negative  HENT: Positive for ear pain  Negative for congestion, ear discharge, facial swelling, hearing loss, rhinorrhea, sinus pressure, sinus pain, sore throat, tinnitus, trouble swallowing and voice change  Respiratory: Negative  Cardiovascular: Negative  Gastrointestinal: Negative  Genitourinary: Negative  Neurological: Negative  Psychiatric/Behavioral: Negative  Problem List:     Patient Active Problem List   Diagnosis    Subacromial impingement of right shoulder    Enlarged prostate without lower urinary tract symptoms (luts)    Esophageal reflux    Essential hypertriglyceridemia    Hyperlipidemia    Splenomegaly    Tremor, essential    Primary osteoarthritis of knee    History of gastrointestinal stromal tumor (GIST)    Belching    Gastroesophageal reflux disease without esophagitis    Screening for prostate cancer    Diarrhea    Body aches    Myalgia    Melena    Atypical chest pain    IBS (irritable bowel syndrome)    Encounter for hepatitis C screening test for low risk patient    Hypokalemia    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)    Lumbar radiculopathy    Herniation of intervertebral disc between L5 and S1    Spinal stenosis of lumbar region    Lumbar disc herniation with radiculopathy    Overweight (BMI 25 0-29  9)    Skin rash    Medicare annual wellness visit, subsequent    Aftercare following surgery    Preop examination    Baker cyst, left    Preoperative testing    Fuchs' corneal dystrophy    Preop exam for internal medicine    Preoperative clearance    Left ear pain    Acute otitis externa of left ear        Objective:     /100   Pulse 77   Temp 97 7 °F (36 5 °C) (Temporal)   Ht 5' 8" (1 727 m)   Wt 84 6 kg (186 lb 6 4 oz)   SpO2 98%   BMI 28 34 kg/m²     Physical Exam  Constitutional:       Appearance: He is not ill-appearing  HENT:      Head: Normocephalic and atraumatic  Jaw: No tenderness  Right Ear: No swelling or tenderness  Left Ear: Swelling and tenderness present        Ears:      Comments: Left TM not completely visualized due to left ear canal swelling     Mouth/Throat:      Mouth: Mucous membranes are moist    Eyes:      General:         Right eye: No discharge  Left eye: No discharge  Conjunctiva/sclera: Conjunctivae normal    Neck:      Musculoskeletal: Normal range of motion and neck supple  Cardiovascular:      Rate and Rhythm: Normal rate and regular rhythm  Pulses: Normal pulses  Heart sounds: Normal heart sounds  Pulmonary:      Effort: Pulmonary effort is normal       Breath sounds: Normal breath sounds  Abdominal:      General: Bowel sounds are normal       Palpations: Abdomen is soft  Musculoskeletal:      Right lower leg: No edema  Left lower leg: No edema  Neurological:      General: No focal deficit present  Mental Status: He is alert and oriented to person, place, and time  Pertinent Laboratory/Diagnostic Studies:    Laboratory Results: I have personally reviewed the pertinent laboratory results/reports         Radiology/Other Diagnostic Testing Results: I have personally reviewed pertinent reports        MD Do Bowles [id number]

## 2021-05-04 NOTE — ASSESSMENT & PLAN NOTE
· Gradually worsening for the last 4 days    · Denies ear drainage or hearing loss  · Aggravated by pressure on the pinna and sleeping on the left side  · Denies fever or chills  · Denies swimming in the pool  · Physical exam noted for left ear canal redness and swelling and partial visualization of TM  · Symptoms likely related to external otitis  · Plan is 7 days of ofloxacin otic solution x 10 drops daily

## 2021-05-27 ENCOUNTER — OFFICE VISIT (OUTPATIENT)
Dept: LAB | Age: 71
End: 2021-05-27
Payer: COMMERCIAL

## 2021-05-27 ENCOUNTER — TRANSCRIBE ORDERS (OUTPATIENT)
Dept: ADMINISTRATIVE | Age: 71
End: 2021-05-27

## 2021-05-27 DIAGNOSIS — H40.51X2 GLAUCOMA OF RIGHT EYE SECONDARY TO OTHER EYE DISORDER, MODERATE STAGE: ICD-10-CM

## 2021-05-27 DIAGNOSIS — H40.51X2 GLAUCOMA OF RIGHT EYE SECONDARY TO OTHER EYE DISORDER, MODERATE STAGE: Primary | ICD-10-CM

## 2021-05-27 PROCEDURE — 93005 ELECTROCARDIOGRAM TRACING: CPT

## 2021-05-29 LAB
ATRIAL RATE: 288 BPM
P AXIS: 6 DEGREES
QRS AXIS: 17 DEGREES
QRSD INTERVAL: 88 MS
QT INTERVAL: 392 MS
QTC INTERVAL: 401 MS
T WAVE AXIS: 34 DEGREES
VENTRICULAR RATE: 63 BPM

## 2021-05-29 PROCEDURE — 93010 ELECTROCARDIOGRAM REPORT: CPT | Performed by: INTERNAL MEDICINE

## 2021-07-07 ENCOUNTER — APPOINTMENT (OUTPATIENT)
Dept: LAB | Age: 71
End: 2021-07-07
Payer: COMMERCIAL

## 2021-07-07 ENCOUNTER — OFFICE VISIT (OUTPATIENT)
Dept: GASTROENTEROLOGY | Facility: AMBULARY SURGERY CENTER | Age: 71
End: 2021-07-07
Payer: COMMERCIAL

## 2021-07-07 VITALS
WEIGHT: 186.2 LBS | HEIGHT: 68 IN | BODY MASS INDEX: 28.22 KG/M2 | DIASTOLIC BLOOD PRESSURE: 80 MMHG | SYSTOLIC BLOOD PRESSURE: 134 MMHG

## 2021-07-07 DIAGNOSIS — K21.9 GASTROESOPHAGEAL REFLUX DISEASE WITHOUT ESOPHAGITIS: ICD-10-CM

## 2021-07-07 DIAGNOSIS — K21.9 GASTROESOPHAGEAL REFLUX DISEASE WITHOUT ESOPHAGITIS: Primary | ICD-10-CM

## 2021-07-07 DIAGNOSIS — Z12.5 PROSTATE CANCER SCREENING: ICD-10-CM

## 2021-07-07 PROCEDURE — 99213 OFFICE O/P EST LOW 20 MIN: CPT | Performed by: INTERNAL MEDICINE

## 2021-07-07 RX ORDER — PANTOPRAZOLE SODIUM 40 MG/1
40 TABLET, DELAYED RELEASE ORAL DAILY
Qty: 30 TABLET | Refills: 5 | Status: SHIPPED | OUTPATIENT
Start: 2021-07-07 | End: 2021-09-07

## 2021-07-07 RX ORDER — VALACYCLOVIR HYDROCHLORIDE 500 MG/1
500 TABLET, FILM COATED ORAL DAILY
COMMUNITY
Start: 2021-06-18 | End: 2022-06-02

## 2021-07-07 RX ORDER — FAMOTIDINE 40 MG/1
20 TABLET, FILM COATED ORAL
Qty: 30 TABLET | Refills: 6 | Status: SHIPPED | OUTPATIENT
Start: 2021-07-07 | End: 2021-09-07

## 2021-07-07 NOTE — LETTER
July 7, 2021     Ivette Aguilar  [address] [facility] [address] [place] [address]    Patient: Rod Brooke   YOB: 1950   Date of Visit: 7/7/2021       Dear Dr Silverio Sharma: Thank you for referring Rod Brooke to me for evaluation  Below are my notes for this consultation  If you have questions, please do not hesitate to call me  I look forward to following your patient along with you  Sincerely,        Verena Guthrie MD        CC: No Recipients  Verena Guthrie MD  7/7/2021  5:34 PM  Sign when Signing Visit  [facility]  Rod Brooke 73 y o  male MRN: [id number]            Assessment & Plan:    72-year-old gentleman well known to us, remote history of duodenal GIST, dyspepsia and reflux  Worsening symptoms when trying to wean off PPI therapy  1  Dyspepsia and reflux:  -discussed with the patient that he had switched his medications, recommend that he take pantoprazole 40 mg in the morning, famotidine 40 mg in the evening  -he was instructed to give us a call the next few weeks if not improved, we can give a trial of Carafate at that time, if still worsening symptoms would then proceed with an upper endoscopy  -last EGD was several years ago, he had no significant findings at that time, no recurrence of his gastrointestinal stromal tumor  -suspect that much of the recent flare-up is secondary to attempt of weaning down PPI therapy and recent increased stressors due to multiple other medical issues    2  Screening colonoscopy:  Patient's last colonoscopy was 7 years ago, suggests possible repeat screening examination next year patient continues to have some irregular stools at times  -would proceed with an upper endoscopy at the same time, we will see the patient back in 1 year, sooner if needed      Kareemjaleesa Goldberg was seen today for follow-up      Diagnoses and all orders for this visit:    Gastroesophageal reflux disease without esophagitis  -     pantoprazole (PROTONIX) 40 mg tablet; Take 1 tablet (40 mg total) by mouth daily  -     famotidine (PEPCID) 40 MG tablet; Take 0 5 tablets (20 mg total) by mouth daily at bedtime    Gastroesophageal reflux disease without esophagitis  Comments: Worsening of the symptoms, nocturnal awakenings he is currently on high-dose Prevacid; I will have the patient go for EGD and will change to Protonix 40 mg kolby  Orders:  -     pantoprazole (PROTONIX) 40 mg tablet; Take 1 tablet (40 mg total) by mouth daily  -     famotidine (PEPCID) 40 MG tablet; Take 0 5 tablets (20 mg total) by mouth daily at bedtime            _____________________________________________________________        CC:  GERD    HPI:  Jigar Pinto is a 73 y o male who is here for GERD  As you know this is a pleasant 42-year-old gentleman, weight take seen him in years past for a duodenal just   This was surgically resected  Patient is also had history of dyspeptic symptoms, had previously been on pantoprazole 40 mg in the morning, famotidine 40 mg in the evening  On his last office visit was suggest that he try to wean off the PPI therapy  Unfortunately around the same time he started developed ocular symptoms, is undergoing surgeries for his eye, had congenital corneal issue, there was discussion of corneal transplant, he also has cataract surgery this was complicated by retinal issues  Unfortunately patient has multiple eye surgeries in the interim  Additionally patient has also developed a Baker cyst which has recurred and he has had to have drain of this this  Patient notes that through all this is reflux symptoms had progressively worsened, having nocturnal dyspepsia with frequent belching  He recently started taking famotidine 40 mg in the morning, pantoprazole 40 mg in the evening  He notes that his symptoms still persist     Bowel movements have been fairly regular for the patient he notes some irregular stools at time which is his baseline    Denies any melena or rectal bleeding  Weight has been stable  Patient and wife her likely doing a trip out to the [place] in the [place] the summer      ROS:  The remainder of the ROS was negative except for the pertinent positives mentioned in HPI  Allergies: Patient has no known allergies  Medications:   Current Outpatient Medications:     cholestyramine (QUESTRAN) 4 GM/DOSE powder, Take 1 packet (4 g total) by mouth every other day (Patient taking differently: Take 4 g by mouth every other day ), Disp: 378 g, Rfl: 6    dorzolamide-timolol (COSOPT) 22 3-6 8 MG/ML ophthalmic solution, INSTILL 1 DROP INTO RIGHT EYE 3 TIMES A DAY, Disp: , Rfl: 4    famotidine (PEPCID) 40 MG tablet, Take 0 5 tablets (20 mg total) by mouth daily at bedtime, Disp: 30 tablet, Rfl: 6    GLUCOSAMINE CHONDROITIN COMPLX PO, Take 1 tablet by mouth daily 4500mg  daily , Disp: , Rfl:     Ilevro 0 3 % SUSP, 1 drop daily , Disp: , Rfl:     latanoprost (XALATAN) 0 005 % ophthalmic solution, Administer 1 drop to the right eye , Disp: , Rfl: 4    Multiple Vitamins-Minerals (MULTIVITAL) tablet, Take 1 tablet by mouth daily Spectravite , Disp: , Rfl:     ofloxacin (OCUFLOX) 0 3 % ophthalmic solution, 1 drop 4 (four) times a day , Disp: , Rfl:     pantoprazole (PROTONIX) 40 mg tablet, Take 1 tablet (40 mg total) by mouth daily, Disp: 30 tablet, Rfl: 5    tamsulosin (FLOMAX) 0 4 mg, Take 2 capsules (0 8 mg total) by mouth daily with dinner, Disp: 180 capsule, Rfl: 3    valACYclovir (VALTREX) 500 mg tablet, Take 500 mg by mouth daily, Disp: , Rfl:     Alphagan P 0 1 %, , Disp: , Rfl:     Besivance 0 6 % SUSP, INSTILL 1 DROP BY OPHTHALMIC ROUTE 3 TIMES EVERY DAY INTO THE RIGHT EYE   FOR AGITATION (Patient not taking: Reported on 7/7/2021), Disp: , Rfl:     polymyxin b-trimethoprim (POLYTRIM) ophthalmic solution, 1 drop 4 (four) times a day  (Patient not taking: Reported on 7/7/2021), Disp: , Rfl:     prednisoLONE acetate (PRED FORTE) 1 % ophthalmic suspension, , Disp: , Rfl:     Past Medical History:   Diagnosis Date    Abnormal weight loss     RESOLVED: [id number]    Anemia     RESOLVED: 32YZV7446    Atypical chest pain     RESOLVED: [id number]    BPH (benign prostatic hyperplasia)     Cancer (HCC)     DUODENAL    Luis Miguel's syndrome     Depression     RESOLVED: [id number]    Diverticulitis of colon     LAST ASSESSED: [id number]    Duodenal nodule     RESOLVED: 34BHZ9359    Gastrointestinal stromal tumor (GIST) (HCC)     MALIGNANT; RESOLVED: [id number]    GERD (gastroesophageal reflux disease)     Glaucoma     RESOLVED: [id number]    Insomnia     RESOLVED: [id number]    Lactose intolerance     LAST ASSESSED: 38RPW9872       Past Surgical History:   Procedure Laterality Date    BACK SURGERY      BACK SURGERY      l4 s1 surgery     CERVICAL FUSION      c4 and c5    CHOLECYSTECTOMY      COLONOSCOPY  2014    kutty    ELBOW SURGERY Left     REPAIR    EYE SURGERY      GANGLION CYST EXCISION Left 6/5/2020    Procedure: KNEE EXCISION BAKERS CYST;  Surgeon: Stephanie Bejarano MD;  [facility];  Service: Orthopedics    KNEE ARTHROSCOPY Left     LUMBAR DISCECTOMY      L5 S1    ORIF RADIAL SHAFT FRACTURE      NE ESOPHAGOGASTRODUODENOSCOPY TRANSORAL DIAGNOSTIC N/A 11/1/2016    Procedure: ESOPHAGOGASTRODUODENOSCOPY (EGD); Surgeon: Lacey Smith MD;  [facility]; Service: Gastroenterology    NE ESOPHAGOGASTRODUODENOSCOPY TRANSORAL DIAGNOSTIC N/A 5/31/2018    Procedure: ESOPHAGOGASTRODUODENOSCOPY (EGD); Surgeon: Lacey Smith MD;  [facility];   Service: Gastroenterology    NE [facility] Left 12/21/2020    Procedure: KNEE ARTHROSCOPY, popliteal cyst decompression;  Surgeon: Lluvia Gautam MD;  [facility];  Service: Orthopedics    NE REDO [address] [place] [address] N/A 10/24/2019    Procedure: Revision laminectomy and decompression at L5-S1;  Surgeon: No Santos MD;  [facility]; Service: Orthopedics    SMALL INTESTINE SURGERY      GIST surgery    US GUIDED MSK PROCEDURE  5/21/2019    US GUIDED MSK PROCEDURE  10/30/2020       Family History   Problem Relation Age of Onset    Thyroid disease Mother     Thyroid disease Brother     Diabetes Paternal Grandmother     Hypertension Family         reports that he has never smoked  He has never used smokeless tobacco  He reports current alcohol use  He reports that he does not use drugs        Physical Exam:    /80 (BP Location: Right arm, Patient Position: Sitting, Cuff Size: Standard)   Ht 5' 8" (1 727 m)   Wt 84 5 kg (186 lb 3 2 oz)   BMI 28 31 kg/m²     Gen: wn/wd, NAD  HEENT: anicteric, MMM, no cervical LAD, injected right sclera  CVS: RRR, no m/r/g  CHEST: CTA b/l  ABD: +BS, soft, NT,ND, no hepatosplenomegaly  EXT: no c/c/e  NEURO: aaox3  SKIN: NO rashes

## 2021-07-07 NOTE — PROGRESS NOTES
[facility]  Roshan Infante 73 y o  male MRN: [id number]            Assessment & Plan:    29-year-old gentleman well known to us, remote history of duodenal GIST, dyspepsia and reflux  Worsening symptoms when trying to wean off PPI therapy  1  Dyspepsia and reflux:  -discussed with the patient that he had switched his medications, recommend that he take pantoprazole 40 mg in the morning, famotidine 40 mg in the evening  -he was instructed to give us a call the next few weeks if not improved, we can give a trial of Carafate at that time, if still worsening symptoms would then proceed with an upper endoscopy  -last EGD was several years ago, he had no significant findings at that time, no recurrence of his gastrointestinal stromal tumor  -suspect that much of the recent flare-up is secondary to attempt of weaning down PPI therapy and recent increased stressors due to multiple other medical issues    2  Screening colonoscopy:  Patient's last colonoscopy was 7 years ago, suggests possible repeat screening examination next year patient continues to have some irregular stools at times  -would proceed with an upper endoscopy at the same time, we will see the patient back in 1 year, sooner if needed      Ai Lozano was seen today for follow-up  Diagnoses and all orders for this visit:    Gastroesophageal reflux disease without esophagitis  -     pantoprazole (PROTONIX) 40 mg tablet; Take 1 tablet (40 mg total) by mouth daily  -     famotidine (PEPCID) 40 MG tablet; Take 0 5 tablets (20 mg total) by mouth daily at bedtime    Gastroesophageal reflux disease without esophagitis  Comments: Worsening of the symptoms, nocturnal awakenings he is currently on high-dose Prevacid; I will have the patient go for EGD and will change to Protonix 40 mg kolby  Orders:  -     pantoprazole (PROTONIX) 40 mg tablet; Take 1 tablet (40 mg total) by mouth daily  -     famotidine (PEPCID) 40 MG tablet;  Take 0 5 tablets (20 mg total) by mouth daily at bedtime            _____________________________________________________________        CC:  GERD    HPI:  Eileen Gonzalez is a 73 y o male who is here for GERD  As you know this is a pleasant 82-year-old gentleman, weight take seen him in years past for a duodenal just   This was surgically resected  Patient is also had history of dyspeptic symptoms, had previously been on pantoprazole 40 mg in the morning, famotidine 40 mg in the evening  On his last office visit was suggest that he try to wean off the PPI therapy  Unfortunately around the same time he started developed ocular symptoms, is undergoing surgeries for his eye, had congenital corneal issue, there was discussion of corneal transplant, he also has cataract surgery this was complicated by retinal issues  Unfortunately patient has multiple eye surgeries in the interim  Additionally patient has also developed a Baker cyst which has recurred and he has had to have drain of this this  Patient notes that through all this is reflux symptoms had progressively worsened, having nocturnal dyspepsia with frequent belching  He recently started taking famotidine 40 mg in the morning, pantoprazole 40 mg in the evening  He notes that his symptoms still persist     Bowel movements have been fairly regular for the patient he notes some irregular stools at time which is his baseline  Denies any melena or rectal bleeding  Weight has been stable  Patient and wife her likely doing a trip out to [place] the [place] the [place]      ROS:  The remainder of the ROS was negative except for the pertinent positives mentioned in HPI  Allergies: Patient has no known allergies      Medications:   Current Outpatient Medications:     cholestyramine (QUESTRAN) 4 GM/DOSE powder, Take 1 packet (4 g total) by mouth every other day (Patient taking differently: Take 4 g by mouth every other day ), Disp: 378 g, Rfl: 6   dorzolamide-timolol (COSOPT) 22 3-6 8 MG/ML ophthalmic solution, INSTILL 1 DROP INTO RIGHT EYE 3 TIMES A DAY, Disp: , Rfl: 4    famotidine (PEPCID) 40 MG tablet, Take 0 5 tablets (20 mg total) by mouth daily at bedtime, Disp: 30 tablet, Rfl: 6    GLUCOSAMINE CHONDROITIN COMPLX PO, Take 1 tablet by mouth daily 4500mg  daily , Disp: , Rfl:     Ilevro 0 3 % SUSP, 1 drop daily , Disp: , Rfl:     latanoprost (XALATAN) 0 005 % ophthalmic solution, Administer 1 drop to the right eye , Disp: , Rfl: 4    Multiple Vitamins-Minerals (MULTIVITAL) tablet, Take 1 tablet by mouth daily Spectravite , Disp: , Rfl:     ofloxacin (OCUFLOX) 0 3 % ophthalmic solution, 1 drop 4 (four) times a day , Disp: , Rfl:     pantoprazole (PROTONIX) 40 mg tablet, Take 1 tablet (40 mg total) by mouth daily, Disp: 30 tablet, Rfl: 5    tamsulosin (FLOMAX) 0 4 mg, Take 2 capsules (0 8 mg total) by mouth daily with dinner, Disp: 180 capsule, Rfl: 3    valACYclovir (VALTREX) 500 mg tablet, Take 500 mg by mouth daily, Disp: , Rfl:     Alphagan P 0 1 %, , Disp: , Rfl:     Besivance 0 6 % SUSP, INSTILL 1 DROP BY OPHTHALMIC ROUTE 3 TIMES EVERY DAY INTO THE RIGHT EYE   FOR AGITATION (Patient not taking: Reported on 7/7/2021), Disp: , Rfl:     polymyxin b-trimethoprim (POLYTRIM) ophthalmic solution, 1 drop 4 (four) times a day  (Patient not taking: Reported on 7/7/2021), Disp: , Rfl:     prednisoLONE acetate (PRED FORTE) 1 % ophthalmic suspension, , Disp: , Rfl:     Past Medical History:   Diagnosis Date    Abnormal weight loss     RESOLVED: [id number]    Anemia     RESOLVED: 50UTL5392    Atypical chest pain     RESOLVED: 64DJQ1520    BPH (benign prostatic hyperplasia)     Cancer (HCC)     DUODENAL    Luis Miguel's syndrome     Depression     RESOLVED: 59JVN9712    Diverticulitis of colon     LAST ASSESSED: [id number]    Duodenal nodule     RESOLVED: 70KUT7858    Gastrointestinal stromal tumor (GIST) ([facility] [address] )     MALIGNANT; RESOLVED: [id number]  GERD (gastroesophageal reflux disease)     Glaucoma     RESOLVED: 99QOR9943    Insomnia     RESOLVED: 86RZI1271    Lactose intolerance     LAST ASSESSED: 58RMC5728       Past Surgical History:   Procedure Laterality Date    BACK SURGERY      BACK SURGERY      l4 s1 surgery     CERVICAL FUSION      c4 and c5    CHOLECYSTECTOMY      COLONOSCOPY  2014    kutty    ELBOW SURGERY Left     REPAIR    EYE SURGERY      GANGLION CYST EXCISION Left 6/5/2020    Procedure: KNEE EXCISION BAKERS CYST;  Surgeon: Olu Olson MD;  [facility];  Service: Orthopedics    KNEE ARTHROSCOPY Left     LUMBAR DISCECTOMY      L5 S1    ORIF RADIAL SHAFT FRACTURE      NH ESOPHAGOGASTRODUODENOSCOPY TRANSORAL DIAGNOSTIC N/A 11/1/2016    Procedure: ESOPHAGOGASTRODUODENOSCOPY (EGD); Surgeon: Josee Wing MD;  [facility]; Service: Gastroenterology    NH ESOPHAGOGASTRODUODENOSCOPY TRANSORAL DIAGNOSTIC N/A 5/31/2018    Procedure: ESOPHAGOGASTRODUODENOSCOPY (EGD); Surgeon: Josee Wing MD;  [facility]; Service: Gastroenterology    NH [facility] Left 12/21/2020    Procedure: KNEE ARTHROSCOPY, popliteal cyst decompression;  Surgeon: Baldo Corrales MD;  [facility];  Service: Orthopedics    NH REDO EXCIS LUMBAR DISK N/A 10/24/2019    Procedure: Revision laminectomy and decompression at L5-S1;  Surgeon: Chelsey Pizarro MD;  [facility];  Service: Orthopedics    SMALL INTESTINE SURGERY      GIST surgery    [facility] MSK PROCEDURE  5/21/2019    US GUIDED MSK PROCEDURE  10/30/2020       Family History   Problem Relation Age of Onset    Thyroid disease Mother     Thyroid disease Brother     Diabetes Paternal Grandmother     Hypertension Family         reports that he has never smoked  He has never used smokeless tobacco  He reports current alcohol use  He reports that he does not use drugs        Physical Exam:    /80 (BP Location: Right arm, Patient Position: Sitting, Cuff Size: Standard)   Ht 5' 8" (1 727 m)   Wt 84 5 kg (186 lb 3 2 oz)   BMI 28 31 kg/m²     Gen: wn/wd, NAD  HEENT: anicteric, MMM, no cervical LAD, injected right sclera  CVS: RRR, no m/r/g  CHEST: CTA b/l  ABD: +BS, soft, NT,ND, no hepatosplenomegaly  EXT: no c/c/e  NEURO: aaox3  SKIN: NO rashes

## 2021-07-16 ENCOUNTER — OFFICE VISIT (OUTPATIENT)
Dept: UROLOGY | Facility: AMBULATORY SURGERY CENTER | Age: 71
End: 2021-07-16
Payer: COMMERCIAL

## 2021-07-16 VITALS
SYSTOLIC BLOOD PRESSURE: 136 MMHG | HEART RATE: 59 BPM | DIASTOLIC BLOOD PRESSURE: 78 MMHG | BODY MASS INDEX: 28.19 KG/M2 | HEIGHT: 68 IN | WEIGHT: 186 LBS

## 2021-07-16 DIAGNOSIS — Z12.5 PROSTATE CANCER SCREENING: ICD-10-CM

## 2021-07-16 DIAGNOSIS — R35.0 URINARY FREQUENCY: Primary | ICD-10-CM

## 2021-07-16 DIAGNOSIS — N40.0 ENLARGED PROSTATE WITHOUT LOWER URINARY TRACT SYMPTOMS (LUTS): ICD-10-CM

## 2021-07-16 LAB
BACTERIA UR QL AUTO: NORMAL /HPF
BILIRUB UR QL STRIP: NEGATIVE
CLARITY UR: CLEAR
COLOR UR: YELLOW
GLUCOSE UR STRIP-MCNC: NEGATIVE MG/DL
HGB UR QL STRIP.AUTO: NEGATIVE
HYALINE CASTS #/AREA URNS LPF: NORMAL /LPF
KETONES UR STRIP-MCNC: NEGATIVE MG/DL
LEUKOCYTE ESTERASE UR QL STRIP: NEGATIVE
NITRITE UR QL STRIP: NEGATIVE
NON-SQ EPI CELLS URNS QL MICRO: NORMAL /HPF
PH UR STRIP.AUTO: 6 [PH]
PROT UR STRIP-MCNC: NEGATIVE MG/DL
RBC #/AREA URNS AUTO: NORMAL /HPF
SP GR UR STRIP.AUTO: 1.01 (ref 1–1.03)
UROBILINOGEN UR QL STRIP.AUTO: 0.2 E.U./DL
WBC #/AREA URNS AUTO: NORMAL /HPF

## 2021-07-16 PROCEDURE — 81001 URINALYSIS AUTO W/SCOPE: CPT | Performed by: NURSE PRACTITIONER

## 2021-07-16 PROCEDURE — 99213 OFFICE O/P EST LOW 20 MIN: CPT | Performed by: NURSE PRACTITIONER

## 2021-07-16 PROCEDURE — 87086 URINE CULTURE/COLONY COUNT: CPT | Performed by: NURSE PRACTITIONER

## 2021-07-16 RX ORDER — TAMSULOSIN HYDROCHLORIDE 0.4 MG/1
0.8 CAPSULE ORAL
Qty: 180 CAPSULE | Refills: 3 | Status: SHIPPED | OUTPATIENT
Start: 2021-07-16 | End: 2022-06-02

## 2021-07-16 NOTE — PROGRESS NOTES
07/16/21    Rod Brooke   1950   [id number]     Assessment  1 BPH with LUTS  2 Routine prostate cancer screening    Discussion/Plan  1  BPH with LUTS   Frequency, urgency   Continue Tamsulosin to 0 8 mg, refills provided   Urine dip in office: trace leukocytes, negative blood  Sent for culture   PVR 60   AUA 22    Hydration with water 48-64 oz daily and avoidance of bladder irritants   Encouraged double voiding   Offered referral to pelvic floor PT or cystoscopy with MD to evaluate prostate obstruction - patient defers at this time  2  Routine prostate cancer screening  7/7/21 PSA 1 4   KATHLEEN: Approximately 35-40 g prostate, smooth, non-tender, no nodules    Patient will follow up with PSA and KATHLEEN in 1 year or sooner if needed  Subjective  HPI   Rod Brooke is a 73 y o  male here for follow up evaluation of urinary symptoms secondary to benign prostatic hyperplasia and routine prostate cancer screening  Patient previously managed with tamsulosin 0 4 mg p o  Daily  His biggest complaint is frequency and urgency of urination  He drives to [address] [place] [address] for work and reports needing to stop every 2 hours to urinate  He reports he is not supposed to make stops while on the road for work so he purposely dehydrates himself  He feels he may not completely empty his bladder  PSA 1 4  PSA trend below  No family history of prostate cancer  He is undergoing multiple stages of surgery for his right eye including corneal transplant     PMH: GERD, IBS, OA, corneal dystrophy, spinal stenosis     Review of Systems - History obtained from chart review and the patient  General ROS: negative  Psychological ROS: negative  Ophthalmic ROS: negative  ENT ROS: negative  Allergy and Immunology ROS: negative  Hematological and Lymphatic ROS: negative  Breast ROS: negative for breast lumps  Respiratory ROS: no cough, shortness of breath, or wheezing  Cardiovascular ROS: no chest pain or dyspnea on exertion  Gastrointestinal ROS: no abdominal pain, change in bowel habits, or black or bloody stools  Genito-Urinary ROS: positive for - urinary frequency/urgency  Musculoskeletal ROS: negative  Neurological ROS: no TIA or stroke symptoms  Dermatological ROS: negative       Objective  Physical Exam  Vitals and nursing note reviewed  Constitutional:       General: He is awake  He is not in acute distress  Appearance: Normal appearance  He is well-developed, well-groomed and normal weight  He is not ill-appearing, toxic-appearing or diaphoretic  Pulmonary:      Effort: Pulmonary effort is normal    Genitourinary:     Rectum: Normal       Comments: KATHLEEN: Approximately 35-40 g prostate, smooth, non-tender, no nodules  Musculoskeletal:         General: Normal range of motion  Cervical back: Normal range of motion and neck supple  Skin:     General: Skin is warm  Neurological:      General: No focal deficit present  Mental Status: He is alert and oriented to person, place, and time  Mental status is at baseline  Psychiatric:         Attention and Perception: Attention normal          Mood and Affect: Mood normal          Speech: Speech normal          Behavior: Behavior normal  Behavior is cooperative  Thought Content:  Thought content normal          Cognition and Memory: Cognition normal          Judgment: Judgment normal            Component      Latest Ref Rng & Units 5/26/2017 6/2/2018 6/19/2019 5/14/2020   PSA, Total      0 0 - 4 0 ng/mL 1 1 0 9 1 3 1 3     Component      Latest Ref Rng & Units 7/7/2021   PSA, Total      0 0 - 4 0 ng/mL 1 4         LYLE Calvillo

## 2021-07-17 LAB — BACTERIA UR CULT: NORMAL

## 2021-09-22 ENCOUNTER — OFFICE VISIT (OUTPATIENT)
Dept: URGENT CARE | Age: 71
End: 2021-09-22
Payer: COMMERCIAL

## 2021-09-22 VITALS — TEMPERATURE: 97.5 F | OXYGEN SATURATION: 97 % | RESPIRATION RATE: 18 BRPM | HEART RATE: 105 BPM

## 2021-09-22 DIAGNOSIS — J02.9 SORE THROAT: ICD-10-CM

## 2021-09-22 DIAGNOSIS — R05.9 COUGH: Primary | ICD-10-CM

## 2021-09-22 LAB — S PYO AG THROAT QL: NEGATIVE

## 2021-09-22 PROCEDURE — 87880 STREP A ASSAY W/OPTIC: CPT | Performed by: PHYSICIAN ASSISTANT

## 2021-09-22 PROCEDURE — G0463 HOSPITAL OUTPT CLINIC VISIT: HCPCS | Performed by: PHYSICIAN ASSISTANT

## 2021-09-22 PROCEDURE — 99213 OFFICE O/P EST LOW 20 MIN: CPT | Performed by: PHYSICIAN ASSISTANT

## 2021-09-22 PROCEDURE — U0005 INFEC AGEN DETEC AMPLI PROBE: HCPCS | Performed by: PHYSICIAN ASSISTANT

## 2021-09-22 PROCEDURE — U0003 INFECTIOUS AGENT DETECTION BY NUCLEIC ACID (DNA OR RNA); SEVERE ACUTE RESPIRATORY SYNDROME CORONAVIRUS 2 (SARS-COV-2) (CORONAVIRUS DISEASE [COVID-19]), AMPLIFIED PROBE TECHNIQUE, MAKING USE OF HIGH THROUGHPUT TECHNOLOGIES AS DESCRIBED BY CMS-2020-01-R: HCPCS | Performed by: PHYSICIAN ASSISTANT

## 2021-09-22 NOTE — PROGRESS NOTES
[address]        NAME: Ray Topete is a 73 y o  male  : 1950    MRN: [id number]  DATE: 2021  TIME: 3:13 PM    Assessment and Plan   Cough [R05]  1  Cough  Novel Coronavirus (Covid-19),PCR [facility] - Office Collection   2  Sore throat  POCT rapid strepA    Throat culture     Rapid strep negative, throat culture pending  COVID test pending    Patient Instructions     COVID swab collected today, test results pending  Check MyChart and call in 2-3 days for test results  Results may take up to 7-10 days  Quarantine guidelines discussed  OTC supplements/medications discussed  Follow-up with PCP in the next 1-2 days for re-evaluation  Go to the ED if any fevers, unable to stay hydrated, abdominal pain, chest pain, shortness of breath, wheezing, chest tightness, cough or cold symptoms, new or worsening symptoms or other concerning symptoms  Chief Complaint     Chief Complaint   Patient presents with    Cough     x yesterday     Sore Throat         History of Present Illness       72 y/o male presents with mild intermittent dry cough, nasal congestion, sore throat since yesterday  Also notes some intermittent mild generalized bodyaches with chills  Denies fevers  Denies any loss of taste or smell  Has tried ibuprofen with some relief  States wife is sick with similar symptoms, but denies any known COVID exposure or recent travel  Deneis any chest pain, chest tightness, shortness of breath, GI/ symptoms or other complaints  Review of Systems   Review of Systems   Constitutional: Positive for chills  Negative for fatigue and fever  HENT: Positive for congestion, rhinorrhea and sore throat  Negative for ear pain, trouble swallowing and voice change  Eyes: Negative for itching and visual disturbance  Respiratory: Positive for cough  Negative for chest tightness, shortness of breath and wheezing  Cardiovascular: Negative for chest pain, palpitations and leg swelling  Gastrointestinal: Negative for abdominal pain, diarrhea, nausea and vomiting  Genitourinary: Negative for decreased urine volume  Musculoskeletal: Positive for myalgias (mild, geenralized bodyaches)  Negative for back pain, joint swelling, neck pain and neck stiffness  Skin: Negative for rash  Neurological: Negative for dizziness, seizures, weakness, numbness and headaches  All other systems reviewed and are negative  Current Medications       Current Outpatient Medications:     Alphagan P 0 1 %, , Disp: , Rfl:     Besivance 0 6 % SUSP, INSTILL 1 DROP BY OPHTHALMIC ROUTE 3 TIMES EVERY DAY INTO THE RIGHT EYE   FOR AGITATION (Patient not taking: Reported on 7/7/2021), Disp: , Rfl:     cholestyramine (QUESTRAN) 4 GM/DOSE powder, Take 1 packet (4 g total) by mouth every other day (Patient taking differently: Take 4 g by mouth every other day ), Disp: 378 g, Rfl: 6    dorzolamide-timolol (COSOPT) 22 3-6 8 MG/ML ophthalmic solution, INSTILL 1 DROP INTO RIGHT EYE 3 TIMES A DAY, Disp: , Rfl: 4    famotidine (PEPCID) 40 MG tablet, Take 1 tablet (40 mg total) by mouth daily at bedtime, Disp: 90 tablet, Rfl: 3    GLUCOSAMINE CHONDROITIN COMPLX PO, Take 1 tablet by mouth daily 4500mg  daily , Disp: , Rfl:     Ilevro 0 3 % SUSP, 1 drop daily , Disp: , Rfl:     latanoprost (XALATAN) 0 005 % ophthalmic solution, Administer 1 drop to the right eye , Disp: , Rfl: 4    Multiple Vitamins-Minerals (MULTIVITAL) tablet, Take 1 tablet by mouth daily Spectravite , Disp: , Rfl:     ofloxacin (OCUFLOX) 0 3 % ophthalmic solution, 1 drop 4 (four) times a day , Disp: , Rfl:     pantoprazole (PROTONIX) 40 mg tablet, Take 1 tablet (40 mg total) by mouth daily, Disp: 90 tablet, Rfl: 3    polymyxin b-trimethoprim (POLYTRIM) ophthalmic solution, 1 drop 4 (four) times a day  (Patient not taking: Reported on 7/7/2021), Disp: , Rfl:     prednisoLONE acetate (PRED FORTE) 1 % ophthalmic suspension, , Disp: , Rfl:    tamsulosin (FLOMAX) 0 4 mg, Take 2 capsules (0 8 mg total) by mouth daily with dinner, Disp: 180 capsule, Rfl: 3    valACYclovir (VALTREX) 500 mg tablet, Take 500 mg by mouth daily (Patient not taking: Reported on 9/22/2021), Disp: , Rfl:     Current Allergies     Allergies as of 09/22/2021    (No Known Allergies)            The following portions of the patient's history were reviewed and updated as appropriate: allergies, current medications, past family history, past medical history, past social history, past surgical history and problem list      Past Medical History:   Diagnosis Date    Abnormal weight loss     RESOLVED: [id number]    Anemia     RESOLVED: [id number]    Atypical chest pain     RESOLVED: [id number]    BPH (benign prostatic hyperplasia)     Cancer ([facility] )     DUODENAL    Luis Miguel's syndrome     Depression     RESOLVED: [id number]    Diverticulitis of colon     LAST ASSESSED: [id number]    Duodenal nodule     RESOLVED: [id number]    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; RESOLVED: [id number]    GERD (gastroesophageal reflux disease)     Glaucoma     RESOLVED: [id number]    Insomnia     RESOLVED: 30SKP2105    Lactose intolerance     LAST ASSESSED: [id number]       Past Surgical History:   Procedure Laterality Date    BACK SURGERY      BACK SURGERY      l4 s1 surgery     CERVICAL FUSION      c4 and c5    CHOLECYSTECTOMY      COLONOSCOPY  2014    kutty    ELBOW SURGERY Left     REPAIR    EYE SURGERY      EYE SURGERY      GANGLION CYST EXCISION Left 6/5/2020    Procedure: KNEE EXCISION BAKERS CYST;  Surgeon: Maia Pulido MD;  [facility];  Service: Orthopedics    KNEE ARTHROSCOPY Left     LUMBAR DISCECTOMY      L5 S1    ORIF RADIAL SHAFT FRACTURE      VA ESOPHAGOGASTRODUODENOSCOPY TRANSORAL DIAGNOSTIC N/A 11/1/2016    Procedure: ESOPHAGOGASTRODUODENOSCOPY (EGD); Surgeon: Petar Bethea MD;  [facility];   Service: Gastroenterology    VA ESOPHAGOGASTRODUODENOSCOPY TRANSORAL DIAGNOSTIC N/A 5/31/2018    Procedure: ESOPHAGOGASTRODUODENOSCOPY (EGD); Surgeon: Verena Guthrie MD;  [facility]; Service: Gastroenterology    [facility] Left 12/21/2020    Procedure: KNEE ARTHROSCOPY, popliteal cyst decompression;  Surgeon: Andrew Biswas MD;  [facility];  Service: Orthopedics    PA REDO EXCIS LUMBAR DISK N/A 10/24/2019    Procedure: Revision laminectomy and decompression at L5-S1;  Surgeon: Sharlene Baez MD;  [facility];  Service: Orthopedics    SMALL INTESTINE SURGERY      GIST surgery    [facility] [place] [facility]"" 634 MSK PROCEDURE  5/21/2019    US GUIDED MSK PROCEDURE  10/30/2020       Family History   Problem Relation Age of Onset    Thyroid disease Mother     Thyroid disease Brother     Diabetes Paternal Grandmother     Hypertension Family          Medications have been verified  Objective   Pulse 105   Temp 97 5 °F (36 4 °C)   Resp 18   SpO2 97%        Physical Exam     Physical Exam  Vitals and nursing note reviewed  Constitutional:       General: He is not in acute distress  Appearance: Normal appearance  He is well-developed  He is not ill-appearing or toxic-appearing  HENT:      Right Ear: Tympanic membrane normal       Left Ear: Tympanic membrane normal       Mouth/Throat:      Mouth: Mucous membranes are moist       Pharynx: Oropharynx is clear  Uvula midline  No oropharyngeal exudate, posterior oropharyngeal erythema or uvula swelling  Tonsils: No tonsillar exudate  0 on the right  0 on the left  Eyes:      Extraocular Movements: Extraocular movements intact  Pupils: Pupils are equal, round, and reactive to light  Cardiovascular:      Rate and Rhythm: Normal rate and regular rhythm  Heart sounds: Normal heart sounds  Pulmonary:      Effort: Pulmonary effort is normal  No respiratory distress  Breath sounds: Normal breath sounds  No wheezing     Musculoskeletal: Cervical back: Normal range of motion and neck supple  No rigidity  Skin:     Capillary Refill: Capillary refill takes less than 2 seconds  Neurological:      Mental Status: He is alert and oriented to person, place, and time     Psychiatric:         Behavior: Behavior normal

## 2021-09-22 NOTE — PATIENT INSTRUCTIONS
rapid strep negative, throat culture pending  COVID swab collected today, test results pending  Check MyChart and call in 2-3 days for test results  Results may take up to 7-10 days  Quarantine guidelines discussed  OTC supplements/medications discussed  Follow-up with PCP in the next 1-2 days for re-evaluation  Go to the ED if any fevers, unable to stay hydrated, abdominal pain, chest pain, shortness of breath, wheezing, chest tightness, cough or cold symptoms, new or worsening symptoms or other concerning symptoms  [address]    Your healthcare provider and/or public health staff have evaluated you and have determined that you do not need to remain in the hospital at this time  At this time you can be isolated at home where you will be monitored by staff from your local or state health department  You should carefully follow the prevention and isolation steps below until a healthcare provider or local or state health department says that you can return to your normal activities  Stay home except to get medical care    People who are mildly ill with COVID-19 are able to isolate at home during their illness  You should restrict activities outside your home, except for getting medical care  Do not go to work, school, or public areas  Avoid using public transportation, ride-sharing, or taxis  Separate yourself from other people and animals in your home    People: As much as possible, you should stay in a specific room and away from other people in your home  Also, you should use a separate bathroom, if available  Animals: You should restrict contact with pets and other animals while you are sick with COVID-19, just like you would around other people  Although there have not been reports of pets or other animals becoming sick with COVID-19, it is still recommended that people sick with COVID-19 limit contact with animals until more information is known about the virus   When possible, have another member of your household care for your animals while you are sick  If you are sick with COVID-19, avoid contact with your pet, including petting, snuggling, being kissed or licked, and sharing food  If you must care for your pet or be around animals while you are sick, wash your hands before and after you interact with pets and wear a facemask  See COVID-19 and Animals for more information  Call ahead before visiting your doctor    If you have a medical appointment, call the healthcare provider and tell them that you have or may have COVID-19  This will help the healthcare providers office take steps to keep other people from getting infected or exposed  Wear a facemask    You should wear a facemask when you are around other people (e g , sharing a room or vehicle) or pets and before you enter a healthcare providers office  If you are not able to wear a facemask (for example, because it causes trouble breathing), then people who live with you should not stay in the same room with you, or they should wear a facemask if they enter your room  Cover your coughs and sneezes    Cover your mouth and nose with a tissue when you cough or sneeze  Throw used tissues in a lined trash can  Immediately wash your hands with soap and water for at least 20 seconds or, if soap and water are not available, clean your hands with an alcohol-based hand  that contains at least 60% alcohol  Clean your hands often    Wash your hands often with soap and water for at least 20 seconds, especially after blowing your nose, coughing, or sneezing; going to the bathroom; and before eating or preparing food  If soap and water are not readily available, use an alcohol-based hand  with at least 60% alcohol, covering all surfaces of your hands and rubbing them together until they feel dry  Soap and water are the best option if hands are visibly dirty   Avoid touching your eyes, nose, and mouth with unwashed hands  Avoid sharing personal household items    You should not share dishes, drinking glasses, cups, eating utensils, towels, or bedding with other people or pets in your home  After using these items, they should be washed thoroughly with soap and water  Clean all high-touch surfaces everyday    High touch surfaces include counters, tabletops, doorknobs, bathroom fixtures, toilets, phones, keyboards, tablets, and bedside tables  Also, clean any surfaces that may have blood, stool, or body fluids on them  Use a household cleaning spray or wipe, according to the label instructions  Labels contain instructions for safe and effective use of the cleaning product including precautions you should take when applying the product, such as wearing gloves and making sure you have good ventilation during use of the product  Monitor your symptoms    Seek prompt medical attention if your illness is worsening (e g , difficulty breathing)  Before seeking care, call your healthcare provider and tell them that you have, or are being evaluated for, COVID-19  Put on a facemask before you enter the facility  These steps will help the healthcare providers office to keep other people in the office or waiting room from getting infected or exposed  Ask your healthcare provider to call the local or [facility] health department  Persons who are placed under active monitoring or facilitated self-monitoring should follow instructions provided by their local health department or occupational health professionals, as appropriate  If you have a medical emergency and need to call 911, notify the dispatch personnel that you have, or are being evaluated for COVID-19  If possible, put on a facemask before emergency medical services arrive      Discontinuing home isolation    Patients with confirmed COVID-19 should remain under home isolation precautions until the following conditions are met:   - They have had no fever for at least 24 hours (that is one full day of no fever without the use medicine that reduces fevers)  AND  - other symptoms have improved (for example, when their cough or shortness of breath have improved)  AND  - If had mild or moderate illness, at least 10 days have passed since their symptoms first appeared or if severe illness (needed oxygen) or immunosuppressed, at least 20 days have passed since symptoms first appeared  Patients with confirmed COVID-19 should also notify close contacts (including their workplace) and ask that they self-quarantine  Currently, close contact is defined as being within 6 feet for 15 minutes or more from the period 24 hours starting 48 hours before symptom onset to the time at which the patient went into isolation  Close contacts of patients diagnosed with COVID-19 should be instructed by the patient to self-quarantine for 14 days from the last time of their last contact with the patient       Source: [employer]

## 2021-09-23 LAB — SARS-COV-2 RNA RESP QL NAA+PROBE: POSITIVE

## 2021-09-24 ENCOUNTER — TELEPHONE (OUTPATIENT)
Dept: INTERNAL MEDICINE CLINIC | Facility: CLINIC | Age: 71
End: 2021-09-24

## 2021-09-24 ENCOUNTER — TELEMEDICINE (OUTPATIENT)
Dept: INTERNAL MEDICINE CLINIC | Facility: CLINIC | Age: 71
End: 2021-09-24
Payer: COMMERCIAL

## 2021-09-24 VITALS — WEIGHT: 183.3 LBS | TEMPERATURE: 100.9 F | BODY MASS INDEX: 27.87 KG/M2

## 2021-09-24 DIAGNOSIS — U07.1 COVID-19: Primary | ICD-10-CM

## 2021-09-24 PROCEDURE — 99213 OFFICE O/P EST LOW 20 MIN: CPT | Performed by: INTERNAL MEDICINE

## 2021-09-24 RX ORDER — SODIUM CHLORIDE 9 MG/ML
20 INJECTION, SOLUTION INTRAVENOUS ONCE
Status: CANCELLED | OUTPATIENT
Start: 2021-09-27

## 2021-09-24 RX ORDER — LOTEPREDNOL ETABONATE 3.8 MG/G
GEL OPHTHALMIC
COMMUNITY
Start: 2021-08-15 | End: 2022-06-02

## 2021-09-24 RX ORDER — ALBUTEROL SULFATE 90 UG/1
3 AEROSOL, METERED RESPIRATORY (INHALATION) ONCE AS NEEDED
Status: CANCELLED | OUTPATIENT
Start: 2021-09-27

## 2021-09-24 RX ORDER — ONDANSETRON 2 MG/ML
4 INJECTION INTRAMUSCULAR; INTRAVENOUS ONCE AS NEEDED
Status: CANCELLED | OUTPATIENT
Start: 2021-09-27

## 2021-09-24 RX ORDER — ACETAMINOPHEN 325 MG/1
650 TABLET ORAL ONCE AS NEEDED
Status: CANCELLED | OUTPATIENT
Start: 2021-09-27

## 2021-09-24 NOTE — PROGRESS NOTES
COVID-19 Outpatient Progress Note    Assessment/Plan:    Problem List Items Addressed This Visit        Other    COVID-19 - Primary      Recommend a 10 day quarantine,  Monoclonal infusion ordered, fluids, rest may use Mucinex as directed use Tylenol as directed p r n  monitor temperature if any high temp or shortness of breath go immediately to the ER get a pulse ox the pulse ox under 93 go to ER RTO in 3 days call if any problems            doximitry video  Disposition:     I recommended self-quarantine for 14 days and to call back for worsening symptoms or development of shortness of breath  Patient is at increased risk of progressing towards severe COVID-19 due to the following high risk criteria:   - Older age (age >= 72years old)  - Obesity or being overweight    Patient meets criteria for Casirivimab/Imdevimab administration for the treatment of COVID-19  They were counseled in regards to risks, benefits, and side effects of this infusion  Casirivimab and imdevimab are investigational medicines used to treat mild to moderate symptoms of COVID-19 in non-hospitalized adults and adolescents (15years of age and older who weigh at least 80 pounds (40 kg)), and who are at high risk for developing severe COVID-19 symptoms or the need for hospitalization  Casirivimab and imdevimab are investigational because they are still being studied  There is limited information known about the safety and effectiveness of using casirivimab and imdevimab to treat people with COVID-19  The FDA has authorized the emergency use of casirivimab and imdevimab for the treatment of COVID-19 under an Emergency Use Authorization (EUA)  Possible side effects of casirivimab and imdevimab: Allergic reactions can happen during and after infusion with casirivimab and imdevimab      Possible reactions include: fever, chills, nausea, headache, shortness of breath, low blood pressure, wheezing, swelling of your lips, face, or throat, rash including hives, itching, muscle aches, and dizziness  The side effects of getting any medicine by vein may include brief pain, bleeding, bruising of the skin, soreness, swelling, and possible infection at the infusion site  These are not all the possible side effects of casirivimab and imdevimab  Not a lot of people have been given casirivimab and imdevimab  Serious and unexpected side effects may happen  Casirivimab and imdevimab are still being studied so it is possible that all of the risks are not known at this time  It is possible that casirivimab and imdevimab could interfere with your body's own ability to fight off a future infection of SARS-CoV-2  Similarly, casirivimab and imdevimab may reduce your body's immune response to a vaccine for SARS-CoV-2  Specific studies have not been conducted to address these possible risks  Emergency Use Authorization:    [facility] FDA has made casirivimab and imdevimab available under an emergency access mechanism called an EUA  The EUA is supported by a [place] of Health and Human Service (HHS) declaration that circumstances exist to justify the emergency use of drugs and biological products during the COVID-19 pandemic  Casirivimab and imdevimab have not undergone the same type of review as an FDA-approved or cleared product  The FDA may issue an EUA when certain criteria are met, which includes that there are no adequate, approved, available alternatives  In addition, the FDA decision is based on the totality of scientific evidence available showing that it is reasonable to believe that the product meets certain criteria for safety, performance, and labeling and may be effective in treatment of patients during the COVID-19 pandemic  All of these criteria must be met to allow for the product to be used in the treatment of patients during the COVID-19 pandemic       The EUA for casirivimab and imdevimab is in effect for the duration of the COVID-19 declaration justifying emergency use of these products, unless terminated or revoked (after which the products may no longer be used)  Regarding COVID-19 Vaccination:    Currently there is no data or safety or efficacy of COVID-19 vaccination in persons who received monoclonal antibodies as part of COVID-19 treatment  Treatment should be deferred for at least 90 days to avoid interference of the treatment with vaccine-induced immune responses (this is based on estimated half-life of therapies and evidence suggesting reinfection is uncommon within 90 days of initial infection)  The patient consents to proceed with casirivimab and imdevimab administration  I have spent 25 minutes directly with the patient  Greater than 50% of this time was spent in counseling/coordination of care regarding: diagnostic results, prognosis, risks and benefits of treatment options, instructions for management, patient and family education, risk factor reductions and impressions  Verification of patient location:    Patient is located in the following state in which I hold an active license [place]    Encounter provider Neo Mayorga DO    Provider located at [address] [address] [id number]    Recent Visits  Date Type Provider Dept   09/24/21 [address] [place] [address]   09/24/21 Telephone Abram Crane, [facility] recent visits within past 7 days and meeting all other requirements  Future Appointments  No visits were found meeting these conditions  Showing future appointments within next 150 days and meeting all other requirements   exposure with uncle with covid    Patient agrees to participate in a virtual check in via telephone or video visit instead of presenting to the office to address urgent/immediate medical needs  Patient is aware this is a billable service      After connecting through [facility], the patient was identified by name and date of birth  Jessica Garcia was informed that this was a telemedicine visit and that the exam was being conducted confidentially over secure lines  My office door was closed  No one else was in the room  Jessica Garcia acknowledged consent and understanding of privacy and security of the telemedicine visit  I informed the patient that I have reviewed his record in Epic and presented the opportunity for him to ask any questions regarding the visit today  The patient agreed to participate  Subjective:   Jessica Garcia is a 73 y o  male who is concerned about COVID-19  Patient's symptoms include fever (tmax 100 9 yesterday), fatigue, malaise, nasal congestion, rhinorrhea, sore throat, cough and myalgias  Patient denies chills, anosmia, loss of taste, shortness of breath, chest tightness, abdominal pain, nausea, vomiting, diarrhea and headaches       Date of symptom onset: 9/22/2021  COVID-19 vaccination status: Fully vaccinated    Exposure:   Contact with a person who is under investigation (PUI) for or who is positive for COVID-19 within the last 14 days?: Yes    Hospitalized recently for fever and/or lower respiratory symptoms?: No      Currently a healthcare worker that is involved in direct patient care?: No      Works in a special setting where the risk of COVID-19 transmission may be high? (this may include long-term care, correctional and [facility] facilities; homeless shelters; assisted-living facilities and group homes ): Yes      Resident in a special setting where the risk of COVID-19 transmission may be high? (this may include long-term care, correctional and [facility] facilities; homeless shelters; assisted-living facilities and group homes ): Yes      Lab Results   Component Value Date    SARSCOV2 Positive (A) 09/22/2021    Amtheus Emilie Not Detected 05/29/2020     Past Medical History:   Diagnosis Date    Abnormal weight loss RESOLVED: [id number]    Anemia     RESOLVED: [id number]    Atypical chest pain     RESOLVED: [id number]    BPH (benign prostatic hyperplasia)     Cancer (HCC)     DUODENAL    Luis Miguel's syndrome     Depression     RESOLVED: [id number]    Diverticulitis of colon     LAST ASSESSED: [id number]    Duodenal nodule     RESOLVED: [id number]    Gastrointestinal stromal tumor (GIST) (HCC)     MALIGNANT; RESOLVED: [id number]    GERD (gastroesophageal reflux disease)     Glaucoma     RESOLVED: 34LGQ9585    Insomnia     RESOLVED: 51UOW1067    Lactose intolerance     LAST ASSESSED: [id number]     Past Surgical History:   Procedure Laterality Date    BACK SURGERY      BACK SURGERY      l4 s1 surgery     CERVICAL FUSION      c4 and c5    CHOLECYSTECTOMY      COLONOSCOPY  2014    kutty    ELBOW SURGERY Left     REPAIR    EYE SURGERY      EYE SURGERY      GANGLION CYST EXCISION Left 6/5/2020    Procedure: KNEE EXCISION BAKERS CYST;  Surgeon: Ute Del Cid MD;  [facility];  Service: Orthopedics    KNEE ARTHROSCOPY Left     LUMBAR DISCECTOMY      L5 S1    ORIF RADIAL SHAFT FRACTURE      VA ESOPHAGOGASTRODUODENOSCOPY TRANSORAL DIAGNOSTIC N/A 11/1/2016    Procedure: ESOPHAGOGASTRODUODENOSCOPY (EGD); Surgeon: Abram Taylor MD;  [facility]; Service: Gastroenterology    VA ESOPHAGOGASTRODUODENOSCOPY TRANSORAL DIAGNOSTIC N/A 5/31/2018    Procedure: ESOPHAGOGASTRODUODENOSCOPY (EGD); Surgeon: Abram Taylor MD;  [facility];   Service: Gastroenterology    [facility] Left 12/21/2020    Procedure: KNEE ARTHROSCOPY, popliteal cyst decompression;  Surgeon: Mireille Mcguire MD;  [facility];  Service: Orthopedics    VA REDO EXCIS LUMBAR DISK N/A 10/24/2019    Procedure: Revision laminectomy and decompression at L5-S1;  Surgeon: Fidencio Nelson MD;  [facility];  Service: Orthopedics    SMALL INTESTINE SURGERY      GIST surgery    US GUIDED MSK PROCEDURE 5/21/2019    US GUIDED MSK PROCEDURE  10/30/2020     Current Outpatient Medications   Medication Sig Dispense Refill    cholestyramine (QUESTRAN) 4 GM/DOSE powder Take 1 packet (4 g total) by mouth every other day (Patient taking differently: Take 4 g by mouth every other day ) 378 g 6    dorzolamide-timolol (COSOPT) 22 3-6 8 MG/ML ophthalmic solution INSTILL 1 DROP INTO RIGHT EYE 3 TIMES A DAY  4    famotidine (PEPCID) 40 MG tablet Take 1 tablet (40 mg total) by mouth daily at bedtime 90 tablet 3    GLUCOSAMINE CHONDROITIN COMPLX PO Take 1 tablet by mouth daily 4500mg  daily       Lotemax SM 0 38 % GEL INSTILL 1 DROP INTO RIGHT EYE 4 TIMES A DAY      Multiple Vitamins-Minerals (MULTIVITAL) tablet Take 1 tablet by mouth daily Spectravite       pantoprazole (PROTONIX) 40 mg tablet Take 1 tablet (40 mg total) by mouth daily 90 tablet 3    tamsulosin (FLOMAX) 0 4 mg Take 2 capsules (0 8 mg total) by mouth daily with dinner 180 capsule 3    valACYclovir (VALTREX) 500 mg tablet Take 500 mg by mouth daily (Patient not taking: Reported on 9/22/2021)       No current facility-administered medications for this visit  No Known Allergies    Review of Systems   Constitutional: Positive for fatigue and fever (tmax 100 9 yesterday)  Negative for chills  HENT: Positive for congestion, rhinorrhea and sore throat  Respiratory: Positive for cough  Negative for chest tightness and shortness of breath  Gastrointestinal: Negative for abdominal pain, diarrhea, nausea and vomiting  Musculoskeletal: Positive for myalgias  Neurological: Negative for headaches  Objective:    Vitals:    09/24/21 1325   Temp: (!) 100 9 °F (38 3 °C)   Weight: 83 1 kg (183 lb 4 8 oz)       Physical Exam   No respiratory distress, patient is coughing    VIRTUAL VISIT DISCLAIMER    Janette Johnson verbally agrees to participate in [employer]   Pt is aware that Virtual Care Services could be limited without vital signs or the ability to perform a full hands-on physical Skye Strickland understands he or the provider may request at any time to terminate the video visit and request the patient to seek care or treatment in person

## 2021-09-25 NOTE — ASSESSMENT & PLAN NOTE
Recommend a 10 day quarantine,  Monoclonal infusion ordered, fluids, rest may use Mucinex as directed use Tylenol as directed p r n  monitor temperature if any high temp or shortness of breath go immediately to the ER get a pulse ox the pulse ox under 93 go to ER RTO in 3 days call if any problems

## 2021-09-27 ENCOUNTER — HOSPITAL ENCOUNTER (OUTPATIENT)
Dept: INFUSION CENTER | Facility: HOSPITAL | Age: 71
Discharge: HOME | End: 2021-09-27
Payer: COMMERCIAL

## 2021-09-27 VITALS
TEMPERATURE: 96.1 F | HEART RATE: 54 BPM | RESPIRATION RATE: 18 BRPM | DIASTOLIC BLOOD PRESSURE: 71 MMHG | SYSTOLIC BLOOD PRESSURE: 119 MMHG | OXYGEN SATURATION: 97 %

## 2021-09-27 DIAGNOSIS — U07.1 COVID-19: Primary | ICD-10-CM

## 2021-09-27 PROCEDURE — M0243 CASIRIVI AND IMDEVI INFUSION: HCPCS | Performed by: INTERNAL MEDICINE

## 2021-09-27 RX ORDER — ALBUTEROL SULFATE 90 UG/1
3 AEROSOL, METERED RESPIRATORY (INHALATION) ONCE AS NEEDED
Status: DISCONTINUED | OUTPATIENT
Start: 2021-09-27 | End: 2021-09-30 | Stop reason: HOSPADM

## 2021-09-27 RX ORDER — ONDANSETRON 2 MG/ML
4 INJECTION INTRAMUSCULAR; INTRAVENOUS ONCE AS NEEDED
Status: DISCONTINUED | OUTPATIENT
Start: 2021-09-27 | End: 2021-09-30 | Stop reason: HOSPADM

## 2021-09-27 RX ORDER — ALBUTEROL SULFATE 90 UG/1
3 AEROSOL, METERED RESPIRATORY (INHALATION) ONCE AS NEEDED
Status: CANCELLED | OUTPATIENT
Start: 2021-09-27

## 2021-09-27 RX ORDER — ACETAMINOPHEN 325 MG/1
650 TABLET ORAL ONCE AS NEEDED
Status: DISCONTINUED | OUTPATIENT
Start: 2021-09-27 | End: 2021-09-30 | Stop reason: HOSPADM

## 2021-09-27 RX ORDER — SODIUM CHLORIDE 9 MG/ML
20 INJECTION, SOLUTION INTRAVENOUS ONCE
Status: COMPLETED | OUTPATIENT
Start: 2021-09-27 | End: 2021-09-27

## 2021-09-27 RX ORDER — ACETAMINOPHEN 325 MG/1
650 TABLET ORAL ONCE AS NEEDED
Status: CANCELLED | OUTPATIENT
Start: 2021-09-27

## 2021-09-27 RX ORDER — SODIUM CHLORIDE 9 MG/ML
20 INJECTION, SOLUTION INTRAVENOUS ONCE
Status: CANCELLED | OUTPATIENT
Start: 2021-09-27

## 2021-09-27 RX ORDER — ONDANSETRON 2 MG/ML
4 INJECTION INTRAMUSCULAR; INTRAVENOUS ONCE AS NEEDED
Status: CANCELLED | OUTPATIENT
Start: 2021-09-27

## 2021-09-27 RX ADMIN — IMDEVIMAB 1200 MG COMBINED: 1332 INJECTION, SOLUTION, CONCENTRATE INTRAVENOUS at 13:26

## 2021-09-27 RX ADMIN — SODIUM CHLORIDE 20 ML/HR: 0.9 INJECTION, SOLUTION INTRAVENOUS at 13:07

## 2021-09-27 NOTE — PROGRESS NOTES
Patient here today for regeneron infusion  Vital signs stable, IV placed without issue  Copy of EUA given to patient and patient verbally agrees to treatment today  Will continue to monitor

## 2021-09-27 NOTE — PROGRESS NOTES
Patient completed one hour observation time without any adverse reactions  Discharge instructions given to patient and patient escorted to parking area by nursing

## 2021-09-27 NOTE — PROGRESS NOTES
Patient tolerated infusion without adverse reaction    Vital signs stable,will continue to monitor for additional one hour

## 2021-11-22 ENCOUNTER — TELEPHONE (OUTPATIENT)
Dept: INTERNAL MEDICINE CLINIC | Facility: CLINIC | Age: 71
End: 2021-11-22

## 2021-12-14 ENCOUNTER — OFFICE VISIT (OUTPATIENT)
Dept: DERMATOLOGY | Facility: CLINIC | Age: 71
End: 2021-12-14
Payer: COMMERCIAL

## 2021-12-14 VITALS — WEIGHT: 185 LBS | BODY MASS INDEX: 28.04 KG/M2 | TEMPERATURE: 98.5 F | HEIGHT: 68 IN

## 2021-12-14 DIAGNOSIS — L82.1 SEBORRHEIC KERATOSIS: ICD-10-CM

## 2021-12-14 DIAGNOSIS — L40.9 PSORIASIS: Primary | ICD-10-CM

## 2021-12-14 PROCEDURE — 99204 OFFICE O/P NEW MOD 45 MIN: CPT | Performed by: DERMATOLOGY

## 2021-12-14 PROCEDURE — 17000 DESTRUCT PREMALG LESION: CPT | Performed by: DERMATOLOGY

## 2021-12-14 RX ORDER — CALCIPOTRIENE 50 UG/G
OINTMENT TOPICAL 2 TIMES DAILY
Qty: 60 G | Refills: 0 | Status: SHIPPED | OUTPATIENT
Start: 2021-12-14

## 2021-12-14 RX ORDER — CLOBETASOL PROPIONATE 0.5 MG/G
OINTMENT TOPICAL 2 TIMES DAILY
Qty: 30 G | Refills: 0 | Status: SHIPPED | OUTPATIENT
Start: 2021-12-14 | End: 2022-06-02

## 2021-12-17 ENCOUNTER — IMMUNIZATIONS (OUTPATIENT)
Dept: FAMILY MEDICINE CLINIC | Facility: HOSPITAL | Age: 71
End: 2021-12-17

## 2021-12-17 DIAGNOSIS — Z23 ENCOUNTER FOR IMMUNIZATION: Primary | ICD-10-CM

## 2021-12-17 PROCEDURE — 0064A COVID-19 MODERNA VACC 0.25 ML BOOSTER: CPT

## 2021-12-17 PROCEDURE — 91306 COVID-19 MODERNA VACC 0.25 ML BOOSTER: CPT

## 2021-12-22 ENCOUNTER — TELEPHONE (OUTPATIENT)
Dept: DERMATOLOGY | Facility: CLINIC | Age: 71
End: 2021-12-22

## 2022-01-04 ENCOUNTER — OFFICE VISIT (OUTPATIENT)
Dept: DERMATOLOGY | Facility: CLINIC | Age: 72
End: 2022-01-04
Payer: COMMERCIAL

## 2022-01-04 DIAGNOSIS — L40.9 PSORIASIS: Primary | ICD-10-CM

## 2022-01-04 PROCEDURE — 96920 EXCIMER LSR PSRIASIS<250SQCM: CPT | Performed by: DERMATOLOGY

## 2022-01-04 NOTE — PROGRESS NOTES
PHOTOTHERAPY EXCIMER LASER TREATMENT NURSE VISIT    Physical Exam   Diagnosis: Psoriasis    Anatomic location affected:    Area 1:Bilateral elbows  o Color: None  o Sensitivity/Pain:None  o Tempeture/Heat:None  o Increase %: First treatment   o Mjoules: 400   Area 2:Right knee  o Color: None  o Sensitivity/Pain:None  o Tempeture/Heat:None  o Increase%: First treatment   o Mjoules: 400   Area 3:Right lower leg   o Color: None  o Sensitivity/Pain:None  o Tempeture/Heat:None  o Increase%: First treatment   o Mjoules: 300     Total Dosed Cm²: 39    Plan:   Treatment Schedule: 2x/week   Topical Applied: yes - mineral oil applied by Nurse  Jessica Berry Is it a Combination treatment?  No        Based on a thorough discussion of this condition and the management approach to it (including a comprehensive discussion of the known risks, side effects and potential benefits of treatment), the patient (family) agrees to implement the following specific plan:    Patient wore appropriate eye protection   All questions were answered no complications reported   Patient's next scheduled appointment: 1/6/2022

## 2022-01-06 ENCOUNTER — OFFICE VISIT (OUTPATIENT)
Dept: DERMATOLOGY | Facility: CLINIC | Age: 72
End: 2022-01-06
Payer: COMMERCIAL

## 2022-01-06 DIAGNOSIS — L40.9 PSORIASIS: Primary | ICD-10-CM

## 2022-01-06 PROCEDURE — 96920 EXCIMER LSR PSRIASIS<250SQCM: CPT | Performed by: DERMATOLOGY

## 2022-01-06 NOTE — PROGRESS NOTES
PHOTOTHERAPY EXCIMER LASER TREATMENT NURSE VISIT    Physical Exam   Diagnosis: Psoriasis    Anatomic location affected:    Area 1:Bilateral elbows  o Color: None  o Sensitivity/Pain:None  o Tempeture/Heat:None  o Increase %: 25  o Mjoules: 500   Area 2:Right knee  o Color: None  o Sensitivity/Pain:None  o Tempeture/Heat:None  o Increase%: 25   o Mjoules: 500   Area 3:Right lower leg   o Color: None  o Sensitivity/Pain:None  o Tempeture/Heat:None  o Increase%: 26  o Mjoules: 380     Total Dosed Cm²: 56    Plan:   Treatment Schedule: 2x/week   Topical Applied: yes - mineral oil applied by Nurse  rBittany Goldberg Is it a Combination treatment?  No        Based on a thorough discussion of this condition and the management approach to it (including a comprehensive discussion of the known risks, side effects and potential benefits of treatment), the patient (family) agrees to implement the following specific plan:    Patient wore appropriate eye protection   All questions were answered no complications reported   Patient's next scheduled appointment: 1/11/2022

## 2022-01-11 ENCOUNTER — OFFICE VISIT (OUTPATIENT)
Dept: DERMATOLOGY | Facility: CLINIC | Age: 72
End: 2022-01-11
Payer: COMMERCIAL

## 2022-01-11 DIAGNOSIS — L40.9 PSORIASIS: Primary | ICD-10-CM

## 2022-01-11 PROCEDURE — 96920 EXCIMER LSR PSRIASIS<250SQCM: CPT | Performed by: DERMATOLOGY

## 2022-01-11 NOTE — PROGRESS NOTES
PHOTOTHERAPY EXCIMER LASER TREATMENT NURSE VISIT    Physical Exam   Diagnosis: Psoriasis    Anatomic location affected:    Area 1:Bilateral elbows   o Color: None  o Sensitivity/Pain:None  o Tempeture/Heat:None  o Increase %: 26  o Mjoules: 630   Area 2:Right knee  o Color: None  o Sensitivity/Pain:None  o Tempeture/Heat:None  o Increase%: 26  o Mjoules: 630   Area 3:Right lower leg   o Color: None  o Sensitivity/Pain:None  o Tempeture/Heat:None  o Increase%: 26  o Mjoules: 480     Total Dosed Cm²: 33    Plan:   Treatment Schedule: 2x/week   Topical Applied: yes - mineral oil applied by Nurse  Mair Ortiz Is it a Combination treatment?  No        Based on a thorough discussion of this condition and the management approach to it (including a comprehensive discussion of the known risks, side effects and potential benefits of treatment), the patient (family) agrees to implement the following specific plan:    Patient wore appropriate eye protection   All questions were answered no complications reported   Patient's next scheduled appointment: 1/13/2022

## 2022-01-13 ENCOUNTER — OFFICE VISIT (OUTPATIENT)
Dept: DERMATOLOGY | Facility: CLINIC | Age: 72
End: 2022-01-13
Payer: COMMERCIAL

## 2022-01-13 DIAGNOSIS — L40.9 PSORIASIS: Primary | ICD-10-CM

## 2022-01-13 PROCEDURE — 96920 EXCIMER LSR PSRIASIS<250SQCM: CPT | Performed by: DERMATOLOGY

## 2022-01-13 NOTE — PROGRESS NOTES
PHOTOTHERAPY EXCIMER LASER TREATMENT NURSE VISIT    Physical Exam   Diagnosis: Psoriasis    Anatomic location affected:    Area 1:Bilateral elbows   o Color: None  o Sensitivity/Pain:None  o Tempeture/Heat:None  o Increase %: 25  o Mjoules: 790   Area 2:Right knee  o Color: None  o Sensitivity/Pain:None  o Tempeture/Heat:None  o Increase%: 25  o Mjoules: 790   Area 3:Right lower leg   o Color: None  o Sensitivity/Pain:None  o Tempeture/Heat:None  o Increase%: 25  o Mjoules: 600     Total Dosed Cm²: 33    Plan:   Treatment Schedule: 2x/week   Topical Applied: yes - mineral oil applied by Nurse  Javed Best Is it a Combination treatment?  No        Based on a thorough discussion of this condition and the management approach to it (including a comprehensive discussion of the known risks, side effects and potential benefits of treatment), the patient (family) agrees to implement the following specific plan:    Patient wore appropriate eye protection   All questions were answered no complications reported   Patient's next scheduled appointment: 1/18/2022

## 2022-01-18 ENCOUNTER — OFFICE VISIT (OUTPATIENT)
Dept: DERMATOLOGY | Facility: CLINIC | Age: 72
End: 2022-01-18
Payer: COMMERCIAL

## 2022-01-18 DIAGNOSIS — L40.9 PSORIASIS: Primary | ICD-10-CM

## 2022-01-18 PROCEDURE — 96920 EXCIMER LSR PSRIASIS<250SQCM: CPT | Performed by: DERMATOLOGY

## 2022-01-18 NOTE — PROGRESS NOTES
PHOTOTHERAPY EXCIMER LASER TREATMENT NURSE VISIT    Physical Exam   Diagnosis: Psoriasis    Anatomic location affected:    Area 1:Bilateral elbows   o Color: None  o Sensitivity/Pain:None  o Tempeture/Heat:None  o Increase %: 25  o Mjoules: 990   Area 2:Right knee  o Color: None  o Sensitivity/Pain:None  o Tempeture/Heat:None  o Increase%: 25  o Mjoules: 990   Area 3:Right lower leg   o Color: None  o Sensitivity/Pain:None  o Tempeture/Heat:None  o Increase%: 25  o Mjoules: 750     Total Dosed Cm²: 29    Plan:   Treatment Schedule: 2x/week   Topical Applied: yes - mineral oil applied by Nurse  Chiara Mendiola Is it a Combination treatment?  No        Based on a thorough discussion of this condition and the management approach to it (including a comprehensive discussion of the known risks, side effects and potential benefits of treatment), the patient (family) agrees to implement the following specific plan:    Patient wore appropriate eye protection   All questions were answered no complications reported   Patient's next scheduled appointment: 1/20/2022

## 2022-01-20 ENCOUNTER — OFFICE VISIT (OUTPATIENT)
Dept: DERMATOLOGY | Facility: CLINIC | Age: 72
End: 2022-01-20
Payer: COMMERCIAL

## 2022-01-20 DIAGNOSIS — L40.9 PSORIASIS: Primary | ICD-10-CM

## 2022-01-20 PROCEDURE — 96920 EXCIMER LSR PSRIASIS<250SQCM: CPT | Performed by: DERMATOLOGY

## 2022-01-20 NOTE — PROGRESS NOTES
PHOTOTHERAPY EXCIMER LASER TREATMENT NURSE VISIT    Physical Exam   Diagnosis: Psoriasis    Anatomic location affected:    Area 1:Bilateral elbows   o Color: None  o Sensitivity/Pain:None  o Tempeture/Heat:None  o Increase %: 25  o Mjoules: 1240   Area 2:Right knee  o Color: None  o Sensitivity/Pain:None  o Tempeture/Heat:None  o Increase%: 25  o Mjoules: 1240   Area 3:Right lower leg   o Color: None  o Sensitivity/Pain:None  o Tempeture/Heat:None  o Increase%: 25  o Mjoules: 940     Total Dosed Cm²: 36    Plan:   Treatment Schedule: 2x/week   Topical Applied: yes - mineral oil applied by Nurse  Roman Dobbins Is it a Combination treatment?  No        Based on a thorough discussion of this condition and the management approach to it (including a comprehensive discussion of the known risks, side effects and potential benefits of treatment), the patient (family) agrees to implement the following specific plan:    Patient wore appropriate eye protection   All questions were answered no complications reported   Patient's next scheduled appointment: 1/25/2022

## 2022-01-24 ENCOUNTER — OFFICE VISIT (OUTPATIENT)
Dept: DERMATOLOGY | Facility: CLINIC | Age: 72
End: 2022-01-24
Payer: COMMERCIAL

## 2022-01-24 DIAGNOSIS — L40.9 PSORIASIS: Primary | ICD-10-CM

## 2022-01-24 PROCEDURE — 96920 EXCIMER LSR PSRIASIS<250SQCM: CPT | Performed by: DERMATOLOGY

## 2022-01-24 NOTE — PROGRESS NOTES
PHOTOTHERAPY EXCIMER LASER TREATMENT NURSE VISIT    Physical Exam   Diagnosis: Psoriasis    Anatomic location affected:    Area 1:Bilateral elbows   o Color: None  o Sensitivity/Pain:None  o Tempeture/Heat:None  o Increase %: 25  o Mjoules: 5249   Area 2:Right knee  o Color: None  o Sensitivity/Pain:None  o Tempeture/Heat:None  o Increase%: 25  o Mjoules: 1550   Area 3:Right lower leg   o Color: None  o Sensitivity/Pain:None  o Tempeture/Heat:None  o Increase%: 25  o Mjoules: 1180     Total Dosed Cm²: 33    Plan:   Treatment Schedule: 2x/week   Topical Applied: yes - mineral oil applied by Nurse  Bebe Needs Is it a Combination treatment?  No        Based on a thorough discussion of this condition and the management approach to it (including a comprehensive discussion of the known risks, side effects and potential benefits of treatment), the patient (family) agrees to implement the following specific plan:    Patient wore appropriate eye protection   All questions were answered no complications reported   Patient's next scheduled appointment: 1/27/2022

## 2022-01-27 ENCOUNTER — OFFICE VISIT (OUTPATIENT)
Dept: DERMATOLOGY | Facility: CLINIC | Age: 72
End: 2022-01-27
Payer: COMMERCIAL

## 2022-01-27 DIAGNOSIS — L40.9 PSORIASIS: Primary | ICD-10-CM

## 2022-01-27 PROCEDURE — 96920 EXCIMER LSR PSRIASIS<250SQCM: CPT | Performed by: DERMATOLOGY

## 2022-01-27 NOTE — PROGRESS NOTES
PHOTOTHERAPY EXCIMER LASER TREATMENT NURSE VISIT    Physical Exam   Diagnosis: Psoriasis    Anatomic location affected:    Area 1:Bilateral elbows   o Color: None  o Sensitivity/Pain:None  o Tempeture/Heat:None  o Increase %: 25  o Mjoules: 1940   Area 2:Right knee  o Color: None  o Sensitivity/Pain:None  o Tempeture/Heat:None  o Increase%: 25  o Mjoules: 1940   Area 3:Right lower leg   o Color: None  o Sensitivity/Pain:None  o Tempeture/Heat:None  o Increase%: 25  o Mjoules: 1480     Total Dosed Cm²: 29    Plan:   Treatment Schedule: 2x/week   Topical Applied: yes - mineral oil applied by Nurse  Ambrosio Is it a Combination treatment?  No        Based on a thorough discussion of this condition and the management approach to it (including a comprehensive discussion of the known risks, side effects and potential benefits of treatment), the patient (family) agrees to implement the following specific plan:    Patient wore appropriate eye protection   All questions were answered no complications reported   Patient's next scheduled appointment: 2/1/2022

## 2022-02-01 ENCOUNTER — OFFICE VISIT (OUTPATIENT)
Dept: DERMATOLOGY | Facility: CLINIC | Age: 72
End: 2022-02-01
Payer: COMMERCIAL

## 2022-02-01 DIAGNOSIS — L40.9 PSORIASIS: Primary | ICD-10-CM

## 2022-02-01 PROCEDURE — 96920 EXCIMER LSR PSRIASIS<250SQCM: CPT | Performed by: DERMATOLOGY

## 2022-02-01 NOTE — PROGRESS NOTES
PHOTOTHERAPY EXCIMER LASER TREATMENT NURSE VISIT    Physical Exam   Diagnosis: Psoriasis    Anatomic location affected:    Area 1:Bilateral elbows   o Color: None  o Sensitivity/Pain:None  o Tempeture/Heat:None  o Increase %: 25  o Mjoules: 6896   Area 2:Right knee  o Color: None  o Sensitivity/Pain:None  o Tempeture/Heat:None  o Increase%: 25  o Mjoules: 9236   Area 3:Right lower leg   o Color: None  o Sensitivity/Pain:None  o Tempeture/Heat:None  o Increase%: 20  o Mjoules: 5454     Total Dosed Cm²: 29    Plan:   Treatment Schedule: 2x/week   Topical Applied: yes - mineral oil applied by Nurse  [facility] Is it a Combination treatment?  No        Based on a thorough discussion of this condition and the management approach to it (including a comprehensive discussion of the known risks, side effects and potential benefits of treatment), the patient (family) agrees to implement the following specific plan:    Patient wore appropriate eye protection   All questions were answered no complications reported   Patient's next scheduled appointment: 2/3/2022

## 2022-02-03 ENCOUNTER — OFFICE VISIT (OUTPATIENT)
Dept: DERMATOLOGY | Facility: CLINIC | Age: 72
End: 2022-02-03
Payer: COMMERCIAL

## 2022-02-03 DIAGNOSIS — L40.9 PSORIASIS: Primary | ICD-10-CM

## 2022-02-03 PROCEDURE — 96920 EXCIMER LSR PSRIASIS<250SQCM: CPT | Performed by: DERMATOLOGY

## 2022-02-03 NOTE — PROGRESS NOTES
PHOTOTHERAPY EXCIMER LASER TREATMENT NURSE VISIT    Physical Exam   Diagnosis: Psoriasis    Anatomic location affected:    Area 1:Bilateral elbows   o Color: None  o Sensitivity/Pain:None  o Tempeture/Heat:None  o Increase %: 25  o Mjoules: 3040   Area 2:Right knee   o Color: None  o Sensitivity/Pain:None  o Tempeture/Heat:None  o Increase%: 25  o Mjoules: 3040   Area 3:Right lower leg   o Color: None  o Sensitivity/Pain:None  o Tempeture/Heat:None  o Increase%: 10  o Mjoules: 1960     Total Dosed Cm²: 33    Plan:   Treatment Schedule: 2x/week   Topical Applied: yes - mineral oil applied by Nurse  Tory Roberts Is it a Combination treatment?  No        Based on a thorough discussion of this condition and the management approach to it (including a comprehensive discussion of the known risks, side effects and potential benefits of treatment), the patient (family) agrees to implement the following specific plan:    Patient wore appropriate eye protection   All questions were answered no complications reported   Patient's next scheduled appointment: 2/7/2022

## 2022-02-07 ENCOUNTER — OFFICE VISIT (OUTPATIENT)
Dept: DERMATOLOGY | Facility: CLINIC | Age: 72
End: 2022-02-07
Payer: COMMERCIAL

## 2022-02-07 DIAGNOSIS — L40.9 PSORIASIS: Primary | ICD-10-CM

## 2022-02-07 PROCEDURE — 96920 EXCIMER LSR PSRIASIS<250SQCM: CPT | Performed by: DERMATOLOGY

## 2022-02-07 NOTE — PROGRESS NOTES
PHOTOTHERAPY EXCIMER LASER TREATMENT NURSE VISIT    Physical Exam   Diagnosis: Psoriasis    Anatomic location affected:    Area 1:Bilateral elbows   o Color: None  o Sensitivity/Pain:None  o Tempeture/Heat:None  o Increase %: 20  o Mjoules: 9311   Area 2:Right knee  o Color: None  o Sensitivity/Pain:None  o Tempeture/Heat:None  o Increase%: 20  o Mjoules: 1949   Area 3:Right lower leg   o Color: None  o Sensitivity/Pain:None  o Tempeture/Heat:None  o Increase%: 10  o Mjoules: 2160     Total Dosed Cm²: 29    Plan:   Treatment Schedule: 2x/week   Topical Applied: yes - mineral oil applied by Nurse  Hitesh Pert Is it a Combination treatment?  No        Based on a thorough discussion of this condition and the management approach to it (including a comprehensive discussion of the known risks, side effects and potential benefits of treatment), the patient (family) agrees to implement the following specific plan:    Patient wore appropriate eye protection   All questions were answered no complications reported   Patient's next scheduled appointment: 2/9/2022

## 2022-02-10 ENCOUNTER — OFFICE VISIT (OUTPATIENT)
Dept: DERMATOLOGY | Facility: CLINIC | Age: 72
End: 2022-02-10
Payer: COMMERCIAL

## 2022-02-10 DIAGNOSIS — L40.9 PSORIASIS: Primary | ICD-10-CM

## 2022-02-10 PROCEDURE — 96920 EXCIMER LSR PSRIASIS<250SQCM: CPT | Performed by: DERMATOLOGY

## 2022-02-10 NOTE — PROGRESS NOTES
PHOTOTHERAPY EXCIMER LASER TREATMENT NURSE VISIT    Physical Exam   Diagnosis: Psoriasis    Anatomic location affected:    Area 1:Bilateral elbows   o Color: None  o Sensitivity/Pain:None  o Tempeture/Heat:None  o Increase %: 10  o Mjoules: 3515   Area 2:Right knee  o Color: None  o Sensitivity/Pain:None  o Tempeture/Heat:None  o Increase%: 10  o Mjoules: 3215   Area 3:Right lower leg   o Color: None  o Sensitivity/Pain:None  o Tempeture/Heat:None  o Increase%: 10  o Mjoules: 2380     Total Dosed Cm²: 26    Plan:   Treatment Schedule: 2x/week   Topical Applied: yes - mineral oil applied by Nurse  Araceli Silvestre Is it a Combination treatment?  No        Based on a thorough discussion of this condition and the management approach to it (including a comprehensive discussion of the known risks, side effects and potential benefits of treatment), the patient (family) agrees to implement the following specific plan:    Patient wore appropriate eye protection   All questions were answered no complications reported   Patient's next scheduled appointment: 2/14/2022

## 2022-02-14 ENCOUNTER — OFFICE VISIT (OUTPATIENT)
Dept: DERMATOLOGY | Facility: CLINIC | Age: 72
End: 2022-02-14
Payer: COMMERCIAL

## 2022-02-14 DIAGNOSIS — L40.9 PSORIASIS: Primary | ICD-10-CM

## 2022-02-14 PROCEDURE — 96920 EXCIMER LSR PSRIASIS<250SQCM: CPT | Performed by: DERMATOLOGY

## 2022-02-14 NOTE — PROGRESS NOTES
PHOTOTHERAPY EXCIMER LASER TREATMENT NURSE VISIT    Physical Exam   Diagnosis: Psoriasis    Anatomic location affected:    Area 1:Bilateral elbows   o Color: None  o Sensitivity/Pain:None  o Tempeture/Heat:None  o Increase %: 10  o Mjoules: 9793   Area 2:Right knee  o Color: None  o Sensitivity/Pain:None  o Tempeture/Heat:None  o Increase%: 10  o Mjoules: 5333   Area 3:Right lower leg   o Color: None  o Sensitivity/Pain:None  o Tempeture/Heat:None  o Increase%: 10  o Mjoules: 5178     Total Dosed Cm²: 39    Plan:   Treatment Schedule: 2x/week   Topical Applied: yes - mineral oil applied by Nurse  Raghu Campos Is it a Combination treatment?  No        Based on a thorough discussion of this condition and the management approach to it (including a comprehensive discussion of the known risks, side effects and potential benefits of treatment), the patient (family) agrees to implement the following specific plan:    Patient wore appropriate eye protection   All questions were answered no complications reported   Patient's next scheduled appointment: 2/17/2022

## 2022-02-17 ENCOUNTER — TELEPHONE (OUTPATIENT)
Dept: UROLOGY | Facility: AMBULATORY SURGERY CENTER | Age: 72
End: 2022-02-17

## 2022-02-17 ENCOUNTER — OFFICE VISIT (OUTPATIENT)
Dept: DERMATOLOGY | Facility: CLINIC | Age: 72
End: 2022-02-17
Payer: COMMERCIAL

## 2022-02-17 DIAGNOSIS — L40.9 PSORIASIS: Primary | ICD-10-CM

## 2022-02-17 PROCEDURE — 96920 EXCIMER LSR PSRIASIS<250SQCM: CPT | Performed by: DERMATOLOGY

## 2022-02-17 NOTE — TELEPHONE ENCOUNTER
Patient previously managed by Vernell Tapia said he would like to see an MD  Stated he has been only seen by [facility] since 2016  Requesting his next appointment with MD Collin Corley to travel

## 2022-02-17 NOTE — TELEPHONE ENCOUNTER
Spoke to patient to advise of scheduling an appointment and to see if he is having symptoms  Patient states he has no symptoms and was just requesting an appointment with the providers when his yearly is close by  Advised we can send him a post card in about 2 months to schedule a follow up appointment with the MD per his request  Patient had stated everything I was saying was told to him  Advised I do not see any of this documented

## 2022-02-17 NOTE — PROGRESS NOTES
PHOTOTHERAPY EXCIMER LASER TREATMENT NURSE VISIT    Physical Exam   Diagnosis: Psoriasis    Anatomic location affected:    Area 1:Bilateral elbows   o Color: None  o Sensitivity/Pain:None  o Tempeture/Heat:None  o Increase %: 10  o Mjoules: 4890   Area 2:Right knee  o Color: None  o Sensitivity/Pain:None  o Tempeture/Heat:None  o Increase%: 10  o Mjoules: 4890   Area 3:Right lower leg   o Color: None  o Sensitivity/Pain:None  o Tempeture/Heat:None  o Increase%: 10  o Mjoules: 2892     Total Dosed Cm²: 23    Plan:   Treatment Schedule: 2x/week   Topical Applied: yes - mineral oil applied by Nurse  [facility] Is it a Combination treatment? No        Based on a thorough discussion of this condition and the management approach to it (including a comprehensive discussion of the known risks, side effects and potential benefits of treatment), the patient (family) agrees to implement the following specific plan:    Patient wore appropriate eye protection   All questions were answered no complications reported   Patient's next scheduled appointment: 2/22/2022    Pt completed 6 weeks of excimer laser treatments for psoriasis prescribed by Dr Iban Hoyt  Today, 2/17/2022 Dr Iban Hoyt came into the visit to exam pt  Upon examination, psoriasis plaques improved with laser treatments, however are still moderately present  Pt instructed to start applying prescribed Estilar foam topically once daily for 90 days  Pt instructed if insurance does not cover this medication, he can continue using the Clobetasol ointment topically for 2 weeks at a time  Pt informed can wrap saran wrap over top of the ointment to make the medication penetrate the plaques better  Pt agreeable  Per Dr Iban Hoyt, pt can stop the excimer laser treatments

## 2022-03-30 ENCOUNTER — OFFICE VISIT (OUTPATIENT)
Dept: OBGYN CLINIC | Facility: OTHER | Age: 72
End: 2022-03-30
Payer: COMMERCIAL

## 2022-03-30 VITALS
WEIGHT: 183.8 LBS | DIASTOLIC BLOOD PRESSURE: 93 MMHG | HEART RATE: 78 BPM | BODY MASS INDEX: 27.86 KG/M2 | HEIGHT: 68 IN | SYSTOLIC BLOOD PRESSURE: 142 MMHG

## 2022-03-30 DIAGNOSIS — G57.82 NEUROPATHY OF LEFT SAPHENOUS NERVE: Primary | ICD-10-CM

## 2022-03-30 DIAGNOSIS — Z98.890 STATUS POST ARTHROSCOPY OF LEFT KNEE: ICD-10-CM

## 2022-03-30 PROCEDURE — 99213 OFFICE O/P EST LOW 20 MIN: CPT | Performed by: ORTHOPAEDIC SURGERY

## 2022-03-30 NOTE — PROGRESS NOTES
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  · Since last visit Cyst has resorbed  His biggist concern at this time is the numbness in his leg saphenous distribution  · We did discuss that the numbness in his leg would most likely be long term  He has minimal strength changes in his leg at this time and is able to function at full level  He does have sensation to pressure though cannot differentiate sharp versus dull throughout that saphenous nerve distribution  This time we did not have recommendations for the numbness as it has not changed much over the past year and most likely will be permanent  If the patient back to look into further treatment for this we would have to find and nerve specialist to refer to  · Overall his knee straight has not been very painful and again he is able to perform all normal activities without much issue  · Continue with HEP   · Continue to use compression as needed  Return if symptoms worsen or fail to improve  History of Present Illness  Amanuel Ochoa is a 81 y o  male who presents for follow up S/p left knee arthroscopy with popliteal cyst decompression 2020  Since last visit on 2021 the cyst has resolved and he has not been having any pain in his knee  He still complains of some lingering decrease sensation from the saphenous nerve which begin after his initial surgery in 2020  His history includes that he has been previously treated by Dr Soto Bonilla  In 2020 he was diagnosed with bakers cyst of the left knee  On 2020 he underwent a bakers cyst excision by Dr Soto Bonilla   Patient reports doing postoperative physical therapy and injury to the saphenous nerve with decreased sensation along the medial aspect of his left leg postoperatively   Patient reports ultimately the cyst recurred  On 10/13/2020 Dr Milla Porter left knee arthrocentesis and removed 20 mL of fluid   At this time Dr Gaming Saliva ultrasound-guided [place] 10/30/2020 patient underwent ultrasound-guided drainage of left Baker's cyst were 80 mL of fluid was drained by Interventional Radiology  [facility] [place] reports the following day the cyst had refilled  Review of Systems  Pertinent items are noted in HPI  All other systems were reviewed and are negative  Physical Exam  /93   Pulse 78   Ht 5' 8" (1 727 m)   Wt 83 4 kg (183 lb 12 8 oz)   BMI 27 95 kg/m²   Cons: Appears well  No apparent distress  Psych: Alert  Oriented x3  Mood and affect normal   Eyes: PERRLA, EOMI  Resp: Normal effort  No audible wheezing or stridor  CV: Palpable pulse  No discernable arrhythmia  No LE edema  Lymph:  No palpable cervical, axillary, or inguinal lymphadenopathy  Skin: Warm  No palpable masses  No visible lesions  Neuro: Normal muscle tone  Normal and symmetric DTR's  Left Knee Exam  Alignment:  Normal knee alignment  Inspection:  No swelling  No erythema  No ecchymosis  No deformity  Palpation:  No tenderness  ROM:  Normal knee ROM  Strength:  Able to actively extend knee against gravity  Stability:  Not tested  Tests:  Not tested  Patella:  Normal patellar mobility  Neurovascular: Through the saphenous nerve distribution the patient does not able to distinguish between sharp and dull but does feel pressure  This extends from the medial aspect of the knee down the medial aspect of the lower leg across the ankle into his great toe  He does have mild weakness 4+ out of 5 in the great toe extension  2+ DP & PT pulses    Gait:  Normal     Studies Reviewed  No studies to review    Procedures  No procedures today     Medical, Surgical, Family, and Social History  The patient's medical history, family history, and social history, were reviewed and updated as appropriate  Past Medical History:   Diagnosis Date    Abnormal weight loss     RESOLVED: [id number]    Anemia     RESOLVED: [id number]    Atypical chest pain     RESOLVED: [id number]    BPH (benign prostatic hyperplasia)     Cancer (HCC)     DUODENAL    Luis Miguel's syndrome     Depression     RESOLVED: [id number]    Diverticulitis of colon     LAST ASSESSED: [id number]    Duodenal nodule     RESOLVED: [id number]    Gastrointestinal stromal tumor (GIST) (HCC)     MALIGNANT; RESOLVED: [id number]    GERD (gastroesophageal reflux disease)     Glaucoma     RESOLVED: [id number]    Insomnia     RESOLVED: [id number]    Lactose intolerance     LAST ASSESSED: [id number]       Past Surgical History:   Procedure Laterality Date    BACK SURGERY      BACK SURGERY      l4 s1 surgery     CERVICAL FUSION      c4 and c5    CHOLECYSTECTOMY      COLONOSCOPY  2014    kutty    ELBOW SURGERY Left     REPAIR    EYE SURGERY      EYE SURGERY      GANGLION CYST EXCISION Left 6/5/2020    Procedure: KNEE EXCISION BAKERS CYST;  Surgeon: Mishel Yap MD;  Location:  MAIN OR;  Service: Orthopedics    KNEE ARTHROSCOPY Left     LUMBAR DISCECTOMY      L5 S1    ORIF RADIAL SHAFT FRACTURE      MS ESOPHAGOGASTRODUODENOSCOPY TRANSORAL DIAGNOSTIC N/A 11/1/2016    Procedure: ESOPHAGOGASTRODUODENOSCOPY (EGD); Surgeon: Haris Perez MD;  [facility]; Service: Gastroenterology    MS ESOPHAGOGASTRODUODENOSCOPY TRANSORAL DIAGNOSTIC N/A 5/31/2018    Procedure: ESOPHAGOGASTRODUODENOSCOPY (EGD); Surgeon: Haris Perez MD;  [facility];   Service: Gastroenterology    MS [facility] Left 12/21/2020    Procedure: KNEE ARTHROSCOPY, popliteal cyst decompression;  Surgeon: Elke Fernandez MD;  Location[facility];  Service: Orthopedics    MS [address] DISK N/A 10/24/2019    Procedure: Revision laminectomy and decompression at L5-S1;  Surgeon: Kyara Shearer MD;  [facility];  Service: Orthopedics    SMALL INTESTINE SURGERY      GIST surgery    Doshoshana Caldwell MSK PROCEDURE  5/21/2019    Jazmín Caldwell MSK PROCEDURE  10/30/2020       Family History   Problem Relation Age of Onset    Thyroid disease Mother     Thyroid disease Brother     Diabetes Paternal Grandmother     Hypertension Family     Skin cancer Father        Social History     Occupational History    Not on file   Tobacco Use    Smoking status: Never Smoker    Smokeless tobacco: Never Used   Vaping Use    Vaping Use: Never used   Substance and Sexual Activity    Alcohol use: Yes     Comment: occasional    Drug use: No    Sexual activity: Yes       No Known Allergies      Current Outpatient Medications:     calcipotriene (DOVONOX) 0 005 % ointment, Apply topically 2 (two) times a day, Disp: 60 g, Rfl: 0    clobetasol (TEMOVATE) 0 05 % ointment, Apply topically 2 (two) times a day, Disp: 30 g, Rfl: 0    famotidine (PEPCID) 40 MG tablet, Take 1 tablet (40 mg total) by mouth daily at bedtime, Disp: 90 tablet, Rfl: 3    GLUCOSAMINE CHONDROITIN COMPLX PO, Take 1 tablet by mouth daily 4500mg  daily , Disp: , Rfl:     Lotemax SM 0 38 % GEL, INSTILL 1 DROP INTO RIGHT EYE 4 TIMES A DAY, Disp: , Rfl:     Multiple Vitamins-Minerals (MULTIVITAL) tablet, Take 1 tablet by mouth daily Spectravite , Disp: , Rfl:     pantoprazole (PROTONIX) 40 mg tablet, Take 1 tablet (40 mg total) by mouth daily, Disp: 90 tablet, Rfl: 3    tamsulosin (FLOMAX) 0 4 mg, Take 2 capsules (0 8 mg total) by mouth daily with dinner, Disp: 180 capsule, Rfl: 3    Calcipotriene-Betameth Diprop 0 005-0 064 % FOAM, Apply topically once daily for 90 days to affected areas  , Disp: 60 g, Rfl: 6    cholestyramine (QUESTRAN) 4 GM/DOSE powder, Take 1 packet (4 g total) by mouth every other day (Patient taking differently: Take 4 g by mouth every other day ), Disp: 378 g, Rfl: 6    dorzolamide-timolol (COSOPT) 22 3-6 8 MG/ML ophthalmic solution, INSTILL 1 DROP INTO RIGHT EYE 3 TIMES A DAY, Disp: , Rfl: 4    valACYclovir (VALTREX) 500 mg tablet, Take 500 mg by mouth daily (Patient not taking: Reported on 9/22/2021), Disp: , Rfl:       Yamilet Mims PA-C    Scribe Attestation    I,:   am acting as a scribe while in the presence of the attending physician :       I,:   personally performed the services described in this documentation    as scribed in my presence :

## 2022-06-02 ENCOUNTER — OFFICE VISIT (OUTPATIENT)
Dept: INTERNAL MEDICINE CLINIC | Facility: CLINIC | Age: 72
End: 2022-06-02
Payer: COMMERCIAL

## 2022-06-02 VITALS
WEIGHT: 183.8 LBS | DIASTOLIC BLOOD PRESSURE: 74 MMHG | OXYGEN SATURATION: 96 % | BODY MASS INDEX: 27.86 KG/M2 | HEART RATE: 88 BPM | HEIGHT: 68 IN | SYSTOLIC BLOOD PRESSURE: 124 MMHG | RESPIRATION RATE: 16 BRPM

## 2022-06-02 DIAGNOSIS — N40.0 ENLARGED PROSTATE WITHOUT LOWER URINARY TRACT SYMPTOMS (LUTS): ICD-10-CM

## 2022-06-02 DIAGNOSIS — Z00.00 MEDICARE ANNUAL WELLNESS VISIT, SUBSEQUENT: Primary | ICD-10-CM

## 2022-06-02 DIAGNOSIS — G47.00 INSOMNIA, UNSPECIFIED TYPE: ICD-10-CM

## 2022-06-02 DIAGNOSIS — F32.A MILD DEPRESSION: ICD-10-CM

## 2022-06-02 PROCEDURE — G0438 PPPS, INITIAL VISIT: HCPCS | Performed by: INTERNAL MEDICINE

## 2022-06-02 RX ORDER — BRIMONIDINE TARTRATE 0.1 %
DROPS OPHTHALMIC (EYE)
COMMUNITY
Start: 2022-05-19

## 2022-06-02 RX ORDER — PREDNISOLONE ACETATE 10 MG/ML
SUSPENSION/ DROPS OPHTHALMIC
COMMUNITY
Start: 2022-05-25

## 2022-06-02 RX ORDER — TAMSULOSIN HYDROCHLORIDE 0.4 MG/1
0.4 CAPSULE ORAL
Qty: 180 CAPSULE | Refills: 3
Start: 2022-06-02 | End: 2022-06-07 | Stop reason: SDUPTHER

## 2022-06-02 NOTE — ASSESSMENT & PLAN NOTE
D/t stress of right eye complications   Wants to hold off on medication   Is welcome to come back anytime to discuss options for management

## 2022-06-02 NOTE — PROGRESS NOTES
Assessment and Plan:     Problem List Items Addressed This Visit        Other    Enlarged prostate without lower urinary tract symptoms (luts)     Has psa already ordered  Will follow up with urology  Relevant Medications    tamsulosin (FLOMAX) 0 4 mg    Medicare annual wellness visit, subsequent - Primary     F/u in 6 months w/ labs   F/u with [facility]   F/u with dr Roosevelt Garcia - discuss gerd   Currently still able to drive with 04/87 in left eye, right eye is being followed up at [facility]           Relevant Orders    CBC and differential    Comprehensive metabolic panel    Hemoglobin A1C    Lipid Panel with Direct LDL reflex    Insomnia     Unable to initiate and stay asleep, will need medication primarily to help induction at this time if start I believe  Pt believes it may be due to increased burping / belching - will try pantoprazole 40 BID x 3 days to see if improvement of symptoms will help provide more restful sleep  Wants to hold of on meds   Sleep hygiene discussed in detail such as avoiding blue light, pre sleep rituals, stimulus therapy, etc  Rx of mild depression and improvement of eye sight will help with this           Mild depression     D/t stress of right eye complications   Wants to hold off on medication   Is welcome to come back anytime to discuss options for management                   Physical Activity Assessment and Intervention:    Activity journal reviewed      Emotional and Mental Well-being, Sleep, Connectedness Assessment and Intervention:    Sleep/stress assessment performed    Depression and anxiety screening performed and reviewed    PHQ-9 or GAD7 performed for initial evaluation or follow-up    Counseled regarding sleep hygiene and aspects of healthy sleep      Therapeutic Lifestyle Change Visit:     One-on-one comprehensive counseling, coaching, and health behavior change visit completed        Correction: PHQ9 shows mild depression  No SI       Depression Screening and Follow-up Plan: Patient was screened for depression during today's encounter  They screened negative with a PHQ-2 score of 0  Falls Plan of Care: balance, strength, and gait training instructions were provided  Preventive health issues were discussed with patient, and age appropriate screening tests were ordered as noted in patient's After Visit Summary  Personalized health advice and appropriate referrals for health education or preventive services given if needed, as noted in patient's After Visit Summary  History of Present Illness:     Patient presents for Medicare Annual Wellness visit  Not sleep well  Is tired  Not sleeping well  H/O 5 in office procedures and 5 surgeries in eye procured and partial corneal transplant  Glaucoma stabilized with shunt  Possible another corneal transplant / stromal punctures (seems to work so far)  Has been to [facility] regularly every six weeks and so has not been able to follow-up with annual visits  COVID in September 2021  Sleep hygiene   In bed by 10  tries to fall asleep by 12  Unable to fall asleep  Maybe acid reflex  Blenching and farting a lot  Had gist 7 years ago removed and since then after lying down he feels burping  Sleeps about 1-1 5 hours daily at max  Once he is exhausted every 3-4 days he will sleep max 3-4 hours  Has tried melatonin 20 mg,  Sleep ease, ibuprofen p m  He will have fogginess today after) is taking famotidine 40 mg and pantoprazole 40 mg  And Tums  Tries to  Avoid spicy food  Avoids foods that cause him reflux           Patient Care Team:  Colton Valles, DO as PCP - MD Ana Torres MD (Gastroenterology)  MD Beth Villalba MD Pamella Press, MD as Surgeon (Surgical Oncology)     Problem List:     Patient Active Problem List   Diagnosis    Subacromial impingement of right shoulder    Enlarged prostate without lower urinary tract symptoms (luts)    Esophageal reflux    Essential hypertriglyceridemia    Hyperlipidemia    Splenomegaly    Tremor, essential    Primary osteoarthritis of knee    History of gastrointestinal stromal tumor (GIST)    Belching    Gastroesophageal reflux disease without esophagitis    Screening for prostate cancer    Diarrhea    Body aches    Myalgia    Melena    Atypical chest pain    IBS (irritable bowel syndrome)    Encounter for hepatitis C screening test for low risk patient    Hypokalemia    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)    Lumbar radiculopathy    Herniation of intervertebral disc between L5 and S1    Spinal stenosis of lumbar region    Lumbar disc herniation with radiculopathy    Overweight (BMI 25 0-29  9)    Skin rash    Medicare annual wellness visit, subsequent    Aftercare following surgery    Preop examination    Baker cyst, left    Preoperative testing    Fuchs' corneal dystrophy    Preop exam for internal medicine    Preoperative clearance    Left ear pain    Acute otitis externa of left ear    COVID-19    Status post arthroscopy of left knee    Neuropathy of left saphenous nerve    Insomnia    Mild depression      Past Medical and Surgical History:     Past Medical History:   Diagnosis Date    Abnormal weight loss     RESOLVED: [id number]    Anemia     RESOLVED: [id number]    Atypical chest pain     RESOLVED: [id number]    BPH (benign prostatic hyperplasia)     Cancer (HCC)     DUODENAL    Luis Miguel's syndrome     Depression     RESOLVED: [id number]    Diverticulitis of colon     LAST ASSESSED: [id number]    Duodenal nodule     RESOLVED: 63GZR9997    Gastrointestinal stromal tumor (GIST) (HCC)     MALIGNANT; RESOLVED: 24KIY7005    GERD (gastroesophageal reflux disease)     Glaucoma     RESOLVED: 75UXC5952    Insomnia     RESOLVED: 31LZT7933    Lactose intolerance     LAST ASSESSED: 60MSX8375     Past Surgical History:   Procedure Laterality Date    BACK SURGERY      BACK SURGERY      l4 s1 surgery     CERVICAL FUSION      c4 and c5    CHOLECYSTECTOMY      COLONOSCOPY  2014    kutty    ELBOW SURGERY Left     REPAIR    EYE SURGERY      EYE SURGERY      GANGLION CYST EXCISION Left 6/5/2020    Procedure: KNEE EXCISION BAKERS CYST;  Surgeon: Faraz Du MD;  [facility];  Service: Orthopedics    KNEE ARTHROSCOPY Left     LUMBAR DISCECTOMY      L5 S1    ORIF RADIAL SHAFT FRACTURE      MA ESOPHAGOGASTRODUODENOSCOPY TRANSORAL DIAGNOSTIC N/A 11/1/2016    Procedure: ESOPHAGOGASTRODUODENOSCOPY (EGD); Surgeon: Jeff Acosta MD;  [facility]; Service: Gastroenterology    MA ESOPHAGOGASTRODUODENOSCOPY TRANSORAL DIAGNOSTIC N/A 5/31/2018    Procedure: ESOPHAGOGASTRODUODENOSCOPY (EGD); Surgeon: Jeff Acosta MD;  [facility];   Service: Gastroenterology    MA [facility] Left 12/21/2020    Procedure: KNEE ARTHROSCOPY, popliteal cyst decompression;  Surgeon: Yoli Gaming MD;  [facility];  Service: Orthopedics    MA REDO EXCIS LUMBAR DISK N/A 10/24/2019    Procedure: Revision laminectomy and decompression at L5-S1;  Surgeon: Hayde Norwood MD;  [facility];  Service: Orthopedics    SMALL INTESTINE SURGERY      GIST surgery    [facility] 634 MSK PROCEDURE  5/21/2019    US GUIDED MSK PROCEDURE  10/30/2020      Family History:     Family History   Problem Relation Age of Onset    Thyroid disease Mother     Thyroid disease Brother     Diabetes Paternal Grandmother     Hypertension Family     Skin cancer Father       Social History:     Social History     Socioeconomic History    Marital status: /Civil Union     Spouse name: None    Number of children: None    Years of education: None    Highest education level: None   Occupational History    None   Tobacco Use    Smoking status: Never Smoker    Smokeless tobacco: Never Used   Vaping Use    Vaping Use: Never used Substance and Sexual Activity    Alcohol use: Yes     Comment: occasional    Drug use: No    Sexual activity: Yes   Other Topics Concern    None   Social History Narrative    None     Social Determinants of Health     Financial Resource Strain: Not on file   Food Insecurity: Not on file   Transportation Needs: Not on file   Physical Activity: Not on file   Stress: Not on file   Social Connections: Not on file   Intimate Partner Violence: Not on file   Housing Stability: Not on file      Medications and Allergies:     Current Outpatient Medications   Medication Sig Dispense Refill    Alphagan P 0 1 % INSTILL 1 DROP BY OPHTHALMIC ROUTE TWICE A DAY INTO RIGHT EYE      calcipotriene (DOVONOX) 0 005 % ointment Apply topically 2 (two) times a day 60 g 0    Calcipotriene-Betameth Diprop 0 005-0 064 % FOAM Apply topically once daily for 90 days to affected areas  60 g 6    dorzolamide-timolol (COSOPT) 22 3-6 8 MG/ML ophthalmic solution INSTILL 1 DROP INTO RIGHT EYE 3 TIMES A DAY  4    famotidine (PEPCID) 40 MG tablet Take 1 tablet (40 mg total) by mouth daily at bedtime 90 tablet 3    GLUCOSAMINE CHONDROITIN COMPLX PO Take 1 tablet by mouth daily 4500mg  daily       Multiple Vitamins-Minerals (MULTIVITAL) tablet Take 1 tablet by mouth daily Spectravite       pantoprazole (PROTONIX) 40 mg tablet Take 1 tablet (40 mg total) by mouth daily 90 tablet 3    prednisoLONE acetate (PRED FORTE) 1 % ophthalmic suspension INSTILL 1 DROP 4 TIMES EVERY DAY INTO RIGHT EYE      tamsulosin (FLOMAX) 0 4 mg Take 1 capsule (0 4 mg total) by mouth daily with dinner Has been taking 0 4 mg for man y years 180 capsule 3     No current facility-administered medications for this visit       No Known Allergies   Immunizations:     Immunization History   Administered Date(s) Administered    COVID-19 MODERNA VACC 0 25 ML IM BOOSTER 12/17/2021    COVID-19 MODERNA VACC 0 5 ML IM 01/05/2021, 02/04/2021    INFLUENZA 12/02/2018, 11/08/2020, 10/26/2021    Influenza, seasonal, injectable 11/09/2014    Influenza, seasonal, injectable, preservative free 11/01/2012    MMR 01/12/2015    Pneumococcal Conjugate 13-Valent 03/01/2017    Pneumococcal Polysaccharide PPV23 09/28/2012, 09/12/2018    Td (adult), adsorbed 02/01/2003    Tdap 01/24/2013    Zoster 12/06/2012    Zoster Vaccine Recombinant 03/14/2020, 06/22/2020      Health Maintenance:         Topic Date Due    Colorectal Cancer Screening  12/12/2025    Hepatitis C Screening  Completed         Topic Date Due    COVID-19 Vaccine (4 - Booster for Moderna series) 04/17/2022      Medicare Health Risk Assessment:     /74   Pulse 88   Resp 16   Ht 5' 8" (1 727 m)   Wt 83 4 kg (183 lb 12 8 oz)   SpO2 96%   BMI 27 95 kg/m²      Eugenia is here for his Subsequent Wellness visit  Health Risk Assessment:   Patient rates overall health as good  Patient feels that their physical health rating is same  Patient is satisfied with their life  Eyesight was rated as same  Hearing was rated as same  Patient feels that their emotional and mental health rating is same  Patients states they are never, rarely angry  Patient states they are never, rarely unusually tired/fatigued  Pain experienced in the last 7 days has been none  Patient states that he has experienced no weight loss or gain in last 6 months  Depression Screening:   PHQ-2 Score: 0      Fall Risk Screening: In the past year, patient has experienced: no history of falling in past year      Home Safety:  Patient does not have trouble with stairs inside or outside of their home  Patient has working smoke alarms and has working carbon monoxide detector  Home safety hazards include: none  Nutrition:   Current diet is Regular  Non spicy foods    Medications:   Patient is currently taking over-the-counter supplements  OTC medications include: see medication list  Patient is able to manage medications       Activities of Daily Living (ADLs)/Instrumental Activities of Daily Living (IADLs):   Walk and transfer into and out of bed and chair?: Yes  Dress and groom yourself?: Yes    Bathe or shower yourself?: Yes    Feed yourself? Yes  Do your laundry/housekeeping?: Yes  Manage your money, pay your bills and track your expenses?: Yes  Make your own meals?: Yes    Do your own shopping?: Yes    Previous Hospitalizations:   Any hospitalizations or ED visits within the last 12 months?: No      Advance Care Planning:   Living will: Yes    Durable POA for healthcare:  Yes    Advanced directive: Yes      Cognitive Screening:   Provider or family/friend/caregiver concerned regarding cognition?: No    PREVENTIVE SCREENINGS      Cardiovascular Screening:    General: Screening Not Indicated and History Lipid Disorder      Colorectal Cancer Screening:     General: Screening Current      Prostate Cancer Screening:    General: Screening Current      Abdominal Aortic Aneurysm (AAA) Screening:    Risk factors include: age between 73-69 yo        Lung Cancer Screening:     General: Screening Not Indicated      Hepatitis C Screening:    General: Screening Current    Screening, Brief Intervention, and Referral to Treatment (SBIRT)    Screening      Single Item Drug Screening:  How often have you used an illegal drug (including marijuana) or a prescription medication for non-medical reasons in the past year? never    Single Item Drug Screen Score: 0  Interpretation: Negative screen for possible drug use disorder      Francis Elkins MD

## 2022-06-02 NOTE — ASSESSMENT & PLAN NOTE
F/u in 6 months w/ labs   F/u with [facility]   F/u with dr Vidhi Tran - discuss gerd   Currently still able to drive with 59/46 in left eye, right eye is being followed up at [facility]

## 2022-06-02 NOTE — PATIENT INSTRUCTIONS

## 2022-06-02 NOTE — ASSESSMENT & PLAN NOTE
Unable to initiate and stay asleep, will need medication primarily to help induction at this time if start I believe       Pt believes it may be due to increased burping / belching - will try pantoprazole 40 BID x 3 days to see if improvement of symptoms will help provide more restful sleep  Wants to hold of on meds   Sleep hygiene discussed in detail such as avoiding blue light, pre sleep rituals, stimulus therapy, etc  Rx of mild depression and improvement of eye sight will help with this

## 2022-06-04 ENCOUNTER — APPOINTMENT (OUTPATIENT)
Dept: LAB | Age: 72
End: 2022-06-04
Payer: COMMERCIAL

## 2022-06-04 DIAGNOSIS — Z12.5 PROSTATE CANCER SCREENING: ICD-10-CM

## 2022-06-04 DIAGNOSIS — Z00.00 MEDICARE ANNUAL WELLNESS VISIT, SUBSEQUENT: ICD-10-CM

## 2022-06-04 LAB
ALBUMIN SERPL BCP-MCNC: 3.9 G/DL (ref 3.5–5)
ALP SERPL-CCNC: 82 U/L (ref 46–116)
ALT SERPL W P-5'-P-CCNC: 30 U/L (ref 12–78)
ANION GAP SERPL CALCULATED.3IONS-SCNC: 0 MMOL/L (ref 4–13)
AST SERPL W P-5'-P-CCNC: 21 U/L (ref 5–45)
BASOPHILS # BLD AUTO: 0.03 THOUSANDS/ΜL (ref 0–0.1)
BASOPHILS NFR BLD AUTO: 1 % (ref 0–1)
BILIRUB SERPL-MCNC: 0.86 MG/DL (ref 0.2–1)
BUN SERPL-MCNC: 16 MG/DL (ref 5–25)
CALCIUM SERPL-MCNC: 9.4 MG/DL (ref 8.3–10.1)
CHLORIDE SERPL-SCNC: 107 MMOL/L (ref 100–108)
CHOLEST SERPL-MCNC: 198 MG/DL
CO2 SERPL-SCNC: 32 MMOL/L (ref 21–32)
CREAT SERPL-MCNC: 0.91 MG/DL (ref 0.6–1.3)
EOSINOPHIL # BLD AUTO: 0.06 THOUSAND/ΜL (ref 0–0.61)
EOSINOPHIL NFR BLD AUTO: 1 % (ref 0–6)
ERYTHROCYTE [DISTWIDTH] IN BLOOD BY AUTOMATED COUNT: 12.4 % (ref 11.6–15.1)
EST. AVERAGE GLUCOSE BLD GHB EST-MCNC: 94 MG/DL
GFR SERPL CREATININE-BSD FRML MDRD: 85 ML/MIN/1.73SQ M
GLUCOSE P FAST SERPL-MCNC: 100 MG/DL (ref 65–99)
HBA1C MFR BLD: 4.9 %
HCT VFR BLD AUTO: 48.5 % (ref 36.5–49.3)
HDLC SERPL-MCNC: 44 MG/DL
HGB BLD-MCNC: 16.8 G/DL (ref 12–17)
IMM GRANULOCYTES # BLD AUTO: 0.01 THOUSAND/UL (ref 0–0.2)
IMM GRANULOCYTES NFR BLD AUTO: 0 % (ref 0–2)
LDLC SERPL CALC-MCNC: 129 MG/DL (ref 0–100)
LYMPHOCYTES # BLD AUTO: 1.11 THOUSANDS/ΜL (ref 0.6–4.47)
LYMPHOCYTES NFR BLD AUTO: 24 % (ref 14–44)
MCH RBC QN AUTO: 31.6 PG (ref 26.8–34.3)
MCHC RBC AUTO-ENTMCNC: 34.6 G/DL (ref 31.4–37.4)
MCV RBC AUTO: 91 FL (ref 82–98)
MONOCYTES # BLD AUTO: 0.45 THOUSAND/ΜL (ref 0.17–1.22)
MONOCYTES NFR BLD AUTO: 10 % (ref 4–12)
NEUTROPHILS # BLD AUTO: 2.91 THOUSANDS/ΜL (ref 1.85–7.62)
NEUTS SEG NFR BLD AUTO: 64 % (ref 43–75)
NRBC BLD AUTO-RTO: 0 /100 WBCS
PLATELET # BLD AUTO: 147 THOUSANDS/UL (ref 149–390)
PMV BLD AUTO: 12.3 FL (ref 8.9–12.7)
POTASSIUM SERPL-SCNC: 4.3 MMOL/L (ref 3.5–5.3)
PROT SERPL-MCNC: 7.5 G/DL (ref 6.4–8.2)
PSA SERPL-MCNC: 1.5 NG/ML (ref 0–4)
RBC # BLD AUTO: 5.31 MILLION/UL (ref 3.88–5.62)
SODIUM SERPL-SCNC: 139 MMOL/L (ref 136–145)
TRIGL SERPL-MCNC: 127 MG/DL
WBC # BLD AUTO: 4.57 THOUSAND/UL (ref 4.31–10.16)

## 2022-06-04 PROCEDURE — 85025 COMPLETE CBC W/AUTO DIFF WBC: CPT

## 2022-06-04 PROCEDURE — 83036 HEMOGLOBIN GLYCOSYLATED A1C: CPT

## 2022-06-04 PROCEDURE — G0103 PSA SCREENING: HCPCS

## 2022-06-04 PROCEDURE — 36415 COLL VENOUS BLD VENIPUNCTURE: CPT

## 2022-06-04 PROCEDURE — 80061 LIPID PANEL: CPT

## 2022-06-04 PROCEDURE — 80053 COMPREHEN METABOLIC PANEL: CPT

## 2022-06-07 ENCOUNTER — PATIENT MESSAGE (OUTPATIENT)
Dept: UROLOGY | Facility: AMBULATORY SURGERY CENTER | Age: 72
End: 2022-06-07

## 2022-06-07 DIAGNOSIS — N40.0 ENLARGED PROSTATE WITHOUT LOWER URINARY TRACT SYMPTOMS (LUTS): ICD-10-CM

## 2022-06-07 RX ORDER — TAMSULOSIN HYDROCHLORIDE 0.4 MG/1
0.4 CAPSULE ORAL
Qty: 180 CAPSULE | Refills: 3
Start: 2022-06-07 | End: 2022-06-09 | Stop reason: SDUPTHER

## 2022-06-09 DIAGNOSIS — N40.0 ENLARGED PROSTATE WITHOUT LOWER URINARY TRACT SYMPTOMS (LUTS): ICD-10-CM

## 2022-06-09 RX ORDER — TAMSULOSIN HYDROCHLORIDE 0.4 MG/1
0.4 CAPSULE ORAL
Qty: 180 CAPSULE | Refills: 0 | Status: SHIPPED | OUTPATIENT
Start: 2022-06-09

## 2022-06-18 DIAGNOSIS — E87.8 NARROW ANION GAP: Primary | ICD-10-CM

## 2022-06-21 ENCOUNTER — APPOINTMENT (OUTPATIENT)
Dept: LAB | Age: 72
End: 2022-06-21
Payer: COMMERCIAL

## 2022-06-21 DIAGNOSIS — E87.8 NARROW ANION GAP: ICD-10-CM

## 2022-06-21 PROCEDURE — 84165 PROTEIN E-PHORESIS SERUM: CPT | Performed by: PATHOLOGY

## 2022-06-21 PROCEDURE — 84165 PROTEIN E-PHORESIS SERUM: CPT

## 2022-06-21 PROCEDURE — 36415 COLL VENOUS BLD VENIPUNCTURE: CPT

## 2022-06-21 PROCEDURE — 84166 PROTEIN E-PHORESIS/URINE/CSF: CPT | Performed by: INTERNAL MEDICINE

## 2022-06-21 PROCEDURE — 84166 PROTEIN E-PHORESIS/URINE/CSF: CPT | Performed by: PATHOLOGY

## 2022-06-22 LAB
ALBUMIN SERPL ELPH-MCNC: 4.51 G/DL (ref 3.5–5)
ALBUMIN SERPL ELPH-MCNC: 59.4 % (ref 52–65)
ALBUMIN UR ELPH-MCNC: 100 %
ALPHA1 GLOB MFR UR ELPH: 0 %
ALPHA1 GLOB SERPL ELPH-MCNC: 0.27 G/DL (ref 0.1–0.4)
ALPHA1 GLOB SERPL ELPH-MCNC: 3.6 % (ref 2.5–5)
ALPHA2 GLOB MFR UR ELPH: 0 %
ALPHA2 GLOB SERPL ELPH-MCNC: 0.71 G/DL (ref 0.4–1.2)
ALPHA2 GLOB SERPL ELPH-MCNC: 9.4 % (ref 7–13)
B-GLOBULIN MFR UR ELPH: 0 %
BETA GLOB ABNORMAL SERPL ELPH-MCNC: 0.53 G/DL (ref 0.4–0.8)
BETA1 GLOB SERPL ELPH-MCNC: 7 % (ref 5–13)
BETA2 GLOB SERPL ELPH-MCNC: 6.6 % (ref 2–8)
BETA2+GAMMA GLOB SERPL ELPH-MCNC: 0.5 G/DL (ref 0.2–0.5)
GAMMA GLOB ABNORMAL SERPL ELPH-MCNC: 1.06 G/DL (ref 0.5–1.6)
GAMMA GLOB MFR UR ELPH: 0 %
GAMMA GLOB SERPL ELPH-MCNC: 14 % (ref 12–22)
IGG/ALB SER: 1.46 {RATIO} (ref 1.1–1.8)
PROT PATTERN SERPL ELPH-IMP: NORMAL
PROT PATTERN UR ELPH-IMP: NORMAL
PROT SERPL-MCNC: 7.6 G/DL (ref 6.4–8.2)
PROT UR-MCNC: 10 MG/DL

## 2022-07-07 ENCOUNTER — OFFICE VISIT (OUTPATIENT)
Dept: GASTROENTEROLOGY | Facility: CLINIC | Age: 72
End: 2022-07-07
Payer: COMMERCIAL

## 2022-07-07 VITALS
DIASTOLIC BLOOD PRESSURE: 86 MMHG | WEIGHT: 182 LBS | HEART RATE: 85 BPM | HEIGHT: 68 IN | TEMPERATURE: 98.2 F | OXYGEN SATURATION: 97 % | BODY MASS INDEX: 27.58 KG/M2 | SYSTOLIC BLOOD PRESSURE: 122 MMHG

## 2022-07-07 DIAGNOSIS — R19.7 DIARRHEA, UNSPECIFIED TYPE: ICD-10-CM

## 2022-07-07 DIAGNOSIS — Z85.09 HISTORY OF GASTROINTESTINAL STROMAL TUMOR (GIST): ICD-10-CM

## 2022-07-07 DIAGNOSIS — K58.0 IRRITABLE BOWEL SYNDROME WITH DIARRHEA: ICD-10-CM

## 2022-07-07 DIAGNOSIS — K21.9 GASTROESOPHAGEAL REFLUX DISEASE WITHOUT ESOPHAGITIS: Primary | ICD-10-CM

## 2022-07-07 PROCEDURE — 99213 OFFICE O/P EST LOW 20 MIN: CPT | Performed by: INTERNAL MEDICINE

## 2022-07-07 RX ORDER — CALCIUM CARBONATE 200(500)MG
1 TABLET,CHEWABLE ORAL DAILY
COMMUNITY

## 2022-07-07 RX ORDER — CHOLESTYRAMINE 4 G/9G
1 POWDER, FOR SUSPENSION ORAL DAILY
Qty: 30 EACH | Refills: 3 | Status: SHIPPED | OUTPATIENT
Start: 2022-07-07

## 2022-07-07 NOTE — PATIENT INSTRUCTIONS
Scheduled date of colonoscopy/EGD (as of today): 09/28/22  Physician performing colonoscopy: Eveline Johnson  [facility] colon[facility]  Bowel prep reviewed with patient: MIRMARI/MAG  Instructions reviewed with patient by: VIRGINIA  Clearances: NONE

## 2022-07-07 NOTE — LETTER
July 8, 2022     Ivette Baird  [address] [place] [address]    Patient: Carlos Garces   YOB: 1950   Date of Visit: 7/7/2022       Dear Dr Sarah Calix: Thank you for referring Carlos Garces to me for evaluation  Below are my notes for this consultation  If you have questions, please do not hesitate to call me  I look forward to following your patient along with you  Sincerely,        Harshad Dowling MD        CC: No Recipients  Harshad Dowling MD  7/8/2022  5:09 PM  Sign when Signing Visit  [facility]  Carlos Garces 81 y o  male MRN: [id number]            Assessment & Plan:    Pleasant 82-year-old gentleman, remote history of gastrointestinal stromal tumor versus duodenum, longstanding history of reflux, mild IBS with diarrhea predominant symptoms  1  GERD: Has some wheezing worsening of symptoms  -continue current medications, including pantoprazole 40 mg in the morning, famotidine 40 mg in the evening  -we will schedule upper endoscopy for further evaluate  -we will check a right upper quadrant ultrasound    2  Diarrhea: Longstanding history of IBS with diarrhea  -he has bonded well to cholestyramine the past which she recommended that he try again  -take 1 tbsp of fiber supplement daily   -we will proceed with repeat colonoscopy at the same time as upper endoscopy to further exclude any underlying luminal pathology   -discussed with him risks of procedure including bleeding, surgery, perforation, missed polyp detection rate      Yuni Chavez was seen today for diarrhea, constipation, gerd and gas  Diagnoses and all orders for this visit:    Gastroesophageal reflux disease without esophagitis  -     EGD; Future  -     US right upper quadrant; Future    Irritable bowel syndrome with diarrhea  -     cholestyramine (QUESTRAN) 4 g packet; Take 1 packet (4 g total) by mouth daily  -     Colonoscopy;  Future    Diarrhea, unspecified type  - cholestyramine (QUESTRAN) 4 g packet; Take 1 packet (4 g total) by mouth daily  -     Colonoscopy; Future    History of gastrointestinal stromal tumor (GIST)    Other orders  -     Diet NPO; Sips with meds; Standing  -     Void on call to OR; Standing            _____________________________________________________________        CC: Follow-up    HPI:  Carlos Garces is a 81 y o male who is here for follow-up  68-year-old gentleman, 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, history of reflux, irregular stools with IBS  Reports that may be symptoms has recently worsened, specifically with loose bowel movements have any or from 2 to 4 bowel movements per day  He is having frequent coughing clearing of throat, has worsening symptoms when he lays flat  He reports that he is not sleeping very well  He frequently takes Tums in the evening  He has reflux just before laying down and often times when he wakes up in the morning  Denies any melena or rectal bleeding  Denies any nausea, vomiting, dysphagia  He reports possibly some mild pill dysphagia  Patient stopped taking fiber supplementation which she had been taken in the past for similar symptoms  Weight has been stable      ROS:  The remainder of the ROS was negative except for the pertinent positives mentioned in HPI  Allergies: Patient has no known allergies      Medications:   Current Outpatient Medications:     Alphagan P 0 1 %, INSTILL 1 DROP BY OPHTHALMIC ROUTE TWICE A DAY INTO RIGHT EYE, Disp: , Rfl:     calcium carbonate (TUMS) 500 mg chewable tablet, Chew 1 tablet daily When needed, Disp: , Rfl:     cholestyramine (QUESTRAN) 4 g packet, Take 1 packet (4 g total) by mouth daily, Disp: 30 each, Rfl: 3    dorzolamide-timolol (COSOPT) 22 3-6 8 MG/ML ophthalmic solution, One drop four times a day, Disp: , Rfl: 4    famotidine (PEPCID) 40 MG tablet, Take 1 tablet (40 mg total) by mouth daily at bedtime, Disp: 90 tablet, Rfl: 3    GLUCOSAMINE CHONDROITIN COMPLX PO, Take 1 tablet by mouth daily 4500mg  daily , Disp: , Rfl:     Multiple Vitamins-Minerals (MULTIVITAL) tablet, Take 1 tablet by mouth daily Spectravite , Disp: , Rfl:     pantoprazole (PROTONIX) 40 mg tablet, Take 1 tablet (40 mg total) by mouth daily (Patient taking differently: Take 40 mg by mouth daily In the morning), Disp: 90 tablet, Rfl: 3    prednisoLONE acetate (PRED FORTE) 1 % ophthalmic suspension, INSTILL 1 DROP 4 TIMES EVERY DAY INTO RIGHT EYE, Disp: , Rfl:     tamsulosin (FLOMAX) 0 4 mg, Take 1 capsule (0 4 mg total) by mouth daily with dinner Has been taking 0 4 mg for man y years, Disp: 180 capsule, Rfl: 0    calcipotriene (DOVONOX) 0 005 % ointment, Apply topically 2 (two) times a day (Patient not taking: Reported on 7/7/2022), Disp: 60 g, Rfl: 0    Calcipotriene-Betameth Diprop 0 005-0 064 % FOAM, Apply topically once daily for 90 days to affected areas   (Patient not taking: Reported on 7/7/2022), Disp: 60 g, Rfl: 6    Past Medical History:   Diagnosis Date    Abnormal weight loss     RESOLVED: [id number]    Anemia     RESOLVED: 42AZT2211    Atypical chest pain     RESOLVED: 63EOG4994    BPH (benign prostatic hyperplasia)     Cancer (HCC)     DUODENAL    Luis Miguel's syndrome     Depression     RESOLVED: [id number]    Diverticulitis of colon     LAST ASSESSED: 27HEY6147    Duodenal nodule     RESOLVED: 24MID4400    Gastrointestinal stromal tumor (GIST) (HCC)     MALIGNANT; RESOLVED: [id number]    GERD (gastroesophageal reflux disease)     Glaucoma     RESOLVED: 21YEA6133    Insomnia     RESOLVED: 54SUP7537    Lactose intolerance     LAST ASSESSED: 85WHW6074       Past Surgical History:   Procedure Laterality Date    BACK SURGERY      BACK SURGERY      l4 s1 surgery     CERVICAL FUSION      c4 and c5    CHOLECYSTECTOMY      COLONOSCOPY  2014    kutty    ELBOW SURGERY Left     REPAIR    EYE SURGERY      EYE SURGERY      EYE SURGERY Right 2022    right eye surgery    GANGLION CYST EXCISION Left 06/05/2020    Procedure: KNEE EXCISION BAKERS CYST;  Surgeon: David Hale MD;  [facility];  Service: Orthopedics    KNEE ARTHROSCOPY Left     LUMBAR DISCECTOMY      L5 S1    ORIF RADIAL SHAFT FRACTURE      NM ESOPHAGOGASTRODUODENOSCOPY TRANSORAL DIAGNOSTIC N/A 11/01/2016    Procedure: ESOPHAGOGASTRODUODENOSCOPY (EGD); Surgeon: Joi Ayon MD;  [facility]; Service: Gastroenterology    NM ESOPHAGOGASTRODUODENOSCOPY TRANSORAL DIAGNOSTIC N/A 05/31/2018    Procedure: ESOPHAGOGASTRODUODENOSCOPY (EGD); Surgeon: Joi Ayon MD;  [facility]; Service: Gastroenterology    NM [facility] Left 12/21/2020    Procedure: KNEE ARTHROSCOPY, popliteal cyst decompression;  Surgeon: Allen Arzate MD;  [facility];  Service: Orthopedics    NM REDO EXCIS LUMBAR DISK N/A 10/24/2019    Procedure: Revision laminectomy and decompression at L5-S1;  Surgeon: Wayne Hinds MD;  [facility];  Service: Orthopedics    SMALL INTESTINE SURGERY      GIST surgery    [facility] 634 MSK PROCEDURE  05/21/2019    US GUIDED MSK PROCEDURE  10/30/2020       Family History   Problem Relation Age of Onset    Thyroid disease Mother     Thyroid disease Brother     Diabetes Paternal Grandmother     Hypertension Family     Skin cancer Father         reports that he has never smoked  He has never used smokeless tobacco  He reports current alcohol use  He reports that he does not use drugs        Physical Exam:    /86 (BP Location: Left arm, Patient Position: Sitting, Cuff Size: Standard)   Pulse 85   Temp 98 2 °F (36 8 °C)   Ht 5' 8" (1 727 m)   Wt 82 6 kg (182 lb)   SpO2 97%   BMI 27 67 kg/m²     Gen: wn/wd, NAD healthy-appearing gentle  HEENT: anicteric, MMM, no cervical LAD  CVS: RRR, no m/r/g  CHEST: CTA b/l  ABD: +BS, soft, NT,ND, no hepatosplenomegaly  EXT: no c/c/e  NEURO: aaox3  SKIN: NO rashes

## 2022-07-08 NOTE — PROGRESS NOTES
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, longstanding history of reflux, mild IBS with diarrhea predominant symptoms  1  GERD: Has some wheezing worsening of symptoms  -continue current medications, including pantoprazole 40 mg in the morning, famotidine 40 mg in the evening  -we will schedule upper endoscopy for further evaluate  -we will check a right upper quadrant ultrasound    2  Diarrhea: Longstanding history of IBS with diarrhea  -he has bonded well to cholestyramine the past which she recommended that he try again  -take 1 tbsp of fiber supplement daily   -we will proceed with repeat colonoscopy at the same time as upper endoscopy to further exclude any underlying luminal pathology   -discussed with him risks of procedure including bleeding, surgery, perforation, missed polyp detection rate      Ewa Barnes was seen today for diarrhea, constipation, gerd and gas  Diagnoses and all orders for this visit:    Gastroesophageal reflux disease without esophagitis  -     EGD; Future  -     US right upper quadrant; Future    Irritable bowel syndrome with diarrhea  -     cholestyramine (QUESTRAN) 4 g packet; Take 1 packet (4 g total) by mouth daily  -     Colonoscopy; Future    Diarrhea, unspecified type  -     cholestyramine (QUESTRAN) 4 g packet; Take 1 packet (4 g total) by mouth daily  -     Colonoscopy; Future    History of gastrointestinal stromal tumor (GIST)    Other orders  -     Diet NPO; Sips with meds; Standing  -     Void on call to OR; Standing            _____________________________________________________________        CC: Follow-up    HPI:  Thor Shea is a 81 y o male who is here for follow-up    79-year-old gentleman, 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, history of reflux, irregular stools with IBS  Reports that may be symptoms has recently worsened, specifically with loose bowel movements have any or from 2 to 4 bowel movements per day  He is having frequent coughing clearing of throat, has worsening symptoms when he lays flat  He reports that he is not sleeping very well  He frequently takes Tums in the evening  He has reflux just before laying down and often times when he wakes up in the morning  Denies any melena or rectal bleeding  Denies any nausea, vomiting, dysphagia  He reports possibly some mild pill dysphagia  Patient stopped taking fiber supplementation which she had been taken in the past for similar symptoms  Weight has been stable      ROS:  The remainder of the ROS was negative except for the pertinent positives mentioned in HPI  Allergies: Patient has no known allergies      Medications:   Current Outpatient Medications:     Alphagan P 0 1 %, INSTILL 1 DROP BY OPHTHALMIC ROUTE TWICE A DAY INTO RIGHT EYE, Disp: , Rfl:     calcium carbonate (TUMS) 500 mg chewable tablet, Chew 1 tablet daily When needed, Disp: , Rfl:     cholestyramine (QUESTRAN) 4 g packet, Take 1 packet (4 g total) by mouth daily, Disp: 30 each, Rfl: 3    dorzolamide-timolol (COSOPT) 22 3-6 8 MG/ML ophthalmic solution, One drop four times a day, Disp: , Rfl: 4    famotidine (PEPCID) 40 MG tablet, Take 1 tablet (40 mg total) by mouth daily at bedtime, Disp: 90 tablet, Rfl: 3    GLUCOSAMINE CHONDROITIN COMPLX PO, Take 1 tablet by mouth daily 4500mg  daily , Disp: , Rfl:     Multiple Vitamins-Minerals (MULTIVITAL) tablet, Take 1 tablet by mouth daily Spectravite , Disp: , Rfl:     pantoprazole (PROTONIX) 40 mg tablet, Take 1 tablet (40 mg total) by mouth daily (Patient taking differently: Take 40 mg by mouth daily In the morning), Disp: 90 tablet, Rfl: 3    prednisoLONE acetate (PRED FORTE) 1 % ophthalmic suspension, INSTILL 1 DROP 4 TIMES EVERY DAY INTO RIGHT EYE, Disp: , Rfl:     tamsulosin (FLOMAX) 0 4 mg, Take 1 capsule (0 4 mg total) by mouth daily with dinner Has been taking 0 4 mg for man y years, Disp: 180 capsule, Rfl: 0    calcipotriene (DOVONOX) 0 005 % ointment, Apply topically 2 (two) times a day (Patient not taking: Reported on 7/7/2022), Disp: 60 g, Rfl: 0    Calcipotriene-Betameth Diprop 0 005-0 064 % FOAM, Apply topically once daily for 90 days to affected areas  (Patient not taking: Reported on 7/7/2022), Disp: 60 g, Rfl: 6    Past Medical History:   Diagnosis Date    Abnormal weight loss     RESOLVED: [id number]    Anemia     RESOLVED: [id number]    Atypical chest pain     RESOLVED: 91SIJ6355    BPH (benign prostatic hyperplasia)     Cancer (HCC)     DUODENAL    Luis Miguel's syndrome     Depression     RESOLVED: [id number]    Diverticulitis of colon     LAST ASSESSED: [id number]    Duodenal nodule     RESOLVED: 05KHC5100    Gastrointestinal stromal tumor (GIST) (HCC)     MALIGNANT; RESOLVED: [id number]    GERD (gastroesophageal reflux disease)     Glaucoma     RESOLVED: 76VSD6854    Insomnia     RESOLVED: 51IGI9812    Lactose intolerance     LAST ASSESSED: 89SEN7370       Past Surgical History:   Procedure Laterality Date    BACK SURGERY      BACK SURGERY      l4 s1 surgery     CERVICAL FUSION      c4 and c5    CHOLECYSTECTOMY      COLONOSCOPY  2014    kutty    ELBOW SURGERY Left     REPAIR    EYE SURGERY      EYE SURGERY      EYE SURGERY Right 2022    right eye surgery    GANGLION CYST EXCISION Left 06/05/2020    Procedure: KNEE EXCISION BAKERS CYST;  Surgeon: Alfonso Vital MD;  [facility];  Service: Orthopedics    KNEE ARTHROSCOPY Left     LUMBAR DISCECTOMY      L5 S1    ORIF RADIAL SHAFT FRACTURE      RI ESOPHAGOGASTRODUODENOSCOPY TRANSORAL DIAGNOSTIC N/A 11/01/2016    Procedure: ESOPHAGOGASTRODUODENOSCOPY (EGD); Surgeon: Chirag Jack MD;  [facility];   Service: Gastroenterology    RI ESOPHAGOGASTRODUODENOSCOPY TRANSORAL DIAGNOSTIC N/A 05/31/2018    Procedure: ESOPHAGOGASTRODUODENOSCOPY (EGD); Surgeon: Pema Mcintosh MD;  [facility]; Service: Gastroenterology    [facility] Left 12/21/2020    Procedure: KNEE ARTHROSCOPY, popliteal cyst decompression;  Surgeon: Di Mcmanus MD;  [facility];  Service: Orthopedics    NH REDO EXCIS LUMBAR DISK N/A 10/24/2019    Procedure: Revision laminectomy and decompression at L5-S1;  Surgeon: Dorian Khoury MD;  [facility];  Service: Orthopedics    SMALL INTESTINE SURGERY      GIST surgery    [facility]  05/21/2019    US GUIDED MSK PROCEDURE  10/30/2020       Family History   Problem Relation Age of Onset    Thyroid disease Mother     Thyroid disease Brother     Diabetes Paternal Grandmother     Hypertension Family     Skin cancer Father         reports that he has never smoked  He has never used smokeless tobacco  He reports current alcohol use  He reports that he does not use drugs        Physical Exam:    /86 (BP Location: Left arm, Patient Position: Sitting, Cuff Size: Standard)   Pulse 85   Temp 98 2 °F (36 8 °C)   Ht 5' 8" (1 727 m)   Wt 82 6 kg (182 lb)   SpO2 97%   BMI 27 67 kg/m²     Gen: wn/wd, NAD healthy-appearing gentle  HEENT: anicteric, MMM, no cervical LAD  CVS: RRR, no m/r/g  CHEST: CTA b/l  ABD: +BS, soft, NT,ND, no hepatosplenomegaly  EXT: no c/c/e  NEURO: aaox3  SKIN: NO rashes

## 2022-07-12 ENCOUNTER — OFFICE VISIT (OUTPATIENT)
Dept: UROLOGY | Facility: AMBULATORY SURGERY CENTER | Age: 72
End: 2022-07-12
Payer: COMMERCIAL

## 2022-07-12 VITALS
BODY MASS INDEX: 28.13 KG/M2 | HEIGHT: 68 IN | HEART RATE: 69 BPM | WEIGHT: 185.6 LBS | SYSTOLIC BLOOD PRESSURE: 132 MMHG | DIASTOLIC BLOOD PRESSURE: 82 MMHG | OXYGEN SATURATION: 96 %

## 2022-07-12 DIAGNOSIS — R35.0 URINARY FREQUENCY: ICD-10-CM

## 2022-07-12 DIAGNOSIS — N40.0 ENLARGED PROSTATE WITHOUT LOWER URINARY TRACT SYMPTOMS (LUTS): Primary | ICD-10-CM

## 2022-07-12 LAB

## 2022-07-12 PROCEDURE — 51798 US URINE CAPACITY MEASURE: CPT | Performed by: UROLOGY

## 2022-07-12 PROCEDURE — 81002 URINALYSIS NONAUTO W/O SCOPE: CPT | Performed by: UROLOGY

## 2022-07-12 PROCEDURE — 99214 OFFICE O/P EST MOD 30 MIN: CPT | Performed by: UROLOGY

## 2022-07-12 NOTE — PROGRESS NOTES
7/12/2022    Yo Portal  1950  [id number]        Assessment  LUTS    Plan  We reviewed his findings mm  At this point due to prolonged use of tamsulosin over few years, I recommend cystoscopy and transrectal ultrasound to further evaluate bladder outlet and prostate overall  Discussed the indications for this  He understands and agrees  He also asked about lack of ejaculation and explained that he may discontinue tamsulosin if desired to observe further  He is comfortable with this plan  Follow-up cystoscopy and transrectal ultrasound  History of Present Illness  Nicolas Cole is a 81 y o  male followed for LUTS  He is taking tamsulosin daily  Has tried bid without much change  Still complains of significant urgency/frequency  Dehydrates himself purposely to avoid need to go to bathroom  PSA is 0 would within normal limits, up to 1 5  No significant family history  He has had multiple surgeries recently  AUA SYMPTOM SCORE    Flowsheet Row Most Recent Value   AUA SYMPTOM SCORE    How often have you had a sensation of not emptying your bladder completely after you finished urinating? 5 (P)     How often have you had to urinate again less than two hours after you finished urinating? 4 (P)     How often have you found you stopped and started again several times when you urinate? 4 (P)     How often have you found it difficult to postpone urination? 5 (P)     How often have you had a weak urinary stream? 5 (P)     How often have you had to push or strain to begin urination? 4 (P)     How many times did you most typically get up to urinate from the time you went to bed at night until the time you got up in the morning? 5 (P)     Quality of Life: If you were to spend the rest of your life with your urinary condition just the way it is now, how would you feel about that? 4 (P)     AUA SYMPTOM SCORE 32 (P)           Review of Systems  Review of Systems   Constitutional: Negative      HENT: Negative  Respiratory: Negative  Cardiovascular: Negative  Gastrointestinal: Negative  Genitourinary:        As per HPI   Musculoskeletal: Negative  Skin: Negative  Neurological: Negative  Hematological: Negative  Past Medical History  Past Medical History:   Diagnosis Date    Abnormal weight loss     RESOLVED: [id number]    Anemia     RESOLVED: [id number]    Atypical chest pain     RESOLVED: 78AHC0557    BPH (benign prostatic hyperplasia)     Cancer (HCC)     DUODENAL    Luis Miguel's syndrome     Depression     RESOLVED: 56ILC9643    Diverticulitis of colon     LAST ASSESSED: [id number]    Duodenal nodule     RESOLVED: [id number]    Gastrointestinal stromal tumor (GIST) (HCC)     MALIGNANT; RESOLVED: 32APZ7375    GERD (gastroesophageal reflux disease)     Glaucoma     RESOLVED: 88GMO4268    Insomnia     RESOLVED: 78DRJ1850    Lactose intolerance     LAST ASSESSED: 43NWY1776       Past Social History  Past Surgical History:   Procedure Laterality Date    BACK SURGERY      BACK SURGERY      l4 s1 surgery     CERVICAL FUSION      c4 and c5    CHOLECYSTECTOMY      COLONOSCOPY  2014    kutty    ELBOW SURGERY Left     REPAIR    EYE SURGERY      EYE SURGERY      EYE SURGERY Right 2022    right eye surgery    GANGLION CYST EXCISION Left 06/05/2020    Procedure: KNEE EXCISION BAKERS CYST;  Surgeon: Margy Noland MD;  [facility];  Service: Orthopedics    KNEE ARTHROSCOPY Left     LUMBAR DISCECTOMY      L5 S1    ORIF RADIAL SHAFT FRACTURE      NV ESOPHAGOGASTRODUODENOSCOPY TRANSORAL DIAGNOSTIC N/A 11/01/2016    Procedure: ESOPHAGOGASTRODUODENOSCOPY (EGD); Surgeon: Edmar Grewal MD;  [facility]; Service: Gastroenterology    NV ESOPHAGOGASTRODUODENOSCOPY TRANSORAL DIAGNOSTIC N/A 05/31/2018    Procedure: ESOPHAGOGASTRODUODENOSCOPY (EGD); Surgeon: Edmar Grewal MD;  [facility];   Service: Gastroenterology    NV KNEE SCOPE,DIAGNOSTIC Left 12/21/2020    Procedure: KNEE ARTHROSCOPY, popliteal cyst decompression;  Surgeon: Simpson Olszewski, MD;  [facility];  Service: Orthopedics    AR REDO EXCIS LUMBAR DISK N/A 10/24/2019    Procedure: Revision laminectomy and decompression at L5-S1;  Surgeon: Taran Hernandez MD;  [facility];  Service: Orthopedics    SMALL INTESTINE SURGERY      GIST surgery    [facility] [address] MSK PROCEDURE  05/21/2019    US GUIDED MSK PROCEDURE  10/30/2020       Past Family History  Family History   Problem Relation Age of Onset    Thyroid disease Mother     Thyroid disease Brother     Diabetes Paternal Grandmother     Hypertension Family     Skin cancer Father        Past Social history  Social History     Socioeconomic History    Marital status: /Civil Union     Spouse name: Not on file    Number of children: Not on file    Years of education: Not on file    Highest education level: Not on file   Occupational History    Not on file   Tobacco Use    Smoking status: Never Smoker    Smokeless tobacco: Never Used   Vaping Use    Vaping Use: Never used   Substance and Sexual Activity    Alcohol use: Yes     Comment: occasional    Drug use: No    Sexual activity: Yes   Other Topics Concern    Not on file   Social History Narrative    Not on file     Social Determinants of Health     Financial Resource Strain: Not on file   Food Insecurity: Not on file   Transportation Needs: Not on file   Physical Activity: Not on file   Stress: Not on file   Social Connections: Not on file   Intimate Partner Violence: Not on file   Housing Stability: Not on file     Social History     Tobacco Use   Smoking Status Never Smoker   Smokeless Tobacco Never Used       Current Medications  Current Outpatient Medications   Medication Sig Dispense Refill    Alphagan P 0 1 % INSTILL 1 DROP BY OPHTHALMIC ROUTE TWICE A DAY INTO RIGHT EYE      calcipotriene (DOVONOX) 0 005 % ointment Apply topically 2 (two) times a day (Patient not taking: Reported on 7/7/2022) 60 g 0    Calcipotriene-Betameth Diprop 0 005-0 064 % FOAM Apply topically once daily for 90 days to affected areas  (Patient not taking: Reported on 7/7/2022) 60 g 6    calcium carbonate (TUMS) 500 mg chewable tablet Chew 1 tablet daily When needed      cholestyramine (QUESTRAN) 4 g packet Take 1 packet (4 g total) by mouth daily 30 each 3    dorzolamide-timolol (COSOPT) 22 3-6 8 MG/ML ophthalmic solution One drop four times a day  4    famotidine (PEPCID) 40 MG tablet Take 1 tablet (40 mg total) by mouth daily at bedtime 90 tablet 3    GLUCOSAMINE CHONDROITIN COMPLX PO Take 1 tablet by mouth daily 4500mg  daily       Multiple Vitamins-Minerals (MULTIVITAL) tablet Take 1 tablet by mouth daily Spectravite       pantoprazole (PROTONIX) 40 mg tablet Take 1 tablet (40 mg total) by mouth daily (Patient taking differently: Take 40 mg by mouth daily In the morning) 90 tablet 3    prednisoLONE acetate (PRED FORTE) 1 % ophthalmic suspension INSTILL 1 DROP 4 TIMES EVERY DAY INTO RIGHT EYE      tamsulosin (FLOMAX) 0 4 mg Take 1 capsule (0 4 mg total) by mouth daily with dinner Has been taking 0 4 mg for man y years 180 capsule 0     No current facility-administered medications for this visit  Allergies  No Known Allergies    Past Medical History, Social History, Family History, medications and allergies were reviewed  Vitals  Vitals:    07/12/22 1311   BP: 132/82   Pulse: 69   SpO2: 96%   Weight: 84 2 kg (185 lb 9 6 oz)   Height: 5' 8" (1 727 m)       Physical Exam  Physical Exam  Vitals reviewed  Constitutional:       Appearance: He is well-developed  HENT:      Head: Normocephalic and atraumatic  Eyes:      Conjunctiva/sclera: Conjunctivae normal    Cardiovascular:      Rate and Rhythm: Normal rate  Pulmonary:      Effort: Pulmonary effort is normal    Abdominal:      Palpations: Abdomen is soft     Genitourinary:     Comments: Prostate about 30 gm, smooth, no nodules    Musculoskeletal:         General: Normal range of motion  Skin:     General: Skin is warm and dry  Neurological:      Mental Status: He is alert and oriented to person, place, and time     Psychiatric:         Mood and Affect: Mood normal            Results  Lab Results   Component Value Date    PSA 1 5 06/04/2022    PSA 1 4 07/07/2021    PSA 1 3 05/14/2020     Lab Results   Component Value Date    GLUCOSE 97 09/25/2015    CALCIUM 9 4 06/04/2022     09/25/2015    K 4 3 06/04/2022    CO2 32 06/04/2022     06/04/2022    BUN 16 06/04/2022    CREATININE 0 91 06/04/2022     Lab Results   Component Value Date    WBC 4 57 06/04/2022    HGB 16 8 06/04/2022    HCT 48 5 06/04/2022    MCV 91 06/04/2022     (L) 06/04/2022

## 2022-07-13 ENCOUNTER — HOSPITAL ENCOUNTER (OUTPATIENT)
Dept: RADIOLOGY | Facility: HOSPITAL | Age: 72
Discharge: HOME | End: 2022-07-13
Attending: INTERNAL MEDICINE
Payer: COMMERCIAL

## 2022-07-13 DIAGNOSIS — K21.9 GASTROESOPHAGEAL REFLUX DISEASE WITHOUT ESOPHAGITIS: ICD-10-CM

## 2022-07-13 PROCEDURE — 76705 ECHO EXAM OF ABDOMEN: CPT

## 2022-07-14 DIAGNOSIS — K21.9 GASTROESOPHAGEAL REFLUX DISEASE WITHOUT ESOPHAGITIS: ICD-10-CM

## 2022-07-14 RX ORDER — FAMOTIDINE 40 MG/1
40 TABLET, FILM COATED ORAL
Qty: 90 TABLET | Refills: 0 | Status: SHIPPED | OUTPATIENT
Start: 2022-07-14 | End: 2022-10-09 | Stop reason: SDUPTHER

## 2022-07-14 RX ORDER — PANTOPRAZOLE SODIUM 40 MG/1
40 TABLET, DELAYED RELEASE ORAL DAILY
Qty: 90 TABLET | Refills: 0 | Status: SHIPPED | OUTPATIENT
Start: 2022-07-14 | End: 2022-10-09 | Stop reason: SDUPTHER

## 2022-07-25 ENCOUNTER — TELEPHONE (OUTPATIENT)
Dept: GASTROENTEROLOGY | Facility: CLINIC | Age: 72
End: 2022-07-25

## 2022-07-25 NOTE — TELEPHONE ENCOUNTER
Patient called back to say that he is aware of the results of the [address]  He received a call from Dr Juani Burk and also saw them in [address] [place] [address] [address]  If you have anything else in addition to this note please contact him through [place]

## 2022-07-25 NOTE — TELEPHONE ENCOUNTER
----- Message from Salomón Lynch sent at 7/21/2022  2:09 PM EDT -----    ----- Message -----  From: Rhea Hawkins MD  Sent: 7/20/2022   2:45 PM EDT  To: [facility]    Recent ultrasound was relatively normal, mild fatty changes of the liver  Minimize carbohydrate intake, increase at exercise and activity    Please call with any questions or concerns    Will see back in your next endoscopy and consider additional workup afterwards

## 2022-08-18 ENCOUNTER — TELEPHONE (OUTPATIENT)
Dept: OTHER | Facility: OTHER | Age: 72
End: 2022-08-18

## 2022-08-18 NOTE — TELEPHONE ENCOUNTER
Pt called in to confirm whether or not he needs someone to accompany him to his 9/13 appt  He is requesting a call back

## 2022-09-06 NOTE — TELEPHONE ENCOUNTER
Called pt and advised no prior auth required on Cystos  Gave him code and advised coverage questions would go through insurance directly

## 2022-09-06 NOTE — TELEPHONE ENCOUNTER
Patient has a cysto/trus scheduled on 9/13/22 at 3 pm with Dr Bessy Marsh in [place]  Patient calling to see if an Christi Coronado is needed or if this is covered by his insurance       Patient requesting a call back at [phone]

## 2022-09-13 ENCOUNTER — PROCEDURE VISIT (OUTPATIENT)
Dept: UROLOGY | Facility: AMBULATORY SURGERY CENTER | Age: 72
End: 2022-09-13
Payer: COMMERCIAL

## 2022-09-13 VITALS
SYSTOLIC BLOOD PRESSURE: 120 MMHG | HEART RATE: 64 BPM | DIASTOLIC BLOOD PRESSURE: 82 MMHG | BODY MASS INDEX: 28.04 KG/M2 | OXYGEN SATURATION: 97 % | HEIGHT: 68 IN | WEIGHT: 185 LBS | RESPIRATION RATE: 18 BRPM

## 2022-09-13 DIAGNOSIS — R35.0 URINARY FREQUENCY: Primary | ICD-10-CM

## 2022-09-13 LAB
SL AMB  POCT GLUCOSE, UA: NORMAL
SL AMB LEUKOCYTE ESTERASE,UA: NORMAL
SL AMB POCT BILIRUBIN,UA: NORMAL
SL AMB POCT BLOOD,UA: NORMAL
SL AMB POCT CLARITY,UA: CLEAR
SL AMB POCT COLOR,UA: YELLOW
SL AMB POCT KETONES,UA: NORMAL
SL AMB POCT NITRITE,UA: NORMAL
SL AMB POCT PH,UA: 5
SL AMB POCT SPECIFIC GRAVITY,UA: 1.01
SL AMB POCT URINE PROTEIN: NORMAL
SL AMB POCT UROBILINOGEN: 0.2

## 2022-09-13 PROCEDURE — 99214 OFFICE O/P EST MOD 30 MIN: CPT | Performed by: UROLOGY

## 2022-09-13 PROCEDURE — 76872 US TRANSRECTAL: CPT | Performed by: UROLOGY

## 2022-09-13 PROCEDURE — 52000 CYSTOURETHROSCOPY: CPT | Performed by: UROLOGY

## 2022-09-13 PROCEDURE — 81002 URINALYSIS NONAUTO W/O SCOPE: CPT | Performed by: UROLOGY

## 2022-09-13 RX ORDER — CEFAZOLIN SODIUM 2 G/50ML
2000 SOLUTION INTRAVENOUS ONCE
OUTPATIENT
Start: 2022-09-13 | End: 2022-09-13

## 2022-09-13 NOTE — LETTER
September 13, 2022     Ivette Ro  [address] [facility] [address] [address]    Patient: Blayne David   YOB: 1950   Date of Visit: 9/13/2022       Dear Dr Lesley Fitzgerald: Thank you for referring Blayne David to me for evaluation  Below are my notes for this consultation  If you have questions, please do not hesitate to call me  I look forward to following your patient along with you  Sincerely,        Ary Lucia MD        CC: No Recipients  Ary Lucia MD  9/13/2022  3:36 PM  Sign when Signing Visit  9/13/2022    Blayne David  1950  [id number]        Assessment  BPH with lower symptoms    Plan  I discussed the findings with the patient today  He is quite frustrated by his symptoms with symptom score 33  Evaluation does reveal enlarged prostate with outlet obstruction  And we discussed options including continued observation medical management, uro lift, TURP  After discussion of technique, risks, benefits of each, he would like to proceed with UroLift  This is my recommendation well  He he has a very good chance approximately 90% of symptomatic relief as result  All questions answered to satisfaction and consent was obtained for uro lift  He understands that if this fails he is still a candidate for TURP  Total visit time was 30 minutes of which over 50% was spent on counseling  History of Present Illness  Imelda Juarez is a 81 y o  male treated with tamsulosin twice daily for several years  He is quite frustrated due to lack of efficacy  Returns today for bladder outlet evaluation  Cystoscopy     Date/Time 9/13/2022 3:34 PM     Performed by  Ary Lucia MD     Authorized by Ary Lucia MD          Procedure Details:  Procedure type: cystoscopy    Additional Procedure Details: Patient was prepped with chlorhexidine  2% lidocaine jelly was instilled per urethra  [de-identified] Polish flexible cystoscope was placed    There is no urethral abnormality noted  Prostate is noted to be enlarged with bilateral hypertrophy  Kissing lobes  There is no median lobe  Evaluation of bladder revealed mild trabeculation  Both ureteral orifices are normal   There is no suspicious mass or lesion  TRANSRECTAL ULTRASOUND   Patient returned to the left lateral decubitus position  Transrectal ultrasound probe was placed  Prostate is homogeneous without obvious mass  There is some central zone calcification  Approximate prostate volume 45 mL  AUA SYMPTOM SCORE    Flowsheet Row Most Recent Value   AUA SYMPTOM SCORE    How often have you had a sensation of not emptying your bladder completely after you finished urinating? 4 (P)     How often have you had to urinate again less than two hours after you finished urinating? 5 (P)     How often have you found you stopped and started again several times when you urinate? 5 (P)     How often have you found it difficult to postpone urination? 5 (P)     How often have you had a weak urinary stream? 5 (P)     How often have you had to push or strain to begin urination? 4 (P)     How many times did you most typically get up to urinate from the time you went to bed at night until the time you got up in the morning? 5 (P)     Quality of Life: If you were to spend the rest of your life with your urinary condition just the way it is now, how would you feel about that? 4 (P)     AUA SYMPTOM SCORE 33 (P)           Review of Systems  Review of Systems   Constitutional: Negative  HENT: Negative  Respiratory: Negative  Cardiovascular: Negative  Gastrointestinal: Negative  Genitourinary:        As per HPI   Musculoskeletal: Negative  Skin: Negative  Neurological: Negative  Hematological: Negative            Past Medical History  Past Medical History:   Diagnosis Date    Abnormal weight loss     RESOLVED: [id number]    Anemia     RESOLVED: 30MKS1713    Atypical chest pain     RESOLVED: 18GPB6592    BPH (benign prostatic hyperplasia)     Cancer (HCC)     DUODENAL    Luis Miguel's syndrome     Depression     RESOLVED: 44EJU8943    Diverticulitis of colon     LAST ASSESSED: 73SVO7391    Duodenal nodule     RESOLVED: 65POJ0352    Gastrointestinal stromal tumor (GIST) (HCC)     MALIGNANT; RESOLVED: 48TOA8973    GERD (gastroesophageal reflux disease)     Glaucoma     RESOLVED: 11YEC9607    Insomnia     RESOLVED: 95EUK3370    Lactose intolerance     LAST ASSESSED: 96KGE3171       Past Social History  Past Surgical History:   Procedure Laterality Date    BACK SURGERY      BACK SURGERY      l4 s1 surgery     CERVICAL FUSION      c4 and c5    CHOLECYSTECTOMY      COLONOSCOPY  2014    kutty    ELBOW SURGERY Left     REPAIR    EYE SURGERY      EYE SURGERY      EYE SURGERY Right 2022    right eye surgery    GANGLION CYST EXCISION Left 06/05/2020    Procedure: KNEE EXCISION BAKERS CYST;  Surgeon: David Soni MD;  [facility];  Service: Orthopedics    KNEE ARTHROSCOPY Left     LUMBAR DISCECTOMY      L5 S1    ORIF RADIAL SHAFT FRACTURE      LA ESOPHAGOGASTRODUODENOSCOPY TRANSORAL DIAGNOSTIC N/A 11/01/2016    Procedure: ESOPHAGOGASTRODUODENOSCOPY (EGD); Surgeon: Radha Castro MD;  [facility]; Service: Gastroenterology    LA ESOPHAGOGASTRODUODENOSCOPY TRANSORAL DIAGNOSTIC N/A 05/31/2018    Procedure: ESOPHAGOGASTRODUODENOSCOPY (EGD); Surgeon: Radha Castro MD;  [facility];   Service: Gastroenterology    [facility] Left 12/21/2020    Procedure: KNEE ARTHROSCOPY, popliteal cyst decompression;  Surgeon: Domo Han MD;  [facility];  Service: Orthopedics    [address] N/A 10/24/2019    Procedure: Revision laminectomy and decompression at L5-S1;  Surgeon: Bruno Noble MD;  [facility];  Service: Orthopedics    SMALL INTESTINE SURGERY      GIST surgery    [place] [id number] MSK PROCEDURE  05/21/2019     GUIDED MSK PROCEDURE  10/30/2020       Past Family History  Family History   Problem Relation Age of Onset    Thyroid disease Mother     Thyroid disease Brother     Diabetes Paternal Grandmother     Hypertension Family     Skin cancer Father        Past Social history  Social History     Socioeconomic History    Marital status: /Civil Union     Spouse name: Not on file    Number of children: Not on file    Years of education: Not on file    Highest education level: Not on file   Occupational History    Not on file   Tobacco Use    Smoking status: Never Smoker    Smokeless tobacco: Never Used   Vaping Use    Vaping Use: Never used   Substance and Sexual Activity    Alcohol use: Yes     Comment: occasional    Drug use: No    Sexual activity: Yes   Other Topics Concern    Not on file   Social History Narrative    Not on file     Social Determinants of Health     Financial Resource Strain: Not on file   Food Insecurity: Not on file   Transportation Needs: Not on file   Physical Activity: Not on file   Stress: Not on file   Social Connections: Not on file   Intimate Partner Violence: Not on file   Housing Stability: Not on file     Social History     Tobacco Use   Smoking Status Never Smoker   Smokeless Tobacco Never Used       Current Medications  Current Outpatient Medications   Medication Sig Dispense Refill    Alphagan P 0 1 % INSTILL 1 DROP BY OPHTHALMIC ROUTE TWICE A DAY INTO RIGHT EYE      calcium carbonate (TUMS) 500 mg chewable tablet Chew 1 tablet daily When needed      cholestyramine (QUESTRAN) 4 g packet Take 1 packet (4 g total) by mouth daily 30 each 3    dorzolamide-timolol (COSOPT) 22 3-6 8 MG/ML ophthalmic solution One drop four times a day  4    famotidine (PEPCID) 40 MG tablet Take 1 tablet (40 mg total) by mouth daily at bedtime 90 tablet 0    GLUCOSAMINE CHONDROITIN COMPLX PO Take 1 tablet by mouth daily 4500mg  daily       Multiple Vitamins-Minerals (MULTIVITAL) tablet Take 1 tablet by mouth daily Spectravite       pantoprazole (PROTONIX) 40 mg tablet Take 1 tablet (40 mg total) by mouth daily 90 tablet 0    prednisoLONE acetate (PRED FORTE) 1 % ophthalmic suspension INSTILL 1 DROP 4 TIMES EVERY DAY INTO RIGHT EYE      tamsulosin (FLOMAX) 0 4 mg Take 1 capsule (0 4 mg total) by mouth daily with dinner Has been taking 0 4 mg for man y years 180 capsule 0    calcipotriene (DOVONOX) 0 005 % ointment Apply topically 2 (two) times a day (Patient not taking: No sig reported) 60 g 0    Calcipotriene-Betameth Diprop 0 005-0 064 % FOAM Apply topically once daily for 90 days to affected areas  (Patient not taking: No sig reported) 60 g 6     No current facility-administered medications for this visit  Allergies  No Known Allergies    Past Medical History, Social History, Family History, medications and allergies were reviewed  Vitals  Vitals:    09/13/22 1500   BP: 120/82   Pulse: 64   Resp: 18   SpO2: 97%   Weight: 83 9 kg (185 lb)   Height: 5' 8" (1 727 m)       Physical Exam  Physical Exam  Vitals reviewed  Constitutional:       Appearance: He is well-developed  HENT:      Head: Normocephalic and atraumatic  Eyes:      Conjunctiva/sclera: Conjunctivae normal    Cardiovascular:      Rate and Rhythm: Normal rate  Pulmonary:      Effort: Pulmonary effort is normal    Abdominal:      Palpations: Abdomen is soft  Musculoskeletal:         General: Normal range of motion  Skin:     General: Skin is warm and dry  Neurological:      Mental Status: He is alert and oriented to person, place, and time     Psychiatric:         Mood and Affect: Mood normal              Results  Lab Results   Component Value Date    PSA 1 5 06/04/2022    PSA 1 4 07/07/2021    PSA 1 3 05/14/2020     Lab Results   Component Value Date    GLUCOSE 97 09/25/2015    CALCIUM 9 4 06/04/2022     09/25/2015    K 4 3 06/04/2022    CO2 32 06/04/2022     06/04/2022    BUN 16 06/04/2022 CREATININE 0 91 06/04/2022     Lab Results   Component Value Date    WBC 4 57 06/04/2022    HGB 16 8 06/04/2022    HCT 48 5 06/04/2022    MCV 91 06/04/2022     (L) 06/04/2022

## 2022-09-19 ENCOUNTER — TELEPHONE (OUTPATIENT)
Dept: UROLOGY | Facility: AMBULATORY SURGERY CENTER | Age: 72
End: 2022-09-19

## 2022-09-19 NOTE — TELEPHONE ENCOUNTER
I called pt this afternoon to discuss scheduling his Urolift procedure with Dr Irvin Huge  There was no answer so I did leave a voicemail asking pt to call our office back to discuss

## 2022-09-21 ENCOUNTER — TELEPHONE (OUTPATIENT)
Dept: GASTROENTEROLOGY | Facility: AMBULARY SURGERY CENTER | Age: 72
End: 2022-09-21

## 2022-09-21 NOTE — TELEPHONE ENCOUNTER
Patient calling back to talk to ISHMAEL DOTSON [facility] about procedure     Pt can be reached at [phone]

## 2022-09-21 NOTE — TELEPHONE ENCOUNTER
Patients GI provider:  Dr Vinayak Tamayo     Number to return call: [phone]     Reason for call: Pt calling stating magnesium citrate was removed   Would like to know what he can get other than magnesium citrate     Scheduled procedure/appointment date if applicable: Apt/procedure 9/2/22

## 2022-09-21 NOTE — TELEPHONE ENCOUNTER
Spoke with patient, reviewed OTC dulcolax/ miralax prep with him  Prep instructions emailed to him as requested  No further questions

## 2022-09-22 NOTE — TELEPHONE ENCOUNTER
Patient called to talk to ISHMAEL DOTSON [facility] about procedure     Pt was transferred

## 2022-09-22 NOTE — TELEPHONE ENCOUNTER
I spoke with pt this afternoon and scheduled him for surgery with Dr Srinivasan Araujo at the [facility] on 12/8/2022  I verbally went over all of pt 's pre op instructions and prep information with him  Pt is scheduled for a pre op appt w/ Uroflow on 11/10/22 at the [facility]  Pt is aware that he needs to have his pre op BMP, Urine culture and EKG done 2 weeks prior to surgery  I also informed pt that per Dr Srinivasan Araujo pt needs to have a medical clearance appt with his PCP and I did offer to schedule the appt for him but pt declined and stated that he will schedule the appt  Pt will make sure he has a  on the day of surgery and will stop any Aspirin and/or vitamins 1 week prior to surgery  Per pt 's request I will be mailing him a copy of his surgical packet and instructed him to call our office with any questions or concerns regarding this procedure

## 2022-09-27 RX ORDER — SODIUM CHLORIDE 9 MG/ML
125 INJECTION, SOLUTION INTRAVENOUS CONTINUOUS
Status: CANCELLED | OUTPATIENT
Start: 2022-09-27

## 2022-09-28 ENCOUNTER — ANESTHESIA EVENT (OUTPATIENT)
Dept: GASTROENTEROLOGY | Facility: AMBULARY SURGERY CENTER | Age: 72
End: 2022-09-28

## 2022-09-28 ENCOUNTER — ANESTHESIA (OUTPATIENT)
Dept: GASTROENTEROLOGY | Facility: AMBULARY SURGERY CENTER | Age: 72
End: 2022-09-28

## 2022-09-28 ENCOUNTER — HOSPITAL ENCOUNTER (OUTPATIENT)
Dept: GASTROENTEROLOGY | Facility: AMBULARY SURGERY CENTER | Age: 72
Setting detail: OUTPATIENT SURGERY
Discharge: HOME | End: 2022-09-28
Attending: INTERNAL MEDICINE
Payer: COMMERCIAL

## 2022-09-28 VITALS
DIASTOLIC BLOOD PRESSURE: 65 MMHG | HEIGHT: 68 IN | TEMPERATURE: 98.6 F | RESPIRATION RATE: 16 BRPM | OXYGEN SATURATION: 97 % | BODY MASS INDEX: 26.83 KG/M2 | WEIGHT: 177 LBS | SYSTOLIC BLOOD PRESSURE: 130 MMHG | HEART RATE: 80 BPM

## 2022-09-28 DIAGNOSIS — K58.0 IRRITABLE BOWEL SYNDROME WITH DIARRHEA: ICD-10-CM

## 2022-09-28 DIAGNOSIS — K21.9 GASTROESOPHAGEAL REFLUX DISEASE WITHOUT ESOPHAGITIS: ICD-10-CM

## 2022-09-28 DIAGNOSIS — R19.7 DIARRHEA, UNSPECIFIED TYPE: ICD-10-CM

## 2022-09-28 PROCEDURE — 45380 COLONOSCOPY AND BIOPSY: CPT | Performed by: INTERNAL MEDICINE

## 2022-09-28 PROCEDURE — 43239 EGD BIOPSY SINGLE/MULTIPLE: CPT | Performed by: INTERNAL MEDICINE

## 2022-09-28 PROCEDURE — 88305 TISSUE EXAM BY PATHOLOGIST: CPT | Performed by: PATHOLOGY

## 2022-09-28 PROCEDURE — 45385 COLONOSCOPY W/LESION REMOVAL: CPT | Performed by: INTERNAL MEDICINE

## 2022-09-28 PROCEDURE — 88313 SPECIAL STAINS GROUP 2: CPT | Performed by: PATHOLOGY

## 2022-09-28 RX ORDER — EPHEDRINE SULFATE 50 MG/ML
INJECTION INTRAVENOUS AS NEEDED
Status: DISCONTINUED | OUTPATIENT
Start: 2022-09-28 | End: 2022-09-28

## 2022-09-28 RX ORDER — PROPOFOL 10 MG/ML
INJECTION, EMULSION INTRAVENOUS AS NEEDED
Status: DISCONTINUED | OUTPATIENT
Start: 2022-09-28 | End: 2022-09-28

## 2022-09-28 RX ORDER — LIDOCAINE HYDROCHLORIDE 10 MG/ML
INJECTION, SOLUTION EPIDURAL; INFILTRATION; INTRACAUDAL; PERINEURAL AS NEEDED
Status: DISCONTINUED | OUTPATIENT
Start: 2022-09-28 | End: 2022-09-28

## 2022-09-28 RX ORDER — SODIUM CHLORIDE, SODIUM LACTATE, POTASSIUM CHLORIDE, CALCIUM CHLORIDE 600; 310; 30; 20 MG/100ML; MG/100ML; MG/100ML; MG/100ML
INJECTION, SOLUTION INTRAVENOUS CONTINUOUS PRN
Status: DISCONTINUED | OUTPATIENT
Start: 2022-09-28 | End: 2022-09-28

## 2022-09-28 RX ADMIN — PROPOFOL 20 MG: 10 INJECTION, EMULSION INTRAVENOUS at 11:06

## 2022-09-28 RX ADMIN — LIDOCAINE HYDROCHLORIDE 80 MG: 10 INJECTION, SOLUTION EPIDURAL; INFILTRATION; INTRACAUDAL at 10:55

## 2022-09-28 RX ADMIN — PROPOFOL 20 MG: 10 INJECTION, EMULSION INTRAVENOUS at 11:14

## 2022-09-28 RX ADMIN — PROPOFOL 30 MG: 10 INJECTION, EMULSION INTRAVENOUS at 11:09

## 2022-09-28 RX ADMIN — PROPOFOL 50 MG: 10 INJECTION, EMULSION INTRAVENOUS at 10:57

## 2022-09-28 RX ADMIN — PROPOFOL 150 MG: 10 INJECTION, EMULSION INTRAVENOUS at 10:55

## 2022-09-28 RX ADMIN — PROPOFOL 50 MG: 10 INJECTION, EMULSION INTRAVENOUS at 11:00

## 2022-09-28 RX ADMIN — EPHEDRINE SULFATE 5 MG: 50 INJECTION, SOLUTION INTRAVENOUS at 11:08

## 2022-09-28 RX ADMIN — PROPOFOL 50 MG: 10 INJECTION, EMULSION INTRAVENOUS at 11:03

## 2022-09-28 RX ADMIN — PROPOFOL 30 MG: 10 INJECTION, EMULSION INTRAVENOUS at 11:11

## 2022-09-28 RX ADMIN — EPHEDRINE SULFATE 5 MG: 50 INJECTION, SOLUTION INTRAVENOUS at 11:10

## 2022-09-28 RX ADMIN — SODIUM CHLORIDE, SODIUM LACTATE, POTASSIUM CHLORIDE, AND CALCIUM CHLORIDE: .6; .31; .03; .02 INJECTION, SOLUTION INTRAVENOUS at 10:52

## 2022-09-28 RX ADMIN — EPHEDRINE SULFATE 5 MG: 50 INJECTION, SOLUTION INTRAVENOUS at 11:19

## 2022-09-28 NOTE — ANESTHESIA PREPROCEDURE EVALUATION
Procedure:  COLONOSCOPY  EGD     - denies any chest pain, palpitations, shortness of breath, syncope, lightheadedness, seizures   - denies any recent infectious symptoms such as fevers, chills, cough   - denies taking any anticoagulation medications or any issues with bleeding, bruising, clotting   - hx of essential tremor    Relevant Problems   ANESTHESIA (within normal limits)      CARDIO   (+) Atypical chest pain   (+) Essential hypertriglyceridemia   (+) Hyperlipidemia      ENDO (within normal limits)      GI/HEPATIC   (+) Esophageal reflux   (+) Gastroesophageal reflux disease without esophagitis      /RENAL (within normal limits)      GYN (within normal limits)      HEMATOLOGY (within normal limits)      MUSCULOSKELETAL   (+) Primary osteoarthritis of knee      NEURO/PSYCH   (+) History of gastrointestinal stromal tumor (GIST)   (+) Mild depression      PULMONARY (within normal limits)      Digestive   (+) IBS (irritable bowel syndrome)      Nervous and Auditory   (+) Lumbar radiculopathy      Other   (+) Overweight (BMI 25 0-29 9)   (+) Spinal stenosis of lumbar region   (+) Splenomegaly        Physical Exam    Airway    Mallampati score: I  TM Distance: >3 FB  Neck ROM: full     Dental   No notable dental hx     Cardiovascular  Rhythm: regular, Rate: normal, Cardiovascular exam normal    Pulmonary  Pulmonary exam normal Breath sounds clear to auscultation,     Other Findings        Anesthesia Plan  ASA Score- 2     Anesthesia Type- IV sedation with anesthesia with ASA Monitors  Additional Monitors:   Airway Plan:           Plan Factors-Exercise tolerance (METS): >4 METS  Chart reviewed  EKG reviewed  Imaging results reviewed  Existing labs reviewed  Patient summary reviewed  Patient is not a current smoker  Patient not instructed to abstain from smoking on day of procedure  Patient did not smoke on day of surgery  Obstructive sleep apnea risk education given perioperatively      Induction- intravenous  Postoperative Plan-     Informed Consent- Anesthetic plan and risks discussed with patient  I personally reviewed this patient with the CRNA  Discussed and agreed on the Anesthesia Plan with the CRNA  Ramy Trinh

## 2022-09-28 NOTE — ANESTHESIA POSTPROCEDURE EVALUATION
Post-Op Assessment Note    CV Status:  Stable  Pain Score: 0    Pain management: adequate     Mental Status:  Alert and awake   Hydration Status:  Euvolemic   PONV Controlled:  Controlled   Airway Patency:  Patent      Post Op Vitals Reviewed: Yes      Staff: CRNA         No complications documented      /65 (09/28/22 1124)    Temp 98 6 °F (37 °C) (09/28/22 1124)    Pulse 83 (09/28/22 1124)   Resp 16 (09/28/22 1124)    SpO2 99 % (09/28/22 1124)

## 2022-09-28 NOTE — H&P
History and Physical - [facility]  Yanira Harmon 81 y o  male MRN: [id number]    HPI: Yanira Harmon is a 81y o  year old male who presents with GERD, hx of duodenal GIST, chronic diarrhea  Review of Systems    Historical Information   Past Medical History:   Diagnosis Date    Abnormal weight loss     RESOLVED: 81HJT0729    Anemia     RESOLVED: 06PIG2497    Atypical chest pain     RESOLVED: 07EOR5202    BPH (benign prostatic hyperplasia)     Cancer (HCC)     DUODENAL    Luis Miguel's syndrome     Depression     RESOLVED: 81VIW1458    Diverticulitis of colon     LAST ASSESSED: 00KSL8435    Duodenal nodule     RESOLVED: 71DHN5866    Gastrointestinal stromal tumor (GIST) (HCC)     MALIGNANT; RESOLVED: 06EEH6159    GERD (gastroesophageal reflux disease)     Glaucoma     RESOLVED: 96TDR3345    Insomnia     RESOLVED: 48NWL8685    Lactose intolerance     LAST ASSESSED: 19YAA0690     Past Surgical History:   Procedure Laterality Date    BACK SURGERY      BACK SURGERY      l4 s1 surgery     CERVICAL FUSION      c4 and c5    CHOLECYSTECTOMY      COLONOSCOPY  2014    kutty    ELBOW SURGERY Left     REPAIR    EYE SURGERY      EYE SURGERY      EYE SURGERY Right 2022    right eye surgery    GANGLION CYST EXCISION Left 06/05/2020    Procedure: KNEE EXCISION BAKERS CYST;  Surgeon: Sandra Pittman MD;  [facility];  Service: Orthopedics    KNEE ARTHROSCOPY Left     LUMBAR DISCECTOMY      L5 S1    ORIF RADIAL SHAFT FRACTURE      UT ESOPHAGOGASTRODUODENOSCOPY TRANSORAL DIAGNOSTIC N/A 11/01/2016    Procedure: ESOPHAGOGASTRODUODENOSCOPY (EGD); Surgeon: Jaime Muniz MD;  [facility]; Service: Gastroenterology    UT ESOPHAGOGASTRODUODENOSCOPY TRANSORAL DIAGNOSTIC N/A 05/31/2018    Procedure: ESOPHAGOGASTRODUODENOSCOPY (EGD); Surgeon: Jaime Muniz MD;  [facility];   Service: Gastroenterology    [facility] Left 12/21/2020 Procedure: KNEE ARTHROSCOPY, popliteal cyst decompression;  Surgeon: Di Mcmanus MD;  [facility];  Service: Orthopedics    IA REDO EXCIS LUMBAR DISK N/A 10/24/2019    Procedure: Revision laminectomy and decompression at L5-S1;  Surgeon: Dorian Khoury MD;  [facility];  Service: Orthopedics    SMALL INTESTINE SURGERY      GIST surgery    [address] PROCEDURE  05/21/2019    [address]K PROCEDURE  10/30/2020     Social History   Social History     Substance and Sexual Activity   Alcohol Use Yes    Comment: occasional     Social History     Substance and Sexual Activity   Drug Use No     Social History     Tobacco Use   Smoking Status Never Smoker   Smokeless Tobacco Never Used     Family History   Problem Relation Age of Onset    Thyroid disease Mother     Thyroid disease Brother     Diabetes Paternal Grandmother     Hypertension Family     Skin cancer Father        Meds/Allergies     (Not in a hospital admission)      No Known Allergies    Objective     /83   Pulse 82   Temp (!) 96 9 °F (36 1 °C) (Temporal)   Resp 16   Ht 5' 8" (1 727 m)   Wt 80 3 kg (177 lb)   SpO2 98%   BMI 26 91 kg/m²       PHYSICAL EXAM    Gen: NAD  CV: RRR  CHEST: Clear  ABD: soft, NT/ND  EXT: no edema  Neuro: AAO      ASSESSMENT/PLAN:  This is a 81y o  year old male here for GERD, hx of duodenal GIST, chronic diarrhea         PLAN:   Procedure: egd/colonoscopy

## 2022-09-30 PROCEDURE — 88305 TISSUE EXAM BY PATHOLOGIST: CPT | Performed by: PATHOLOGY

## 2022-09-30 PROCEDURE — 88313 SPECIAL STAINS GROUP 2: CPT | Performed by: PATHOLOGY

## 2022-10-09 DIAGNOSIS — K21.9 GASTROESOPHAGEAL REFLUX DISEASE WITHOUT ESOPHAGITIS: ICD-10-CM

## 2022-10-10 RX ORDER — PANTOPRAZOLE SODIUM 40 MG/1
40 TABLET, DELAYED RELEASE ORAL DAILY
Qty: 90 TABLET | Refills: 1 | Status: SHIPPED | OUTPATIENT
Start: 2022-10-10

## 2022-10-10 RX ORDER — FAMOTIDINE 40 MG/1
40 TABLET, FILM COATED ORAL
Qty: 90 TABLET | Refills: 1 | Status: SHIPPED | OUTPATIENT
Start: 2022-10-10

## 2022-10-12 PROBLEM — H60.502 ACUTE OTITIS EXTERNA OF LEFT EAR: Status: RESOLVED | Noted: 2021-05-04 | Resolved: 2022-10-12

## 2022-10-18 ENCOUNTER — PREP FOR PROCEDURE (OUTPATIENT)
Dept: UROLOGY | Facility: AMBULATORY SURGERY CENTER | Age: 72
End: 2022-10-18

## 2022-10-18 DIAGNOSIS — N40.0 ENLARGED PROSTATE WITHOUT LOWER URINARY TRACT SYMPTOMS (LUTS): Primary | ICD-10-CM

## 2022-10-18 DIAGNOSIS — Z01.818 PREOP EXAMINATION: ICD-10-CM

## 2022-10-18 DIAGNOSIS — Z01.810 PREOP CARDIOVASCULAR EXAM: ICD-10-CM

## 2022-10-18 DIAGNOSIS — Z01.812 PRE-PROCEDURE LAB EXAM: ICD-10-CM

## 2022-10-18 DIAGNOSIS — R39.89 SUSPECTED UTI: ICD-10-CM

## 2022-10-18 NOTE — TELEPHONE ENCOUNTER
I returned pt 's phone call and I was able to speak with him  I verbally went over all of pt 's pre op instructions and prep information with him again  Pt stated that he never received his surgical packet and asked if he could just pick one up at the office later this week  I confirmed I can have it there for Friday 10/21/2022 in the Morning  Pt then explained that last week he was scheduled for a cornea transplant on 11/29/22 and wanted to confirm if this would be ok with his Urolift being 9 days after this procedure  He also confirmed that this is going to be with general anesthesia as well  I informed pt that I will check with Dr Yolanda Ann and then get back to him  Pt thanked me and said that he really hopes this will not be a issue because as of Jan 2023 his insurance is changing and it will have higher copays so he is hoping that he can get this done before the end of the year  Pt will wait to hear back from me

## 2022-10-20 NOTE — TELEPHONE ENCOUNTER
I called pt this morning to inform him that per Dr Berny Long he needs to check with his transplant provider to confirm if it is ok to proceed with the Urolift on 12/8/2022  Pt confirmed that he will call their office and then let me know if anything needs to change

## 2022-10-21 NOTE — TELEPHONE ENCOUNTER
Pt is having his cornea operation on 11/29/22 and the transplant doctor said it was best to schedule the Urolife 2 weeks after the operation  Pt would like to discuss this       Pt can be reached at [phone]

## 2022-10-23 DIAGNOSIS — N40.0 ENLARGED PROSTATE WITHOUT LOWER URINARY TRACT SYMPTOMS (LUTS): ICD-10-CM

## 2022-10-24 RX ORDER — TAMSULOSIN HYDROCHLORIDE 0.4 MG/1
0.4 CAPSULE ORAL
Qty: 180 CAPSULE | Refills: 0 | Status: SHIPPED | OUTPATIENT
Start: 2022-10-24

## 2022-11-21 ENCOUNTER — CONSULT (OUTPATIENT)
Dept: INTERNAL MEDICINE CLINIC | Facility: CLINIC | Age: 72
End: 2022-11-21

## 2022-11-21 VITALS
HEART RATE: 83 BPM | OXYGEN SATURATION: 98 % | HEIGHT: 68 IN | TEMPERATURE: 97.4 F | SYSTOLIC BLOOD PRESSURE: 120 MMHG | WEIGHT: 182 LBS | BODY MASS INDEX: 27.58 KG/M2 | DIASTOLIC BLOOD PRESSURE: 78 MMHG

## 2022-11-21 DIAGNOSIS — Z01.818 PREOPERATIVE CLEARANCE: Primary | ICD-10-CM

## 2022-11-21 RX ORDER — LOTEPREDNOL ETABONATE 3.8 MG/G
1 GEL OPHTHALMIC 4 TIMES DAILY
COMMUNITY
Start: 2022-10-23

## 2022-11-21 RX ORDER — BROMFENAC SODIUM 0.7 MG/ML
0.07 SOLUTION/ DROPS OPHTHALMIC DAILY
COMMUNITY
Start: 2022-10-23

## 2022-11-21 NOTE — PROGRESS NOTES
INTERNAL MEDICINE PRE-OPERATIVE EVALUATION  [facility]    NAME: Kenisha Barnhart  AGE: 81 y o  SEX: male  : 1950     DATE: 2022     Internal Medicine Pre-Operative Evaluation:     Chief Complaint: Pre-operative Evaluation     Surgery: Descemet stripping endothelial keratoplasty of right eye   Anticipated Date of Surgery: 2022    Procedure : cystoscopy and transrectal ultrasound on 2022  Referring Provider: She Apodaca DO        History of Present Illness:     Kenisha Barnhart is a 81 y o  male who presents to the office today for a preoperative consultation at the request of surgeon, Dr Veornika Evans and Hannah Graham   who plans on performing Descemet stripping endothelial keratoplasty of right eye  on : 2022  Solomon Townsend Patient has a bleeding risk of: no recent abnormal bleeding  Patient does not have objections to receiving blood products if needed  Current anti-platelet/anti-coagulation medications that the patient is prescribed includes: N/A  Assessment of Cardiac Risk:  · Denies unstable or severe angina or MI in the last 6 weeks or history of stent placement in the last year   · Denies decompensated heart failure (e g  New onset heart failure, NYHA functional class IV heart failure, or worsening existing heart failure)  · Denies significant arrhythmias such as high grade AV block, symptomatic ventricular arrhythmia, newly recognized ventricular tachycardia, supraventricular tachycardia with resting heart rate >100, or symptomatic bradycardia  · Denies severe heart valve disease including aortic stenosis or symptomatic mitral stenosis     Exercise Capacity:  · Able to walk 4 blocks without symptoms?: Yes  · Able to walk 2 flights without symptoms?: Yes    Prior Anesthesia Reactions: No     Personal history of venous thromboembolic disease? No    History of steroid use for >2 weeks within last year?  No    STOP-BANG Sleep Apnea Screening Questionnaire:      Do you SNORE loudly (louder than talking or loud enough to be heard through closed doors)? No = 0 point   Do you often feel TIRED, fatigued, or sleepy during daytime? No = 0 point   Has anyone OBSERVED you stop breathing during your sleep? No = 0 point   Do you have or are you being treated for high blood pressure? No = 0 point   BMI more than 35 kg/m2? No = 0 point   AGE over 48years old? Yes = 1 point   NECK circumference > 16 inches (40 cm)? No = 0 point   Male GENDER? Yes = 1 point   TOTAL SCORE 2 = LOW risk of ARIANE       Review of Systems:     Review of Systems   Constitutional: Negative for chills and fever  HENT: Negative for ear pain and sore throat  Eyes: Positive for visual disturbance  Negative for pain  Respiratory: Negative for cough and shortness of breath  Cardiovascular: Negative for chest pain and palpitations  Gastrointestinal: Negative for abdominal pain and vomiting  Genitourinary: Negative for dysuria and hematuria  Musculoskeletal: Negative for arthralgias and back pain  Skin: Negative for color change and rash  Neurological: Negative for seizures and syncope  All other systems reviewed and are negative         Problem List:     Patient Active Problem List   Diagnosis   • Subacromial impingement of right shoulder   • Enlarged prostate without lower urinary tract symptoms (luts)   • Esophageal reflux   • Essential hypertriglyceridemia   • Hyperlipidemia   • Splenomegaly   • Tremor, essential   • Primary osteoarthritis of knee   • History of gastrointestinal stromal tumor (GIST)   • Belching   • Gastroesophageal reflux disease without esophagitis   • Screening for prostate cancer   • Diarrhea   • Body aches   • Myalgia   • Melena   • Atypical chest pain   • IBS (irritable bowel syndrome)   • Encounter for hepatitis C screening test for low risk patient   • Hypokalemia   • 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)   • Lumbar radiculopathy   • Herniation of intervertebral disc between L5 and S1   • Spinal stenosis of lumbar region   • Lumbar disc herniation with radiculopathy   • Overweight (BMI 25 0-29  9)   • Skin rash   • Medicare annual wellness visit, subsequent   • Aftercare following surgery   • Preop examination   • Baker cyst, left   • Preoperative testing   • Fuchs' corneal dystrophy   • Preop exam for internal medicine   • Preoperative clearance   • Left ear pain   • COVID-19   • Status post arthroscopy of left knee   • Neuropathy of left saphenous nerve   • Insomnia   • Mild depression        Allergies:     No Known Allergies     Current Medications:       Current Outpatient Medications:   •  Alphagan P 0 1 %, INSTILL 1 DROP BY OPHTHALMIC ROUTE TWICE A DAY INTO RIGHT EYE, Disp: , Rfl:   •  calcium carbonate (TUMS) 500 mg chewable tablet, Chew 1 tablet daily When needed, Disp: , Rfl:   •  cholestyramine (QUESTRAN) 4 g packet, Take 1 packet (4 g total) by mouth daily, Disp: 30 each, Rfl: 3  •  dorzolamide-timolol (COSOPT) 22 3-6 8 MG/ML ophthalmic solution, One drop four times a day, Disp: , Rfl: 4  •  famotidine (PEPCID) 40 MG tablet, Take 1 tablet (40 mg total) by mouth daily at bedtime, Disp: 90 tablet, Rfl: 1  •  GLUCOSAMINE CHONDROITIN COMPLX PO, Take 1 tablet by mouth daily 4500mg  daily , Disp: , Rfl:   •  Lotemax SM 0 38 % GEL, Administer 1 drop to the right eye 4 (four) times a day 1 drop 4 times daily, Disp: , Rfl:   •  Multiple Vitamins-Minerals (MULTIVITAL) tablet, Take 1 tablet by mouth daily Spectravite , Disp: , Rfl:   •  pantoprazole (PROTONIX) 40 mg tablet, Take 1 tablet (40 mg total) by mouth daily, Disp: 90 tablet, Rfl: 1  •  Prolensa 0 07 % SOLN, Administer 0 07 drops to the right eye in the morning, Disp: , Rfl:   •  tamsulosin (FLOMAX) 0 4 mg, Take 1 capsule (0 4 mg total) by mouth daily with dinner Has been taking 0 4 mg for man y years, Disp: 180 capsule, Rfl: 0  •  calcipotriene (DOVONOX) 0 005 % ointment, Apply topically 2 (two) times a day (Patient not taking: Reported on 11/21/2022), Disp: 60 g, Rfl: 0  •  Calcipotriene-Betameth Diprop 0 005-0 064 % FOAM, Apply topically once daily for 90 days to affected areas  (Patient not taking: Reported on 11/21/2022), Disp: 60 g, Rfl: 6  •  prednisoLONE acetate (PRED FORTE) 1 % ophthalmic suspension, INSTILL 1 DROP 4 TIMES EVERY DAY INTO RIGHT EYE (Patient not taking: Reported on 11/21/2022), Disp: , Rfl:      Past History:     Past Medical History:   Diagnosis Date   • Abnormal weight loss     RESOLVED: 66DCB6130   • Anemia     RESOLVED: 93PKU6859   • Atypical chest pain     RESOLVED: 20TGI9337   • BPH (benign prostatic hyperplasia)    • Cancer (HCC)     DUODENAL   • Luis Miguel's syndrome    • Depression     RESOLVED: 70TAT8213   • Diverticulitis of colon     LAST ASSESSED: 12DPU9316   • Duodenal nodule     RESOLVED: 45WRG2214   • Gastrointestinal stromal [facility] ([facility] )     MALIGNANT; RESOLVED: 55FLQ3163   • GERD (gastroesophageal reflux disease)    • Glaucoma     RESOLVED: 01SAU1679   • Insomnia     RESOLVED: 09TCG5933   • Lactose intolerance     LAST ASSESSED: 01ORO3529        Past Surgical History:   Procedure Laterality Date   • BACK SURGERY     • BACK SURGERY      l4 s1 surgery    • CERVICAL FUSION      c4 and c5   • CHOLECYSTECTOMY     • COLONOSCOPY  2014    kutty   • ELBOW SURGERY Left     REPAIR   • EYE SURGERY     • EYE SURGERY     • EYE SURGERY Right 2022    right eye surgery   • GANGLION CYST EXCISION Left 06/05/2020    Procedure: KNEE EXCISION BAKERS CYST;  Surgeon: Earnest Win MD;  [facility];  Service: Orthopedics   • KNEE ARTHROSCOPY Left    • LUMBAR DISCECTOMY      L5 S1   • ORIF RADIAL SHAFT FRACTURE     • [place] ESOPHAGOGASTRODUODENOSCOPY TRANSORAL DIAGNOSTIC N/A 11/01/2016    Procedure: ESOPHAGOGASTRODUODENOSCOPY (EGD); Surgeon: Jeana García MD;  [facility];   Service: Gastroenterology   • CO ESOPHAGOGASTRODUODENOSCOPY TRANSORAL DIAGNOSTIC N/A 05/31/2018    Procedure: ESOPHAGOGASTRODUODENOSCOPY (EGD); Surgeon: Jewel Cisse MD;  [facility];   Service: Gastroenterology   • CO KNEE [address] Left 12/21/2020    Procedure: KNEE ARTHROSCOPY, popliteal cyst decompression;  Surgeon: Petrona Gomez MD;  [facility];  Service: Orthopedics   • CO REDO EXCIS LUMBAR DISK N/A 10/24/2019    Procedure: Revision laminectomy and decompression at L5-S1;  Surgeon: Kimberly Brooke MD;  [facility];  Service: Orthopedics   • SMALL INTESTINE SURGERY      GIST surgery   • [address] MSK PROCEDURE  05/21/2019   • [address] MSK PROCEDURE  10/30/2020        Family History   Problem Relation Age of Onset   • Thyroid disease Mother    • Thyroid disease Brother    • Diabetes Paternal Grandmother    • Hypertension Family    • Skin cancer Father         Social History     Socioeconomic History   • Marital status: /Civil Union     Spouse name: Not on file   • Number of children: Not on file   • Years of education: Not on file   • Highest education level: Not on file   Occupational History   • Not on file   Tobacco Use   • Smoking status: Never   • Smokeless tobacco: Never   Vaping Use   • Vaping Use: Never used   Substance and Sexual Activity   • Alcohol use: Yes     Comment: occasional   • Drug use: No   • Sexual activity: Yes   Other Topics Concern   • Not on file   Social History Narrative   • Not on file     Social Determinants of Health     Financial Resource Strain: Not on file   Food Insecurity: Not on file   Transportation Needs: Not on file   Physical Activity: Not on file   Stress: Not on file   Social Connections: Not on file   Intimate Partner Violence: Not on file   Housing Stability: Not on file        Physical Exam:      /78 (BP Location: Left arm, Patient Position: Sitting, Cuff Size: Large)   Pulse 83   Temp (!) 97 4 °F (36 3 °C) (Probe)   Ht 5' 8" (1 727 m)   Wt 82 6 kg (182 lb)   SpO2 98%   BMI 27 67 kg/m²     Physical Exam  Vitals and nursing note reviewed  Constitutional:       General: He is not in acute distress  Appearance: Normal appearance  He is not ill-appearing, toxic-appearing or diaphoretic  HENT:      Head: Normocephalic and atraumatic  Nose: Nose normal       Mouth/Throat:      Mouth: Mucous membranes are moist    Eyes:      General: No scleral icterus  Extraocular Movements: Extraocular movements intact  Pupils: Pupils are equal, round, and reactive to light  Cardiovascular:      Rate and Rhythm: Normal rate and regular rhythm  Pulmonary:      Effort: Pulmonary effort is normal  No respiratory distress  Breath sounds: Normal breath sounds  No wheezing or rales  Abdominal:      General: Bowel sounds are normal       Palpations: Abdomen is soft  Tenderness: There is no abdominal tenderness  Musculoskeletal:      Cervical back: Normal range of motion and neck supple  Right lower leg: No edema  Left lower leg: No edema  Skin:     General: Skin is warm and dry  Neurological:      General: No focal deficit present  Mental Status: He is alert and oriented to person, place, and time  Psychiatric:         Mood and Affect: Mood normal          Behavior: Behavior normal            Data:     Pre-operative work-up    Laboratory Results: I have personally reviewed the pertinent laboratory results/reports     EKG: I have personally reviewed pertinent reports  Chest x-ray: I have personally reviewed pertinent reports  Assessment:     No diagnosis found  Plan:     81 y o  male with planned surgery as indicated above    Cardiac Risk Estimation: per the Revised Cardiac Risk Index (Circ  100:1043, 1999), the patient's risk factors for cardiac complications include none, putting him in: RCI RISK CLASS I (0 risk factors, risk of major cardiac compl  appr  0 5%)      1  Further preoperative workup as follows:   - None; no further preoperative work-up is required    2  Medication Management/Recommendations:   Hold flomax the day before the procedure     3  Prophylaxis for cardiac events with perioperative beta-blockers: not indicated  4  Patient requires further consultation with: None    Clearance  Patient is CLEARED for surgery without any additional cardiac testing       Tim Jasso MD  [facility] [address]  Phone#  [phone]  Fax#  [phone]

## 2022-11-28 ENCOUNTER — TELEPHONE (OUTPATIENT)
Dept: UROLOGY | Facility: MEDICAL CENTER | Age: 72
End: 2022-11-28

## 2022-12-05 ENCOUNTER — APPOINTMENT (OUTPATIENT)
Dept: LAB | Age: 72
End: 2022-12-05

## 2022-12-05 DIAGNOSIS — R39.89 SUSPECTED UTI: ICD-10-CM

## 2022-12-05 DIAGNOSIS — Z01.812 PRE-PROCEDURE LAB EXAM: ICD-10-CM

## 2022-12-05 DIAGNOSIS — N40.0 ENLARGED PROSTATE WITHOUT LOWER URINARY TRACT SYMPTOMS (LUTS): ICD-10-CM

## 2022-12-05 DIAGNOSIS — Z01.818 PREOP EXAMINATION: ICD-10-CM

## 2022-12-05 LAB
ANION GAP SERPL CALCULATED.3IONS-SCNC: 4 MMOL/L (ref 4–13)
BUN SERPL-MCNC: 12 MG/DL (ref 5–25)
CALCIUM SERPL-MCNC: 9.2 MG/DL (ref 8.3–10.1)
CHLORIDE SERPL-SCNC: 105 MMOL/L (ref 96–108)
CO2 SERPL-SCNC: 29 MMOL/L (ref 21–32)
CREAT SERPL-MCNC: 0.98 MG/DL (ref 0.6–1.3)
GFR SERPL CREATININE-BSD FRML MDRD: 77 ML/MIN/1.73SQ M
GLUCOSE P FAST SERPL-MCNC: 104 MG/DL (ref 65–99)
POTASSIUM SERPL-SCNC: 4.2 MMOL/L (ref 3.5–5.3)
SODIUM SERPL-SCNC: 138 MMOL/L (ref 135–147)

## 2022-12-06 LAB — BACTERIA UR CULT: NORMAL

## 2022-12-08 NOTE — PROGRESS NOTES
12/12/2022  James Hawk  1950  [id number]      Assessment  BPH with lower urinary tract symptoms    Discussion  Reyna Turcios is a 81 y o  male being managed by Dr Maykel Wade   Patient scheduled for cystoscopy with UroLift insertion on 12/20/2022  We reviewed the risks and benefits of this procedure including but not limited to bleeding, infection, damage to nearby structures such as bladder/ureter/kidney, need for nephrostomy tube, need for additional surgeries  Patient verbalized understanding  Consent will be obtained on the day of procedure by performing surgeon  All questions were answered  History of Present Illness  81 y o  male presents today to discuss his upcoming surgery  Patient recently underwent flexible cystoscopy on 9/13/2022  He was noted to have enlarged prostate with bladder outlet obstruction  Due to his persistent urinary symptoms and decreased quality of life, he is scheduled for UroLift procedure on 12/20/2022      Review of Systems  Review of Systems    Past Medical History  Past Medical History:   Diagnosis Date   • Abnormal weight loss     RESOLVED: 10THK8423   • Anemia     RESOLVED: 82MUH9795   • Atypical chest pain     RESOLVED: 96OZF7399   • BPH (benign prostatic hyperplasia)    • Cancer (HCC)     DUODENAL   • Luis Miguel's syndrome    • Depression     RESOLVED: 72SDU5896   • Diverticulitis of colon     LAST ASSESSED: 92UPT1557   • Duodenal nodule     RESOLVED: 49OVI0996   • Gastrointestinal stromal tumor (GIST) ([facility] [address] )     MALIGNANT; RESOLVED: 68AHG4618   • GERD (gastroesophageal reflux disease)    • Glaucoma     RESOLVED: 35KOP4119   • Insomnia     RESOLVED: 88NFD2777   • Lactose intolerance     LAST ASSESSED: 10QJJ4004       Surgical History  Past Surgical History:   Procedure Laterality Date   • BACK SURGERY     • BACK SURGERY      l4 s1 surgery    • CERVICAL FUSION      c4 and c5   • CHOLECYSTECTOMY     • COLONOSCOPY  2014    kutty   • ELBOW SURGERY Left     REPAIR   • EYE SURGERY     • EYE SURGERY     • EYE SURGERY Right 2022    right eye surgery   • GANGLION CYST EXCISION Left 06/05/2020    Procedure: KNEE EXCISION BAKERS CYST;  Surgeon: Genoveva Ochoa MD;  [facility];  Service: Orthopedics   • KNEE ARTHROSCOPY Left    • LUMBAR DISCECTOMY      L5 S1   • ORIF RADIAL SHAFT FRACTURE     • [place] ESOPHAGOGASTRODUODENOSCOPY TRANSORAL DIAGNOSTIC N/A 11/01/2016    Procedure: ESOPHAGOGASTRODUODENOSCOPY (EGD); Surgeon: Sahil Cisneros MD;  [facility]; Service: Gastroenterology   • NM ESOPHAGOGASTRODUODENOSCOPY TRANSORAL DIAGNOSTIC N/A 05/31/2018    Procedure: ESOPHAGOGASTRODUODENOSCOPY (EGD); Surgeon: Sahil Cisneros MD;  [facility];   Service: Gastroenterology   • NM KNEE [address] Left 12/21/2020    Procedure: KNEE ARTHROSCOPY, popliteal cyst decompression;  Surgeon: Nick Watson MD;  [facility];  Service: Orthopedics   • NM REDO EXCIS LUMBAR DISK N/A 10/24/2019    Procedure: Revision laminectomy and decompression at L5-S1;  Surgeon: Rodrigue Moore MD;  [facility];  Service: Orthopedics   • SMALL INTESTINE SURGERY      GIST surgery   • [address] [facility] PROCEDURE  05/21/2019   • [address] [facility] PROCEDURE  10/30/2020       Family History  Family History   Problem Relation Age of Onset   • Thyroid disease Mother    • Thyroid disease Brother    • Diabetes Paternal Grandmother    • Hypertension Family    • Skin cancer Father        Social History  Social History     Socioeconomic History   • Marital status: /Civil Union     Spouse name: Not on file   • Number of children: Not on file   • Years of education: Not on file   • Highest education level: Not on file   Occupational History   • Not on file   Tobacco Use   • Smoking status: Never   • Smokeless tobacco: Never   Vaping Use   • Vaping Use: Never used   Substance and Sexual Activity   • Alcohol use: Yes     Comment: occasional   • Drug use: No   • Sexual activity: Yes   Other Topics Concern   • Not on file   Social History Narrative   • Not on file     Social Determinants of Health     Financial Resource Strain: Not on file   Food Insecurity: Not on file   Transportation Needs: Not on file   Physical Activity: Not on file   Stress: Not on file   Social Connections: Not on file   Intimate Partner Violence: Not on file   Housing Stability: Not on file       Current Medications  Current Outpatient Medications   Medication Sig Dispense Refill   • Alphagan P 0 1 % INSTILL 1 DROP BY OPHTHALMIC ROUTE TWICE A DAY INTO RIGHT EYE     • calcipotriene (DOVONOX) 0 005 % ointment Apply topically 2 (two) times a day (Patient not taking: Reported on 11/21/2022) 60 g 0   • Calcipotriene-Betameth Diprop 0 005-0 064 % FOAM Apply topically once daily for 90 days to affected areas   (Patient not taking: Reported on 11/21/2022) 60 g 6   • calcium carbonate (TUMS) 500 mg chewable tablet Chew 1 tablet daily When needed     • cholestyramine (QUESTRAN) 4 g packet Take 1 packet (4 g total) by mouth daily 30 each 3   • dorzolamide-timolol (COSOPT) 22 3-6 8 MG/ML ophthalmic solution One drop four times a day  4   • famotidine (PEPCID) 40 MG tablet Take 1 tablet (40 mg total) by mouth daily at bedtime 90 tablet 1   • GLUCOSAMINE CHONDROITIN COMPLX PO Take 1 tablet by mouth daily 4500mg  daily      • Lotemax SM 0 38 % GEL Administer 1 drop to the right eye 4 (four) times a day 1 drop 4 times daily     • Multiple Vitamins-Minerals (MULTIVITAL) tablet Take 1 tablet by mouth daily Spectravite      • pantoprazole (PROTONIX) 40 mg tablet Take 1 tablet (40 mg total) by mouth daily 90 tablet 1   • prednisoLONE acetate (PRED FORTE) 1 % ophthalmic suspension INSTILL 1 DROP 4 TIMES EVERY DAY INTO RIGHT EYE (Patient not taking: Reported on 11/21/2022)     • Prolensa 0 07 % SOLN Administer 0 07 drops to the right eye in the morning     • tamsulosin (FLOMAX) 0 4 mg Take 1 capsule (0 4 mg total) by mouth daily with dinner Has been taking 0 4 mg for man y years 180 capsule 0     No current facility-administered medications for this visit  Allergies  No Known Allergies    Vitals  There were no vitals filed for this visit      Physical Exam  Physical Exam

## 2022-12-12 ENCOUNTER — OFFICE VISIT (OUTPATIENT)
Dept: UROLOGY | Facility: AMBULATORY SURGERY CENTER | Age: 72
End: 2022-12-12

## 2022-12-12 VITALS
SYSTOLIC BLOOD PRESSURE: 128 MMHG | HEART RATE: 76 BPM | WEIGHT: 182 LBS | DIASTOLIC BLOOD PRESSURE: 72 MMHG | BODY MASS INDEX: 27.58 KG/M2 | HEIGHT: 68 IN | OXYGEN SATURATION: 98 %

## 2022-12-12 DIAGNOSIS — N40.0 ENLARGED PROSTATE WITHOUT LOWER URINARY TRACT SYMPTOMS (LUTS): Primary | ICD-10-CM

## 2022-12-12 RX ORDER — OFLOXACIN 3 MG/ML
SOLUTION/ DROPS OPHTHALMIC
COMMUNITY
Start: 2022-11-22

## 2022-12-12 NOTE — H&P (VIEW-ONLY)
12/12/2022  Jenise Cardona  1950  [id number]      Assessment   BPH with Lower Urinary Tract Symptoms not controlled to patient's satisfaction with pharmacotherapy  Discussion  Claribel Gibson is a 81 y o  male being managed by Dr Bridget Hicks   We reviewed the risks and benefits of the UroLift procedure including but not limited to bleeding, infection, damage to nearby structures including bladder/prostate/ urethra, sexual dysfunction, incontinence, continued urinary retention, retrograde ejaculation, and/or need for additional surgeries  Patient verbalized understanding  Surgical consent was obtained today in the office  All questions were answered  Cystoscopy obtained 9/13/2022 by Dr Bridget Hicks revealed an  enlarged with bilateral hypertrophy and  Kissing lobes with no no median lobe  Urine culture obtained 12/5/2022 and was negative  Uroflow obtained today and will be scanned into the chart  History of Present Illness  81 y o  male presents today to discuss his upcoming surgery  Frequency and urgency that has been getting worse over the past couple years  He reports past episodes of urinary retention and sensation of not being able to completely empty bladder  Previously managed by tamsulosin emma 2006  No previous surgery for BPH  No reported cardiac history and is not on any blood thinners/aspirin  No hx of Does take a multivitamin  No hx of ARIANE  Has a history of numerous right eye procedures including 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  Advised to wear a right eye shield during Urolift procedure by ophthalmologist      Review of Systems  Review of Systems   Constitutional: Negative for chills, fatigue and fever  HENT: Negative  Eyes: Positive for redness (s/p right corneal transplant )  Respiratory: Negative for shortness of breath  Cardiovascular: Negative for chest pain, palpitations and leg swelling  Gastrointestinal: Negative  Endocrine: Negative for heat intolerance and polydipsia  Genitourinary: Positive for frequency and urgency  Musculoskeletal: Negative  Allergic/Immunologic: Negative  Neurological: Negative for dizziness, tremors, syncope and light-headedness  Psychiatric/Behavioral: Negative  Past Medical History  Past Medical History:   Diagnosis Date   • Abnormal weight loss     RESOLVED: [id number]   • Anemia     RESOLVED: [id number]   • Atypical chest pain     RESOLVED: 01TMH0278   • BPH (benign prostatic hyperplasia)    • Cancer (HCC)     DUODENAL   • Luis Miguel's syndrome    • Depression     RESOLVED: 26CCV0635   • Diverticulitis of colon     LAST ASSESSED: [id number]   • Duodenal nodule     RESOLVED: 37NXT9360   • [facility]     MALIGNANT; RESOLVED: [id number]   • GERD (gastroesophageal reflux disease)    • Glaucoma     RESOLVED: 79AMB4052   • Insomnia     RESOLVED: 53SCI7970   • Lactose intolerance     LAST ASSESSED: 84FQR5857       Surgical History  Past Surgical History:   Procedure Laterality Date   • BACK SURGERY     • BACK SURGERY      l4 s1 surgery    • CERVICAL FUSION      c4 and c5   • CHOLECYSTECTOMY     • COLONOSCOPY  2014    kutty   • ELBOW SURGERY Left     REPAIR   • EYE SURGERY     • EYE SURGERY     • EYE SURGERY Right 2022    right eye surgery   • GANGLION CYST EXCISION Left 06/05/2020    Procedure: KNEE EXCISION BAKERS CYST;  Surgeon: Kae Manzano MD;  [facility];  Service: Orthopedics   • KNEE ARTHROSCOPY Left    • LUMBAR DISCECTOMY      L5 S1   • ORIF RADIAL SHAFT FRACTURE     • [place] ARTHROSCOPY KNEE DIAGNOSTIC W/WO SYNOVIAL BX SPX Left 12/21/2020    Procedure: KNEE ARTHROSCOPY, popliteal cyst decompression;  Surgeon: Donal Cannon MD;  [facility];  Service: Orthopedics   • AZ ESOPHAGOGASTRODUODENOSCOPY TRANSORAL DIAGNOSTIC N/A 11/01/2016    Procedure: ESOPHAGOGASTRODUODENOSCOPY (EGD);   Surgeon: Zelda Camarena MD;  [facility]: [facility]; Service: Gastroenterology   • AZ ESOPHAGOGASTRODUODENOSCOPY TRANSORAL DIAGNOSTIC N/A 05/31/2018    Procedure: ESOPHAGOGASTRODUODENOSCOPY (EGD); Surgeon: Meena Mackey MD;  [facility];   Service: Gastroenterology   • AZ LAMOT PRTL FFD EXC [address] REEXPL [address] LUMBAR N/A 10/24/2019    Procedure: Revision laminectomy and decompression at L5-S1;  Surgeon: Jennie Mathews MD;  [facility];  Service: Orthopedics   • SMALL INTESTINE SURGERY      GIST surgery   • [address] [facility] PROCEDURE  05/21/2019   • [address] MSK PROCEDURE  10/30/2020       Family History  Family History   Problem Relation Age of Onset   • Thyroid disease Mother    • Thyroid disease Brother    • Diabetes Paternal Grandmother    • Hypertension Family    • Skin cancer Father        Social History  Social History     Socioeconomic History   • Marital status: /Civil Union     Spouse name: Not on file   • Number of children: Not on file   • Years of education: Not on file   • Highest education level: Not on file   Occupational History   • Not on file   Tobacco Use   • Smoking status: Never   • Smokeless tobacco: Never   Vaping Use   • Vaping Use: Never used   Substance and Sexual Activity   • Alcohol use: Yes     Comment: occasional   • Drug use: No   • Sexual activity: Yes   Other Topics Concern   • Not on file   Social History Narrative   • Not on file     Social Determinants of Health     Financial Resource Strain: Not on file   Food Insecurity: Not on file   Transportation Needs: Not on file   Physical Activity: Not on file   Stress: Not on file   Social Connections: Not on file   Intimate Partner Violence: Not on file   Housing Stability: Not on file       Current Medications  Current Outpatient Medications   Medication Sig Dispense Refill   • Alphagan P 0 1 % INSTILL 1 DROP BY OPHTHALMIC ROUTE TWICE A DAY INTO RIGHT EYE     • calcium carbonate (TUMS) 500 mg chewable tablet Chew 1 tablet daily When needed     • cholestyramine (QUESTRAN) 4 g packet Take 1 packet (4 g total) by mouth daily 30 each 3   • dorzolamide-timolol (COSOPT) 22 3-6 8 MG/ML ophthalmic solution One drop four times a day  4   • famotidine (PEPCID) 40 MG tablet Take 1 tablet (40 mg total) by mouth daily at bedtime 90 tablet 1   • GLUCOSAMINE CHONDROITIN COMPLX PO Take 1 tablet by mouth daily 4500mg  daily      • Misc Natural Products (OSTEO BI-FLEX JOINT SHIELD PO) Take by mouth     • Multiple Vitamins-Minerals (MULTIVITAL) tablet Take 1 tablet by mouth daily Spectravite      • ofloxacin (OCUFLOX) 0 3 % ophthalmic solution PLEASE SEE ATTACHED FOR DETAILED DIRECTIONS     • pantoprazole (PROTONIX) 40 mg tablet Take 1 tablet (40 mg total) by mouth daily 90 tablet 1   • Prolensa 0 07 % SOLN Administer 0 07 drops to the right eye in the morning     • tamsulosin (FLOMAX) 0 4 mg Take 1 capsule (0 4 mg total) by mouth daily with dinner Has been taking 0 4 mg for man y years 180 capsule 0   • calcipotriene (DOVONOX) 0 005 % ointment Apply topically 2 (two) times a day (Patient not taking: Reported on 11/21/2022) 60 g 0   • Calcipotriene-Betameth Diprop 0 005-0 064 % FOAM Apply topically once daily for 90 days to affected areas  (Patient not taking: Reported on 11/21/2022) 60 g 6   • Lotemax SM 0 38 % GEL Administer 1 drop to the right eye 4 (four) times a day 1 drop 4 times daily (Patient not taking: Reported on 12/12/2022)     • prednisoLONE acetate (PRED FORTE) 1 % ophthalmic suspension INSTILL 1 DROP 4 TIMES EVERY DAY INTO RIGHT EYE (Patient not taking: Reported on 11/21/2022)       No current facility-administered medications for this visit  Allergies  No Known Allergies    Vitals  Vitals:    12/12/22 0740   BP: 128/72   BP Location: Left arm   Patient Position: Sitting   Cuff Size: Adult   Pulse: 76   SpO2: 98%   Weight: 82 6 kg (182 lb)   Height: 5' 8" (1 727 m)       Physical Exam  Physical Exam  Constitutional:       General: He is not in acute distress  Appearance: Normal appearance  He is normal weight  He is not ill-appearing or toxic-appearing  HENT:      Head: Normocephalic and atraumatic  Right Ear: External ear normal       Left Ear: External ear normal       Nose: Nose normal    Eyes:      Conjunctiva/sclera:      Right eye: Right conjunctiva is injected  No exudate  Left eye: Left conjunctiva is not injected  No exudate  Cardiovascular:      Rate and Rhythm: Normal rate and regular rhythm  Pulses: Normal pulses  Heart sounds: Normal heart sounds  No murmur heard  Pulmonary:      Effort: Pulmonary effort is normal  No respiratory distress  Breath sounds: Normal breath sounds  No stridor  No wheezing, rhonchi or rales  Abdominal:      General: Abdomen is flat  Bowel sounds are normal  There is no distension  Palpations: Abdomen is soft  There is no mass  Tenderness: There is no right CVA tenderness or left CVA tenderness  Musculoskeletal:         General: Normal range of motion  Cervical back: Normal range of motion and neck supple  Right lower leg: No edema  Left lower leg: No edema  Skin:     General: Skin is warm and dry  Capillary Refill: Capillary refill takes less than 2 seconds  Neurological:      General: No focal deficit present  Mental Status: He is alert and oriented to person, place, and time  Cranial Nerves: No cranial nerve deficit  Psychiatric:         Mood and Affect: Mood normal          Behavior: Behavior normal          Thought Content:  Thought content normal          Judgment: Judgment normal

## 2022-12-12 NOTE — PROGRESS NOTES
12/12/2022  Eva Zamudio  1950  [id number]      Assessment   BPH with Lower Urinary Tract Symptoms not controlled to patient's satisfaction with pharmacotherapy  Discussion  Vashti Zamora is a 81 y o  male being managed by Dr Maia Valdes   We reviewed the risks and benefits of the UroLift procedure including but not limited to bleeding, infection, damage to nearby structures including bladder/prostate/ urethra, sexual dysfunction, incontinence, continued urinary retention, retrograde ejaculation, and/or need for additional surgeries  Patient verbalized understanding  Surgical consent was obtained today in the office  All questions were answered  Cystoscopy obtained 9/13/2022 by Dr Maia Valdes revealed an  enlarged with bilateral hypertrophy and  Kissing lobes with no no median lobe  Urine culture obtained 12/5/2022 and was negative  Uroflow obtained today and will be scanned into the chart  History of Present Illness  81 y o  male presents today to discuss his upcoming surgery  Frequency and urgency that has been getting worse over the past couple years  He reports past episodes of urinary retention and sensation of not being able to completely empty bladder  Previously managed by tamsulosin emma 2006  No previous surgery for BPH  No reported cardiac history and is not on any blood thinners/aspirin  No hx of Does take a multivitamin  No hx of ARIANE  Has a history of numerous right eye procedures including 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  Advised to wear a right eye shield during Urolift procedure by ophthalmologist      Review of Systems  Review of Systems   Constitutional: Negative for chills, fatigue and fever  HENT: Negative  Eyes: Positive for redness (s/p right corneal transplant )  Respiratory: Negative for shortness of breath  Cardiovascular: Negative for chest pain, palpitations and leg swelling  Gastrointestinal: Negative  Endocrine: Negative for heat intolerance and polydipsia  Genitourinary: Positive for frequency and urgency  Musculoskeletal: Negative  Allergic/Immunologic: Negative  Neurological: Negative for dizziness, tremors, syncope and light-headedness  Psychiatric/Behavioral: Negative  Past Medical History  Past Medical History:   Diagnosis Date   • Abnormal weight loss     RESOLVED: [id number]   • Anemia     RESOLVED: 92GUP9795   • Atypical chest pain     RESOLVED: 95AXS0585   • BPH (benign prostatic hyperplasia)    • Cancer (HCC)     DUODENAL   • Luis Miguel's syndrome    • Depression     RESOLVED: 29HRH5818   • Diverticulitis of colon     LAST ASSESSED: 35SKF3898   • Duodenal nodule     RESOLVED: 18GKH1696   • Gastrointestinal stromal tumor (GIST) ([facility] )     MALIGNANT; RESOLVED: 39HFD6153   • GERD (gastroesophageal reflux disease)    • Glaucoma     RESOLVED: 93ZOW5853   • Insomnia     RESOLVED: 17SBE0385   • Lactose intolerance     LAST ASSESSED: 86QGT8430       Surgical History  Past Surgical History:   Procedure Laterality Date   • BACK SURGERY     • BACK SURGERY      l4 s1 surgery    • CERVICAL FUSION      c4 and c5   • CHOLECYSTECTOMY     • COLONOSCOPY  2014    kutty   • ELBOW SURGERY Left     REPAIR   • EYE SURGERY     • EYE SURGERY     • EYE SURGERY Right 2022    right eye surgery   • GANGLION CYST EXCISION Left 06/05/2020    Procedure: KNEE EXCISION BAKERS CYST;  Surgeon: Tawanna Peterson MD;  [facility];  Service: Orthopedics   • KNEE ARTHROSCOPY Left    • LUMBAR DISCECTOMY      L5 S1   • ORIF RADIAL SHAFT FRACTURE     • [place] ARTHROSCOPY KNEE DIAGNOSTIC W/WO SYNOVIAL BX SPX Left 12/21/2020    Procedure: KNEE ARTHROSCOPY, popliteal cyst decompression;  Surgeon: Cydney Beck MD;  [facility];  Service: Orthopedics   • [place] ESOPHAGOGASTRODUODENOSCOPY TRANSORAL DIAGNOSTIC N/A 11/01/2016    Procedure: ESOPHAGOGASTRODUODENOSCOPY (EGD);   Surgeon: Rhea Hawkins MD;  [facility]; Service: Gastroenterology   • [place] ESOPHAGOGASTRODUODENOSCOPY TRANSORAL DIAGNOSTIC N/A 05/31/2018    Procedure: ESOPHAGOGASTRODUODENOSCOPY (EGD); Surgeon: Fidel Shaw MD;  [facility];   Service: Gastroenterology   • [place] LAMOT PRTL FFD EXC [address] REEXPL [address] LUMBAR N/A 10/24/2019    Procedure: Revision laminectomy and decompression at L5-S1;  Surgeon: Madhuri Cha MD;  [facility];  Service: Orthopedics   • SMALL INTESTINE SURGERY      GIST surgery   • [address] MS[address] PROCEDURE  05/21/2019   • [address] [facility] PROCEDURE  10/30/2020       Family History  Family History   Problem Relation Age of Onset   • Thyroid disease Mother    • Thyroid disease Brother    • Diabetes Paternal Grandmother    • Hypertension Family    • Skin cancer Father        Social History  Social History     Socioeconomic History   • Marital status: /Civil Union     Spouse name: Not on file   • Number of children: Not on file   • Years of education: Not on file   • Highest education level: Not on file   Occupational History   • Not on file   Tobacco Use   • Smoking status: Never   • Smokeless tobacco: Never   Vaping Use   • Vaping Use: Never used   Substance and Sexual Activity   • Alcohol use: Yes     Comment: occasional   • Drug use: No   • Sexual activity: Yes   Other Topics Concern   • Not on file   Social History Narrative   • Not on file     Social Determinants of Health     Financial Resource Strain: Not on file   Food Insecurity: Not on file   Transportation Needs: Not on file   Physical Activity: Not on file   Stress: Not on file   Social Connections: Not on file   Intimate Partner Violence: Not on file   Housing Stability: Not on file       Current Medications  Current Outpatient Medications   Medication Sig Dispense Refill   • Alphagan P 0 1 % INSTILL 1 DROP BY OPHTHALMIC ROUTE TWICE A DAY INTO RIGHT EYE     • calcium carbonate (TUMS) 500 mg chewable tablet Chew 1 tablet daily When needed     • cholestyramine (QUESTRAN) 4 g packet Take 1 packet (4 g total) by mouth daily 30 each 3   • dorzolamide-timolol (COSOPT) 22 3-6 8 MG/ML ophthalmic solution One drop four times a day  4   • famotidine (PEPCID) 40 MG tablet Take 1 tablet (40 mg total) by mouth daily at bedtime 90 tablet 1   • GLUCOSAMINE CHONDROITIN COMPLX PO Take 1 tablet by mouth daily 4500mg  daily      • Misc Natural Products (OSTEO BI-FLEX JOINT SHIELD PO) Take by mouth     • Multiple Vitamins-Minerals (MULTIVITAL) tablet Take 1 tablet by mouth daily Spectravite      • ofloxacin (OCUFLOX) 0 3 % ophthalmic solution PLEASE SEE ATTACHED FOR DETAILED DIRECTIONS     • pantoprazole (PROTONIX) 40 mg tablet Take 1 tablet (40 mg total) by mouth daily 90 tablet 1   • Prolensa 0 07 % SOLN Administer 0 07 drops to the right eye in the morning     • tamsulosin (FLOMAX) 0 4 mg Take 1 capsule (0 4 mg total) by mouth daily with dinner Has been taking 0 4 mg for man y years 180 capsule 0   • calcipotriene (DOVONOX) 0 005 % ointment Apply topically 2 (two) times a day (Patient not taking: Reported on 11/21/2022) 60 g 0   • Calcipotriene-Betameth Diprop 0 005-0 064 % FOAM Apply topically once daily for 90 days to affected areas  (Patient not taking: Reported on 11/21/2022) 60 g 6   • Lotemax SM 0 38 % GEL Administer 1 drop to the right eye 4 (four) times a day 1 drop 4 times daily (Patient not taking: Reported on 12/12/2022)     • prednisoLONE acetate (PRED FORTE) 1 % ophthalmic suspension INSTILL 1 DROP 4 TIMES EVERY DAY INTO RIGHT EYE (Patient not taking: Reported on 11/21/2022)       No current facility-administered medications for this visit  Allergies  No Known Allergies    Vitals  Vitals:    12/12/22 0740   BP: 128/72   BP Location: Left arm   Patient Position: Sitting   Cuff Size: Adult   Pulse: 76   SpO2: 98%   Weight: 82 6 kg (182 lb)   Height: 5' 8" (1 727 m)       Physical Exam  Physical Exam  Constitutional:       General: He is not in acute distress  Appearance: Normal appearance  He is normal weight  He is not ill-appearing or toxic-appearing  HENT:      Head: Normocephalic and atraumatic  Right Ear: External ear normal       Left Ear: External ear normal       Nose: Nose normal    Eyes:      Conjunctiva/sclera:      Right eye: Right conjunctiva is injected  No exudate  Left eye: Left conjunctiva is not injected  No exudate  Cardiovascular:      Rate and Rhythm: Normal rate and regular rhythm  Pulses: Normal pulses  Heart sounds: Normal heart sounds  No murmur heard  Pulmonary:      Effort: Pulmonary effort is normal  No respiratory distress  Breath sounds: Normal breath sounds  No stridor  No wheezing, rhonchi or rales  Abdominal:      General: Abdomen is flat  Bowel sounds are normal  There is no distension  Palpations: Abdomen is soft  There is no mass  Tenderness: There is no right CVA tenderness or left CVA tenderness  Musculoskeletal:         General: Normal range of motion  Cervical back: Normal range of motion and neck supple  Right lower leg: No edema  Left lower leg: No edema  Skin:     General: Skin is warm and dry  Capillary Refill: Capillary refill takes less than 2 seconds  Neurological:      General: No focal deficit present  Mental Status: He is alert and oriented to person, place, and time  Cranial Nerves: No cranial nerve deficit  Psychiatric:         Mood and Affect: Mood normal          Behavior: Behavior normal          Thought Content:  Thought content normal          Judgment: Judgment normal

## 2022-12-15 ENCOUNTER — TELEPHONE (OUTPATIENT)
Dept: UROLOGY | Facility: MEDICAL CENTER | Age: 72
End: 2022-12-15

## 2022-12-15 NOTE — PRE-PROCEDURE INSTRUCTIONS
Pre-Surgery Instructions:   Medication Instructions   • Alphagan P 0 1 % Take day of surgery  • calcium carbonate (TUMS) 500 mg chewable tablet Hold day of surgery  • cholestyramine (QUESTRAN) 4 g packet Hold day of surgery  • dorzolamide-timolol (COSOPT) 22 3-6 8 MG/ML ophthalmic solution Take day of surgery  • famotidine (PEPCID) 40 MG tablet Take night before surgery   • Misc Natural Products (OSTEO BI-FLEX JOINT SHIELD PO) Stop taking 7 days prior to surgery  • Multiple Vitamins-Minerals (MULTIVITAL) tablet Stop taking 7 days prior to surgery  • ofloxacin (OCUFLOX) 0 3 % ophthalmic solution Take day of surgery  • pantoprazole (PROTONIX) 40 mg tablet Take day of surgery  • prednisoLONE acetate (PRED FORTE) 1 % ophthalmic suspension Take day of surgery  • Prolensa 0 07 % SOLN Take day of surgery  • tamsulosin (FLOMAX) 0 4 mg Take night before surgery    Med list reviewed as above  Also instructed pt not to start any new vitamins/supplements preoperatively and to avoid NSAID's  7 days prior to surgery  Tylenol is acceptable if needed  Pt has and/or is getting CHG surgical soap and verbalizes understanding of preoperative showering protocol  All information within "My Surgical Experience" pamphlet reviewed and patient verbalizes understanding and compliance  All questions answered

## 2022-12-15 NOTE — TELEPHONE ENCOUNTER
Patient of Dr Angle Rodriguez in [place]    Patient called stating He would like to Speak to   [facility]  He only has good news to share       He can be reached at [phone]

## 2022-12-19 ENCOUNTER — ANESTHESIA EVENT (OUTPATIENT)
Dept: PERIOP | Facility: HOSPITAL | Age: 72
End: 2022-12-19

## 2022-12-20 ENCOUNTER — ANESTHESIA (OUTPATIENT)
Dept: PERIOP | Facility: HOSPITAL | Age: 72
End: 2022-12-20

## 2022-12-20 ENCOUNTER — HOSPITAL ENCOUNTER (OUTPATIENT)
Facility: HOSPITAL | Age: 72
Setting detail: OUTPATIENT SURGERY
Discharge: HOME | End: 2022-12-20
Attending: UROLOGY | Admitting: UROLOGY

## 2022-12-20 VITALS
OXYGEN SATURATION: 99 % | WEIGHT: 182 LBS | TEMPERATURE: 97.6 F | HEART RATE: 58 BPM | DIASTOLIC BLOOD PRESSURE: 74 MMHG | BODY MASS INDEX: 27.58 KG/M2 | SYSTOLIC BLOOD PRESSURE: 138 MMHG | HEIGHT: 68 IN | RESPIRATION RATE: 16 BRPM

## 2022-12-20 DIAGNOSIS — N40.0 ENLARGED PROSTATE WITHOUT LOWER URINARY TRACT SYMPTOMS (LUTS): Primary | ICD-10-CM

## 2022-12-20 DEVICE — IMPLANT CARTRIDGE UROLIFT 2: Type: IMPLANTABLE DEVICE | Site: URETHRA | Status: FUNCTIONAL

## 2022-12-20 RX ORDER — HYDROMORPHONE HCL IN WATER/PF 6 MG/30 ML
0.2 PATIENT CONTROLLED ANALGESIA SYRINGE INTRAVENOUS
Status: DISCONTINUED | OUTPATIENT
Start: 2022-12-20 | End: 2022-12-20 | Stop reason: HOSPADM

## 2022-12-20 RX ORDER — LIDOCAINE HYDROCHLORIDE 10 MG/ML
INJECTION, SOLUTION EPIDURAL; INFILTRATION; INTRACAUDAL; PERINEURAL AS NEEDED
Status: DISCONTINUED | OUTPATIENT
Start: 2022-12-20 | End: 2022-12-20

## 2022-12-20 RX ORDER — ONDANSETRON 2 MG/ML
4 INJECTION INTRAMUSCULAR; INTRAVENOUS ONCE AS NEEDED
Status: DISCONTINUED | OUTPATIENT
Start: 2022-12-20 | End: 2022-12-20 | Stop reason: HOSPADM

## 2022-12-20 RX ORDER — FENTANYL CITRATE 50 UG/ML
INJECTION, SOLUTION INTRAMUSCULAR; INTRAVENOUS AS NEEDED
Status: DISCONTINUED | OUTPATIENT
Start: 2022-12-20 | End: 2022-12-20

## 2022-12-20 RX ORDER — FENTANYL CITRATE/PF 50 MCG/ML
25 SYRINGE (ML) INJECTION
Status: DISCONTINUED | OUTPATIENT
Start: 2022-12-20 | End: 2022-12-20 | Stop reason: HOSPADM

## 2022-12-20 RX ORDER — CEFAZOLIN SODIUM 2 G/50ML
2000 SOLUTION INTRAVENOUS ONCE
Status: COMPLETED | OUTPATIENT
Start: 2022-12-20 | End: 2022-12-20

## 2022-12-20 RX ORDER — HYDROCODONE BITARTRATE AND ACETAMINOPHEN 5; 325 MG/1; MG/1
1 TABLET ORAL EVERY 6 HOURS PRN
Status: DISCONTINUED | OUTPATIENT
Start: 2022-12-20 | End: 2022-12-20 | Stop reason: HOSPADM

## 2022-12-20 RX ORDER — DEXAMETHASONE SODIUM PHOSPHATE 10 MG/ML
INJECTION, SOLUTION INTRAMUSCULAR; INTRAVENOUS AS NEEDED
Status: DISCONTINUED | OUTPATIENT
Start: 2022-12-20 | End: 2022-12-20

## 2022-12-20 RX ORDER — HYDROCODONE BITARTRATE AND ACETAMINOPHEN 5; 325 MG/1; MG/1
1 TABLET ORAL EVERY 4 HOURS PRN
Qty: 5 TABLET | Refills: 0 | Status: SHIPPED | OUTPATIENT
Start: 2022-12-20 | End: 2022-12-22

## 2022-12-20 RX ORDER — IBUPROFEN 600 MG/1
600 TABLET ORAL EVERY 6 HOURS PRN
Status: DISCONTINUED | OUTPATIENT
Start: 2022-12-20 | End: 2022-12-20 | Stop reason: HOSPADM

## 2022-12-20 RX ORDER — ONDANSETRON 2 MG/ML
INJECTION INTRAMUSCULAR; INTRAVENOUS AS NEEDED
Status: DISCONTINUED | OUTPATIENT
Start: 2022-12-20 | End: 2022-12-20

## 2022-12-20 RX ORDER — CEPHALEXIN 500 MG/1
500 CAPSULE ORAL EVERY 12 HOURS SCHEDULED
Qty: 6 CAPSULE | Refills: 0 | Status: SHIPPED | OUTPATIENT
Start: 2022-12-20 | End: 2022-12-23

## 2022-12-20 RX ORDER — PROPOFOL 10 MG/ML
INJECTION, EMULSION INTRAVENOUS AS NEEDED
Status: DISCONTINUED | OUTPATIENT
Start: 2022-12-20 | End: 2022-12-20

## 2022-12-20 RX ORDER — SODIUM CHLORIDE, SODIUM LACTATE, POTASSIUM CHLORIDE, CALCIUM CHLORIDE 600; 310; 30; 20 MG/100ML; MG/100ML; MG/100ML; MG/100ML
125 INJECTION, SOLUTION INTRAVENOUS CONTINUOUS
Status: DISCONTINUED | OUTPATIENT
Start: 2022-12-20 | End: 2022-12-20 | Stop reason: HOSPADM

## 2022-12-20 RX ADMIN — PROPOFOL 200 MG: 10 INJECTION, EMULSION INTRAVENOUS at 08:36

## 2022-12-20 RX ADMIN — SODIUM CHLORIDE, SODIUM LACTATE, POTASSIUM CHLORIDE, AND CALCIUM CHLORIDE 125 ML/HR: .6; .31; .03; .02 INJECTION, SOLUTION INTRAVENOUS at 08:05

## 2022-12-20 RX ADMIN — LIDOCAINE HYDROCHLORIDE 50 MG: 10 INJECTION, SOLUTION EPIDURAL; INFILTRATION; INTRACAUDAL; PERINEURAL at 08:36

## 2022-12-20 RX ADMIN — FENTANYL CITRATE 50 MCG: 50 INJECTION INTRAMUSCULAR; INTRAVENOUS at 09:08

## 2022-12-20 RX ADMIN — FENTANYL CITRATE 50 MCG: 50 INJECTION INTRAMUSCULAR; INTRAVENOUS at 08:47

## 2022-12-20 RX ADMIN — ONDANSETRON 4 MG: 2 INJECTION INTRAMUSCULAR; INTRAVENOUS at 08:57

## 2022-12-20 RX ADMIN — DEXAMETHASONE SODIUM PHOSPHATE 10 MG: 10 INJECTION, SOLUTION INTRAMUSCULAR; INTRAVENOUS at 08:57

## 2022-12-20 RX ADMIN — CEFAZOLIN SODIUM 2000 MG: 2 SOLUTION INTRAVENOUS at 08:39

## 2022-12-20 NOTE — ANESTHESIA POSTPROCEDURE EVALUATION
Post-Op Assessment Note    CV Status:  Stable  Pain Score: 0    Pain management: adequate     Mental Status:  Sleepy   Hydration Status:  Euvolemic   PONV Controlled:  Controlled   Airway Patency:  Patent      Post Op Vitals Reviewed: Yes      Staff: CRNA   Comments: Pt able to maintain own airway, VSS, report to recovery RN        No notable events documented      BP   136/83   Temp   97 2   Pulse  50   Resp   12   SpO2   100%

## 2022-12-20 NOTE — ANESTHESIA PREPROCEDURE EVALUATION
Procedure:  CYSTOSCOPY WITH INSERTION UROLIFT (Urethra)    Relevant Problems   CARDIO   (+) Atypical chest pain   (+) Essential hypertriglyceridemia   (+) Hyperlipidemia      GI/HEPATIC   (+) Esophageal reflux   (+) Gastroesophageal reflux disease without esophagitis      MUSCULOSKELETAL   (+) Primary osteoarthritis of knee      NEURO/PSYCH   (+) History of gastrointestinal stromal tumor (GIST)   (+) Mild depression      Digestive   (+) Melena      Nervous and Auditory   (+) Lumbar disc herniation with radiculopathy   (+) Lumbar radiculopathy   (+) Neuropathy of left saphenous nerve   (+) Tremor, essential      Musculoskeletal and Integument   (+) Baker cyst, left   (+) Herniation of intervertebral disc between L5 and S1   (+) Subacromial impingement of right shoulder      Other   (+) Belching   (+) Body aches   (+) Enlarged prostate without lower urinary tract symptoms (luts)   (+) Fuchs' corneal dystrophy   (+) Insomnia   (+) Myalgia   (+) Spinal stenosis of lumbar region   (+) Splenomegaly   (+) Status post arthroscopy of left knee      Lab Results   Component Value Date     09/25/2015    SODIUM 138 12/05/2022    K 4 2 12/05/2022     12/05/2022    CO2 29 12/05/2022    ANIONGAP 4 09/25/2015    AGAP 4 12/05/2022    BUN 12 12/05/2022    CREATININE 0 98 12/05/2022    GLUC 96 03/04/2017    GLUF 104 (H) 12/05/2022    CALCIUM 9 2 12/05/2022    AST 21 06/04/2022    ALT 30 06/04/2022    ALKPHOS 82 06/04/2022    PROT 7 0 09/25/2015    TP 7 6 06/21/2022    BILITOT 0 70 09/25/2015    TBILI 0 86 06/04/2022    EGFR 77 12/05/2022     Lab Results   Component Value Date    WBC 4 57 06/04/2022    HGB 16 8 06/04/2022    HCT 48 5 06/04/2022    MCV 91 06/04/2022     (L) 06/04/2022       Physical Exam    Airway    Mallampati score:  I  TM Distance: >3 FB  Neck ROM: full     Dental       Cardiovascular      Pulmonary      Other Findings        Anesthesia Plan  ASA Score- 2     Anesthesia Type- general with ASA Monitors  Additional Monitors:   Airway Plan: LMA  Plan Factors-Exercise tolerance (METS): >4 METS  Chart reviewed  EKG reviewed  Imaging results reviewed  Existing labs reviewed  Patient summary reviewed  Induction- intravenous  Postoperative Plan- Plan for postoperative opioid use  Planned trial extubation    Informed Consent- Anesthetic plan and risks discussed with patient  I personally reviewed this patient with the CRNA  Discussed and agreed on the Anesthesia Plan with the CRNA  Lyudmila Patino

## 2022-12-20 NOTE — OP NOTE
OPERATIVE REPORT  PATIENT NAME: Kaye Patton    :  1950  MRN: [id number]  Pt Location: [facility] 01    SURGERY DATE: 2022    Surgeon(s) and Role:     Radha Quinones MD - Primary    Preop Diagnosis:  Urinary frequency [R35 0]    Post-Op Diagnosis Codes:     * Urinary frequency [R35 0]    Procedure(s) (LRB):  CYSTOSCOPY WITH INSERTION UROLIFT WITH 6 IMPLANTS (N/A)    Specimen(s):  * No specimens in log *    Estimated Blood Loss:   Minimal    Drains:  Urethral Catheter Latex 20 Fr  (Active)   Number of days: 0       Anesthesia Type:   General    Operative Indications:  Urinary frequency [R35 0]      Operative Findings:  Bladder outlet obstruction  6 implants placed  Complications:   None    Procedure and Technique:  The patient was identified, brought to the operating room, and placed on the table in supine position  After induction of general anesthesia, the patient was placed in dorsal lithotomy position and prepped and draped in the usual sterile fashion  A complete formal timeout was performed  A 20F cystoscope was inserted into the bladder  The cystoscopy bridge was replaced with a UroLift delivery device  The first treatment site was the patient's left side at the level of the verumontanum  The distal tip of the delivery device was then angled laterally approximately 20 degrees at this position to compress the lateral lobe  The trigger was pulled, thereby deploying a needle containing the implant through the prostate  The needle was then retracted, allowing one end of the implant to be delivered to the capsular surface of the prostate  The implant was then tensioned to assure capsular seating and removal of slack monofilament  The device was then angled back toward midline and slowly advanced proximally until cystoscopic verification of the monofilament being centered in the delivery bay   The urethral end piece was then affixed to the monofilament thereby tailoring the size of the implant  Excess filament was then severed  The delivery device was then re-advanced into the bladder  The delivery device was then replaced with cystoscope and bridge and the implant location and opening effect was confirmed cystoscopically  The same procedure was then repeated on the right side, and two additional implants were delivered approximately 1 5 cm from the bladder neck, again one on left and one on right side of the prostate, following the same technique, essentially in stacking fashion  Cystoscopy then revealed a persistent area of obstruction at the apex inferiorly, and two more implants were delivered  A final cystoscopy was conducted first to inspect the location and state of each implant and second, to confirm the presence of a continuous anterior channel was present through the prostatic urethra with irrigation flow turned off  A murguia catheter was then placed  The patient tolerated the procedure well and was transferred to the recovery room awake alert and in stable condition      6 IMPLANTS PLACED       I was present for the entire procedure    Patient Disposition:  PACU     Plan: follow up tomorrow am for murguia removal     SIGNATURE: Molly Mackey MD  DATE: December 20, 2022  TIME: 9:14 AM

## 2022-12-20 NOTE — PERIOPERATIVE NURSING NOTE
Pt given [facility] at home instructions with extra supplies and wife states she understands all instructions for home    Pt denies pain and urine is red/punch colored; drained 400 ml prior to D/C

## 2022-12-20 NOTE — INTERVAL H&P NOTE
H&P reviewed  After examining the patient I find no changes in the patients condition since the H&P had been written  Vitals:    12/20/22 0754   BP: 141/92   Pulse: 68   Resp: 16   Temp: 97 7 °F (36 5 °C)   SpO2: 99%     Proceed as planned with urolift

## 2022-12-21 ENCOUNTER — PROCEDURE VISIT (OUTPATIENT)
Dept: UROLOGY | Facility: AMBULATORY SURGERY CENTER | Age: 72
End: 2022-12-21

## 2022-12-21 VITALS
DIASTOLIC BLOOD PRESSURE: 82 MMHG | BODY MASS INDEX: 27.43 KG/M2 | SYSTOLIC BLOOD PRESSURE: 158 MMHG | WEIGHT: 181 LBS | HEART RATE: 82 BPM | HEIGHT: 68 IN

## 2022-12-21 DIAGNOSIS — N40.0 ENLARGED PROSTATE WITHOUT LOWER URINARY TRACT SYMPTOMS (LUTS): Primary | ICD-10-CM

## 2022-12-21 NOTE — PROGRESS NOTES
12/21/2022    Albin Loza is a 81 y o  male  [id number]    Diagnosis:  Chief Complaint    Post-op; Murguia Removal         Patient presents for murguia removal s/p Urolift on 12/21/2022 with Dr Nathan Pizarro:  Follow up as scheduled for post op office visit  Knows to call the office in the meantime with concerns  Procedure:  Catheter removed after deflation of fully intact balloon without difficulty or any immediate complications  Urolift handout provided and reviewed with expectations  Advised to call today if he is having difficulty voiding today by afternoon      Vitals:    12/21/22 1057   BP: 158/82   Pulse: 82   Weight: 82 1 kg (181 lb)   Height: 5' 8" (1 727 m)         Mariel Tenorio RN

## 2023-01-16 NOTE — PROGRESS NOTES
1/17/2023    [facility]  1950  [id number]      Assessment  -BPH s/p Urolift (12/20/2022)    Discussion/Plan  Dickson Trevino is a 81 y o  male being managed by Dr Em Hodge       1  BPH s/p Urolift (12/20/2022)-    -All questions answered, patient agrees with plan      History of Present Illness  81 y o  male with a history of BPH presents today for follow up  Patient underwent Urolift insertion on 12/20/2022   6 implants were successfully placed  His murguia was successfully removed, and he discontinued Flomax  Last PSA from 6/5/2022 was 1 5      Review of Systems  Review of Systems    Past Medical History  Past Medical History:   Diagnosis Date   • Abnormal weight loss     RESOLVED: [id number]   • Anemia     RESOLVED: [id number]   • Atypical chest pain     RESOLVED: [id number]   • BPH (benign prostatic hyperplasia)    • Cancer (HCC)     DUODENAL   • Luis Miguel's syndrome    • Depression     RESOLVED: [id number]   • Disc disorder     cervical and lumbar   • Diverticulitis of colon     LAST ASSESSED: [id number]   • Duodenal nodule     RESOLVED: [id number]   • 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; RESOLVED: [id number]   • GERD (gastroesophageal reflux disease)    • Glaucoma     RESOLVED: 94QGH1056   • History of COVID-19 09/2021   • Insomnia     RESOLVED: [id number]       Past Social History  Past Surgical History:   Procedure Laterality Date   • BACK SURGERY     • BACK SURGERY      l4 s1 surgery    • CERVICAL FUSION      c4 and c5   • CHOLECYSTECTOMY     • COLONOSCOPY  2014 kutty   • ELBOW SURGERY Left     REPAIR   • EYE SURGERY     • EYE SURGERY     • EYE SURGERY Right 2022    right eye surgery   • GANGLION CYST EXCISION Left 06/05/2020    Procedure: KNEE EXCISION BAKERS CYST;  Surgeon: Perfecto Randall MD;  Location: [facility];  Service: Orthopedics   • KNEE ARTHROSCOPY Left    • LUMBAR DISCECTOMY      L5 S1   • ORIF RADIAL SHAFT FRACTURE     • OR ARTHROSCOPY KNEE DIAGNOSTIC W/WO SYNOVIAL BX SPX Left 12/21/2020    Procedure: KNEE ARTHROSCOPY, popliteal cyst decompression;  Surgeon: Magdalene Barragan MD;  [facility];  Service: Orthopedics   • UT CYSTO INSERTION TRANSPROSTATIC IMPLANT SINGLE N/A 12/20/2022    Procedure: CYSTOSCOPY WITH INSERTION UROLIFT WITH 6 IMPLANTS;  Surgeon: Shirley Higuera MD;  Location: [facility];  Service: Urology   • UT ESOPHAGOGASTRODUODENOSCOPY TRANSORAL DIAGNOSTIC N/A 11/01/2016    Procedure: ESOPHAGOGASTRODUODENOSCOPY (EGD); Surgeon: Regla Burgos MD;  Location: [facility]; Service: Gastroenterology   • UT ESOPHAGOGASTRODUODENOSCOPY TRANSORAL DIAGNOSTIC N/A 05/31/2018    Procedure: ESOPHAGOGASTRODUODENOSCOPY (EGD); Surgeon: Regla Burgos MD;  Location: [facility];   Service: Gastroenterology   • UT LAMOT PRTL FFD EXC [address] REEXPL [address] LUMBAR N/A 10/24/2019    Procedure: Revision laminectomy and decompression at L5-S1;  Surgeon: Smita Fong MD;  Location: [facility];  Service: Orthopedics   • SMALL INTESTINE SURGERY      GIST surgery   • [address] MSK PROCEDURE  05/21/2019   • [address] MSK PROCEDURE  10/30/2020       Past Family History  Family History   Problem Relation Age of Onset   • Thyroid disease Mother    • Thyroid disease Brother    • Diabetes Paternal Grandmother    • Hypertension Family    • Skin cancer Father        Past Social history  Social History     Socioeconomic History   • Marital status: /Civil Union     Spouse name: Not on file   • Number of children: Not on file   • Years of education: Not on file   • Highest education level: Not on file   Occupational History   • Not on file   Tobacco Use   • Smoking status: Never   • Smokeless tobacco: Never   Vaping Use   • Vaping Use: Never used   Substance and Sexual Activity   • Alcohol use: Yes     Comment: occasional   • Drug use: No   • Sexual activity: Yes   Other Topics Concern   • Not on file   Social History Narrative   • Not on file     Social Determinants of Health     Financial Resource Strain: Not on file   Food Insecurity: Not on file   Transportation Needs: Not on file   Physical Activity: Not on file   Stress: Not on file   Social Connections: Not on file   Intimate Partner Violence: Not on file   Housing Stability: Not on file       Current Medications  Current Outpatient Medications   Medication Sig Dispense Refill   • Alphagan P 0 1 % INSTILL 1 DROP BY OPHTHALMIC ROUTE TWICE A DAY INTO RIGHT EYE     • calcium carbonate (TUMS) 500 mg chewable tablet Chew 1 tablet daily When needed     • cholestyramine (QUESTRAN) 4 g packet Take 1 packet (4 g total) by mouth daily (Patient taking differently: Take 1 packet by mouth every other day) 30 each 3   • dorzolamide-timolol (COSOPT) 22 3-6 8 MG/ML ophthalmic solution One drop four times a day  4   • famotidine (PEPCID) 40 MG tablet Take 1 tablet (40 mg total) by mouth daily at bedtime 90 tablet 1   • Misc Natural Products (OSTEO BI-FLEX JOINT SHIELD PO) Take by mouth     • Multiple Vitamins-Minerals (MULTIVITAL) tablet Take 1 tablet by mouth daily Spectravite      • ofloxacin (OCUFLOX) 0 3 % ophthalmic solution PLEASE SEE ATTACHED FOR DETAILED DIRECTIONS     • pantoprazole (PROTONIX) 40 mg tablet Take 1 tablet (40 mg total) by mouth daily 90 tablet 1   • prednisoLONE acetate (PRED FORTE) 1 % ophthalmic suspension      • Prolensa 0 07 % SOLN Administer 0 07 drops to the right eye in the morning     • tamsulosin (FLOMAX) 0 4 mg Take 1 capsule (0 4 mg total) by mouth daily with dinner Has been taking 0 4 mg for man y years 180 capsule 0     No current facility-administered medications for this visit  Allergies  No Known Allergies    Past Medical History, Social History, Family History, medications and allergies were reviewed  Vitals  There were no vitals filed for this visit      Physical Exam  Physical Exam    Results    I have personally reviewed all pertinent lab results and reviewed with patient  Lab Results   Component Value Date PSA 1 5 06/04/2022    PSA 1 4 07/07/2021    PSA 1 3 05/14/2020     Lab Results   Component Value Date    GLUCOSE 97 09/25/2015    CALCIUM 9 2 12/05/2022     09/25/2015    K 4 2 12/05/2022    CO2 29 12/05/2022     12/05/2022    BUN 12 12/05/2022    CREATININE 0 98 12/05/2022     Lab Results   Component Value Date    WBC 4 57 06/04/2022    HGB 16 8 06/04/2022    HCT 48 5 06/04/2022    MCV 91 06/04/2022     (L) 06/04/2022     No results found for this or any previous visit (from the past 1 hour(s))

## 2023-01-17 ENCOUNTER — TELEPHONE (OUTPATIENT)
Dept: UROLOGY | Facility: AMBULATORY SURGERY CENTER | Age: 73
End: 2023-01-17

## 2023-01-17 ENCOUNTER — TELEMEDICINE (OUTPATIENT)
Dept: UROLOGY | Facility: AMBULATORY SURGERY CENTER | Age: 73
End: 2023-01-17

## 2023-01-17 ENCOUNTER — TELEPHONE (OUTPATIENT)
Dept: UROLOGY | Facility: HOSPITAL | Age: 73
End: 2023-01-17

## 2023-01-17 DIAGNOSIS — N32.81 OAB (OVERACTIVE BLADDER): ICD-10-CM

## 2023-01-17 DIAGNOSIS — N40.0 ENLARGED PROSTATE WITHOUT LOWER URINARY TRACT SYMPTOMS (LUTS): Primary | ICD-10-CM

## 2023-01-17 NOTE — TELEPHONE ENCOUNTER
[facility]  Please schedule patient for uroflow/pvr in 2 weeks with nurse  Called [facility] and scheduled for 2/1 - He asked if when he gets there he can urinate right away as that is  his problem of not being abl to hold his urine   Told him I would document it in appointment

## 2023-01-17 NOTE — ASSESSMENT & PLAN NOTE
Patient reports significant improvement in urinary hesitancy and straining, but still notes episodes of urinary frequency, urgency, and now occasional spraying urinary stream when bladder is not full  He finds this embarrassing  We discussed that patient only 4 weeks out from procedure, and symptoms should continue to improve  However, I would like patient to return in 2 weeks for PVR/Uroflow with nurse  If he is still experiencing spraying stream, will arrange for cystoscopy with Dr Wilmer Brar to assess for urethral stricture  In the interim, he may remain off Flomax  He will continue kegel exercises, as urinary incontinence improving  Next PSA due in June 2023

## 2023-01-17 NOTE — PROGRESS NOTES
Virtual Brief Visit    Patient is located in the following state in which I hold an active license [place]      Assessment/Plan:    Problem List Items Addressed This Visit        Other    Enlarged prostate without lower urinary tract symptoms (luts) - Primary     Patient reports significant improvement in urinary hesitancy and straining, but still notes episodes of urinary frequency, urgency, and now occasional spraying urinary stream when bladder is not full  He finds this embarrassing  We discussed that patient only 4 weeks out from procedure, and symptoms should continue to improve  However, I would like patient to return in 2 weeks for PVR/Uroflow with nurse  If he is still experiencing spraying stream, will arrange for cystoscopy with Dr Peyton Quintanilla to assess for urethral stricture  In the interim, he may remain off Flomax  He will continue kegel exercises, as urinary incontinence improving  Next PSA due in June 2023  Other Visit Diagnoses     OAB (overactive bladder)            Follow up in 2 weeks with nurse for PVR/Uroflow assessment  All questions answered, patient amenable with plan, and advised to call sooner with any issues  History of Present Illness  81 y o  male with a history of BPH presents today for follow up  Patient underwent Urolift insertion on 12/20/2022   6 implants were successfully placed  His murguia was removed by nursing staff, and he discontinued Flomax  Since the procedure, he expresses frustration in his symptoms of ongoing urinary frequency, urgency, and occasional spraying urinary stream when he does not have a full bladder  He has noticed improvement in urinary hesitancy and straining, and can now easily void  Patient was also not expecting to experience significant urinary incontinence after the murguia catheter was removed  He reports having to wear sanitary briefs for 3 weeks    Patient was advised by nursing staff to practice kegel exercises, which he has been performing, and has noticed a slight improvement in his ability to hold his urine with urge episodes  He denies any gross hematuria or dysuria  Last PSA from 6/5/2022 was 1 5  AUA SYMPTOM SCORE    Flowsheet Row Most Recent Value   AUA SYMPTOM SCORE    How often have you had a sensation of not emptying your bladder completely after you finished urinating? 2 (P)     How often have you had to urinate again less than two hours after you finished urinating? 3 (P)     How often have you found you stopped and started again several times when you urinate? 4 (P)     How often have you found it difficult to postpone urination? 4 (P)     How often have you had a weak urinary stream? 4 (P)     How often have you had to push or strain to begin urination? 1 (P)     How many times did you most typically get up to urinate from the time you went to bed at night until the time you got up in the morning? 3 (P)     Quality of Life: If you were to spend the rest of your life with your urinary condition just the way it is now, how would you feel about that? 3 (P)     AUA SYMPTOM SCORE 21 (P)             Recent Visits  No visits were found meeting these conditions  Showing recent visits within past 7 days and meeting all other requirements  Today's Visits  Date Type Provider Dept   01/17/23 2750 Shaye Vragas, [address] [facility]   Showing today's visits and meeting all other requirements  Future Appointments  No visits were found meeting these conditions    Showing future appointments within next 150 days and meeting all other requirements         Visit Time    Visit Start Time: 0800  Visit Stop Time: 5638  Total Visit Duration: 15 minutes

## 2023-02-01 ENCOUNTER — PROCEDURE VISIT (OUTPATIENT)
Dept: UROLOGY | Facility: AMBULATORY SURGERY CENTER | Age: 73
End: 2023-02-01

## 2023-02-01 VITALS
WEIGHT: 185.4 LBS | HEIGHT: 68 IN | BODY MASS INDEX: 28.1 KG/M2 | DIASTOLIC BLOOD PRESSURE: 62 MMHG | SYSTOLIC BLOOD PRESSURE: 126 MMHG | HEART RATE: 72 BPM

## 2023-02-01 DIAGNOSIS — N40.0 ENLARGED PROSTATE WITHOUT LOWER URINARY TRACT SYMPTOMS (LUTS): Primary | ICD-10-CM

## 2023-02-01 LAB — POST-VOID RESIDUAL VOLUME, ML POC: 75 ML

## 2023-02-01 NOTE — PROGRESS NOTES
2/1/2023  Jenise Cardona is a 72 y o  male  [id number]    Diagnosis:  Chief Complaint    PVR/Uroflow         Patient presents for follow up post void residual s/p Urolift 12/20/2022 managed by Dr Branch Lights:  Follow up as scheduled with Dr Alireza Koehler to contact the office in the meantime with any questions or concerns  Assessment:      Vitals:    02/01/23 0931   BP: 126/62   BP Location: Left arm   Patient Position: Sitting   Cuff Size: Adult   Pulse: 72   Weight: 84 1 kg (185 lb 6 4 oz)   Height: 5' 8" (1 727 m)     Patient voided in the office  Uroflow was obtained  Post void residual measured via bladder scanner to be 75 mL      Recent Results (from the past 1 hour(s))   POCT Measure PVR    Collection Time: 02/01/23 10:19 AM   Result Value Ref Range    POST-VOID RESIDUAL VOLUME, ML POC 75 mL             JONO Grace, RN

## 2023-02-06 ENCOUNTER — RA CDI HCC (OUTPATIENT)
Dept: OTHER | Facility: HOSPITAL | Age: 73
End: 2023-02-06

## 2023-02-06 NOTE — PROGRESS NOTES
[facility]  coding opportunities       Chart reviewed, no opportunity found: CHART REVIEWED, NO OPPORTUNITY FOUND        Patients Insurance     Medicare Insurance: [employer] [facility] Advantage

## 2023-02-22 ENCOUNTER — OFFICE VISIT (OUTPATIENT)
Dept: INTERNAL MEDICINE CLINIC | Facility: CLINIC | Age: 73
End: 2023-02-22

## 2023-02-22 VITALS
DIASTOLIC BLOOD PRESSURE: 78 MMHG | SYSTOLIC BLOOD PRESSURE: 122 MMHG | BODY MASS INDEX: 28.22 KG/M2 | WEIGHT: 186.2 LBS | OXYGEN SATURATION: 98 % | HEART RATE: 86 BPM | RESPIRATION RATE: 16 BRPM | HEIGHT: 68 IN

## 2023-02-22 DIAGNOSIS — E78.1 ESSENTIAL HYPERTRIGLYCERIDEMIA: ICD-10-CM

## 2023-02-22 DIAGNOSIS — Z13.29 SCREENING FOR THYROID DISORDER: ICD-10-CM

## 2023-02-22 DIAGNOSIS — R73.01 IFG (IMPAIRED FASTING GLUCOSE): ICD-10-CM

## 2023-02-22 DIAGNOSIS — N40.0 ENLARGED PROSTATE WITHOUT LOWER URINARY TRACT SYMPTOMS (LUTS): ICD-10-CM

## 2023-02-22 DIAGNOSIS — H18.519 FUCHS' CORNEAL DYSTROPHY, UNSPECIFIED LATERALITY: Primary | ICD-10-CM

## 2023-02-22 DIAGNOSIS — E66.3 OVERWEIGHT (BMI 25.0-29.9): ICD-10-CM

## 2023-02-22 DIAGNOSIS — Z12.5 SCREENING PSA (PROSTATE SPECIFIC ANTIGEN): ICD-10-CM

## 2023-02-22 DIAGNOSIS — D69.6 LOW PLATELET COUNT (HCC): ICD-10-CM

## 2023-02-22 NOTE — PROGRESS NOTES
Assessment/Plan:    Essential hypertriglyceridemia  Reduce carbohydrates/sweets we will continue monitor lipid profile routine exercise advised    Fuchs' corneal dystrophy  Status post partial corneal transplant working with ophthalmology    Overweight (BMI 25 0-29  9)  I have counselled the pt to follow a healthy and balanced diet ,and recommend routine exercise  Enlarged prostate without lower urinary tract symptoms (luts)  Status post UroLift patient did experience urge incontinence x6 weeks following procedure which has improved at this point in time he still requires Flomax for the urine frequency continue follow-up with urology    Low platelet count (HCC)  Mild we will continue to monitor CBC    IFG (impaired fasting glucose)  Reduce carbohydrates/sweets routine exercise continue monitor fasting blood sugar and hemoglobin A1c         Problem List Items Addressed This Visit        Endocrine    IFG (impaired fasting glucose)     Reduce carbohydrates/sweets routine exercise continue monitor fasting blood sugar and hemoglobin A1c         Relevant Orders    Comprehensive metabolic panel    Hemoglobin A1C       Other    Enlarged prostate without lower urinary tract symptoms (luts)     Status post UroLift patient did experience urge incontinence x6 weeks following procedure which has improved at this point in time he still requires Flomax for the urine frequency continue follow-up with urology         Essential hypertriglyceridemia     Reduce carbohydrates/sweets we will continue monitor lipid profile routine exercise advised         Relevant Orders    Comprehensive metabolic panel    Lipid Panel with Direct LDL reflex    Overweight (BMI 25 0-29  9)     I have counselled the pt to follow a healthy and balanced diet ,and recommend routine exercise           Fuchs' corneal dystrophy - Primary     Status post partial corneal transplant working with ophthalmology         Low platelet count ([address] )     Mild we will continue to monitor CBC         Relevant Orders    CBC (Includes Diff/Plt) (Refl)   Other Visit Diagnoses     Screening PSA (prostate specific antigen)        Relevant Orders    PSA, Total Screen    Screening for thyroid disorder        Relevant Orders    TSH, 3rd generation with Free T4 reflex          Return to office 6  months  call if any problems  Subjective:      Patient ID: Keysha Parish is a 72 y o  male  HPI 69-year old male coming in for a follow up visit regarding future corneal dystrophy, essential hypertriglyceridemia, overweight, impaired fasting glucose, low platelet count and enlarged prostate; the patient reports me compliant taking medications without untoward side effects the  The patient is here to review his medical condition, update me on the medical condition and the patient reports me no hospitalizations and no ER visits  He does report to me after [place] he did have the UroLift he did experience urge incontinence for about 6 weeks afterwards which was very significant requiring him to wear a diaper  He has had improvement in his incontinence but still with ongoing urgency which he is required to use the Flomax  Also reports be working with his ophthalmologist regarding a partial corneal transplant for foods corneal dystrophy  Had uro lift flow better tried off flomax , the pt reports inc for 6 weeks; diet good not active  Trying to follow healthy balanced diet less active with the winter months    The following portions of the patient's history were reviewed and updated as appropriate: allergies, current medications, past family history, past medical history, past social history, past surgical history and problem list     Review of Systems   Constitutional: Negative for activity change, appetite change and unexpected weight change  HENT: Negative for congestion and postnasal drip  Eyes: Positive for visual disturbance     Respiratory: Negative for cough and shortness of breath  Cardiovascular: Negative for chest pain  Gastrointestinal: Negative for abdominal pain, diarrhea, nausea and vomiting  Neurological: Negative for dizziness, light-headedness and headaches  Objective:    Return in about 6 months (around 8/22/2023)  No results found        No Known Allergies    Past Medical History:   Diagnosis Date   • Abnormal weight loss     RESOLVED: [id number]   • Anemia     RESOLVED: [id number]   • Atypical chest pain     RESOLVED: [id number]   • BPH (benign prostatic hyperplasia)    • Cancer (HCC)     DUODENAL   • Luis Miguel's syndrome    • Depression     RESOLVED: [id number]   • Disc disorder     cervical and lumbar   • Diverticulitis of colon     LAST ASSESSED: [id number]   • Duodenal nodule     RESOLVED: [id number]   • 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[address] )     MALIGNANT; RESOLVED: [id number]   • GERD (gastroesophageal reflux disease)    • Glaucoma     RESOLVED: [id number]   • History of COVID-19 09/2021   • Insomnia     RESOLVED: 59IHO4066     Past Surgical History:   Procedure Laterality Date   • BACK SURGERY     • BACK SURGERY      l4 s1 surgery    • CERVICAL FUSION      c4 and c5   • CHOLECYSTECTOMY     • COLONOSCOPY  2014    kutty   • ELBOW SURGERY Left     REPAIR   • EYE SURGERY     • EYE SURGERY     • EYE SURGERY Right 2022    right eye surgery   • GANGLION CYST EXCISION Left 06/05/2020    Procedure: KNEE EXCISION BAKERS CYST;  Surgeon: Myriam Hart MD;  Location:  MAIN OR;  Service: Orthopedics   • KNEE ARTHROSCOPY Left    • LUMBAR DISCECTOMY      L5 S1   • ORIF RADIAL SHAFT FRACTURE     • KS ARTHROSCOPY KNEE DIAGNOSTIC W/WO SYNOVIAL BX SPX Left 12/21/2020    Procedure: KNEE ARTHROSCOPY, popliteal cyst decompression;  Surgeon: Dieter Chang MD;  [facility];  Service: Orthopedics   • KS CYSTO INSERTION TRANSPROSTATIC IMPLANT SINGLE N/A 12/20/2022    Procedure: CYSTOSCOPY WITH INSERTION UROLIFT WITH 6 IMPLANTS;  Surgeon: Eden Lynn MD; [facility]: [facility];  Service: Urology   • ID ESOPHAGOGASTRODUODENOSCOPY TRANSORAL DIAGNOSTIC N/A 11/01/2016    Procedure: ESOPHAGOGASTRODUODENOSCOPY (EGD); Surgeon: Harshad Dowling MD;  [facility]: [facility]; Service: Gastroenterology   • ID ESOPHAGOGASTRODUODENOSCOPY TRANSORAL DIAGNOSTIC N/A 05/31/2018    Procedure: ESOPHAGOGASTRODUODENOSCOPY (EGD); Surgeon: Harshad Dowling MD;  [facility];   Service: Gastroenterology   • ID LAMOT PRTL FFD EXC DISC REEXPL [address] LUMBAR N/A 10/24/2019    Procedure: Revision laminectomy and decompression at L5-S1;  Surgeon: Dilcia Segura MD;  [facility];  Service: Orthopedics   • SMALL INTESTINE SURGERY      GIST surgery   • [address] MSK PROCEDURE  05/21/2019   • [address] MSK PROCEDURE  10/30/2020     Current Outpatient Medications on File Prior to Visit   Medication Sig Dispense Refill   • Alphagan P 0 1 % INSTILL 1 DROP BY OPHTHALMIC ROUTE TWICE A DAY INTO RIGHT EYE     • calcium carbonate (TUMS) 500 mg chewable tablet Chew 1 tablet daily When needed     • cholestyramine (QUESTRAN) 4 g packet Take 1 packet (4 g total) by mouth daily (Patient taking differently: Take 1 packet by mouth every other day) 30 each 3   • dorzolamide-timolol (COSOPT) 22 3-6 8 MG/ML ophthalmic solution Administer 1 drop to the right eye 2 (two) times a day One drop four times a day  4   • famotidine (PEPCID) 40 MG tablet Take 1 tablet (40 mg total) by mouth daily at bedtime 90 tablet 1   • Misc Natural Products (OSTEO BI-FLEX JOINT SHIELD PO) Take by mouth     • Multiple Vitamins-Minerals (MULTIVITAL) tablet Take 1 tablet by mouth daily Spectravite      • pantoprazole (PROTONIX) 40 mg tablet Take 1 tablet (40 mg total) by mouth daily 90 tablet 1   • prednisoLONE acetate (PRED FORTE) 1 % ophthalmic suspension Administer 1 drop to the right eye 3 (three) times a day     • Prolensa 0 07 % SOLN Administer 0 07 drops to the right eye in the morning     • tamsulosin (FLOMAX) 0 4 mg Take 1 capsule (0 4 mg total) by mouth daily with dinner Has been taking 0 4 mg for man y years 180 capsule 0   • ofloxacin (OCUFLOX) 0 3 % ophthalmic solution PLEASE SEE ATTACHED FOR DETAILED DIRECTIONS (Patient not taking: Reported on 2/22/2023)       No current facility-administered medications on file prior to visit  Family History   Problem Relation Age of Onset   • Thyroid disease Mother    • Thyroid disease Brother    • Diabetes Paternal Grandmother    • Hypertension Family    • Skin cancer Father      Social History     Socioeconomic History   • Marital status: /Civil Union     Spouse name: Not on file   • Number of children: Not on file   • Years of education: Not on file   • Highest education level: Not on file   Occupational History   • Not on file   Tobacco Use   • Smoking status: Never   • Smokeless tobacco: Never   Vaping Use   • Vaping Use: Never used   Substance and Sexual Activity   • Alcohol use: Yes     Comment: occasional   • Drug use: No   • Sexual activity: Yes   Other Topics Concern   • Not on file   Social History Narrative   • Not on file     Social Determinants of Health     Financial Resource Strain: Not on file   Food Insecurity: Not on file   Transportation Needs: Not on file   Physical Activity: Not on file   Stress: Not on file   Social Connections: Not on file   Intimate Partner Violence: Not on file   Housing Stability: Not on file     Vitals:    02/22/23 0901   BP: 122/78   Pulse: 86   Resp: 16   SpO2: 98%   Weight: 84 5 kg (186 lb 3 2 oz)   Height: 5' 8" (1 727 m)     Results for orders placed or performed in visit on 02/01/23   POCT Measure PVR   Result Value Ref Range    POST-VOID RESIDUAL VOLUME, ML POC 75 mL     Weight (last 2 days)     Date/Time Weight    02/22/23 0901 84 5 (186 2)        Body mass index is 28 31 kg/m²    BP      Temp      Pulse     Resp      SpO2        Vitals:    02/22/23 0901   Weight: 84 5 kg (186 lb 3 2 oz)     Vitals:    02/22/23 0901   Weight: 84 5 kg (186 lb 3 2 oz)       /78   Pulse 86   Resp 16   Ht 5' 8" (1 727 m)   Wt 84 5 kg (186 lb 3 2 oz)   SpO2 98%   BMI 28 31 kg/m²          Physical Exam  Vitals and nursing note reviewed  Constitutional:       General: He is not in acute distress  Appearance: Normal appearance  He is well-developed  He is not ill-appearing, toxic-appearing or diaphoretic  HENT:      Head: Normocephalic and atraumatic  Right Ear: External ear normal       Left Ear: External ear normal    Eyes:      General: No scleral icterus  Right eye: No discharge  Left eye: No discharge  Conjunctiva/sclera: Conjunctivae normal       Pupils: Pupils are equal, round, and reactive to light  Cardiovascular:      Rate and Rhythm: Normal rate and regular rhythm  Heart sounds: Normal heart sounds  No murmur heard  No friction rub  No gallop  Pulmonary:      Effort: No respiratory distress  Breath sounds: No wheezing or rales  Abdominal:      General: Bowel sounds are normal  There is no distension  Palpations: Abdomen is soft  There is no mass  Tenderness: There is no abdominal tenderness  There is no guarding or rebound  Musculoskeletal:         General: No deformity  Cervical back: Neck supple  Lymphadenopathy:      Cervical: No cervical adenopathy  Neurological:      Mental Status: He is alert

## 2023-02-23 PROBLEM — R73.01 IFG (IMPAIRED FASTING GLUCOSE): Status: ACTIVE | Noted: 2023-02-23

## 2023-02-23 PROBLEM — D69.6 LOW PLATELET COUNT (HCC): Status: ACTIVE | Noted: 2023-02-23

## 2023-02-23 NOTE — ASSESSMENT & PLAN NOTE
Reduce carbohydrates/sweets routine exercise continue monitor fasting blood sugar and hemoglobin A1c

## 2023-02-23 NOTE — ASSESSMENT & PLAN NOTE
Status post UroLift patient did experience urge incontinence x6 weeks following procedure which has improved at this point in time he still requires Flomax for the urine frequency continue follow-up with urology

## 2023-05-04 DIAGNOSIS — N40.0 ENLARGED PROSTATE WITHOUT LOWER URINARY TRACT SYMPTOMS (LUTS): ICD-10-CM

## 2023-05-04 RX ORDER — TAMSULOSIN HYDROCHLORIDE 0.4 MG/1
CAPSULE ORAL
Qty: 90 CAPSULE | Refills: 1 | Status: SHIPPED | OUTPATIENT
Start: 2023-05-04

## 2023-05-09 DIAGNOSIS — K21.9 GASTROESOPHAGEAL REFLUX DISEASE WITHOUT ESOPHAGITIS: ICD-10-CM

## 2023-05-09 RX ORDER — PANTOPRAZOLE SODIUM 40 MG/1
TABLET, DELAYED RELEASE ORAL
Qty: 90 TABLET | Refills: 1 | Status: SHIPPED | OUTPATIENT
Start: 2023-05-09

## 2023-05-19 ENCOUNTER — OFFICE VISIT (OUTPATIENT)
Dept: INTERNAL MEDICINE CLINIC | Facility: CLINIC | Age: 73
End: 2023-05-19

## 2023-05-19 VITALS
HEART RATE: 96 BPM | HEIGHT: 68 IN | DIASTOLIC BLOOD PRESSURE: 82 MMHG | OXYGEN SATURATION: 97 % | WEIGHT: 186.2 LBS | BODY MASS INDEX: 28.22 KG/M2 | SYSTOLIC BLOOD PRESSURE: 128 MMHG

## 2023-05-19 DIAGNOSIS — H40.9 GLAUCOMA OF RIGHT EYE, UNSPECIFIED GLAUCOMA TYPE: ICD-10-CM

## 2023-05-19 DIAGNOSIS — Z01.818 PREOP EXAMINATION: Primary | ICD-10-CM

## 2023-05-19 DIAGNOSIS — K21.9 GASTROESOPHAGEAL REFLUX DISEASE WITHOUT ESOPHAGITIS: ICD-10-CM

## 2023-05-19 NOTE — ASSESSMENT & PLAN NOTE
Patient is of low cardiac risk for the proposed procedure  A preop EKG was performed in office and showed normal sinus rhythm  No further cardiac work-up is indicated  Advised patient to hold all NSAIDs, fish oil and vitamin E 7 days prior to procedure

## 2023-05-19 NOTE — PROGRESS NOTES
Presurgical Evaluation    Subjective:      Patient ID: Cortes Zimmerman is a 72 y o  male  Chief Complaint   Patient presents with   • Pre-op Exam       HPI    The following portions of the patient's history were reviewed and updated as appropriate: allergies, current medications, past family history, past medical history, past social history, past surgical history and problem list     Procedure date: 5/30/2023    Surgeon: Dr Kena Olivas  Planned procedure: Yelena Dagoberto assisted surgery to lower pressure and right eye  Diagnosis for procedure: Inflammatory glaucoma right eye    The patient is seen for perioperative risk stratification  Patient reports no symptoms of chest pain at rest or with exertion, dyspnea at rest or with exertion, PND, lower extremity edema, claudication or palpitations  Patient has no history of ischemic heart disease, CHF or diabetes  Patient denies any history of easy bruising, profuse bleeding from minor injuries or family history of bleeding disorder      Review of Systems  ROS as per HPI      Current Outpatient Medications   Medication Sig Dispense Refill   • Alphagan P 0 1 % INSTILL 1 DROP BY OPHTHALMIC ROUTE TWICE A DAY INTO RIGHT EYE     • calcium carbonate (TUMS) 500 mg chewable tablet Chew 1 tablet daily When needed     • cholestyramine (QUESTRAN) 4 g packet Take 1 packet (4 g total) by mouth daily (Patient taking differently: Take 1 packet by mouth every other day) 30 each 3   • dorzolamide-timolol (COSOPT) 22 3-6 8 MG/ML ophthalmic solution Administer 1 drop to the right eye 2 (two) times a day One drop four times a day  4   • famotidine (PEPCID) 40 MG tablet Take 1 tablet (40 mg total) by mouth daily at bedtime 90 tablet 1   • Misc Natural Products (OSTEO BI-FLEX JOINT SHIELD PO) Take by mouth     • Multiple Vitamins-Minerals (MULTIVITAL) tablet Take 1 tablet by mouth daily Spectravite      • pantoprazole (PROTONIX) 40 mg tablet TAKE 1 TABLET BY MOUTH EVERY DAY 90 tablet 1   • prednisoLONE acetate (PRED FORTE) 1 % ophthalmic suspension Administer 1 drop to the right eye 3 (three) times a day     • Prolensa 0 07 % SOLN Administer 0 07 drops to the right eye in the morning     • tamsulosin (FLOMAX) 0 4 mg TAKE 1 CAPSULE (0 4 MG TOTAL) BY MOUTH DAILY WITH DINNER 90 capsule 1   • ofloxacin (OCUFLOX) 0 3 % ophthalmic solution PLEASE SEE ATTACHED FOR DETAILED DIRECTIONS (Patient not taking: Reported on 2/22/2023)       No current facility-administered medications for this visit  Allergies on file:   Patient has no known allergies      Past Medical History:   Diagnosis Date   • Abnormal weight loss     RESOLVED: [id number]   • Anemia     RESOLVED: 47SUX3880   • Atypical chest pain     RESOLVED: 30UCP4672   • BPH (benign prostatic hyperplasia)    • Cancer (HCC)     DUODENAL   • Luis Miguel's syndrome    • Depression     RESOLVED: 53ZPR4607   • Disc disorder     cervical and lumbar   • Diverticulitis of colon     LAST ASSESSED: [id number]   • Duodenal nodule     RESOLVED: 91JZR2024   • 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 [address] )     MALIGNANT; RESOLVED: [id number]   • GERD (gastroesophageal reflux disease)    • Glaucoma     RESOLVED: 17AUD5280   • History of COVID-19 09/2021   • Insomnia     RESOLVED: 83SUZ1568       Past Surgical History:   Procedure Laterality Date   • BACK SURGERY     • BACK SURGERY      l4 s1 surgery    • CERVICAL FUSION      c4 and c5   • CHOLECYSTECTOMY     • COLONOSCOPY  2014    kutty   • ELBOW SURGERY Left     REPAIR   • EYE SURGERY     • EYE SURGERY     • EYE SURGERY Right 2022    right eye surgery   • GANGLION CYST EXCISION Left 06/05/2020    Procedure: KNEE EXCISION BAKERS CYST;  Surgeon: Nerissa Kiser MD;  [facility];  Service: Orthopedics   • KNEE ARTHROSCOPY Left    • LUMBAR DISCECTOMY      L5 S1   • ORIF RADIAL SHAFT FRACTURE     • KS ARTHROSCOPY KNEE DIAGNOSTIC W/WO SYNOVIAL BX SPX Left 12/21/2020    Procedure: KNEE ARTHROSCOPY, popliteal cyst "decompression;  Surgeon: Pranav Lane MD;  [facility];  Service: Orthopedics   • [place] CYSTO INSERTION TRANSPROSTATIC IMPLANT SINGLE N/A 12/20/2022    Procedure: CYSTOSCOPY WITH INSERTION UROLIFT WITH 6 IMPLANTS;  Surgeon: Justice Peña MD;  [facility];  Service: Urology   • [place] ESOPHAGOGASTRODUODENOSCOPY TRANSORAL DIAGNOSTIC N/A 11/01/2016    Procedure: ESOPHAGOGASTRODUODENOSCOPY (EGD); Surgeon: Sonali Morales MD;  [facility]: [facility]; Service: Gastroenterology   • [place] ESOPHAGOGASTRODUODENOSCOPY TRANSORAL DIAGNOSTIC N/A 05/31/2018    Procedure: ESOPHAGOGASTRODUODENOSCOPY (EGD); Surgeon: Sonali Morales MD;  [facility];   Service: Gastroenterology   • [place] LAMOT PRTL FFD EXC [address] REEXPL [address] LUMBAR N/A 10/24/2019    Procedure: Revision laminectomy and decompression at L5-S1;  Surgeon: Robin Gonzalez MD;  [facility];  Service: Orthopedics   • SMALL INTESTINE SURGERY      GIST surgery   • [address] MSK PROCEDURE  05/21/2019   • [address] MSK PROCEDURE  10/30/2020       Family History   Problem Relation Age of Onset   • Thyroid disease Mother    • Thyroid disease Brother    • Diabetes Paternal Grandmother    • Hypertension Family    • Skin cancer Father        Social History     Tobacco Use   • Smoking status: Never   • Smokeless tobacco: Never   Vaping Use   • Vaping Use: Never used   Substance Use Topics   • Alcohol use: Yes     Comment: occasional   • Drug use: No       Objective:    Vitals:    05/19/23 1502   BP: 128/82   Pulse: 96   SpO2: 97%   Weight: 84 5 kg (186 lb 3 2 oz)   Height: 5' 8\" (1 727 m)       BP Readings from Last 3 Encounters:   05/19/23 128/82   02/22/23 122/78   02/01/23 126/62        Wt Readings from Last 3 Encounters:   05/19/23 84 5 kg (186 lb 3 2 oz)   02/22/23 84 5 kg (186 lb 3 2 oz)   02/01/23 84 1 kg (185 lb 6 4 oz)        Physical Exam      General: NAD, Alert and oriented x3   HEENT: NCAT, EOMI, normal conjunctiva, right eyelid ptosis " Cardiovascular: RRR, normal S1 and S2, no m/r/g  Pulmonary: Normal respiratory effort, no wheezes, rales or rhonchi  GI: Soft, nontender, nondistended, normoactive bowel sounds  Musculoskeletal: Normal bulk and tone  Neuro: Non-focal, ambulating without difficulty, non-antalgic gait  Extremities: No lower extremity edema  Skin: Normal skin color, no rashes   Psychiatric: Normal mood and affect      Preop labs/testing available and reviewed: yes                 RCRI  High Risk surgery? 1 Point  CAD History:         1 Point   MI; Positive Stress Test; CP due to Mi;  Nitrate Usage to control Angina; Pathologic Q wave on EKG  CHF Active:         1 Point   Pulm Edema; Paroxysmal Nocturnal Dyspnea;  Bibasilar Rales (crackles);S3; CHF on CXR  Cerebrovascular Disease (TIA or CVA):     1 Point  DM on Insulin:        1 Point  Serum Creat >2 0 mg/dl:       1 Point          Total Points: 0     Scorin: Class I, Very Low Risk (0 4%)     1: Class II, Low risk (0 9%)     2: Class III Moderate (6 6%)     3: Class IV High (>11%)        Assessment/Plan:    Patient is medically optimized (cleared) for the planned procedure  1  Preop examination  Assessment & Plan:  Patient is of low cardiac risk for the proposed procedure  A preop EKG was performed in office and showed normal sinus rhythm  No further cardiac work-up is indicated  Advised patient to hold all NSAIDs, fish oil and vitamin E 7 days prior to procedure  Orders:  -     POCT ECG    2  Glaucoma of right eye, unspecified glaucoma type  Assessment & Plan:  -Laser assisted surgery to lower pressure and right eye      3   Gastroesophageal reflux disease without esophagitis  Assessment & Plan:  - Controlled  - Patient to take pantoprazole the morning of surgery as prescribed to avoid rebound reflux

## 2023-05-19 NOTE — ASSESSMENT & PLAN NOTE
- Controlled  - Patient to take pantoprazole the morning of surgery as prescribed to avoid rebound reflux

## 2023-06-01 NOTE — PROGRESS NOTES
6/2/2023    Thereasa Najjar  1950  [id number]      Assessment  -BPH s/p Urolift (12/20/2022)    Discussion/Plan  Matheus Rodriguez is a 72 y o  male being managed by Dr Nathan Mendes        1  BPH s/p Urolift (12/20/2022)- urine dip in the office today identified trace blood, we will send for urinalysis with microscopic  He denies any episodes of gross hematuria  PVR assessment is 30 mL  Patient would like to remain on tamsulosin 0 4 mg daily as he states medication assist with urinary hesitancy  He otherwise has no urinary complaints and is doing well since procedure  Patient would like to continue monitoring PSA  He will complete with upcoming lab work ordered by his PCP  Follow-up in 6 months to reevaluate his urinary pattern with PVR assessment  He was advised to call sooner with any questions or issues     -All questions answered, patient agrees with plan      History of Present Illness  72 y o  male with a history of BPH presents today for follow up  Patient underwent Urolift insertion on 12/20/2022   6 implants were successfully placed  His murguia was successfully removed  Patient had initially tried discontinuing tamsulosin, but developed urinary hesitancy 2 to 3 days after stopping medication  He has since resumed tamsulosin  He otherwise reports increased urinary flow and feeling of incomplete bladder emptying  No gross hematuria or dysuria      Last PSA from 6/5/2022 was 1 5  Patient denies any strong family history of prostate malignancy  Review of Systems  Review of Systems   Constitutional: Negative  HENT: Negative  Respiratory: Negative  Cardiovascular: Negative  Gastrointestinal: Negative  Genitourinary: Negative for decreased urine volume, difficulty urinating, dysuria, flank pain, frequency, hematuria and urgency  Musculoskeletal: Negative  Skin: Negative  Neurological: Negative  Psychiatric/Behavioral: Negative        AUA SYMPTOM SCORE    Flowsheet Row Most Recent Value   AUA SYMPTOM SCORE    How often have you had a sensation of not emptying your bladder completely after you finished urinating? 2 (P)     How often have you had to urinate again less than two hours after you finished urinating? 4 (P)     How often have you found you stopped and started again several times when you urinate? 1 (P)     How often have you found it difficult to postpone urination? 5 (P)     How often have you had a weak urinary stream? 2 (P)     How often have you had to push or strain to begin urination? 1 (P)     How many times did you most typically get up to urinate from the time you went to bed at night until the time you got up in the morning? 5 (P)     Quality of Life: If you were to spend the rest of your life with your urinary condition just the way it is now, how would you feel about that? 3 (P)     AUA SYMPTOM SCORE 20 (P)             Past Medical History  Past Medical History:   Diagnosis Date   • Abnormal weight loss     RESOLVED: [id number]   • Anemia     RESOLVED: 79FLX6179   • Atypical chest pain     RESOLVED: [id number]   • BPH (benign prostatic hyperplasia)    • Cancer (HCC)     DUODENAL   • Luis Miguel's syndrome    • Depression     RESOLVED: 03IZQ1785   • Disc disorder     cervical and lumbar   • Diverticulitis of colon     LAST ASSESSED: [id number]   • Duodenal nodule     RESOLVED: 52XZW2554   • Gastrointestinal stromal tumor (GIST) ([facility] [address] )     MALIGNANT; RESOLVED: 95IHF1379   • GERD (gastroesophageal reflux disease)    • Glaucoma     RESOLVED: 06EBV3100   • History of COVID-19 09/2021   • Insomnia     RESOLVED: 09PLY4857       Past Social History  Past Surgical History:   Procedure Laterality Date   • BACK SURGERY     • BACK SURGERY      l4 s1 surgery    • CERVICAL FUSION      c4 and c5   • CHOLECYSTECTOMY     • COLONOSCOPY  2014    kutty   • ELBOW SURGERY Left     REPAIR   • EYE SURGERY     • EYE SURGERY     • EYE SURGERY Right 2022    right eye surgery   • GANGLION CYST EXCISION Left 06/05/2020    Procedure: KNEE EXCISION BAKERS CYST;  Surgeon: Reinaldo Chicas MD;  [facility];  Service: Orthopedics   • KNEE ARTHROSCOPY Left    • LUMBAR DISCECTOMY      L5 S1   • ORIF RADIAL SHAFT FRACTURE     • OK ARTHROSCOPY KNEE DIAGNOSTIC W/WO SYNOVIAL BX SPX Left 12/21/2020    Procedure: KNEE ARTHROSCOPY, popliteal cyst decompression;  Surgeon: Jude Cavanaugh MD;  [facility];  Service: Orthopedics   • OK CYSTO INSERTION TRANSPROSTATIC IMPLANT SINGLE N/A 12/20/2022    Procedure: CYSTOSCOPY WITH INSERTION UROLIFT WITH 6 IMPLANTS;  Surgeon: Ary Rutherford MD;  [facility];  Service: Urology   • OK ESOPHAGOGASTRODUODENOSCOPY TRANSORAL DIAGNOSTIC N/A 11/01/2016    Procedure: ESOPHAGOGASTRODUODENOSCOPY (EGD); Surgeon: Bryon Lomas MD;  [facility]; Service: Gastroenterology   • OK ESOPHAGOGASTRODUODENOSCOPY TRANSORAL DIAGNOSTIC N/A 05/31/2018    Procedure: ESOPHAGOGASTRODUODENOSCOPY (EGD); Surgeon: Bryon Lomas MD;  [facility];   Service: Gastroenterology   • OK LAMOT PRTL FFD EXC [address] REEXPL [address] LUMBAR N/A 10/24/2019    Procedure: Revision laminectomy and decompression at L5-S1;  Surgeon: Gilbert Carr MD;  [facility];  Service: Orthopedics   • SMALL INTESTINE SURGERY      GIST surgery   • [address] PROCEDURE  05/21/2019   • [address] [facility] PROCEDURE  10/30/2020       Past Family History  Family History   Problem Relation Age of Onset   • Thyroid disease Mother    • Thyroid disease Brother    • Diabetes Paternal Grandmother    • Hypertension Family    • Skin cancer Father        Past Social history  Social History     Socioeconomic History   • Marital status: /Civil Union     Spouse name: Not on file   • Number of children: Not on file   • Years of education: Not on file   • Highest education level: Not on file   Occupational History   • Not on file   Tobacco Use   • Smoking status: Never   • Smokeless tobacco: Never Vaping Use   • Vaping Use: Never used   Substance and Sexual Activity   • Alcohol use: Yes     Comment: occasional   • Drug use: No   • Sexual activity: Yes   Other Topics Concern   • Not on file   Social History Narrative   • Not on file     Social Determinants of Health     Financial Resource Strain: Not on file   Food Insecurity: Not on file   Transportation Needs: Not on file   Physical Activity: Not on file   Stress: Not on file   Social Connections: Not on file   Intimate Partner Violence: Not on file   Housing Stability: Not on file       Current Medications  Current Outpatient Medications   Medication Sig Dispense Refill   • Alphagan P 0 1 % INSTILL 1 DROP BY OPHTHALMIC ROUTE TWICE A DAY INTO RIGHT EYE     • calcium carbonate (TUMS) 500 mg chewable tablet Chew 1 tablet daily When needed     • cholestyramine (QUESTRAN) 4 g packet Take 1 packet (4 g total) by mouth daily (Patient taking differently: Take 1 packet by mouth every other day) 30 each 3   • dorzolamide-timolol (COSOPT) 22 3-6 8 MG/ML ophthalmic solution Administer 1 drop to the right eye 2 (two) times a day One drop four times a day  4   • famotidine (PEPCID) 40 MG tablet Take 1 tablet (40 mg total) by mouth daily at bedtime 90 tablet 1   • Misc Natural Products (OSTEO BI-FLEX JOINT SHIELD PO) Take by mouth     • Multiple Vitamins-Minerals (MULTIVITAL) tablet Take 1 tablet by mouth daily Spectravite      • ofloxacin (OCUFLOX) 0 3 % ophthalmic solution PLEASE SEE ATTACHED FOR DETAILED DIRECTIONS (Patient not taking: Reported on 2/22/2023)     • pantoprazole (PROTONIX) 40 mg tablet TAKE 1 TABLET BY MOUTH EVERY DAY 90 tablet 1   • prednisoLONE acetate (PRED FORTE) 1 % ophthalmic suspension Administer 1 drop to the right eye 3 (three) times a day     • Prolensa 0 07 % SOLN Administer 0 07 drops to the right eye in the morning     • tamsulosin (FLOMAX) 0 4 mg TAKE 1 CAPSULE (0 4 MG TOTAL) BY MOUTH DAILY WITH DINNER 90 capsule 1     No current facility-administered medications for this visit  Allergies  No Known Allergies    Past Medical History, Social History, Family History, medications and allergies were reviewed  Vitals  There were no vitals filed for this visit  Physical Exam  Physical Exam  Constitutional:       Appearance: Normal appearance  He is well-developed  HENT:      Head: Normocephalic  Eyes:      Pupils: Pupils are equal, round, and reactive to light  Pulmonary:      Effort: Pulmonary effort is normal    Abdominal:      Palpations: Abdomen is soft  Musculoskeletal:         General: Normal range of motion  Cervical back: Normal range of motion  Skin:     General: Skin is warm and dry  Neurological:      General: No focal deficit present  Mental Status: He is alert and oriented to person, place, and time  Psychiatric:         Mood and Affect: Mood normal          Behavior: Behavior normal          Thought Content: Thought content normal          Judgment: Judgment normal          Results    I have personally reviewed all pertinent lab results and reviewed with patient  Lab Results   Component Value Date    PSA 1 5 06/04/2022    PSA 1 4 07/07/2021    PSA 1 3 05/14/2020     Lab Results   Component Value Date    BUN 12 12/05/2022    CALCIUM 9 2 12/05/2022     12/05/2022    CO2 29 12/05/2022    CREATININE 0 98 12/05/2022    GLUCOSE 97 09/25/2015    K 4 2 12/05/2022     09/25/2015     Lab Results   Component Value Date    HCT 48 5 06/04/2022    HGB 16 8 06/04/2022    MCV 91 06/04/2022     (L) 06/04/2022    WBC 4 57 06/04/2022     No results found for this or any previous visit (from the past 1 hour(s))

## 2023-06-02 ENCOUNTER — OFFICE VISIT (OUTPATIENT)
Dept: UROLOGY | Facility: AMBULATORY SURGERY CENTER | Age: 73
End: 2023-06-02

## 2023-06-02 VITALS
BODY MASS INDEX: 28.04 KG/M2 | DIASTOLIC BLOOD PRESSURE: 82 MMHG | HEIGHT: 68 IN | WEIGHT: 185 LBS | SYSTOLIC BLOOD PRESSURE: 128 MMHG | HEART RATE: 78 BPM | OXYGEN SATURATION: 98 %

## 2023-06-02 DIAGNOSIS — N32.81 OAB (OVERACTIVE BLADDER): ICD-10-CM

## 2023-06-02 DIAGNOSIS — R35.0 URINARY FREQUENCY: Primary | ICD-10-CM

## 2023-06-02 DIAGNOSIS — N40.0 ENLARGED PROSTATE WITHOUT LOWER URINARY TRACT SYMPTOMS (LUTS): ICD-10-CM

## 2023-06-02 LAB
BACTERIA UR QL AUTO: NORMAL /HPF
BILIRUB UR QL STRIP: NEGATIVE
CLARITY UR: CLEAR
COLOR UR: COLORLESS
GLUCOSE UR STRIP-MCNC: NEGATIVE MG/DL
HGB UR QL STRIP.AUTO: NEGATIVE
KETONES UR STRIP-MCNC: NEGATIVE MG/DL
LEUKOCYTE ESTERASE UR QL STRIP: NEGATIVE
NITRITE UR QL STRIP: NEGATIVE
NON-SQ EPI CELLS URNS QL MICRO: NORMAL /HPF
PH UR STRIP.AUTO: 6 [PH]
POST-VOID RESIDUAL VOLUME, ML POC: 30 ML
PROT UR STRIP-MCNC: NEGATIVE MG/DL
RBC #/AREA URNS AUTO: NORMAL /HPF
SL AMB  POCT GLUCOSE, UA: ABNORMAL
SL AMB LEUKOCYTE ESTERASE,UA: ABNORMAL
SL AMB POCT BILIRUBIN,UA: ABNORMAL
SL AMB POCT BLOOD,UA: ABNORMAL
SL AMB POCT CLARITY,UA: CLEAR
SL AMB POCT COLOR,UA: YELLOW
SL AMB POCT KETONES,UA: ABNORMAL
SL AMB POCT NITRITE,UA: ABNORMAL
SL AMB POCT PH,UA: 6
SL AMB POCT SPECIFIC GRAVITY,UA: 1
SL AMB POCT URINE PROTEIN: ABNORMAL
SL AMB POCT UROBILINOGEN: 0.2
SP GR UR STRIP.AUTO: 1.01 (ref 1–1.03)
UROBILINOGEN UR STRIP-ACNC: <2 MG/DL
WBC #/AREA URNS AUTO: NORMAL /HPF

## 2023-06-02 PROCEDURE — 81001 URINALYSIS AUTO W/SCOPE: CPT | Performed by: NURSE PRACTITIONER

## 2023-06-14 ENCOUNTER — APPOINTMENT (OUTPATIENT)
Dept: RADIOLOGY | Facility: OTHER | Age: 73
End: 2023-06-14
Payer: COMMERCIAL

## 2023-06-14 ENCOUNTER — OFFICE VISIT (OUTPATIENT)
Dept: OBGYN CLINIC | Facility: OTHER | Age: 73
End: 2023-06-14
Payer: COMMERCIAL

## 2023-06-14 VITALS
WEIGHT: 185 LBS | SYSTOLIC BLOOD PRESSURE: 132 MMHG | HEIGHT: 68 IN | HEART RATE: 73 BPM | DIASTOLIC BLOOD PRESSURE: 80 MMHG | BODY MASS INDEX: 28.04 KG/M2

## 2023-06-14 DIAGNOSIS — G89.29 CHRONIC PAIN OF LEFT KNEE: ICD-10-CM

## 2023-06-14 DIAGNOSIS — M25.562 CHRONIC PAIN OF LEFT KNEE: Primary | ICD-10-CM

## 2023-06-14 DIAGNOSIS — G89.29 CHRONIC PAIN OF LEFT KNEE: Primary | ICD-10-CM

## 2023-06-14 DIAGNOSIS — M25.562 CHRONIC PAIN OF LEFT KNEE: ICD-10-CM

## 2023-06-14 PROCEDURE — 73564 X-RAY EXAM KNEE 4 OR MORE: CPT

## 2023-06-14 PROCEDURE — 99214 OFFICE O/P EST MOD 30 MIN: CPT | Performed by: ORTHOPAEDIC SURGERY

## 2023-06-14 PROCEDURE — 73562 X-RAY EXAM OF KNEE 3: CPT

## 2023-06-14 PROCEDURE — 20610 DRAIN/INJ JOINT/BURSA W/O US: CPT | Performed by: ORTHOPAEDIC SURGERY

## 2023-06-14 RX ORDER — BUPIVACAINE HYDROCHLORIDE 2.5 MG/ML
4 INJECTION, SOLUTION INFILTRATION; PERINEURAL
Status: COMPLETED | OUTPATIENT
Start: 2023-06-14 | End: 2023-06-14

## 2023-06-14 RX ADMIN — BUPIVACAINE HYDROCHLORIDE 4 ML: 2.5 INJECTION, SOLUTION INFILTRATION; PERINEURAL at 15:15

## 2023-06-14 NOTE — PROGRESS NOTES
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    · Aspiration of left knee joint was performed   · Recommended compression to aid is symptoms management   · Activity as tolerated  · Ice, heat and anti-inflammatories prn   · Ordered and review XR today   Return if symptoms worsen or fail to improve  History of Present Illness  Chon Reno is a 72 y o  male who presents for follow up left knee bakers cyst  He was last seen in the office on 2022 at which time he was doing well  He states that over the past 2 months the baker's cyst has returned with his increase in activity  He does have pain over the bakers cyst when end range of motion  He still has some lingering decrease sensation from the saphenous nerve which begin after his initial surgery in 2020  His history includes that he has been previously treated by Dr Ivonne Richardson  In 2020 he was diagnosed with bakers cyst of the left knee  On 2020 he underwent a bakers cyst excision by Dr Adelita Gonzalez reports doing postoperative physical therapy and injury to the saphenous nerve with decreased sensation along the medial aspect of his left leg postoperatively   Patient reports ultimately the cyst recurred  On 10/13/2020 Dr Veras How left knee arthrocentesis and removed 20 mL of fluid   At this time Dr Yossi Mccabe ultrasound-guided Florencia Son 10/30/2020 patient underwent ultrasound-guided drainage of left Baker's cyst were 80 mL of fluid was drained by Interventional Radiology  Jose Mcmanus reports the following day the cyst had refilled      Review of "Systems  Pertinent items are noted in HPI  All other systems were reviewed and are negative  Physical Exam  /80 (BP Location: Left arm, Patient Position: Sitting, Cuff Size: Adult)   Pulse 73   Ht 5' 8\" (1 727 m) Comment: verbal  Wt 83 9 kg (185 lb)   BMI 28 13 kg/m²   Cons: Appears well  No apparent distress  Psych: Alert  Oriented x3  Mood and affect normal   Eyes: PERRLA, EOMI  Resp: Normal effort  No audible wheezing or stridor  CV: Palpable pulse  No discernable arrhythmia  No LE edema  Lymph:  No palpable cervical, axillary, or inguinal lymphadenopathy  Skin: Warm  No palpable masses  No visible lesions  Neuro: Normal muscle tone  Normal and symmetric DTR's  Left Knee Exam  Alignment:  Normal knee alignment  Inspection:  No swelling  large cyst around medial knee  Palpation:  moderate bakers cyst tenderness  trace effusion  ROM:  Knee Extension 0  Knee Flexion 125  Strength:  Able to actively extend knee against gravity  Stability:  No objective knee instability  Stable Varus / Valgus stress, Lachman, and Posterior drawer  Tests:  No pertinent positive or negative tests  Patella:  Normal patellar mobility  Neurovascular:  Sensation intact in DP/SP/Corbin/Sa/T nerve distributions  2+ DP & PT pulses  Gait:  Normal     Studies Reviewed  I have personally reviewed pertinent films in PACS  XR of left knee - images from 06/14/2023 showing mild narrowing of medial joint space    Large joint arthrocentesis: L knee  Universal Protocol:  Consent given by: patient  Time out: Immediately prior to procedure a \"time out\" was called to verify the correct patient, procedure, equipment, support staff and site/side marked as required    Site marked: the operative site was marked  Supporting Documentation  Indications: pain and diagnostic evaluation   Procedure Details  Location: knee - L knee  Preparation: Patient was prepped and draped in the usual sterile fashion  Needle size: 22 " G  Ultrasound guidance: no  Approach: lateral  Medications administered: 4 mL bupivacaine 0 25 %    Aspirate amount: 100 mL    Patient tolerance: patient tolerated the procedure well with no immediate complications  Dressing:  Sterile dressing applied          Medical, Surgical, Family, and Social History  The patient's medical history, family history, and social history, were reviewed and updated as appropriate      Past Medical History:   Diagnosis Date   • Abnormal weight loss     RESOLVED: 08AKP0778   • Anemia     RESOLVED: 09NLR4392   • Atypical chest pain     RESOLVED: 57FQG7508   • BPH (benign prostatic hyperplasia)    • Cancer (HCC)     DUODENAL   • Luis Miguel's syndrome    • Depression     RESOLVED: 12YNE5583   • Disc disorder     cervical and lumbar   • Diverticulitis of colon     LAST ASSESSED: [id number]   • Duodenal nodule     RESOLVED: 89MCE5579   • Gastrointestinal stromal tumor (GIST) ([facility] [address] )     MALIGNANT; RESOLVED: 87TMD2778   • GERD (gastroesophageal reflux disease)    • Glaucoma     RESOLVED: 08ABD6746   • History of COVID-19 09/2021   • Insomnia     RESOLVED: 55DGI4404       Past Surgical History:   Procedure Laterality Date   • BACK SURGERY     • BACK SURGERY      l4 s1 surgery    • CERVICAL FUSION      c4 and c5   • CHOLECYSTECTOMY     • COLONOSCOPY  2014    kutty   • ELBOW SURGERY Left     REPAIR   • EYE SURGERY     • EYE SURGERY     • EYE SURGERY Right 2022    right eye surgery   • GANGLION CYST EXCISION Left 06/05/2020    Procedure: KNEE EXCISION BAKERS CYST;  Surgeon: Marquis Mckeon MD;  [facility];  Service: Orthopedics   • KNEE ARTHROSCOPY Left    • LUMBAR DISCECTOMY      L5 S1   • ORIF RADIAL SHAFT FRACTURE     • GA ARTHROSCOPY KNEE DIAGNOSTIC W/WO SYNOVIAL BX SPX Left 12/21/2020    Procedure: KNEE ARTHROSCOPY, popliteal cyst decompression;  Surgeon: Rosalinda Coffey MD;  [facility];  Service: Orthopedics   • GA CYSTO INSERTION TRANSPROSTATIC IMPLANT SINGLE N/A 12/20/2022    Procedure: CYSTOSCOPY WITH INSERTION UROLIFT WITH 6 IMPLANTS;  Surgeon: Natanael Crowe MD;  [facility];  Service: Urology   • MT ESOPHAGOGASTRODUODENOSCOPY TRANSORAL DIAGNOSTIC N/A 11/01/2016    Procedure: ESOPHAGOGASTRODUODENOSCOPY (EGD); Surgeon: Marielena Prieto MD;  [facility]; Service: Gastroenterology   • MT ESOPHAGOGASTRODUODENOSCOPY TRANSORAL DIAGNOSTIC N/A 05/31/2018    Procedure: ESOPHAGOGASTRODUODENOSCOPY (EGD); Surgeon: Marielena Prieto MD;  [facility];   Service: Gastroenterology   • MT LAMOT PRTL FFD EXC [address] REEXPL [address] LUMBAR N/A 10/24/2019    Procedure: Revision laminectomy and decompression at L5-S1;  Surgeon: Antonia Licona MD;  [facility];  Service: Orthopedics   • SMALL INTESTINE SURGERY      GIST surgery   • [address] PROCEDURE  05/21/2019   • [address] PROCEDURE  10/30/2020       Family History   Problem Relation Age of Onset   • Thyroid disease Mother    • Thyroid disease Brother    • Diabetes Paternal Grandmother    • Hypertension Family    • Skin cancer Father        Social History     Occupational History   • Not on file   Tobacco Use   • Smoking status: Never   • Smokeless tobacco: Never   Vaping Use   • Vaping Use: Never used   Substance and Sexual Activity   • Alcohol use: Yes     Comment: occasional   • Drug use: No   • Sexual activity: Yes       No Known Allergies      Current Outpatient Medications:   •  calcium carbonate (TUMS) 500 mg chewable tablet, Chew 1 tablet daily When needed, Disp: , Rfl:   •  cholestyramine (QUESTRAN) 4 g packet, Take 1 packet (4 g total) by mouth daily (Patient taking differently: Take 1 packet by mouth every other day), Disp: 30 each, Rfl: 3  •  dorzolamide-timolol (COSOPT) 22 3-6 8 MG/ML ophthalmic solution, Administer 1 drop to the right eye 2 (two) times a day One drop four times a day, Disp: , Rfl: 4  •  famotidine (PEPCID) 40 MG tablet, Take 1 tablet (40 mg total) by mouth daily at bedtime, Disp: 90 tablet, Rfl: 1  •  Misc Natural Products (OSTEO BI-FLEX JOINT SHIELD PO), Take by mouth, Disp: , Rfl:   •  Multiple Vitamins-Minerals (MULTIVITAL) tablet, Take 1 tablet by mouth daily Spectravite , Disp: , Rfl:   •  pantoprazole (PROTONIX) 40 mg tablet, TAKE 1 TABLET BY MOUTH EVERY DAY, Disp: 90 tablet, Rfl: 1  •  prednisoLONE acetate (PRED FORTE) 1 % ophthalmic suspension, Administer 1 drop to the right eye 3 (three) times a day, Disp: , Rfl:   •  Prolensa 0 07 % SOLN, Administer 0 07 drops to the right eye in the morning, Disp: , Rfl:   •  tamsulosin (FLOMAX) 0 4 mg, TAKE 1 CAPSULE (0 4 MG TOTAL) BY MOUTH DAILY WITH DINNER, Disp: 90 capsule, Rfl: 1  •  Alphagan P 0 1 %, INSTILL 1 DROP BY OPHTHALMIC ROUTE TWICE A DAY INTO RIGHT EYE (Patient not taking: Reported on 6/14/2023), Disp: , Rfl:   •  ofloxacin (OCUFLOX) 0 3 % ophthalmic solution, PLEASE SEE ATTACHED FOR DETAILED DIRECTIONS (Patient not taking: Reported on 2/22/2023), Disp: , Rfl:       Jordin Barajas    Scribe Attestation    I,:  Jordin Barajas am acting as a scribe while in the presence of the attending physician :       I,:  Maria Esther Cedeno MD personally performed the services described in this documentation    as scribed in my presence :

## 2023-06-30 ENCOUNTER — APPOINTMENT (OUTPATIENT)
Dept: LAB | Age: 73
End: 2023-06-30
Payer: COMMERCIAL

## 2023-06-30 DIAGNOSIS — E78.1 ESSENTIAL HYPERTRIGLYCERIDEMIA: ICD-10-CM

## 2023-06-30 DIAGNOSIS — Z13.29 SCREENING FOR THYROID DISORDER: ICD-10-CM

## 2023-06-30 DIAGNOSIS — D69.6 LOW PLATELET COUNT (HCC): ICD-10-CM

## 2023-06-30 DIAGNOSIS — Z12.5 SCREENING PSA (PROSTATE SPECIFIC ANTIGEN): ICD-10-CM

## 2023-06-30 DIAGNOSIS — R73.01 IFG (IMPAIRED FASTING GLUCOSE): ICD-10-CM

## 2023-06-30 LAB
ALBUMIN SERPL BCP-MCNC: 3.9 G/DL (ref 3.5–5)
ALP SERPL-CCNC: 101 U/L (ref 46–116)
ALT SERPL W P-5'-P-CCNC: 34 U/L (ref 12–78)
ANION GAP SERPL CALCULATED.3IONS-SCNC: 5 MMOL/L
AST SERPL W P-5'-P-CCNC: 25 U/L (ref 5–45)
BASOPHILS # BLD AUTO: 0.03 THOUSANDS/ÂΜL (ref 0–0.1)
BASOPHILS NFR BLD AUTO: 1 % (ref 0–1)
BILIRUB SERPL-MCNC: 0.81 MG/DL (ref 0.2–1)
BUN SERPL-MCNC: 11 MG/DL (ref 5–25)
CALCIUM SERPL-MCNC: 9.4 MG/DL (ref 8.3–10.1)
CHLORIDE SERPL-SCNC: 105 MMOL/L (ref 96–108)
CHOLEST SERPL-MCNC: 214 MG/DL
CO2 SERPL-SCNC: 29 MMOL/L (ref 21–32)
CREAT SERPL-MCNC: 0.96 MG/DL (ref 0.6–1.3)
EOSINOPHIL # BLD AUTO: 0.05 THOUSAND/ÂΜL (ref 0–0.61)
EOSINOPHIL NFR BLD AUTO: 1 % (ref 0–6)
ERYTHROCYTE [DISTWIDTH] IN BLOOD BY AUTOMATED COUNT: 12 % (ref 11.6–15.1)
EST. AVERAGE GLUCOSE BLD GHB EST-MCNC: 97 MG/DL
GFR SERPL CREATININE-BSD FRML MDRD: 79 ML/MIN/1.73SQ M
GLUCOSE P FAST SERPL-MCNC: 126 MG/DL (ref 65–99)
HBA1C MFR BLD: 5 %
HCT VFR BLD AUTO: 47.7 % (ref 36.5–49.3)
HDLC SERPL-MCNC: 44 MG/DL
HGB BLD-MCNC: 16.3 G/DL (ref 12–17)
IMM GRANULOCYTES # BLD AUTO: 0.02 THOUSAND/UL (ref 0–0.2)
IMM GRANULOCYTES NFR BLD AUTO: 0 % (ref 0–2)
LDLC SERPL CALC-MCNC: 145 MG/DL (ref 0–100)
LYMPHOCYTES # BLD AUTO: 0.96 THOUSANDS/ÂΜL (ref 0.6–4.47)
LYMPHOCYTES NFR BLD AUTO: 18 % (ref 14–44)
MCH RBC QN AUTO: 30.8 PG (ref 26.8–34.3)
MCHC RBC AUTO-ENTMCNC: 34.2 G/DL (ref 31.4–37.4)
MCV RBC AUTO: 90 FL (ref 82–98)
MONOCYTES # BLD AUTO: 0.5 THOUSAND/ÂΜL (ref 0.17–1.22)
MONOCYTES NFR BLD AUTO: 9 % (ref 4–12)
NEUTROPHILS # BLD AUTO: 3.77 THOUSANDS/ÂΜL (ref 1.85–7.62)
NEUTS SEG NFR BLD AUTO: 71 % (ref 43–75)
NRBC BLD AUTO-RTO: 0 /100 WBCS
PLATELET # BLD AUTO: 168 THOUSANDS/UL (ref 149–390)
PMV BLD AUTO: 12.1 FL (ref 8.9–12.7)
POTASSIUM SERPL-SCNC: 4.5 MMOL/L (ref 3.5–5.3)
PROT SERPL-MCNC: 7.8 G/DL (ref 6.4–8.4)
PSA SERPL-MCNC: 2.66 NG/ML (ref 0–4)
RBC # BLD AUTO: 5.3 MILLION/UL (ref 3.88–5.62)
SODIUM SERPL-SCNC: 139 MMOL/L (ref 135–147)
TRIGL SERPL-MCNC: 125 MG/DL
TSH SERPL DL<=0.05 MIU/L-ACNC: 0.7 UIU/ML (ref 0.45–4.5)
WBC # BLD AUTO: 5.33 THOUSAND/UL (ref 4.31–10.16)

## 2023-06-30 PROCEDURE — 85025 COMPLETE CBC W/AUTO DIFF WBC: CPT

## 2023-06-30 PROCEDURE — 84443 ASSAY THYROID STIM HORMONE: CPT

## 2023-06-30 PROCEDURE — 80061 LIPID PANEL: CPT

## 2023-06-30 PROCEDURE — 83036 HEMOGLOBIN GLYCOSYLATED A1C: CPT

## 2023-06-30 PROCEDURE — 80053 COMPREHEN METABOLIC PANEL: CPT

## 2023-06-30 PROCEDURE — G0103 PSA SCREENING: HCPCS

## 2023-06-30 PROCEDURE — 36415 COLL VENOUS BLD VENIPUNCTURE: CPT

## 2023-07-11 ENCOUNTER — RA CDI HCC (OUTPATIENT)
Dept: OTHER | Facility: HOSPITAL | Age: 73
End: 2023-07-11

## 2023-07-11 NOTE — PROGRESS NOTES
[address] [facility] coding opportunities       Chart reviewed, no opportunity found: CHART REVIEWED, NO OPPORTUNITY FOUND        Patients Insurance     Medicare Insurance: [employer]

## 2023-07-18 ENCOUNTER — OFFICE VISIT (OUTPATIENT)
Dept: INTERNAL MEDICINE CLINIC | Facility: CLINIC | Age: 73
End: 2023-07-18
Payer: COMMERCIAL

## 2023-07-18 VITALS
HEART RATE: 86 BPM | WEIGHT: 184 LBS | OXYGEN SATURATION: 96 % | HEIGHT: 68 IN | SYSTOLIC BLOOD PRESSURE: 126 MMHG | BODY MASS INDEX: 27.89 KG/M2 | DIASTOLIC BLOOD PRESSURE: 86 MMHG

## 2023-07-18 DIAGNOSIS — H40.9 GLAUCOMA OF RIGHT EYE, UNSPECIFIED GLAUCOMA TYPE: ICD-10-CM

## 2023-07-18 DIAGNOSIS — G57.82 NEUROPATHY OF LEFT SAPHENOUS NERVE: ICD-10-CM

## 2023-07-18 DIAGNOSIS — Z85.09 ENCOUNTER FOR FOLLOW-UP SURVEILLANCE OF MALIGNANT GASTROINTESTINAL STROMAL TUMOR (GIST): ICD-10-CM

## 2023-07-18 DIAGNOSIS — E78.5 HYPERLIPIDEMIA, UNSPECIFIED HYPERLIPIDEMIA TYPE: Primary | ICD-10-CM

## 2023-07-18 DIAGNOSIS — Z08 ENCOUNTER FOR FOLLOW-UP SURVEILLANCE OF MALIGNANT GASTROINTESTINAL STROMAL TUMOR (GIST): ICD-10-CM

## 2023-07-18 DIAGNOSIS — M71.22 BAKER CYST, LEFT: ICD-10-CM

## 2023-07-18 PROCEDURE — 99214 OFFICE O/P EST MOD 30 MIN: CPT | Performed by: INTERNAL MEDICINE

## 2023-07-18 RX ORDER — ROSUVASTATIN CALCIUM 5 MG/1
5 TABLET, COATED ORAL DAILY
Qty: 30 TABLET | Refills: 5 | Status: SHIPPED | OUTPATIENT
Start: 2023-07-18

## 2023-07-18 RX ORDER — DIFLUPREDNATE OPHTHALMIC 0.5 MG/ML
EMULSION OPHTHALMIC
COMMUNITY

## 2023-07-18 NOTE — PROGRESS NOTES
Assessment/Plan:    Neuropathy of left saphenous nerve  Mild improvement secondary to previous surgery    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)  Clinically stable and doing very well up-to-date with colonoscopy and follow-up with GI    Glaucoma of right eye  Significant improvement after recent procedure continue ongoing follow-up with ophthalmology    Hyperlipidemia  Suboptimal control/elevated ASCVD risk score we will start Crestor 5 mg once daily reviewed the risk benefits and side effects of the medication if any myalgias please notify me we will check CMP and lipid panel in 4 to 6 weeks low-cholesterol diet recommended    Baker cyst, left  Recurrent cyst left knee continue follow-up with orthopedics         Problem List Items Addressed This Visit        Nervous and Auditory    Neuropathy of left saphenous nerve     Mild improvement secondary to previous surgery            Musculoskeletal and Integument    Baker cyst, left     Recurrent cyst left knee continue follow-up with orthopedics            Other    Hyperlipidemia - Primary     Suboptimal control/elevated ASCVD risk score we will start Crestor 5 mg once daily reviewed the risk benefits and side effects of the medication if any myalgias please notify me we will check CMP and lipid panel in 4 to 6 weeks low-cholesterol diet recommended         Relevant Medications    rosuvastatin (CRESTOR) 5 mg tablet    Other Relevant Orders    Comprehensive metabolic panel    Lipid Panel with Direct LDL reflex    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)     Clinically stable and doing very well up-to-date with colonoscopy and follow-up with GI         Glaucoma of right eye     Significant improvement after recent procedure continue ongoing follow-up with ophthalmology         Relevant Medications    Difluprednate 0.05 % EMUL     RTO in 6 months call if any problems    Falls Plan of Care: balance, strength, and gait training instructions were provided. Subjective:      Patient ID: Wellington Armstrong is a 72 y.o. male. HPI 69-year old male coming in for a follow up visit regarding hyperlipidemia, neuropathy of the left saphenous nerve, GIST tumor, glaucoma right eye, recurrent cyst left knee; the patient reports me compliant taking medications without untoward side effects the. The patient is here to review his medical condition, update me on the medical condition and the patient reports me no hospitalizations and no ER visits. No injuries no illnesses trying to follow a healthy and balanced diet remains active mowing the lawn. He has been working with ophthalmology and recently had a procedure which has been very effective at improving his glaucoma. Reports had sclera surgary, pressure of eye 11, losing vision glacoma , chandlers syndrome, acuity 35/500 left eye 20/10; Cutting grass, diet good. Reports me he is up-to-date on his colonoscopy. He reports me ongoing symptoms of neuropathy of the saphenous vein some improvement. Working with orthopedics Dr. Wallace Montano regarding a recurrent cyst like structure of the medial aspect of his knee recent drainage with reaccumulation shortly after drainage. The following portions of the patient's history were reviewed and updated as appropriate: allergies, current medications, past family history, past medical history, past social history, past surgical history and problem list.    Review of Systems   Constitutional: Negative for activity change, appetite change and unexpected weight change. HENT: Negative for congestion and postnasal drip. Eyes: Positive for visual disturbance. Respiratory: Negative for cough and shortness of breath. Cardiovascular: Negative for chest pain. Gastrointestinal: Negative for abdominal pain, diarrhea, nausea and vomiting.    Neurological: Negative for dizziness, light-headedness and headaches. Hematological: Negative for adenopathy. Objective:    Return in about 6 months (around 1/18/2024). No results found.       No Known Allergies    Past Medical History:   Diagnosis Date   • Abnormal weight loss     RESOLVED: [id number]   • Anemia     RESOLVED: [id number]   • Atypical chest pain     RESOLVED: 02PDD3328   • BPH (benign prostatic hyperplasia)    • Cancer (HCC)     DUODENAL   • Luis Miguel's syndrome    • Depression     RESOLVED: [id number]   • Disc disorder     cervical and lumbar   • Diverticulitis of colon     LAST ASSESSED: [id number]   • Duodenal nodule     RESOLVED: 27BMH5213   • Gastrointestinal stromal tumor (GIST) ([address])     MALIGNANT; RESOLVED: 66UWS1217   • GERD (gastroesophageal reflux disease)    • Glaucoma     RESOLVED: [id number]   • History of COVID-19 09/2021   • Insomnia     RESOLVED: 38UNP3303     Past Surgical History:   Procedure Laterality Date   • BACK SURGERY     • BACK SURGERY      l4 s1 surgery    • CERVICAL FUSION      c4 and c5   • CHOLECYSTECTOMY     • COLONOSCOPY  2014    [facility]   • ELBOW SURGERY Left     REPAIR   • EYE SURGERY     • EYE SURGERY     • EYE SURGERY Right 2022    right eye surgery   • GANGLION CYST EXCISION Left 06/05/2020    Procedure: KNEE EXCISION BAKERS CYST;  Surgeon: Ani Burton MD;  [facility];  Service: Orthopedics   • KNEE ARTHROSCOPY Left    • LUMBAR DISCECTOMY      L5 S1   • ORIF RADIAL SHAFT FRACTURE     • VT ARTHROSCOPY KNEE DIAGNOSTIC W/WO SYNOVIAL BX SPX Left 12/21/2020    Procedure: KNEE ARTHROSCOPY, popliteal cyst decompression;  Surgeon: Christelle Rizvi MD;  [facility];  Service: Orthopedics   • VT CYSTO INSERTION TRANSPROSTATIC IMPLANT SINGLE N/A 12/20/2022    Procedure: CYSTOSCOPY WITH INSERTION UROLIFT WITH 6 IMPLANTS;  Surgeon: Jazmine Del Rio MD;  [facility];  Service: Urology   • VT ESOPHAGOGASTRODUODENOSCOPY TRANSORAL DIAGNOSTIC N/A 11/01/2016    Procedure: ESOPHAGOGASTRODUODENOSCOPY (EGD); Surgeon: Mylene Chow MD;  [facility]; Service: Gastroenterology   • VT ESOPHAGOGASTRODUODENOSCOPY TRANSORAL DIAGNOSTIC N/A 05/31/2018    Procedure: ESOPHAGOGASTRODUODENOSCOPY (EGD); Surgeon: Mylene Chow MD;  [facility];   Service: Gastroenterology   • VT LAMOT PRTL FFD EXC DISC REEXPL [address] LUMBAR N/A 10/24/2019    Procedure: Revision laminectomy and decompression at L5-S1;  Surgeon: Zane Arrington MD;  [facility];  Service: Orthopedics   • SMALL INTESTINE SURGERY      GIST surgery   • [address] [facility] PROCEDURE  05/21/2019   • [address] [facility] PROCEDURE  10/30/2020     Current Outpatient Medications on File Prior to Visit   Medication Sig Dispense Refill   • calcium carbonate (TUMS) 500 mg chewable tablet Chew 1 tablet daily When needed     • cholestyramine (QUESTRAN) 4 g packet Take 1 packet (4 g total) by mouth daily (Patient taking differently: Take 1 packet by mouth every other day) 30 each 3   • Difluprednate 0.05 % EMUL Apply to eye     • famotidine (PEPCID) 40 MG tablet Take 1 tablet (40 mg total) by mouth daily at bedtime 90 tablet 1   • Misc Natural Products (OSTEO BI-FLEX JOINT SHIELD PO) Take by mouth     • Multiple Vitamins-Minerals (MULTIVITAL) tablet Take 1 tablet by mouth daily Spectravite      • pantoprazole (PROTONIX) 40 mg tablet TAKE 1 TABLET BY MOUTH EVERY DAY 90 tablet 1   • tamsulosin (FLOMAX) 0.4 mg TAKE 1 CAPSULE (0.4 MG TOTAL) BY MOUTH DAILY WITH DINNER 90 capsule 1   • Alphagan P 0.1 % INSTILL 1 DROP BY OPHTHALMIC ROUTE TWICE A DAY INTO RIGHT EYE (Patient not taking: Reported on 6/14/2023)     • dorzolamide-timolol (COSOPT) 22.3-6.8 MG/ML ophthalmic solution Administer 1 drop to the right eye 2 (two) times a day One drop four times a day (Patient not taking: Reported on 7/18/2023)  4   • ofloxacin (OCUFLOX) 0.3 % ophthalmic solution PLEASE SEE ATTACHED FOR DETAILED DIRECTIONS (Patient not taking: Reported on 2/22/2023)     • prednisoLONE acetate (PRED FORTE) 1 % ophthalmic suspension Administer 1 drop to the right eye 3 (three) times a day (Patient not taking: Reported on 7/18/2023)     • Prolensa 0.07 % SOLN Administer 0.07 drops to the right eye in the morning (Patient not taking: Reported on 7/18/2023)       No current facility-administered medications on file prior to visit.      Family History   Problem Relation Age of Onset   • Thyroid disease Mother    • Thyroid disease Brother    • Diabetes Paternal Grandmother    • Hypertension Family    • Skin cancer Father    • Thyroid disease Father    • Autoimmune disease Sister         Lupus     Social History     Socioeconomic History   • Marital status: /Civil Union     Spouse name: Not on file   • Number of children: Not on file   • Years of education: Not on file   • Highest education level: Not on file   Occupational History   • Not on file   Tobacco Use   • Smoking status: Never   • Smokeless tobacco: Never   Vaping Use   • Vaping Use: Never used   Substance and Sexual Activity   • Alcohol use: Yes     Comment: Holidays( some months without consumption)   • Drug use: No   • Sexual activity: Yes     Partners: Female     Birth control/protection: None     Comment: With my wife   Other Topics Concern   • Not on file   Social History Narrative   • Not on file     Social Determinants of Health     Financial Resource Strain: Not on file   Food Insecurity: Not on file   Transportation Needs: Not on file   Physical Activity: Not on file   Stress: Not on file   Social Connections: Not on file   Intimate Partner Violence: Not on file   Housing Stability: Not on file     Vitals:    07/18/23 1629   BP: 126/86   BP Location: Left arm   Patient Position: Sitting   Cuff Size: Standard   Pulse: 86   SpO2: 96%   Weight: 83.5 kg (184 lb)   Height: 5' 8" (1.727 m)     Results for orders placed or performed in visit on 06/30/23   Comprehensive metabolic panel   Result Value Ref Range    Sodium 139 135 - 147 mmol/L Potassium 4.5 3.5 - 5.3 mmol/L    Chloride 105 96 - 108 mmol/L    CO2 29 21 - 32 mmol/L    ANION GAP 5 mmol/L    BUN 11 5 - 25 mg/dL    Creatinine 0.96 0.60 - 1.30 mg/dL    Glucose, Fasting 126 (H) 65 - 99 mg/dL    Calcium 9.4 8.3 - 10.1 mg/dL    AST 25 5 - 45 U/L    ALT 34 12 - 78 U/L    Alkaline Phosphatase 101 46 - 116 U/L    Total Protein 7.8 6.4 - 8.4 g/dL    Albumin 3.9 3.5 - 5.0 g/dL    Total Bilirubin 0.81 0.20 - 1.00 mg/dL    eGFR 79 ml/min/1.73sq m   Hemoglobin A1C   Result Value Ref Range    Hemoglobin A1C 5.0 Normal 3.8-5.6%; PreDiabetic 5.7-6.4%;  Diabetic >=6.5%; Glycemic control for adults with diabetes <7.0% %    EAG 97 mg/dl   Lipid Panel with Direct LDL reflex   Result Value Ref Range    Cholesterol 214 (H) See Comment mg/dL    Triglycerides 125 See Comment mg/dL    HDL, Direct 44 >=40 mg/dL    LDL Calculated 145 (H) 0 - 100 mg/dL   PSA, Total Screen   Result Value Ref Range    PSA 2.66 0.00 - 4.00 ng/mL   TSH, 3rd generation with Free T4 reflex   Result Value Ref Range    TSH 3RD GENERATON 0.697 0.450 - 4.500 uIU/mL   CBC and differential   Result Value Ref Range    WBC 5.33 4.31 - 10.16 Thousand/uL    RBC 5.30 3.88 - 5.62 Million/uL    Hemoglobin 16.3 12.0 - 17.0 g/dL    Hematocrit 47.7 36.5 - 49.3 %    MCV 90 82 - 98 fL    MCH 30.8 26.8 - 34.3 pg    MCHC 34.2 31.4 - 37.4 g/dL    RDW 12.0 11.6 - 15.1 %    MPV 12.1 8.9 - 12.7 fL    Platelets 766 615 - 600 Thousands/uL    nRBC 0 /100 WBCs    Neutrophils Relative 71 43 - 75 %    Immat GRANS % 0 0 - 2 %    Lymphocytes Relative 18 14 - 44 %    Monocytes Relative 9 4 - 12 %    Eosinophils Relative 1 0 - 6 %    Basophils Relative 1 0 - 1 %    Neutrophils Absolute 3.77 1.85 - 7.62 Thousands/µL    Immature Grans Absolute 0.02 0.00 - 0.20 Thousand/uL    Lymphocytes Absolute 0.96 0.60 - 4.47 Thousands/µL    Monocytes Absolute 0.50 0.17 - 1.22 Thousand/µL    Eosinophils Absolute 0.05 0.00 - 0.61 Thousand/µL    Basophils Absolute 0.03 0.00 - 0.10 Thousands/µL Weight (last 2 days)     Date/Time Weight    07/18/23 1629 83.5 (184)        Body mass index is 27.98 kg/m². BP      Temp      Pulse     Resp      SpO2        Vitals:    07/18/23 1629   Weight: 83.5 kg (184 lb)     Vitals:    07/18/23 1629   Weight: 83.5 kg (184 lb)       /86 (BP Location: Left arm, Patient Position: Sitting, Cuff Size: Standard)   Pulse 86   Ht 5' 8" (1.727 m)   Wt 83.5 kg (184 lb)   SpO2 96%   BMI 27.98 kg/m²          Physical Exam  Vitals and nursing note reviewed. Constitutional:       General: He is not in acute distress. Appearance: Normal appearance. He is well-developed and normal weight. He is not ill-appearing, toxic-appearing or diaphoretic. HENT:      Head: Normocephalic and atraumatic. Right Ear: External ear normal.      Left Ear: External ear normal.   Eyes:      General: No scleral icterus. Right eye: No discharge. Left eye: No discharge. Conjunctiva/sclera: Conjunctivae normal.      Pupils: Pupils are equal, round, and reactive to light. Cardiovascular:      Rate and Rhythm: Normal rate and regular rhythm. Heart sounds: Normal heart sounds. No murmur heard. No friction rub. No gallop. Pulmonary:      Effort: No respiratory distress. Breath sounds: No wheezing or rales. Abdominal:      General: Bowel sounds are normal. There is no distension. Palpations: Abdomen is soft. There is no mass. Tenderness: There is no abdominal tenderness. There is no guarding or rebound. Musculoskeletal:         General: No deformity. Cervical back: Neck supple. Lymphadenopathy:      Cervical: No cervical adenopathy. Neurological:      Mental Status: He is alert.        Cystlike structure medial aspect of the left knee no erythema no warmth soft and mobile

## 2023-07-19 NOTE — ASSESSMENT & PLAN NOTE
Suboptimal control/elevated ASCVD risk score we will start Crestor 5 mg once daily reviewed the risk benefits and side effects of the medication if any myalgias please notify me we will check CMP and lipid panel in 4 to 6 weeks low-cholesterol diet recommended

## 2023-08-19 ENCOUNTER — LAB (OUTPATIENT)
Dept: LAB | Age: 73
End: 2023-08-19
Payer: COMMERCIAL

## 2023-08-19 DIAGNOSIS — E78.5 HYPERLIPIDEMIA, UNSPECIFIED HYPERLIPIDEMIA TYPE: ICD-10-CM

## 2023-08-19 LAB
ALBUMIN SERPL BCP-MCNC: 4 G/DL (ref 3.5–5)
ALP SERPL-CCNC: 93 U/L (ref 46–116)
ALT SERPL W P-5'-P-CCNC: 35 U/L (ref 12–78)
ANION GAP SERPL CALCULATED.3IONS-SCNC: 2 MMOL/L
AST SERPL W P-5'-P-CCNC: 23 U/L (ref 5–45)
BILIRUB SERPL-MCNC: 0.73 MG/DL (ref 0.2–1)
BUN SERPL-MCNC: 10 MG/DL (ref 5–25)
CALCIUM SERPL-MCNC: 9.4 MG/DL (ref 8.3–10.1)
CHLORIDE SERPL-SCNC: 110 MMOL/L (ref 96–108)
CHOLEST SERPL-MCNC: 126 MG/DL
CO2 SERPL-SCNC: 28 MMOL/L (ref 21–32)
CREAT SERPL-MCNC: 0.94 MG/DL (ref 0.6–1.3)
GFR SERPL CREATININE-BSD FRML MDRD: 81 ML/MIN/1.73SQ M
GLUCOSE P FAST SERPL-MCNC: 93 MG/DL (ref 65–99)
HDLC SERPL-MCNC: 44 MG/DL
LDLC SERPL CALC-MCNC: 56 MG/DL (ref 0–100)
POTASSIUM SERPL-SCNC: 4.3 MMOL/L (ref 3.5–5.3)
PROT SERPL-MCNC: 7.6 G/DL (ref 6.4–8.4)
SODIUM SERPL-SCNC: 140 MMOL/L (ref 135–147)
TRIGL SERPL-MCNC: 129 MG/DL

## 2023-08-19 PROCEDURE — 80061 LIPID PANEL: CPT

## 2023-08-19 PROCEDURE — 36415 COLL VENOUS BLD VENIPUNCTURE: CPT

## 2023-08-19 PROCEDURE — 80053 COMPREHEN METABOLIC PANEL: CPT

## 2023-08-21 NOTE — RESULT ENCOUNTER NOTE
Called patient and spoke to wife and advised, patient would like to know why his chloride levels are slightly elevated

## 2023-08-30 ENCOUNTER — OFFICE VISIT (OUTPATIENT)
Dept: OBGYN CLINIC | Facility: OTHER | Age: 73
End: 2023-08-30
Payer: COMMERCIAL

## 2023-08-30 VITALS
BODY MASS INDEX: 27.89 KG/M2 | HEART RATE: 80 BPM | WEIGHT: 184 LBS | SYSTOLIC BLOOD PRESSURE: 155 MMHG | DIASTOLIC BLOOD PRESSURE: 81 MMHG | HEIGHT: 68 IN

## 2023-08-30 DIAGNOSIS — M25.862 CYST OF LEFT KNEE JOINT: Primary | ICD-10-CM

## 2023-08-30 PROCEDURE — 99214 OFFICE O/P EST MOD 30 MIN: CPT | Performed by: ORTHOPAEDIC SURGERY

## 2023-08-30 NOTE — PROGRESS NOTES
Orthopaedic Surgery - Office Note  Diaz Gurrola (36 y.o. male)   : 1950   MRN: [id number]  Encounter Date: 2023    Chief Complaint   Patient presents with   • Left Knee - Swelling, Numbness, Follow-up       Assessment / Plan  L medial knee cyst  S/p L knee arthroscopy with popliteal cyst decompression  Residual saphenous neuropathy from initial surgery in 2020    · Activity as tolerated. · Holding off on joint aspiration today as he will be getting MRI of L knee. MRI of L knee for possible surgical planning for cyst excision. Will discuss further at next visit. · Ice, heat, anti-inflammatories PRN for pain  Return for After MRI of L knee obtained. History of Present Illness  Diaz Gurrola is a 72 y.o. male who presents with recurrent left knee cyst. He has hx of left knee arthroscopy with popliteal cyst decompression 2020. His last office visit was 2023 where left knee joint was aspirated to alleviate pain. Over 100 ml aspirated. Patient reports that it filled back up by the end of that same day. Since then, it has grown in size, causing discomfort and pain, especially on ambulation. No other complaints. Review of Systems  Pertinent items are noted in HPI. All other systems were reviewed and are negative. Physical Exam  /81   Pulse 80   Ht 5' 8" (1.727 m)   Wt 83.5 kg (184 lb)   BMI 27.98 kg/m²   Cons: Appears well. No apparent distress. Psych: Alert. Oriented x3. Mood and affect normal.  Eyes: PERRLA, EOMI  Resp: Normal effort. No audible wheezing or stridor. CV: Palpable pulse. No discernable arrhythmia. No LE edema. Lymph:  No palpable cervical, axillary, or inguinal lymphadenopathy. Skin: Warm. No palpable masses. No visible lesions. Neuro: Normal muscle tone. Normal and symmetric DTR's. Left Knee Exam  Alignment:  Normal knee alignment  Inspection:  No swelling.  Large cyst along medial knee - fluctuant, no erythema, no wound.  Palpation:  mild tenderness at medial knee cyst.  ROM:  Not tested. Strength:  5/5 hamstrings and quadriceps  Stability:  No objective knee instability. Stable Varus / Valgus stress, Lachman, and Posterior drawer. Tests:  No pertinent positive or negative tests. Patella:  Normal patellar mobility. Neurovascular:  intact along DP, SP, Corbin, T nerve distributions. Decreased sensation along saphensous nerve distribution on L. 2+ DP & PT pulses. Gait:  Smooth. Studies Reviewed  No studies to review    Procedures  No procedures today. Medical, Surgical, Family, and Social History  The patient's medical history, family history, and social history, were reviewed and updated as appropriate.     Past Medical History:   Diagnosis Date   • Abnormal weight loss     RESOLVED: [id number]   • Anemia     RESOLVED: 30MZY1297   • Atypical chest pain     RESOLVED: 83WRV4992   • BPH (benign prostatic hyperplasia)    • Cancer (HCC)     DUODENAL   • Luis Miguel's syndrome    • Depression     RESOLVED: [id number]   • Disc disorder     cervical and lumbar   • Diverticulitis of colon     LAST ASSESSED: 55JKW5189   • Duodenal nodule     RESOLVED: 37DLL1129   • Gastrointestinal stromal tumor (GIST) ([address])     MALIGNANT; RESOLVED: 84RTG1903   • GERD (gastroesophageal reflux disease)    • Glaucoma     RESOLVED: [id number]   • History of COVID-19 09/2021   • Insomnia     RESOLVED: 60NHC6647       Past Surgical History:   Procedure Laterality Date   • BACK SURGERY     • BACK SURGERY      l4 s1 surgery    • CERVICAL FUSION      c4 and c5   • CHOLECYSTECTOMY     • COLONOSCOPY  2014    [facility]   • ELBOW SURGERY Left     REPAIR   • EYE SURGERY     • EYE SURGERY     • EYE SURGERY Right 2022    right eye surgery   • GANGLION CYST EXCISION Left 06/05/2020    Procedure: KNEE EXCISION BAKERS CYST;  Surgeon: Seven Esteves MD;  [facility];  Service: Orthopedics   • KNEE ARTHROSCOPY Left    • LUMBAR DISCECTOMY      L5 S1   • ORIF RADIAL SHAFT FRACTURE     • TX ARTHROSCOPY KNEE DIAGNOSTIC W/WO SYNOVIAL BX SPX Left 12/21/2020    Procedure: KNEE ARTHROSCOPY, popliteal cyst decompression;  Surgeon: Malcom Landau, MD;  [facility];  Service: Orthopedics   • TX CYSTO INSERTION TRANSPROSTATIC IMPLANT SINGLE N/A 12/20/2022    Procedure: CYSTOSCOPY WITH INSERTION UROLIFT WITH 6 IMPLANTS;  Surgeon: Jovanna Valdez MD;  [facility];  Service: Urology   • TX ESOPHAGOGASTRODUODENOSCOPY TRANSORAL DIAGNOSTIC N/A 11/01/2016    Procedure: ESOPHAGOGASTRODUODENOSCOPY (EGD); Surgeon: Elizabeth Sanchez MD;  [facility]; Service: Gastroenterology   • TX ESOPHAGOGASTRODUODENOSCOPY TRANSORAL DIAGNOSTIC N/A 05/31/2018    Procedure: ESOPHAGOGASTRODUODENOSCOPY (EGD); Surgeon: Elizabeth Sanchez MD;  [facility];   Service: Gastroenterology   • TX LAMOT PRTL FFD  [facility] REEXPL [address] LUMBAR N/A 10/24/2019    Procedure: Revision laminectomy and decompression at L5-S1;  Surgeon: Yang Hartley MD;  [facility];  Service: Orthopedics   • SMALL INTESTINE SURGERY      GIST surgery   • [address] [facility] PROCEDURE  05/21/2019   • [address] [facility] PROCEDURE  10/30/2020       Family History   Problem Relation Age of Onset   • Thyroid disease Mother    • Thyroid disease Brother    • Diabetes Paternal Grandmother    • Hypertension Family    • Skin cancer Father    • Thyroid disease Father    • Autoimmune disease Sister         Lupus       Social History     Occupational History   • Not on file   Tobacco Use   • Smoking status: Never   • Smokeless tobacco: Never   Vaping Use   • Vaping Use: Never used   Substance and Sexual Activity   • Alcohol use: Yes     Comment: Holidays( some months without consumption)   • Drug use: No   • Sexual activity: Yes     Partners: Female     Birth control/protection: None     Comment: With my wife       No Known Allergies      Current Outpatient Medications:   •  Alphagan P 0.1 %, INSTILL 1 DROP BY OPHTHALMIC ROUTE TWICE A DAY INTO RIGHT EYE (Patient not taking: Reported on 6/14/2023), Disp: , Rfl:   •  calcium carbonate (TUMS) 500 mg chewable tablet, Chew 1 tablet daily When needed, Disp: , Rfl:   •  cholestyramine (QUESTRAN) 4 g packet, Take 1 packet (4 g total) by mouth daily (Patient taking differently: Take 1 packet by mouth every other day), Disp: 30 each, Rfl: 3  •  Difluprednate 0.05 % EMUL, Apply to eye, Disp: , Rfl:   •  dorzolamide-timolol (COSOPT) 22.3-6.8 MG/ML ophthalmic solution, Administer 1 drop to the right eye 2 (two) times a day One drop four times a day (Patient not taking: Reported on 7/18/2023), Disp: , Rfl: 4  •  famotidine (PEPCID) 40 MG tablet, Take 1 tablet (40 mg total) by mouth daily at bedtime, Disp: 90 tablet, Rfl: 1  •  Misc Natural Products (OSTEO BI-FLEX JOINT SHIELD PO), Take by mouth, Disp: , Rfl:   •  Multiple Vitamins-Minerals (MULTIVITAL) tablet, Take 1 tablet by mouth daily Spectravite , Disp: , Rfl:   •  ofloxacin (OCUFLOX) 0.3 % ophthalmic solution, PLEASE SEE ATTACHED FOR DETAILED DIRECTIONS (Patient not taking: Reported on 2/22/2023), Disp: , Rfl:   •  pantoprazole (PROTONIX) 40 mg tablet, TAKE 1 TABLET BY MOUTH EVERY DAY, Disp: 90 tablet, Rfl: 1  •  prednisoLONE acetate (PRED FORTE) 1 % ophthalmic suspension, Administer 1 drop to the right eye 3 (three) times a day (Patient not taking: Reported on 7/18/2023), Disp: , Rfl:   •  Prolensa 0.07 % SOLN, Administer 0.07 drops to the right eye in the morning (Patient not taking: Reported on 7/18/2023), Disp: , Rfl:   •  rosuvastatin (CRESTOR) 5 mg tablet, Take 1 tablet (5 mg total) by mouth daily, Disp: 30 tablet, Rfl: 5  •  tamsulosin (FLOMAX) 0.4 mg, TAKE 1 CAPSULE (0.4 MG TOTAL) BY MOUTH DAILY WITH DINNER, Disp: 90 capsule, Rfl: 1      Verta Running, DPM    Scribe Attestation    I,:  Marc Champagne DPM am acting as a scribe while in the presence of the attending physician.:       I,:  Thomas Child MD personally performed the services described in this documentation    as scribed in my presence.:

## 2023-09-12 ENCOUNTER — HOSPITAL ENCOUNTER (OUTPATIENT)
Dept: RADIOLOGY | Age: 73
Discharge: HOME | End: 2023-09-12
Payer: COMMERCIAL

## 2023-09-12 DIAGNOSIS — M25.862 CYST OF LEFT KNEE JOINT: ICD-10-CM

## 2023-09-12 PROCEDURE — G1004 CDSM NDSC: HCPCS

## 2023-09-12 PROCEDURE — 73721 MRI JNT OF LWR EXTRE W/O DYE: CPT

## 2023-09-20 ENCOUNTER — OFFICE VISIT (OUTPATIENT)
Dept: OBGYN CLINIC | Facility: OTHER | Age: 73
End: 2023-09-20
Payer: COMMERCIAL

## 2023-09-20 ENCOUNTER — APPOINTMENT (OUTPATIENT)
Dept: LAB | Facility: CLINIC | Age: 73
End: 2023-09-20
Payer: COMMERCIAL

## 2023-09-20 ENCOUNTER — TRANSCRIBE ORDERS (OUTPATIENT)
Dept: LAB | Facility: CLINIC | Age: 73
End: 2023-09-20

## 2023-09-20 VITALS
HEART RATE: 68 BPM | BODY MASS INDEX: 27.89 KG/M2 | DIASTOLIC BLOOD PRESSURE: 68 MMHG | HEIGHT: 68 IN | SYSTOLIC BLOOD PRESSURE: 120 MMHG | WEIGHT: 184 LBS

## 2023-09-20 DIAGNOSIS — M25.862 CYST OF LEFT KNEE JOINT: Primary | ICD-10-CM

## 2023-09-20 DIAGNOSIS — G57.82 NEUROPATHY OF LEFT SAPHENOUS NERVE: ICD-10-CM

## 2023-09-20 DIAGNOSIS — Z98.890 STATUS POST ARTHROSCOPY OF LEFT KNEE: ICD-10-CM

## 2023-09-20 PROCEDURE — 86618 LYME DISEASE ANTIBODY: CPT

## 2023-09-20 PROCEDURE — 20610 DRAIN/INJ JOINT/BURSA W/O US: CPT | Performed by: ORTHOPAEDIC SURGERY

## 2023-09-20 PROCEDURE — 99214 OFFICE O/P EST MOD 30 MIN: CPT | Performed by: ORTHOPAEDIC SURGERY

## 2023-09-20 RX ORDER — BUPIVACAINE HYDROCHLORIDE 2.5 MG/ML
4 INJECTION, SOLUTION INFILTRATION; PERINEURAL
Status: COMPLETED | OUTPATIENT
Start: 2023-09-20 | End: 2023-09-20

## 2023-09-20 RX ADMIN — BUPIVACAINE HYDROCHLORIDE 4 ML: 2.5 INJECTION, SOLUTION INFILTRATION; PERINEURAL at 08:45

## 2023-09-20 NOTE — PROGRESS NOTES
Orthopaedic Surgery - Office Note  Yaquelin James (6 y.o. 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 · Discussed surgery involving excision of cyst. As patient's son is having major surgery soon, he prefers to wait until afterward for any kind of surgical intervention. · L knee cyst aspiration. Sent for crystals and Lyme. · Labs ordered for possible underlying autoimmune pathology. · Activity as tolerated. · Ice, heat, anti-inflammatories PRN for pain  Return if symptoms worsen or fail to improve. History of Present Illness  Yaquelin James is a 72 y.o. male who presents with recurrent left knee cyst. He has history of left knee arthroscopy with popliteal cyst decompression with Dr. Kylee Washburn on 2020. Prior to this, he was being treated by Dr. Jean Slaughter. During his last visit here, joint was not aspirated. He has obtained MRI since then. He complains of the same symptoms as during last visit - pain and discomfort, worsening with ambulation. He does not report a change in size in the cyst.     Review of Systems  Pertinent items are noted in HPI. All other systems were reviewed and are negative. Physical Exam  /68   Pulse 68   Ht 5' 8" (1.727 m)   Wt 83.5 kg (184 lb)   BMI 27.98 kg/m²   Cons: Appears well. No apparent distress. Psych: Alert. Oriented x3. Mood and affect normal.  Eyes: PERRLA, EOMI  Resp: Normal effort. No audible wheezing or stridor. CV: Palpable pulse. No discernable arrhythmia. No LE edema. Lymph:  No palpable cervical, axillary, or inguinal lymphadenopathy. Skin: Warm. No palpable masses. No visible lesions. Neuro: Normal muscle tone.   Normal and symmetric DTR's.     Left Knee Exam  Alignment:  Normal knee alignment. Inspection:  No erythema. No ecchymosis. Palpation:  mild tenderness at medial joint line. Large cyst along medial knee. ROM:  Not tested. Strength:  Not tested. Stability:  No objective knee instability. Stable Varus / Valgus stress, Lachman, and Posterior drawer. Tests:  No pertinent positive or negative tests. Patella:  Not tested. Neurovascular: intact along DP, SP, Corbin, T nerve distributions. Decreased sensation along saphensous nerve distribution on L. 2+ DP & PT pulses. Gait:  Normal.      Studies Reviewed  MRI of left knee - moderate joint effusion, large Baker's cyst 13.6x7.5x5.3cm, osteoarthritis    Large joint arthrocentesis: L knee  Universal Protocol:  Consent: Verbal consent obtained. Risks and benefits: risks, benefits and alternatives were discussed  Consent given by: patient  Patient understanding: patient states understanding of the procedure being performed  Patient identity confirmed: verbally with patient    Supporting Documentation  Indications: pain, diagnostic evaluation and joint swelling   Procedure Details  Location: knee - L knee  Preparation: Patient was prepped and draped in the usual sterile fashion  Needle size: 18 G  Ultrasound guidance: no  Approach: medial  Medications administered: 4 mL bupivacaine 0.25 %    Aspirate amount: 60 mL  Aspirate: yellow and clear  Analysis: fluid sample sent for laboratory analysis    Patient tolerance: patient tolerated the procedure well with no immediate complications  Dressing:  Sterile dressing applied              Medical, Surgical, Family, and Social History  The patient's medical history, family history, and social history, were reviewed and updated as appropriate.     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; RESOLVED: [id number]   • GERD (gastroesophageal reflux disease)    • Glaucoma     RESOLVED: 68NSY0295   • History of COVID-19 09/2021   • Insomnia     RESOLVED: [id number]       Past Surgical History:   Procedure Laterality Date   • BACK SURGERY     • BACK SURGERY      l4 s1 surgery    • CERVICAL FUSION      c4 and c5   • CHOLECYSTECTOMY     • COLONOSCOPY  2014    kutty   • ELBOW SURGERY Left     REPAIR   • EYE SURGERY     • EYE SURGERY     • EYE SURGERY Right 2022    right eye surgery   • GANGLION CYST EXCISION Left 06/05/2020    Procedure: KNEE EXCISION BAKERS CYST;  Surgeon: Petra Moore MD;  Location: [facility];  Service: Orthopedics   • KNEE ARTHROSCOPY Left    • LUMBAR DISCECTOMY      L5 S1   • ORIF RADIAL SHAFT FRACTURE     • MA ARTHROSCOPY KNEE DIAGNOSTIC W/WO SYNOVIAL BX SPX Left 12/21/2020    Procedure: KNEE ARTHROSCOPY, popliteal cyst decompression;  Surgeon: Amada Chance MD;  Location[facility];  Service: Orthopedics   • MA CYSTO INSERTION TRANSPROSTATIC IMPLANT SINGLE N/A 12/20/2022    Procedure: CYSTOSCOPY WITH INSERTION UROLIFT WITH 6 IMPLANTS;  Surgeon: Lachelle Degroot MD;  Location[facility];  Service: Urology   • MA ESOPHAGOGASTRODUODENOSCOPY TRANSORAL DIAGNOSTIC N/A 11/01/2016    Procedure: ESOPHAGOGASTRODUODENOSCOPY (EGD); Surgeon: America Dillon MD;  Location: [facility]; Service: Gastroenterology   • MA ESOPHAGOGASTRODUODENOSCOPY TRANSORAL DIAGNOSTIC N/A 05/31/2018    Procedure: ESOPHAGOGASTRODUODENOSCOPY (EGD); Surgeon: America Dillon MD;  [facility];   Service: Gastroenterology   • MA LAMOT PRTL FFD EXC DISC REEXPL [address] LUMBAR N/A 10/24/2019    Procedure: Revision laminectomy and decompression at L5-S1;  Surgeon: Cris Foster MD;  Location: [facility];  Service: Orthopedics   • SMALL INTESTINE SURGERY      GIST surgery   • [address] [facility] PROCEDURE  05/21/2019   • [address] [facility] PROCEDURE  10/30/2020       Family History   Problem Relation Age of Onset   • Thyroid disease Mother    • Thyroid disease Brother    • Diabetes Paternal Grandmother    • Hypertension Family    • Skin cancer Father    • Thyroid disease Father    • Autoimmune disease Sister         Lupus       Social History     Occupational History   • Not on file   Tobacco Use   • Smoking status: Never   • Smokeless tobacco: Never   Vaping Use   • Vaping Use: Never used   Substance and Sexual Activity   • Alcohol use: Yes     Comment: Holidays( some months without consumption)   • Drug use: No   • Sexual activity: Yes     Partners: Female     Birth control/protection: None     Comment: With my wife       No Known Allergies      Current Outpatient Medications:   •  Alphagan P 0.1 %, INSTILL 1 DROP BY OPHTHALMIC ROUTE TWICE A DAY INTO RIGHT EYE (Patient not taking: Reported on 6/14/2023), Disp: , Rfl:   •  calcium carbonate (TUMS) 500 mg chewable tablet, Chew 1 tablet daily When needed, Disp: , Rfl:   •  cholestyramine (QUESTRAN) 4 g packet, Take 1 packet (4 g total) by mouth daily (Patient taking differently: Take 1 packet by mouth every other day), Disp: 30 each, Rfl: 3  •  Difluprednate 0.05 % EMUL, Apply to eye, Disp: , Rfl:   •  dorzolamide-timolol (COSOPT) 22.3-6.8 MG/ML ophthalmic solution, Administer 1 drop to the right eye 2 (two) times a day One drop four times a day (Patient not taking: Reported on 7/18/2023), Disp: , Rfl: 4  •  famotidine (PEPCID) 40 MG tablet, Take 1 tablet (40 mg total) by mouth daily at bedtime, Disp: 90 tablet, Rfl: 1  •  Misc Natural Products (OSTEO BI-FLEX JOINT SHIELD PO), Take by mouth, Disp: , Rfl:   •  Multiple Vitamins-Minerals (MULTIVITAL) tablet, Take 1 tablet by mouth daily Spectravite , Disp: , Rfl:   •  ofloxacin (OCUFLOX) 0.3 % ophthalmic solution, PLEASE SEE ATTACHED FOR DETAILED DIRECTIONS (Patient not taking: Reported on 2/22/2023), Disp: , Rfl:   •  pantoprazole (PROTONIX) 40 mg tablet, TAKE 1 TABLET BY MOUTH EVERY DAY, Disp: 90 tablet, Rfl: 1  •  prednisoLONE acetate (PRED FORTE) 1 % ophthalmic suspension, Administer 1 drop to the right eye 3 (three) times a day (Patient not taking: Reported on 7/18/2023), Disp: , Rfl:   •  Prolensa 0.07 % SOLN, Administer 0.07 drops to the right eye in the morning (Patient not taking: Reported on 7/18/2023), Disp: , Rfl:   •  rosuvastatin (CRESTOR) 5 mg tablet, Take 1 tablet (5 mg total) by mouth daily, Disp: 30 tablet, Rfl: 5  •  tamsulosin (FLOMAX) 0.4 mg, TAKE 1 CAPSULE (0.4 MG TOTAL) BY MOUTH DAILY WITH DINNER, Disp: 90 capsule, Rfl: 1      Bobby Hicks DPM    Scribe Attestation    I,:  Bobby Hicks DPM am acting as a scribe while in the presence of the attending physician.:       I,:  Shyann Olivera MD personally performed the services described in this documentation    as scribed in my presence.:

## 2023-09-21 ENCOUNTER — TELEPHONE (OUTPATIENT)
Age: 73
End: 2023-09-21

## 2023-09-21 ENCOUNTER — LAB REQUISITION (OUTPATIENT)
Dept: LAB | Facility: HOSPITAL | Age: 73
End: 2023-09-21
Payer: COMMERCIAL

## 2023-09-21 DIAGNOSIS — M25.862 OTHER SPECIFIED JOINT DISORDERS, LEFT KNEE: ICD-10-CM

## 2023-09-21 LAB — CRYSTALS SNV QL MICRO: NORMAL

## 2023-09-21 PROCEDURE — 87476 LYME DIS DNA AMP PROBE: CPT

## 2023-09-21 PROCEDURE — 89060 EXAM SYNOVIAL FLUID CRYSTALS: CPT

## 2023-09-21 NOTE — TELEPHONE ENCOUNTER
Caller: [facility]    Doctor: Carmelo Haywood    Reason for call: [facility] called in looking for the specimens that were drawn yesterday    Call back#: [phone] option 1

## 2023-09-22 ENCOUNTER — TELEPHONE (OUTPATIENT)
Age: 73
End: 2023-09-22

## 2023-09-22 DIAGNOSIS — M25.862 CYST OF LEFT KNEE JOINT: Primary | ICD-10-CM

## 2023-09-22 NOTE — TELEPHONE ENCOUNTER
I did contact and clarify the lab test that we want ordered. Pt w/ [facility] c/o abdominal pain generalized, nausea, vomiting.  Per EMS no emesis while en route.  PMHx cyclic vomiting syndrome and reports is similar presentation.  Pt rpts normally goes to [facility], was there earlier but did not receive medications requested so came for 2nd opinion.  Vitally stable in triage, c/o chills, afebrile.

## 2023-09-22 NOTE — TELEPHONE ENCOUNTER
Caller: Modesta from [address] [place] [address]    Doctor: Dr. Christel Soriano    Reason for call: [facility] calling stating that aspirate labs were sent to them for testing for crystals and Lyme. Modesta stating Lyme's disease testing is through blood work. Modesta is calling for clarification before she cancels order for Lyme's disease.       Call back#: [phone] option 1

## 2023-09-25 LAB — B BURGDOR DNA SPEC QL NAA+PROBE: NEGATIVE

## 2023-09-28 LAB — MISCELLANEOUS LAB TEST RESULT: NORMAL

## 2023-10-09 NOTE — TELEPHONE ENCOUNTER
----- Message from Samra Griffith DO sent at 9/24/2021 11:00 AM EDT -----  Regarding: FW: Test Results Question  Contact: [phone]  Please set up a virtual visit - he may use Tylenol as directed p r n , fluids, rest patient should quarantine times 10 days if he develops high temperature for or increasing shortness of breath go immediately to the ER  ----- Message -----  From: Pepe Segovia  Sent: 9/24/2021  10:12 AM EDT  To: Samra Griffith DO  Subject: FW: Test Results Question                          ----- Message -----  From: Lisa Brown  Sent: 9/24/2021  10:00 AM EDT  To: [facility]  Subject: Test Results Question                            I tested positive for covid on Wed  at [facility] they said to follow up with you  I have been trying to call your office there is static, a problem with your system, because we can call other people  What do you suggest for bad sore throat, ears hurt, deep non productive cough  Have been vaccinated  10-Oct-2023 03:17

## 2023-10-11 LAB — MISCELLANEOUS LAB TEST RESULT: NORMAL

## 2023-10-12 DIAGNOSIS — K21.9 GASTROESOPHAGEAL REFLUX DISEASE WITHOUT ESOPHAGITIS: ICD-10-CM

## 2023-10-12 RX ORDER — FAMOTIDINE 40 MG/1
40 TABLET, FILM COATED ORAL
Qty: 90 TABLET | Refills: 1 | Status: SHIPPED | OUTPATIENT
Start: 2023-10-12

## 2023-10-29 DIAGNOSIS — N40.0 ENLARGED PROSTATE WITHOUT LOWER URINARY TRACT SYMPTOMS (LUTS): ICD-10-CM

## 2023-10-29 DIAGNOSIS — K21.9 GASTROESOPHAGEAL REFLUX DISEASE WITHOUT ESOPHAGITIS: ICD-10-CM

## 2023-10-30 RX ORDER — TAMSULOSIN HYDROCHLORIDE 0.4 MG/1
0.4 CAPSULE ORAL
Qty: 90 CAPSULE | Refills: 1 | Status: SHIPPED | OUTPATIENT
Start: 2023-10-30

## 2023-10-30 RX ORDER — PANTOPRAZOLE SODIUM 40 MG/1
TABLET, DELAYED RELEASE ORAL
Qty: 90 TABLET | Refills: 1 | Status: SHIPPED | OUTPATIENT
Start: 2023-10-30

## 2023-11-28 ENCOUNTER — RA CDI HCC (OUTPATIENT)
Dept: OTHER | Facility: HOSPITAL | Age: 73
End: 2023-11-28

## 2023-11-28 NOTE — PROGRESS NOTES
[facility] coding opportunities       Chart reviewed, no opportunity found: CHART REVIEWED, NO OPPORTUNITY FOUND        Patients Insurance     Medicare Insurance: [employer]

## 2023-11-29 ENCOUNTER — OFFICE VISIT (OUTPATIENT)
Dept: INTERNAL MEDICINE CLINIC | Facility: CLINIC | Age: 73
End: 2023-11-29
Payer: COMMERCIAL

## 2023-11-29 VITALS
HEIGHT: 68 IN | SYSTOLIC BLOOD PRESSURE: 124 MMHG | WEIGHT: 185.8 LBS | BODY MASS INDEX: 28.16 KG/M2 | OXYGEN SATURATION: 98 % | HEART RATE: 62 BPM | DIASTOLIC BLOOD PRESSURE: 74 MMHG | RESPIRATION RATE: 16 BRPM

## 2023-11-29 DIAGNOSIS — R73.01 IFG (IMPAIRED FASTING GLUCOSE): ICD-10-CM

## 2023-11-29 DIAGNOSIS — Z23 ENCOUNTER FOR IMMUNIZATION: Primary | ICD-10-CM

## 2023-11-29 DIAGNOSIS — H35.351 CYSTOID MACULAR DEGENERATION OF RIGHT EYE: ICD-10-CM

## 2023-11-29 DIAGNOSIS — E78.5 HYPERLIPIDEMIA, UNSPECIFIED HYPERLIPIDEMIA TYPE: ICD-10-CM

## 2023-11-29 DIAGNOSIS — L85.3 DRY SKIN: ICD-10-CM

## 2023-11-29 DIAGNOSIS — R05.3 CHRONIC COUGH: ICD-10-CM

## 2023-11-29 DIAGNOSIS — Z00.00 MEDICARE ANNUAL WELLNESS VISIT, SUBSEQUENT: ICD-10-CM

## 2023-11-29 PROCEDURE — 99214 OFFICE O/P EST MOD 30 MIN: CPT | Performed by: INTERNAL MEDICINE

## 2023-11-29 PROCEDURE — G0439 PPPS, SUBSEQ VISIT: HCPCS | Performed by: INTERNAL MEDICINE

## 2023-11-29 RX ORDER — BENZONATATE 100 MG/1
100 CAPSULE ORAL
Qty: 20 CAPSULE | Refills: 0 | Status: SHIPPED | OUTPATIENT
Start: 2023-11-29

## 2023-11-29 RX ORDER — FLUTICASONE PROPIONATE 50 MCG
2 SPRAY, SUSPENSION (ML) NASAL DAILY
Qty: 1 G | Refills: 0 | Status: SHIPPED | OUTPATIENT
Start: 2023-11-29

## 2023-11-29 NOTE — PROGRESS NOTES
Assessment and Plan:     Problem List Items Addressed This Visit          Endocrine    IFG (impaired fasting glucose)     Reduce carbohydrates and sweets routine exercise continue monitor A1c/fasting blood sugar         Relevant Orders    Hemoglobin A1C (Completed)       Musculoskeletal and Integument    Dry skin     Patient does report to me significant dry skin on the back Rx for Lac-Hydrin cream apply to affected area once a day as needed            Other    Hyperlipidemia     Hyperlipidemia controlled continue with current medical regiment recommend a low-cholesterol diet and recommend routine exercise we will continue to monitor the progress. Continue Crestor 5 mg once daily         Relevant Orders    Comprehensive metabolic panel (Completed)    Lipid Panel with Direct LDL reflex (Completed)    Medicare annual wellness visit, subsequent     Assessment and plan 1. Medicare subsequent annual wellness examination overall the patient is clinically stable and doing well, we encouraged the patient to follow a healthy and balanced diet. We recommend that the patient exercise routinely approximately 30 minutes 5 times per week . We have reviewed the patient's vaccines and have made recommendations for updates if necessary    annual flu shot, consider COVID booster/RSV vaccine. We will be ordering screening laboratories which are age appropriate. Return to the office in 6 months    call if any problems.          Chronic cough     Chronic cough primarily at nighttime likely postviral cough after exposure to grandchildren who had had viruses currently no sign or symptom of an active infection recommend chest x-ray PA and lateral because of the chronicity Rx for Flonase 2 sprays each nostril once daily and Tessalon Perles 100 mg 1 p.o. nightly as needed         Relevant Medications    fluticasone (FLONASE) 50 mcg/act nasal spray    benzonatate (TESSALON PERLES) 100 mg capsule    Other Relevant Orders    XR chest pa & lateral (Completed)    Cystoid macular degeneration of right eye     Patient does report to me worsening of his vision he is working with [facility] continue to monitor          Other Visit Diagnoses       Encounter for immunization    -  Primary        RTO in 6 months call if any problems  BMI Counseling: Body mass index is 28.25 kg/m². The BMI is above normal. Nutrition recommendations include decreasing portion sizes and moderation in carbohydrate intake. Exercise recommendations include exercising 3-5 times per week. Rationale for BMI follow-up plan is due to patient being overweight or obese. Expand All Collapse All    Assessment/Plan:     Neuropathy of left saphenous nerve  Mild improvement secondary to previous surgery     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)  Clinically stable and doing very well up-to-date with colonoscopy and follow-up with GI     Glaucoma of right eye  Significant improvement after recent procedure continue ongoing follow-up with ophthalmology     Hyperlipidemia  Suboptimal control/elevated ASCVD risk score we will start Crestor 5 mg once daily reviewed the risk benefits and side effects of the medication if any myalgias please notify me we will check CMP and lipid panel in 4 to 6 weeks low-cholesterol diet recommended     Baker cyst, left  Recurrent cyst left knee continue follow-up with orthopedics           Preventive health issues were discussed with patient, and age appropriate screening tests were ordered as noted in patient's After Visit Summary. Personalized health advice and appropriate referrals for health education or preventive services given if needed, as noted in patient's After Visit Summary.      History of Present Illness:     Patient presents for a Medicare Wellness Visit    HPI 69-year old male coming in for a follow up visit regarding hyperlipidemia, impaired fasting glucose, chronic cough at nighttime, dry skin, cystoid macular degeneration, low eye pressure; the patient reports me compliant taking medications without untoward side effects the. The patient is here to review his medical condition, update me on the medical condition and the patient reports me no hospitalizations and no ER visits. No injuries no illnesses the patient has had worsening vision he is working with [facility] recently the pt reports eye pressure now 2 work with [facility] , losing vision, working with DR Che Marcano, who retired, losing visual fields. Trying to follow healthy balanced diet remains active he is retired. Here to review laboratories in detail he does report to me dryness of the skin dryness of the skin of the back. He has been working with Dr. Chanel Liriano orthopedics regarding left Baker's cyst     fatigue , [facility], cold sx cough intermittent , feels feverish, negative, achey, cough, using allergy riccola , wife is recc treatment, tried [facility] cough supressan     Review of Systems:     Review of Systems   Constitutional:  Negative for activity change, appetite change and unexpected weight change. HENT:  Negative for congestion and postnasal drip. Eyes:  Negative for visual disturbance. Respiratory:  Positive for cough. Negative for shortness of breath. Cardiovascular:  Negative for chest pain. Gastrointestinal:  Negative for abdominal pain, diarrhea, nausea and vomiting. Neurological:  Negative for dizziness, light-headedness and headaches. Hematological:  Negative for adenopathy.         Problem List:     Patient Active Problem List   Diagnosis    Subacromial impingement of right shoulder    Enlarged prostate without lower urinary tract symptoms (luts)    Esophageal reflux    Essential hypertriglyceridemia    Hyperlipidemia    Splenomegaly    Tremor, essential    Primary osteoarthritis of knee    History of gastrointestinal stromal tumor (GIST)    Belching    Gastroesophageal reflux disease without esophagitis    Screening for prostate cancer    Diarrhea    Body aches    Myalgia    Melena    Atypical chest pain    IBS (irritable bowel syndrome)    Encounter for hepatitis C screening test for low risk patient    Hypokalemia    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)    Lumbar radiculopathy    Herniation of intervertebral disc between L5 and S1    Spinal stenosis of lumbar region    Lumbar disc herniation with radiculopathy    Overweight (BMI 25.0-29. 9)    Skin rash    Medicare annual wellness visit, subsequent    Aftercare following surgery    Preop examination    Baker cyst, left    Preoperative testing    Fuchs' corneal dystrophy    Preop exam for internal medicine    Preoperative clearance    Left ear pain    COVID-19    Status post arthroscopy of left knee    Neuropathy of left saphenous nerve    Insomnia    Mild depression    IFG (impaired fasting glucose)    Low platelet count (HCC)    Glaucoma of right eye    Chronic cough    Dry skin    Cystoid macular degeneration of right eye      Past Medical and Surgical History:     Past Medical History:   Diagnosis Date    Abnormal weight loss     RESOLVED: [id number]    Anemia     RESOLVED: [id number]    Atypical chest pain     RESOLVED: [id number]    BPH (benign prostatic hyperplasia)     Cancer (HCC)     DUODENAL    Luis Miguel's syndrome     Depression     RESOLVED: [id number]    Disc disorder     cervical and lumbar    Diverticulitis of colon     LAST ASSESSED: [id number]    Duodenal nodule     RESOLVED: [id number]    Gastrointestinal stromal tumor (GIST) (HCC)     MALIGNANT; RESOLVED: [id number]    GERD (gastroesophageal reflux disease)     Glaucoma     RESOLVED: [id number]    History of COVID-19 09/2021    Insomnia     RESOLVED: [id number]     Past Surgical History:   Procedure Laterality Date    BACK SURGERY      BACK SURGERY      l4 s1 surgery     CERVICAL FUSION      c4 and c5    CHOLECYSTECTOMY COLONOSCOPY  2014    kutty    ELBOW SURGERY Left     REPAIR    EYE SURGERY      EYE SURGERY      EYE SURGERY Right 2022    right eye surgery    GANGLION CYST EXCISION Left 06/05/2020    Procedure: KNEE EXCISION BAKERS CYST;  Surgeon: Regi Mcdonald MD;  [facility]: [facility];  Service: Orthopedics    KNEE ARTHROSCOPY Left     LUMBAR DISCECTOMY      L5 S1    ORIF RADIAL SHAFT FRACTURE      WV ARTHROSCOPY KNEE DIAGNOSTIC W/WO SYNOVIAL BX SPX Left 12/21/2020    Procedure: KNEE ARTHROSCOPY, popliteal cyst decompression;  Surgeon: Pema Bell MD;  [facility];  Service: Orthopedics    WV CYSTO INSERTION TRANSPROSTATIC IMPLANT SINGLE N/A 12/20/2022    Procedure: CYSTOSCOPY WITH INSERTION UROLIFT WITH 6 IMPLANTS;  Surgeon: Kimberly Armando MD;  [facility];  Service: Urology    WV ESOPHAGOGASTRODUODENOSCOPY TRANSORAL DIAGNOSTIC N/A 11/01/2016    Procedure: ESOPHAGOGASTRODUODENOSCOPY (EGD); Surgeon: Katie Rodriguez MD;  [facility]: [facility]; Service: Gastroenterology    WV ESOPHAGOGASTRODUODENOSCOPY TRANSORAL DIAGNOSTIC N/A 05/31/2018    Procedure: ESOPHAGOGASTRODUODENOSCOPY (EGD); Surgeon: Katie Rodriguez MD;  [facility];   Service: Gastroenterology    WV LAMOT PRTL FFD  [facility] REEXPL [address] LUMBAR N/A 10/24/2019    Procedure: Revision laminectomy and decompression at L5-S1;  Surgeon: Doreen Villagran MD;  [facility]: [facility];  Service: Orthopedics    SMALL INTESTINE SURGERY      GIST surgery    [address] MSK PROCEDURE  05/21/2019    US GUIDED MSK PROCEDURE  10/30/2020      Family History:     Family History   Problem Relation Age of Onset    Thyroid disease Mother     Thyroid disease Brother     Diabetes Paternal Grandmother     Hypertension Family     Skin cancer Father     Thyroid disease Father     Autoimmune disease Sister         Lupus      Social History:     Social History     Socioeconomic History    Marital status: /Civil Union     Spouse name: None    Number of children: None    Years of education: None    Highest education level: None   Occupational History    None   Tobacco Use    Smoking status: Never    Smokeless tobacco: Never   Vaping Use    Vaping Use: Never used   Substance and Sexual Activity    Alcohol use: Yes     Comment: Holidays( some months without consumption)    Drug use: No    Sexual activity: Yes     Partners: Female     Birth control/protection: None     Comment: With my wife   Other Topics Concern    None   Social History Narrative    None     Social Determinants of Health     Financial Resource Strain: Medium Risk (11/29/2023)    Overall Financial Resource Strain (CARDIA)     Difficulty of Paying Living Expenses: Somewhat hard   Food Insecurity: Not on file   Transportation Needs: Unmet Transportation Needs (11/29/2023)    PRAPARE - Transportation     Lack of Transportation (Medical): Yes     Lack of Transportation (Non-Medical): Yes   Physical Activity: Not on file   Stress: Not on file   Social Connections: Not on file   Intimate Partner Violence: Not on file   Housing Stability: Not on file      Medications and Allergies:     Current Outpatient Medications   Medication Sig Dispense Refill    benzonatate (TESSALON PERLES) 100 mg capsule Take 1 capsule (100 mg total) by mouth daily at bedtime as needed for cough 20 capsule 0    calcium carbonate (TUMS) 500 mg chewable tablet Chew 1 tablet daily When needed      cholestyramine (QUESTRAN) 4 g packet Take 1 packet (4 g total) by mouth daily (Patient taking differently: Take 1 packet by mouth every other day) 30 each 3    Difluprednate 0.05 % EMUL Apply to eye      famotidine (PEPCID) 40 MG tablet TAKE 1 TABLET BY MOUTH DAILY AT BEDTIME 90 tablet 1    fluticasone (FLONASE) 50 mcg/act nasal spray 2 sprays into each nostril daily 1 g 0    Misc Natural Products (OSTEO BI-FLEX JOINT SHIELD PO) Take by mouth      Multiple Vitamins-Minerals (MULTIVITAL) tablet Take 1 tablet by mouth daily Spectravite pantoprazole (PROTONIX) 40 mg tablet TAKE 1 TABLET BY MOUTH EVERY DAY 90 tablet 1    rosuvastatin (CRESTOR) 5 mg tablet Take 1 tablet (5 mg total) by mouth daily 30 tablet 5    tamsulosin (FLOMAX) 0.4 mg TAKE 1 CAPSULE BY MOUTH EVERY DAY WITH DINNER 90 capsule 1    ammonium lactate (LAC-HYDRIN) 12 % cream Apply topically as needed for dry skin 385 g 0    dorzolamide-timolol (COSOPT) 22.3-6.8 MG/ML ophthalmic solution Administer 1 drop to the right eye 2 (two) times a day One drop four times a day (Patient not taking: Reported on 7/18/2023)  4     No current facility-administered medications for this visit. No Known Allergies   Immunizations:     Immunization History   Administered Date(s) Administered    COVID-19 MODERNA VACC 0.25 ML IM BOOSTER 12/17/2021    COVID-19 MODERNA VACC 0.5 ML IM 01/05/2021, 02/04/2021    INFLUENZA 12/02/2018, 11/08/2020, 10/26/2021, 11/03/2022    Influenza, seasonal, injectable 11/09/2014    Influenza, seasonal, injectable, preservative free 11/01/2012    MMR 01/12/2015    Pneumococcal Conjugate 13-Valent 03/01/2017    Pneumococcal Polysaccharide PPV23 09/28/2012, 09/12/2018    Td (adult), adsorbed 02/01/2003    Tdap 01/24/2013    Zoster 12/06/2012    Zoster Vaccine Recombinant 03/14/2020, 06/22/2020    influenza, trivalent, adjuvanted 10/16/2023      Health Maintenance:         Topic Date Due    Colorectal Cancer Screening  09/27/2027    Hepatitis C Screening  Completed         Topic Date Due    COVID-19 Vaccine (4 - Rajeev Burkitt series) 02/11/2022      Medicare Screening Tests and Risk Assessments:     Marlee Lobo is here for his Subsequent Wellness visit. Health Risk Assessment:   Patient rates overall health as good. Patient feels that their physical health rating is same. Patient is satisfied with their life. Eyesight was rated as slightly worse. Hearing was rated as same. Patient feels that their emotional and mental health rating is same. Patients states they are never, rarely angry. Patient states they are sometimes unusually tired/fatigued. Pain experienced in the last 7 days has been some. Patient's pain rating has been 3/10. Patient states that he has experienced no weight loss or gain in last 6 months. Fall Risk Screening: In the past year, patient has experienced: no history of falling in past year      Home Safety:  Patient does not have trouble with stairs inside or outside of their home. Patient has working smoke alarms and has working carbon monoxide detector. Home safety hazards include: none. Nutrition:   Current diet is Regular. Medications:   Patient is currently taking over-the-counter supplements. OTC medications include: see medication list. Patient is able to manage medications. Activities of Daily Living (ADLs)/Instrumental Activities of Daily Living (IADLs):   Walk and transfer into and out of bed and chair?: Yes  Dress and groom yourself?: Yes    Bathe or shower yourself?: Yes    Feed yourself? Yes  Do your laundry/housekeeping?: Yes  Manage your money, pay your bills and track your expenses?: Yes  Make your own meals?: Yes    Do your own shopping?: Yes    Previous Hospitalizations:   Any hospitalizations or ED visits within the last 12 months?: No      Advance Care Planning:   Living will: Yes    Durable POA for healthcare:  Yes    Advanced directive: Yes      Cognitive Screening:   Provider or family/friend/caregiver concerned regarding cognition?: No    PREVENTIVE SCREENINGS      Cardiovascular Screening:    General: Screening Not Indicated and History Lipid Disorder      Diabetes Screening:     General: Screening Current      Colorectal Cancer Screening:     General: Screening Current      Prostate Cancer Screening:    General: Screening Current      Abdominal Aortic Aneurysm (AAA) Screening:    Risk factors include: age between 70-77 yo        Lung Cancer Screening:     General: Screening Not Indicated      Hepatitis C Screening:    General: Screening Current    Screening, Brief Intervention, and Referral to Treatment (SBIRT)    Screening  Typical number of drinks in a day: 0  Typical number of drinks in a week: 1  Interpretation: Low risk drinking behavior. AUDIT-C Screenin) How often did you have a drink containing alcohol in the past year? monthly or less  2) How many drinks did you have on a typical day when you were drinking in the past year? 0  3) How often did you have 6 or more drinks on one occasion in the past year? never    AUDIT-C Score: 1  Interpretation: Score 0-3 (male): Negative screen for alcohol misuse    Single Item Drug Screening:  How often have you used an illegal drug (including marijuana) or a prescription medication for non-medical reasons in the past year? never    Single Item Drug Screen Score: 0  Interpretation: Negative screen for possible drug use disorder    No results found. Physical Exam:     /74   Pulse 62   Resp 16   Ht 5' 8" (1.727 m)   Wt 84.3 kg (185 lb 12.8 oz)   SpO2 98%   BMI 28.25 kg/m²   Postnasal drip adherent to the posterior pharynx  Physical Exam  Vitals and nursing note reviewed. Constitutional:       General: He is not in acute distress. Appearance: Normal appearance. He is well-developed. He is not ill-appearing, toxic-appearing or diaphoretic. HENT:      Head: Normocephalic and atraumatic. Right Ear: External ear normal.      Left Ear: External ear normal.      Nose: Nose normal.   Eyes:      Pupils: Pupils are equal, round, and reactive to light. Cardiovascular:      Rate and Rhythm: Normal rate and regular rhythm. Heart sounds: Normal heart sounds. No murmur heard. Pulmonary:      Effort: Pulmonary effort is normal.      Breath sounds: Normal breath sounds. Abdominal:      General: There is no distension. Palpations: Abdomen is soft. Tenderness: There is no abdominal tenderness. There is no guarding.      Minimal flaking and dryness of the skin back    Wyatt Lack, DO

## 2023-11-29 NOTE — PATIENT INSTRUCTIONS
Medicare Preventive Visit Patient Instructions  Thank you for completing your Welcome to Medicare Visit or Medicare Annual Wellness Visit today. Your next wellness visit will be due in one year (11/29/2024). The screening/preventive services that you may require over the next 5-10 years are detailed below. Some tests may not apply to you based off risk factors and/or age. Screening tests ordered at today's visit but not completed yet may show as past due. Also, please note that scanned in results may not display below. Preventive Screenings:  Service Recommendations Previous Testing/Comments   Colorectal Cancer Screening  Colonoscopy    Fecal Occult Blood Test (FOBT)/Fecal Immunochemical Test (FIT)  Fecal DNA/Cologuard Test  Flexible Sigmoidoscopy Age: 43-73 years old   Colonoscopy: every 10 years (May be performed more frequently if at higher risk)  OR  FOBT/FIT: every 1 year  OR  Cologuard: every 3 years  OR  Sigmoidoscopy: every 5 years  Screening may be recommended earlier than age 39 if at higher risk for colorectal cancer. Also, an individualized decision between you and your healthcare provider will decide whether screening between the ages of 77-80 would be appropriate. Colonoscopy: 09/28/2022  FOBT/FIT: Not on file  Cologuard: Not on file  Sigmoidoscopy: Not on file          Prostate Cancer Screening Individualized decision between patient and health care provider in men between ages of 53-66   Medicare will cover every 12 months beginning on the day after your 50th birthday PSA: 2.66 ng/mL           Hepatitis C Screening Once for adults born between 1945 and 1965  More frequently in patients at high risk for Hepatitis C Hep C Antibody: 09/24/2019        Diabetes Screening 1-2 times per year if you're at risk for diabetes or have pre-diabetes Fasting glucose: 93 mg/dL (8/19/2023)  A1C: 5.0 % (6/30/2023)      Cholesterol Screening Once every 5 years if you don't have a lipid disorder.  May order more often based on risk factors. Lipid panel: 08/19/2023         Other Preventive Screenings Covered by Medicare:  Abdominal Aortic Aneurysm (AAA) Screening: covered once if your at risk. You're considered to be at risk if you have a family history of AAA or a male between the age of 70-76 who smoking at least 100 cigarettes in your lifetime. Lung Cancer Screening: covers low dose CT scan once per year if you meet all of the following conditions: (1) Age 48-67; (2) No signs or symptoms of lung cancer; (3) Current smoker or have quit smoking within the last 15 years; (4) You have a tobacco smoking history of at least 20 pack years (packs per day x number of years you smoked); (5) You get a written order from a healthcare provider. Glaucoma Screening: covered annually if you're considered high risk: (1) You have diabetes OR (2) Family history of glaucoma OR (3)  aged 48 and older OR (3)  American aged 72 and older  Osteoporosis Screening: covered every 2 years if you meet one of the following conditions: (1) Have a vertebral abnormality; (2) On glucocorticoid therapy for more than 3 months; (3) Have primary hyperparathyroidism; (4) On osteoporosis medications and need to assess response to drug therapy. HIV Screening: covered annually if you're between the age of 14-79. Also covered annually if you are younger than 13 and older than 72 with risk factors for HIV infection. For pregnant patients, it is covered up to 3 times per pregnancy.     Immunizations:  Immunization Recommendations   Influenza Vaccine Annual influenza vaccination during flu season is recommended for all persons aged >= 6 months who do not have contraindications   Pneumococcal Vaccine   * Pneumococcal conjugate vaccine = PCV13 (Prevnar 13), PCV15 (Vaxneuvance), PCV20 (Prevnar 20)  * Pneumococcal polysaccharide vaccine = PPSV23 (Pneumovax) Adults 48-37 yo with certain risk factors or if 69+ yo  If never received any pneumonia vaccine: recommend Prevnar 21 (PCV20)  Give PCV20 if previously received 1 dose of PCV13 or PPSV23   Hepatitis B Vaccine 3 dose series if at intermediate or high risk (ex: diabetes, end stage renal disease, liver disease)   Respiratory syncytial virus (RSV) Vaccine - COVERED BY MEDICARE PART D  * RSVPreF3 (Arexvy) CDC recommends that adults 61years of age and older may receive a single dose of RSV vaccine using shared clinical decision-making (SCDM)   Tetanus (Td) Vaccine - COST NOT COVERED BY MEDICARE PART B Following completion of primary series, a booster dose should be given every 10 years to maintain immunity against tetanus. Td may also be given as tetanus wound prophylaxis. Tdap Vaccine - COST NOT COVERED BY MEDICARE PART B Recommended at least once for all adults. For pregnant patients, recommended with each pregnancy. Shingles Vaccine (Shingrix) - COST NOT COVERED BY MEDICARE PART B  2 shot series recommended in those 19 years and older who have or will have weakened immune systems or those 50 years and older     Health Maintenance Due:      Topic Date Due   • Colorectal Cancer Screening  09/27/2027   • Hepatitis C Screening  Completed     Immunizations Due:      Topic Date Due   • COVID-19 Vaccine (4 - Moderna series) 02/11/2022   • Influenza Vaccine (1) 09/01/2023     Advance Directives   What are advance directives? Advance directives are legal documents that state your wishes and plans for medical care. These plans are made ahead of time in case you lose your ability to make decisions for yourself. Advance directives can apply to any medical decision, such as the treatments you want, and if you want to donate organs. What are the types of advance directives? There are many types of advance directives, and each state has rules about how to use them. You may choose a combination of any of the following:  Living will: This is a written record of the treatment you want.  You can also choose which treatments you do not want, which to limit, and which to stop at a certain time. This includes surgery, medicine, IV fluid, and tube feedings. Durable power of  for [facility]): This is a written record that states who you want to make healthcare choices for you when you are unable to make them for yourself. This person, called a proxy, is usually a family member or a friend. You may choose more than 1 proxy. Do not resuscitate (DNR) order:  A DNR order is used in case your heart stops beating or you stop breathing. It is a request not to have certain forms of treatment, such as CPR. A DNR order may be included in other types of advance directives. Medical directive: This covers the care that you want if you are in a coma, near death, or unable to make decisions for yourself. You can list the treatments you want for each condition. Treatment may include pain medicine, surgery, blood transfusions, dialysis, IV or tube feedings, and a ventilator (breathing machine). Values history: This document has questions about your views, beliefs, and how you feel and think about life. This information can help others choose the care that you would choose. Why are advance directives important? An advance directive helps you control your care. Although spoken wishes may be used, it is better to have your wishes written down. Spoken wishes can be misunderstood, or not followed. Treatments may be given even if you do not want them. An advance directive may make it easier for your family to make difficult choices about your care. Weight Management   Why it is important to manage your weight:  Being overweight increases your risk of health conditions such as heart disease, high blood pressure, type 2 diabetes, and certain types of cancer. It can also increase your risk for osteoarthritis, sleep apnea, and other respiratory problems. Aim for a slow, steady weight loss.  Even a small amount of weight loss can lower your risk of health problems. How to lose weight safely:  A safe and healthy way to lose weight is to eat fewer calories and get regular exercise. You can lose up about 1 pound a week by decreasing the number of calories you eat by 500 calories each day. Healthy meal plan for weight management:  A healthy meal plan includes a variety of foods, contains fewer calories, and helps you stay healthy. A healthy meal plan includes the following:  Eat whole-grain foods more often. A healthy meal plan should contain fiber. Fiber is the part of grains, fruits, and vegetables that is not broken down by your body. Whole-grain foods are healthy and provide extra fiber in your diet. Some examples of whole-grain foods are whole-wheat breads and pastas, oatmeal, brown rice, and bulgur. Eat a variety of vegetables every day. Include dark, leafy greens such as spinach, kale, brannon greens, and mustard greens. Eat yellow and orange vegetables such as carrots, sweet potatoes, and winter squash. Eat a variety of fruits every day. Choose fresh or canned fruit (canned in its own juice or light syrup) instead of juice. Fruit juice has very little or no fiber. Eat low-fat dairy foods. Drink fat-free (skim) milk or 1% milk. Eat fat-free yogurt and low-fat cottage cheese. Try low-fat cheeses such as mozzarella and other reduced-fat cheeses. Choose meat and other protein foods that are low in fat. Choose beans or other legumes such as split peas or lentils. Choose fish, skinless poultry (chicken or turkey), or lean cuts of red meat (beef or pork). Before you cook meat or poultry, cut off any visible fat. Use less fat and oil. Try baking foods instead of frying them. Add less fat, such as margarine, sour cream, regular salad dressing and mayonnaise to foods. Eat fewer high-fat foods. Some examples of high-fat foods include french fries, doughnuts, ice cream, and cakes. Eat fewer sweets.   Limit foods and drinks that are high in sugar. This includes candy, cookies, regular soda, and sweetened drinks. Exercise:  Exercise at least 30 minutes per day on most days of the week. Some examples of exercise include walking, biking, dancing, and swimming. You can also fit in more physical activity by taking the stairs instead of the elevator or parking farther away from stores. Ask your healthcare provider about the best exercise plan for you. © Copyright [employer] 2018 Information is for End User's use only and may not be sold, redistributed or otherwise used for commercial purposes.  All illustrations and images included in CareNotes® are the copyrighted property of [employer].D.A.M., Inc. or  [address]

## 2023-11-30 ENCOUNTER — APPOINTMENT (OUTPATIENT)
Dept: RADIOLOGY | Age: 73
End: 2023-11-30
Payer: COMMERCIAL

## 2023-11-30 ENCOUNTER — LAB (OUTPATIENT)
Dept: LAB | Age: 73
End: 2023-11-30
Payer: COMMERCIAL

## 2023-11-30 DIAGNOSIS — R05.3 CHRONIC COUGH: ICD-10-CM

## 2023-11-30 DIAGNOSIS — E78.5 HYPERLIPIDEMIA, UNSPECIFIED HYPERLIPIDEMIA TYPE: ICD-10-CM

## 2023-11-30 DIAGNOSIS — R73.01 IFG (IMPAIRED FASTING GLUCOSE): ICD-10-CM

## 2023-11-30 DIAGNOSIS — L85.3 DRY SKIN: Primary | ICD-10-CM

## 2023-11-30 LAB
ALBUMIN SERPL BCP-MCNC: 4.5 G/DL (ref 3.5–5)
ALP SERPL-CCNC: 76 U/L (ref 34–104)
ALT SERPL W P-5'-P-CCNC: 33 U/L (ref 7–52)
ANION GAP SERPL CALCULATED.3IONS-SCNC: 8 MMOL/L
AST SERPL W P-5'-P-CCNC: 31 U/L (ref 13–39)
BILIRUB SERPL-MCNC: 1.01 MG/DL (ref 0.2–1)
BUN SERPL-MCNC: 16 MG/DL (ref 5–25)
CALCIUM SERPL-MCNC: 9.1 MG/DL (ref 8.4–10.2)
CHLORIDE SERPL-SCNC: 101 MMOL/L (ref 96–108)
CHOLEST SERPL-MCNC: 147 MG/DL
CO2 SERPL-SCNC: 30 MMOL/L (ref 21–32)
CREAT SERPL-MCNC: 0.91 MG/DL (ref 0.6–1.3)
EST. AVERAGE GLUCOSE BLD GHB EST-MCNC: 105 MG/DL
GFR SERPL CREATININE-BSD FRML MDRD: 84 ML/MIN/1.73SQ M
GLUCOSE P FAST SERPL-MCNC: 96 MG/DL (ref 65–99)
HBA1C MFR BLD: 5.3 %
HDLC SERPL-MCNC: 47 MG/DL
LDLC SERPL CALC-MCNC: 79 MG/DL (ref 0–100)
POTASSIUM SERPL-SCNC: 3.9 MMOL/L (ref 3.5–5.3)
PROT SERPL-MCNC: 7.2 G/DL (ref 6.4–8.4)
SODIUM SERPL-SCNC: 139 MMOL/L (ref 135–147)
TRIGL SERPL-MCNC: 105 MG/DL

## 2023-11-30 PROCEDURE — 80053 COMPREHEN METABOLIC PANEL: CPT

## 2023-11-30 PROCEDURE — 36415 COLL VENOUS BLD VENIPUNCTURE: CPT

## 2023-11-30 PROCEDURE — 80061 LIPID PANEL: CPT

## 2023-11-30 PROCEDURE — 83036 HEMOGLOBIN GLYCOSYLATED A1C: CPT

## 2023-11-30 PROCEDURE — 71046 X-RAY EXAM CHEST 2 VIEWS: CPT

## 2023-11-30 RX ORDER — AMMONIUM LACTATE 12 G/100G
CREAM TOPICAL AS NEEDED
Qty: 385 G | Refills: 0 | Status: SHIPPED | OUTPATIENT
Start: 2023-11-30

## 2023-12-03 PROBLEM — H35.351 CYSTOID MACULAR DEGENERATION OF RIGHT EYE: Status: ACTIVE | Noted: 2023-12-03

## 2023-12-03 PROBLEM — R05.3 CHRONIC COUGH: Status: ACTIVE | Noted: 2023-12-03

## 2023-12-03 PROBLEM — L85.3 DRY SKIN: Status: ACTIVE | Noted: 2023-12-03

## 2023-12-03 NOTE — ASSESSMENT & PLAN NOTE
Hyperlipidemia controlled continue with current medical regiment recommend a low-cholesterol diet and recommend routine exercise we will continue to monitor the progress.   Continue Crestor 5 mg once daily

## 2023-12-03 NOTE — ASSESSMENT & PLAN NOTE
Patient does report to me significant dry skin on the back Rx for Lac-Hydrin cream apply to affected area once a day as needed

## 2023-12-03 NOTE — ASSESSMENT & PLAN NOTE
Patient does report to me worsening of his vision he is working with [facility] continue to monitor

## 2023-12-03 NOTE — ASSESSMENT & PLAN NOTE
Assessment and plan 1. Medicare subsequent annual wellness examination overall the patient is clinically stable and doing well, we encouraged the patient to follow a healthy and balanced diet. We recommend that the patient exercise routinely approximately 30 minutes 5 times per week . We have reviewed the patient's vaccines and have made recommendations for updates if necessary    annual flu shot, consider COVID booster/RSV vaccine. We will be ordering screening laboratories which are age appropriate. Return to the office in 6 months    call if any problems.

## 2023-12-03 NOTE — ASSESSMENT & PLAN NOTE
Chronic cough primarily at nighttime likely postviral cough after exposure to grandchildren who had had viruses currently no sign or symptom of an active infection recommend chest x-ray PA and lateral because of the chronicity Rx for Flonase 2 sprays each nostril once daily and Tessalon Perles 100 mg 1 p.o. nightly as needed

## 2024-01-03 DIAGNOSIS — E78.5 HYPERLIPIDEMIA, UNSPECIFIED HYPERLIPIDEMIA TYPE: ICD-10-CM

## 2024-01-03 RX ORDER — ROSUVASTATIN CALCIUM 5 MG/1
5 TABLET, COATED ORAL DAILY
Qty: 30 TABLET | Refills: 5 | Status: SHIPPED | OUTPATIENT
Start: 2024-01-03

## 2024-01-11 ENCOUNTER — OFFICE VISIT (OUTPATIENT)
Dept: UROLOGY | Facility: CLINIC | Age: 74
End: 2024-01-11
Payer: COMMERCIAL

## 2024-01-11 ENCOUNTER — PATIENT MESSAGE (OUTPATIENT)
Dept: UROLOGY | Facility: CLINIC | Age: 74
End: 2024-01-11

## 2024-01-11 VITALS
DIASTOLIC BLOOD PRESSURE: 82 MMHG | HEART RATE: 88 BPM | SYSTOLIC BLOOD PRESSURE: 140 MMHG | RESPIRATION RATE: 16 BRPM | WEIGHT: 190 LBS | OXYGEN SATURATION: 99 % | HEIGHT: 68 IN | BODY MASS INDEX: 28.79 KG/M2

## 2024-01-11 DIAGNOSIS — R39.198 SLOW URINARY STREAM: Primary | ICD-10-CM

## 2024-01-11 DIAGNOSIS — R35.1 NOCTURIA: ICD-10-CM

## 2024-01-11 PROCEDURE — 99213 OFFICE O/P EST LOW 20 MIN: CPT | Performed by: UROLOGY

## 2024-01-11 NOTE — PATIENT INSTRUCTIONS
The UroLift has not improved you significantly, so the next step would be to remove some of the obstructing tissue.  This might require cutting of the tines from the UroLift in a couple of locations.  The operation, which is called a bipolar TURP is something that you can easily see on the Internet.  There are videos of it.  It can generally be done as an outpatient with a catheter in the bladder overnight.  We can bring her back to the office the next day to remove it.  We always want people that you could end up staying overnight if the bleeding is excessive.  If you desire just to shrink the prostate and you do not worry much about sexual dysfunction, you can always try finasteride which will shrink the prostate slowly over 6 to 9 months and then you can switch from Flomax once daily to finasteride once daily.  The risk of missing a dose of finasteride is considerably less as its effect takes a while to start and to dissipate.

## 2024-01-11 NOTE — PROGRESS NOTES
Shine Toro is a(n) 73 y.o. male. , :  1950    Subjective   Chief Complaint:   Chief Complaint   Patient presents with    Follow-up     Diagnoses: The primary encounter diagnosis was Slow urinary stream. A diagnosis of Nocturia was also pertinent to this visit.    Assessment/Plan  73 M interested in sex.  Last sex 20 years ago really.  (Addendum 24 - Patient advised 20 was incorrect.  Really 2 years). Tried cialis and sildenafil previously but it did not work.  No desire for IPP or ICI or ARTURO.  Regarding urination he is on flomax 1 daily.  Has never tried double dose. Has tried stopping flomax and can't  Can travel 2 hour in  a car and cut back fluids.  Night time 1 time to 2 times. No desire for surgery.     Patient Instructions   The UroLift has not improved you significantly, so the next step would be to remove some of the obstructing tissue.  This might require cutting of the tines from the UroLift in a couple of locations.  The operation, which is called a bipolar TURP is something that you can easily see on the Internet.  There are videos of it.  It can generally be done as an outpatient with a catheter in the bladder overnight.  We can bring her back to the office the next day to remove it.  We always want people that you could end up staying overnight if the bleeding is excessive.  If you desire just to shrink the prostate and you do not worry much about sexual dysfunction, you can always try finasteride which will shrink the prostate slowly over 6 to 9 months and then you can switch from Flomax once daily to finasteride once daily.  The risk of missing a dose of finasteride is considerably less as its effect takes a while to start and to dissipate.     Radiology     History  BPH with obstruction.  Prostate 45 mL on transrectal ultrasound 2022 cystoscopy showed kissing lateral lobes with no median lobe.  Mild trabeculation.  Had UroLift 2022 Dr. Brock.  Uroflow was  "10 mL/s Max 6 mL/s average, 150 mL voided, 75 mL residual February 2023.      Prior Visits  6/2/2023 office visit [facility]  73 y.o. male with a history of BPH presents today for follow up.  Patient underwent Urolift insertion on 12/20/2022.  6 implants were successfully placed.  His murguia was successfully removed.  Patient had initially tried discontinuing tamsulosin, but developed urinary hesitancy 2 to 3 days after stopping medication.  He has since resumed tamsulosin.  He otherwise reports increased urinary flow and feeling of incomplete bladder emptying.  No gross hematuria or dysuria.     Last PSA from 6/5/2022 was 1.5.  Patient denies any strong family history of prostate malignancy.    1/12/2024 OV S[facility]  73-year-old gentleman underwent UroLift by Dr. Brock December 2022.  Developed a lot of urinary hesitancy after stopping Flomax following procedure so went back on it.    Lab Results   Component Value Date    PSA 2.66 06/30/2023    PSA 1.5 06/04/2022    PSA 1.4 07/07/2021     No components found for: \"CR\"    No Known Allergies    Review of Systems    Past Surgical History:   Procedure Laterality Date    BACK SURGERY      BACK SURGERY      l4 s1 surgery     CERVICAL FUSION      c4 and c5    CHOLECYSTECTOMY      COLONOSCOPY  2014    kutty    ELBOW SURGERY Left     REPAIR    EYE SURGERY      EYE SURGERY      EYE SURGERY Right 2022    right eye surgery    GANGLION CYST EXCISION Left 06/05/2020    Procedure: KNEE EXCISION BAKERS CYST;  Surgeon: Svetlana Aburto MD;  Location: [facility];  Service: Orthopedics    KNEE ARTHROSCOPY Left     LUMBAR DISCECTOMY      L5 S1    ORIF RADIAL SHAFT FRACTURE      WA ARTHROSCOPY KNEE DIAGNOSTIC W/WO SYNOVIAL BX SPX Left 12/21/2020    Procedure: KNEE ARTHROSCOPY, popliteal cyst decompression;  Surgeon: Emre Duffy MD;  Location: [facility];  Service: Orthopedics    WA CYSTO INSERTION TRANSPROSTATIC IMPLANT SINGLE N/A 12/20/2022    Procedure: CYSTOSCOPY WITH INSERTION " UROLIFT WITH 6 IMPLANTS;  Surgeon: Chris Brock MD;  Location: [facility];  Service: Urology    NY ESOPHAGOGASTRODUODENOSCOPY TRANSORAL DIAGNOSTIC N/A 11/01/2016    Procedure: ESOPHAGOGASTRODUODENOSCOPY (EGD);  Surgeon: Roberto Reese MD;  [facility]: [facility];  Service: Gastroenterology    NY ESOPHAGOGASTRODUODENOSCOPY TRANSORAL DIAGNOSTIC N/A 05/31/2018    Procedure: ESOPHAGOGASTRODUODENOSCOPY (EGD);  Surgeon: Roberto Reese MD;  [facility];  Service: Gastroenterology    NY LAMOT PRTL FFD EXC DISC REEXPL 1 NTRSPC LUMBAR N/A 10/24/2019    Procedure: Revision laminectomy and decompression at L5-S1;  Surgeon: Quiana Amaro MD;  Location: [facility];  Service: Orthopedics    SMALL INTESTINE SURGERY      GIST surgery    US GUIDED MSK PROCEDURE  05/21/2019    US GUIDED MSK PROCEDURE  10/30/2020       Family History   Problem Relation Age of Onset    Thyroid disease Mother     Thyroid disease Brother     Diabetes Paternal Grandmother     Hypertension Family     Skin cancer Father     Thyroid disease Father     Autoimmune disease Sister         Lupus       Social History     Socioeconomic History    Marital status: /Civil Union     Spouse name: Not on file    Number of children: Not on file    Years of education: Not on file    Highest education level: Not on file   Occupational History    Not on file   Tobacco Use    Smoking status: Never    Smokeless tobacco: Never   Vaping Use    Vaping status: Never Used   Substance and Sexual Activity    Alcohol use: Yes     Comment: Holidays( some months without consumption)    Drug use: No    Sexual activity: Yes     Partners: Female     Birth control/protection: None     Comment: With my wife   Other Topics Concern    Not on file   Social History Narrative    Not on file     Social Determinants of Health     Financial Resource Strain: Medium Risk (11/29/2023)    Overall Financial Resource Strain (CARDIA)     Difficulty of Paying Living Expenses: Somewhat hard  "  Food Insecurity: Not on file   Transportation Needs: Unmet Transportation Needs (11/29/2023)    PRAPARE - Transportation     Lack of Transportation (Medical): Yes     Lack of Transportation (Non-Medical): Yes   Physical Activity: Not on file   Stress: Not on file   Social Connections: Not on file   Intimate Partner Violence: Not on file   Housing Stability: Not on file         Current Outpatient Medications:     ammonium lactate (LAC-HYDRIN) 12 % cream, Apply topically as needed for dry skin, Disp: 385 g, Rfl: 0    calcium carbonate (TUMS) 500 mg chewable tablet, Chew 1 tablet daily When needed, Disp: , Rfl:     cholestyramine (QUESTRAN) 4 g packet, Take 1 packet (4 g total) by mouth daily (Patient taking differently: Take 1 packet by mouth every other day), Disp: 30 each, Rfl: 3    Difluprednate 0.05 % EMUL, Apply to eye, Disp: , Rfl:     famotidine (PEPCID) 40 MG tablet, TAKE 1 TABLET BY MOUTH DAILY AT BEDTIME, Disp: 90 tablet, Rfl: 1    fluticasone (FLONASE) 50 mcg/act nasal spray, 2 sprays into each nostril daily, Disp: 1 g, Rfl: 0    Misc Natural Products (OSTEO BI-FLEX JOINT SHIELD PO), Take by mouth, Disp: , Rfl:     Multiple Vitamins-Minerals (MULTIVITAL) tablet, Take 1 tablet by mouth daily Spectravite , Disp: , Rfl:     pantoprazole (PROTONIX) 40 mg tablet, TAKE 1 TABLET BY MOUTH EVERY DAY, Disp: 90 tablet, Rfl: 1    rosuvastatin (CRESTOR) 5 mg tablet, TAKE 1 TABLET (5 MG TOTAL) BY MOUTH DAILY., Disp: 30 tablet, Rfl: 5    tamsulosin (FLOMAX) 0.4 mg, TAKE 1 CAPSULE BY MOUTH EVERY DAY WITH DINNER (Patient not taking: Reported on 1/11/2024), Disp: 90 capsule, Rfl: 1    Objective     /82 (BP Location: Left arm, Patient Position: Sitting, Cuff Size: Large)   Pulse 88   Resp 16   Ht 5' 8\" (1.727 m)   Wt 86.2 kg (190 lb)   SpO2 99%   BMI 28.89 kg/m²     Physical Exam      [facility]St[facility]"

## 2024-02-08 ENCOUNTER — OFFICE VISIT (OUTPATIENT)
Dept: OBGYN CLINIC | Facility: OTHER | Age: 74
End: 2024-02-08
Payer: COMMERCIAL

## 2024-02-08 VITALS
SYSTOLIC BLOOD PRESSURE: 150 MMHG | HEART RATE: 73 BPM | WEIGHT: 190 LBS | DIASTOLIC BLOOD PRESSURE: 89 MMHG | HEIGHT: 68 IN | BODY MASS INDEX: 28.79 KG/M2

## 2024-02-08 DIAGNOSIS — M75.111 NONTRAUMATIC INCOMPLETE TEAR OF RIGHT ROTATOR CUFF: Primary | ICD-10-CM

## 2024-02-08 DIAGNOSIS — M25.511 ACUTE PAIN OF RIGHT SHOULDER: ICD-10-CM

## 2024-02-08 PROCEDURE — 99214 OFFICE O/P EST MOD 30 MIN: CPT | Performed by: ORTHOPAEDIC SURGERY

## 2024-02-08 NOTE — PATIENT INSTRUCTIONS
We discussed that you have a history of partial thickness tear, however on exam today you are strong and with excellent ROM. Prior to resuming home exercise, recommend returning to formal PT to ensure you are performing exercises in a healthy and effective manner for your shoulder.

## 2024-02-08 NOTE — PROGRESS NOTES
"I personally examined the patient and reviewed the history provided.  I agree with the note and the assessment and plan by Dr. Jodee Ryder MD.       Assessment  Diagnoses and all orders for this visit:    Nontraumatic incomplete tear of right rotator cuff  -     Ambulatory Referral to Physical Therapy; Future    Acute pain of right shoulder  -     Cancel: XR shoulder 2+ vw right; Future        Discussion and Plan:    He is doing well.  He does not appear to have any new structural injury to his shoulder and I do recommend returning to PT for formal exercise education prior to resuming HEP to ensure accurate and healthy exercises. Patient may follow up PRN.  Further imaging can always be considered if symptoms progress or fail to improve but currently appears to be minimally symptomatic    Subjective:   Patient ID: Shine Toro is a 74 y.o. male      HPI  The patient presents with a chief complaint of right shoulder pain.   The pain began 1 month(s) ago and is not associated with an acute injury.  Reports he was shoveling snow and aggravated his right shoulder, however his symptoms have improved and he is returned to his baseline shoulder function. He did previously see us in 2018 and an MRI was obtained demonstrating a partial thickness supraspinatus tear, however given his improvement with PT no further intervention was rendered at that time.          The following portions of the patient's history were reviewed and updated as appropriate: allergies, current medications, past family history, past medical history, past social history, past surgical history and problem list.    Review of Systems  Negative unless otherwise stated above.    Objective:  /89 (BP Location: Right arm, Patient Position: Sitting, Cuff Size: Standard)   Pulse 73   Ht 5' 8\" (1.727 m)   Wt 86.2 kg (190 lb)   BMI 28.89 kg/m²       Right Shoulder Exam     Range of Motion   Active abduction:  100   External rotation:  50 "   Forward flexion:  180   Internal rotation 0 degrees:  Upperthoracic     Muscle Strength   Abduction: 5/5   Internal rotation: 5/5   External rotation: 5/5   Supraspinatus: 5/5     Tests   Drop arm: negative    Other   Erythema: absent  Sensation: normal  Pulse: present            Physical Exam  Vitals reviewed.   Constitutional:       General: He is not in acute distress.  HENT:      Head: Normocephalic and atraumatic.   Eyes:      Extraocular Movements: Extraocular movements intact.      Conjunctiva/sclera: Conjunctivae normal.      Pupils: Pupils are equal, round, and reactive to light.   Cardiovascular:      Rate and Rhythm: Normal rate.      Pulses: Normal pulses.   Pulmonary:      Effort: Pulmonary effort is normal.   Abdominal:      General: Abdomen is flat.   Musculoskeletal:      Cervical back: Normal range of motion.   Skin:     General: Skin is warm and dry.      Capillary Refill: Capillary refill takes less than 2 seconds.   Neurological:      Mental Status: He is alert and oriented to person, place, and time.   Psychiatric:         Mood and Affect: Mood normal.           I have personally reviewed pertinent films in PACS and my interpretation is as follows.    I personally reviewed the images in the PACS system and my interpretation is as follows:  MRI scan right shoulder from 2018 shows a partial-thickness articular sided supraspinatus tendon tear        I also personally reviewed the notes from my visit with the patient in 2018 with regards to his diagnosis of a partial-thickness supraspinatus tendon tear

## 2024-02-21 PROBLEM — Z00.00 MEDICARE ANNUAL WELLNESS VISIT, SUBSEQUENT: Status: RESOLVED | Noted: 2019-10-23 | Resolved: 2024-02-21

## 2024-02-21 PROBLEM — Z12.5 SCREENING FOR PROSTATE CANCER: Status: RESOLVED | Noted: 2018-06-11 | Resolved: 2024-02-21

## 2024-04-15 DIAGNOSIS — K21.9 GASTROESOPHAGEAL REFLUX DISEASE WITHOUT ESOPHAGITIS: ICD-10-CM

## 2024-04-15 RX ORDER — FAMOTIDINE 40 MG/1
40 TABLET, FILM COATED ORAL
Qty: 30 TABLET | Refills: 0 | Status: SHIPPED | OUTPATIENT
Start: 2024-04-15

## 2024-05-03 DIAGNOSIS — N40.0 ENLARGED PROSTATE WITHOUT LOWER URINARY TRACT SYMPTOMS (LUTS): ICD-10-CM

## 2024-05-03 DIAGNOSIS — K21.9 GASTROESOPHAGEAL REFLUX DISEASE WITHOUT ESOPHAGITIS: ICD-10-CM

## 2024-05-03 RX ORDER — PANTOPRAZOLE SODIUM 40 MG/1
TABLET, DELAYED RELEASE ORAL
Qty: 90 TABLET | Refills: 2 | Status: SHIPPED | OUTPATIENT
Start: 2024-05-03

## 2024-05-03 RX ORDER — TAMSULOSIN HYDROCHLORIDE 0.4 MG/1
0.4 CAPSULE ORAL
Qty: 90 CAPSULE | Refills: 3 | Status: SHIPPED | OUTPATIENT
Start: 2024-05-03

## 2024-05-07 ENCOUNTER — RA CDI HCC (OUTPATIENT)
Dept: OTHER | Facility: HOSPITAL | Age: 74
End: 2024-05-07

## 2024-05-07 PROBLEM — Z01.818 PREOP EXAM FOR INTERNAL MEDICINE: Status: RESOLVED | Noted: 2021-01-20 | Resolved: 2024-05-07

## 2024-05-07 PROBLEM — Z86.16 HISTORY OF COVID-19: Status: ACTIVE | Noted: 2024-05-07

## 2024-05-07 PROBLEM — Z01.818 PREOPERATIVE CLEARANCE: Status: RESOLVED | Noted: 2021-03-09 | Resolved: 2024-05-07

## 2024-05-07 PROBLEM — Z01.818 PREOP EXAMINATION: Status: RESOLVED | Noted: 2020-05-07 | Resolved: 2024-05-07

## 2024-05-07 PROBLEM — Z11.59 ENCOUNTER FOR HEPATITIS C SCREENING TEST FOR LOW RISK PATIENT: Status: RESOLVED | Noted: 2019-03-24 | Resolved: 2024-05-07

## 2024-05-09 RX ORDER — PREDNISOLONE ACETATE 10 MG/ML
1 SUSPENSION/ DROPS OPHTHALMIC 2 TIMES DAILY
COMMUNITY
Start: 2024-04-20

## 2024-05-11 DIAGNOSIS — K21.9 GASTROESOPHAGEAL REFLUX DISEASE WITHOUT ESOPHAGITIS: ICD-10-CM

## 2024-05-13 RX ORDER — FAMOTIDINE 40 MG/1
40 TABLET, FILM COATED ORAL
Qty: 30 TABLET | Refills: 3 | Status: SHIPPED | OUTPATIENT
Start: 2024-05-13

## 2024-05-14 ENCOUNTER — OFFICE VISIT (OUTPATIENT)
Dept: INTERNAL MEDICINE CLINIC | Facility: CLINIC | Age: 74
End: 2024-05-14
Payer: COMMERCIAL

## 2024-05-14 VITALS
SYSTOLIC BLOOD PRESSURE: 128 MMHG | TEMPERATURE: 97.5 F | DIASTOLIC BLOOD PRESSURE: 80 MMHG | OXYGEN SATURATION: 98 % | RESPIRATION RATE: 16 BRPM | WEIGHT: 188.6 LBS | HEIGHT: 68 IN | BODY MASS INDEX: 28.58 KG/M2 | HEART RATE: 67 BPM

## 2024-05-14 DIAGNOSIS — R53.83 OTHER FATIGUE: ICD-10-CM

## 2024-05-14 DIAGNOSIS — Z13.6 SCREENING FOR CARDIOVASCULAR CONDITION: ICD-10-CM

## 2024-05-14 DIAGNOSIS — E78.5 HYPERLIPIDEMIA, UNSPECIFIED HYPERLIPIDEMIA TYPE: ICD-10-CM

## 2024-05-14 DIAGNOSIS — Z12.5 SCREENING PSA (PROSTATE SPECIFIC ANTIGEN): ICD-10-CM

## 2024-05-14 DIAGNOSIS — M25.551 BILATERAL HIP PAIN: Primary | ICD-10-CM

## 2024-05-14 DIAGNOSIS — E66.3 OVERWEIGHT (BMI 25.0-29.9): ICD-10-CM

## 2024-05-14 DIAGNOSIS — M79.18 MYALGIA, OTHER SITE: ICD-10-CM

## 2024-05-14 DIAGNOSIS — M25.552 BILATERAL HIP PAIN: Primary | ICD-10-CM

## 2024-05-14 PROBLEM — D69.6 LOW PLATELET COUNT (HCC): Status: RESOLVED | Noted: 2023-02-23 | Resolved: 2024-05-14

## 2024-05-14 PROCEDURE — G2211 COMPLEX E/M VISIT ADD ON: HCPCS | Performed by: INTERNAL MEDICINE

## 2024-05-14 PROCEDURE — 99214 OFFICE O/P EST MOD 30 MIN: CPT | Performed by: INTERNAL MEDICINE

## 2024-05-14 NOTE — PROGRESS NOTES
Assessment/Plan:    Hyperlipidemia  Hyperlipidemia controlled continue with current medical regiment recommend a low-cholesterol diet and recommend routine exercise we will continue to monitor the progress.  Continue Crestor 5 mg once daily    Myalgia, other site  Muscle tightness and bilateral hip arthralgia check x-ray bilateral hips, sed and CRP rule out arthritis rule out PMR range of motion exercises    Other fatigue  Will check laboratories including TSH, CBC, vitamin D, sed and CRP likely related to sleep disturbance recommend sleep hygiene patient does not report to me snoring    Screening PSA (prostate specific antigen)  Will check screening PSA    Overweight (BMI 25.0-29.9)  I have counselled the pt to follow a healthy and balanced diet ,and recommend routine exercise.         Problem List Items Addressed This Visit        Surgery/Wound/Pain    Myalgia, other site     Muscle tightness and bilateral hip arthralgia check x-ray bilateral hips, sed and CRP rule out arthritis rule out PMR range of motion exercises         Relevant Orders    Vitamin D 25 hydroxy    Bilateral hip pain - Primary    Relevant Orders    XR hip/pelv 2-3 vws left if performed    XR hip/pelv 2-3 vws right if performed    Sedimentation rate, automated    C-reactive protein       Other    Hyperlipidemia     Hyperlipidemia controlled continue with current medical regiment recommend a low-cholesterol diet and recommend routine exercise we will continue to monitor the progress.  Continue Crestor 5 mg once daily         Overweight (BMI 25.0-29.9)     I have counselled the pt to follow a healthy and balanced diet ,and recommend routine exercise.         Screening PSA (prostate specific antigen)     Will check screening PSA         Relevant Orders    PSA, Total Screen    Other fatigue     Will check laboratories including TSH, CBC, vitamin D, sed and CRP likely related to sleep disturbance recommend sleep hygiene patient does not report to me  snoring         Relevant Orders    TSH, 3rd generation    CBC (Includes Diff/Plt) (Refl)    Vitamin D 25 hydroxy    Sedimentation rate, automated    C-reactive protein   Other Visit Diagnoses     Screening for cardiovascular condition        Relevant Orders    Lipid Panel with Direct LDL reflex    Comprehensive metabolic panel      RTO in 3 to 4 months call if any problems        Subjective:      Patient ID: Shine Toro is a 74 y.o. male.    HPI 74-year old male coming in for a follow up visit regarding bilateral hip pain, fatigue, hyperlipidemia, overweight; the patient reports me compliant taking medications without untoward side effects the.  The patient is here to review his medical condition, update me on the medical condition and the patient reports me no hospitalizations and no ER visits.  No injuries no illnesses he reports the symptoms of fatigue reports me difficulty with his sleep interrupted no snoring no witnessed apnea.  He does report to me tightness and discomfort of bilateral hip posterior region.  No injuries.  No fever no chills fatigue , not sleeping  hard time falling asleep , reports achine  hamstrings hips   pain with bening ibp  no falls.  Trying to follow healthy balanced diet    The following portions of the patient's history were reviewed and updated as appropriate: allergies, current medications, past family history, past medical history, past social history, past surgical history and problem list.    Review of Systems   Constitutional:  Positive for fatigue. Negative for activity change, appetite change and unexpected weight change.   HENT:  Negative for congestion and postnasal drip.    Eyes:  Negative for visual disturbance.   Respiratory:  Negative for cough and shortness of breath.    Cardiovascular:  Negative for chest pain.   Gastrointestinal:  Negative for abdominal pain, diarrhea, nausea and vomiting.   Musculoskeletal:         Hip pain and hamstring tightness    Neurological:  Negative for dizziness, light-headedness and headaches.   Hematological:  Negative for adenopathy.         Objective:    Return in about 3 months (around 8/14/2024).    No results found.      No Known Allergies    Past Medical History:   Diagnosis Date   • Abnormal weight loss     RESOLVED: 15JUN2015   • Anemia     RESOLVED: 15JUN2015   • Atypical chest pain     RESOLVED: 15JUN2015   • BPH (benign prostatic hyperplasia)    • Cancer (HCC)     DUODENAL   • Luis Miguel's syndrome    • Depression     RESOLVED: 25MAR2015   • Disc disorder     cervical and lumbar   • Diverticulitis of colon     LAST ASSESSED: 12FEB2015   • Duodenal nodule     RESOLVED: 16MAR2017   • Encounter for hepatitis C screening test for low risk patient 03/24/2019   • Gastrointestinal stromal tumor (GIST) (HCC)     MALIGNANT; RESOLVED: 08MAR2016   • GERD (gastroesophageal reflux disease)    • Glaucoma     RESOLVED: 16MAR2017   • History of COVID-19 09/2021   • Insomnia     RESOLVED: 16MAR2017   • Preop exam for internal medicine 01/20/2021   • Preop examination 05/07/2020   • Preoperative clearance 03/09/2021     Past Surgical History:   Procedure Laterality Date   • BACK SURGERY     • BACK SURGERY      l4 s1 surgery    • CERVICAL FUSION      c4 and c5   • CHOLECYSTECTOMY     • COLONOSCOPY  2014    kutty   • ELBOW SURGERY Left     REPAIR   • EYE SURGERY     • EYE SURGERY     • EYE SURGERY Right 2022    right eye surgery   • GANGLION CYST EXCISION Left 06/05/2020    Procedure: KNEE EXCISION BAKERS CYST;  Surgeon: Svetlana Aburto MD;  Location: [facility];  Service: Orthopedics   • KNEE ARTHROSCOPY Left    • LUMBAR DISCECTOMY      L5 S1   • ORIF RADIAL SHAFT FRACTURE     • WA ARTHROSCOPY KNEE DIAGNOSTIC W/WO SYNOVIAL BX SPX Left 12/21/2020    Procedure: KNEE ARTHROSCOPY, popliteal cyst decompression;  Surgeon: Emre Duffy MD;  Location: [facility];  Service: Orthopedics   • WA CYSTO INSERTION TRANSPROSTATIC IMPLANT SINGLE N/A  12/20/2022    Procedure: CYSTOSCOPY WITH INSERTION UROLIFT WITH 6 IMPLANTS;  Surgeon: Chris Brock MD;  Location: [facility];  Service: Urology   • CT ESOPHAGOGASTRODUODENOSCOPY TRANSORAL DIAGNOSTIC N/A 11/01/2016    Procedure: ESOPHAGOGASTRODUODENOSCOPY (EGD);  Surgeon: Roberto Reese MD;  Location: [facility];  Service: Gastroenterology   • CT ESOPHAGOGASTRODUODENOSCOPY TRANSORAL DIAGNOSTIC N/A 05/31/2018    Procedure: ESOPHAGOGASTRODUODENOSCOPY (EGD);  Surgeon: Roberto Reese MD;  Location: [facility];  Service: Gastroenterology   • CT LAMOT PRTL FFD EXC DISC REEXPL 1 NTRSPC LUMBAR N/A 10/24/2019    Procedure: Revision laminectomy and decompression at L5-S1;  Surgeon: Quiana Amaro MD;  Location: [facility];  Service: Orthopedics   • SMALL INTESTINE SURGERY      GIST surgery   • US GUIDED MSK PROCEDURE  05/21/2019   • US GUIDED MSK PROCEDURE  10/30/2020     Current Outpatient Medications on File Prior to Visit   Medication Sig Dispense Refill   • calcium carbonate (TUMS) 500 mg chewable tablet Chew 1 tablet daily When needed     • cholestyramine (QUESTRAN) 4 g packet Take 1 packet (4 g total) by mouth daily (Patient taking differently: Take 1 packet by mouth every other day) 30 each 3   • famotidine (PEPCID) 40 MG tablet TAKE 1 TABLET BY MOUTH EVERYDAY AT BEDTIME 30 tablet 3   • fluocinonide (LIDEX) 0.05 % cream APPLY TO AFFECTED AREA OF SKIN TWICE A DAY AS NEEDED     • fluticasone (FLONASE) 50 mcg/act nasal spray 2 sprays into each nostril daily 1 g 0   • Misc Natural Products (OSTEO BI-FLEX JOINT SHIELD PO) Take by mouth     • Multiple Vitamins-Minerals (MULTIVITAL) tablet Take 1 tablet by mouth daily Spectravite      • pantoprazole (PROTONIX) 40 mg tablet TAKE 1 TABLET BY MOUTH EVERY DAY 90 tablet 2   • prednisoLONE acetate (PRED FORTE) 1 % ophthalmic suspension Administer 1 drop to the right eye 2 (two) times a day     • rosuvastatin (CRESTOR) 5 mg tablet TAKE 1 TABLET (5 MG TOTAL) BY MOUTH DAILY. 30  tablet 5   • tamsulosin (FLOMAX) 0.4 mg TAKE 1 CAPSULE BY MOUTH EVERY DAY WITH DINNER 90 capsule 3   • ammonium lactate (LAC-HYDRIN) 12 % cream Apply topically as needed for dry skin (Patient not taking: Reported on 5/14/2024) 385 g 0   • Difluprednate 0.05 % EMUL Apply to eye (Patient not taking: Reported on 5/14/2024)       No current facility-administered medications on file prior to visit.     Family History   Problem Relation Age of Onset   • Thyroid disease Mother    • Thyroid disease Brother    • Diabetes Paternal Grandmother    • Hypertension Family    • Skin cancer Father    • Thyroid disease Father    • Autoimmune disease Sister         Lupus     Social History     Socioeconomic History   • Marital status: /Civil Union     Spouse name: Not on file   • Number of children: Not on file   • Years of education: Not on file   • Highest education level: Not on file   Occupational History   • Not on file   Tobacco Use   • Smoking status: Never   • Smokeless tobacco: Never   Vaping Use   • Vaping status: Never Used   Substance and Sexual Activity   • Alcohol use: Yes     Comment: Holidays( some months without consumption)   • Drug use: No   • Sexual activity: Yes     Partners: Female     Birth control/protection: None     Comment: With my wife   Other Topics Concern   • Not on file   Social History Narrative   • Not on file     Social Determinants of Health     Financial Resource Strain: Medium Risk (11/29/2023)    Overall Financial Resource Strain (CARDIA)    • Difficulty of Paying Living Expenses: Somewhat hard   Food Insecurity: Not on file   Transportation Needs: Unmet Transportation Needs (11/29/2023)    PRAPARE - Transportation    • Lack of Transportation (Medical): Yes    • Lack of Transportation (Non-Medical): Yes   Physical Activity: Not on file   Stress: Not on file   Social Connections: Not on file   Intimate Partner Violence: Not on file   Housing Stability: Not on file     Vitals:    05/14/24  "1621   BP: 128/80   BP Location: Left arm   Patient Position: Sitting   Cuff Size: Standard   Pulse: 67   Resp: 16   Temp: 97.5 °F (36.4 °C)   TempSrc: Tympanic   SpO2: 98%   Weight: 85.5 kg (188 lb 9.6 oz)   Height: 5' 8\" (1.727 m)     Results for orders placed or performed in visit on 11/30/23   Comprehensive metabolic panel   Result Value Ref Range    Sodium 139 135 - 147 mmol/L    Potassium 3.9 3.5 - 5.3 mmol/L    Chloride 101 96 - 108 mmol/L    CO2 30 21 - 32 mmol/L    ANION GAP 8 mmol/L    BUN 16 5 - 25 mg/dL    Creatinine 0.91 0.60 - 1.30 mg/dL    Glucose, Fasting 96 65 - 99 mg/dL    Calcium 9.1 8.4 - 10.2 mg/dL    AST 31 13 - 39 U/L    ALT 33 7 - 52 U/L    Alkaline Phosphatase 76 34 - 104 U/L    Total Protein 7.2 6.4 - 8.4 g/dL    Albumin 4.5 3.5 - 5.0 g/dL    Total Bilirubin 1.01 (H) 0.20 - 1.00 mg/dL    eGFR 84 ml/min/1.73sq m   Hemoglobin A1C   Result Value Ref Range    Hemoglobin A1C 5.3 Normal 4.0-5.6%; PreDiabetic 5.7-6.4%; Diabetic >=6.5%; Glycemic control for adults with diabetes <7.0% %     mg/dl   Lipid Panel with Direct LDL reflex   Result Value Ref Range    Cholesterol 147 See Comment mg/dL    Triglycerides 105 See Comment mg/dL    HDL, Direct 47 >=40 mg/dL    LDL Calculated 79 0 - 100 mg/dL     Weight (last 2 days)     Date/Time Weight    05/14/24 1621 85.5 (188.6)        Body mass index is 28.68 kg/m².  BP      Temp      Pulse     Resp      SpO2        Vitals:    05/14/24 1621   Weight: 85.5 kg (188 lb 9.6 oz)     Vitals:    05/14/24 1621   Weight: 85.5 kg (188 lb 9.6 oz)       /80 (BP Location: Left arm, Patient Position: Sitting, Cuff Size: Standard)   Pulse 67   Temp 97.5 °F (36.4 °C) (Tympanic)   Resp 16   Ht 5' 8\" (1.727 m)   Wt 85.5 kg (188 lb 9.6 oz)   SpO2 98%   BMI 28.68 kg/m²     Decreased extension bilateral hips and stiffness full range of motion internal/external rotation of the hips     Physical Exam  Vitals and nursing note reviewed.   Constitutional:       " General: He is not in acute distress.     Appearance: Normal appearance. He is well-developed. He is not ill-appearing, toxic-appearing or diaphoretic.   HENT:      Head: Normocephalic and atraumatic.      Right Ear: External ear normal.      Left Ear: External ear normal.   Eyes:      General: No scleral icterus.        Right eye: No discharge.         Left eye: No discharge.      Conjunctiva/sclera: Conjunctivae normal.      Pupils: Pupils are equal, round, and reactive to light.   Cardiovascular:      Rate and Rhythm: Normal rate and regular rhythm.      Heart sounds: Normal heart sounds. No murmur heard.     No friction rub. No gallop.   Pulmonary:      Effort: No respiratory distress.      Breath sounds: No wheezing or rales.   Abdominal:      General: Bowel sounds are normal. There is no distension.      Palpations: Abdomen is soft. There is no mass.      Tenderness: There is no abdominal tenderness. There is no guarding or rebound.   Musculoskeletal:         General: No deformity.      Cervical back: Neck supple.   Lymphadenopathy:      Cervical: No cervical adenopathy.   Neurological:      Mental Status: He is alert.

## 2024-05-15 NOTE — ASSESSMENT & PLAN NOTE
Muscle tightness and bilateral hip arthralgia check x-ray bilateral hips, sed and CRP rule out arthritis rule out PMR range of motion exercises

## 2024-05-15 NOTE — ASSESSMENT & PLAN NOTE
Will check laboratories including TSH, CBC, vitamin D, sed and CRP likely related to sleep disturbance recommend sleep hygiene patient does not report to me snoring

## 2024-05-21 ENCOUNTER — APPOINTMENT (OUTPATIENT)
Dept: RADIOLOGY | Age: 74
End: 2024-05-21
Payer: COMMERCIAL

## 2024-05-21 ENCOUNTER — APPOINTMENT (OUTPATIENT)
Dept: LAB | Age: 74
End: 2024-05-21
Payer: COMMERCIAL

## 2024-05-21 DIAGNOSIS — M25.552 BILATERAL HIP PAIN: ICD-10-CM

## 2024-05-21 DIAGNOSIS — M25.551 BILATERAL HIP PAIN: ICD-10-CM

## 2024-05-21 DIAGNOSIS — M79.18 MYALGIA, OTHER SITE: ICD-10-CM

## 2024-05-21 DIAGNOSIS — Z13.6 SCREENING FOR CARDIOVASCULAR CONDITION: ICD-10-CM

## 2024-05-21 DIAGNOSIS — Z12.5 SCREENING PSA (PROSTATE SPECIFIC ANTIGEN): ICD-10-CM

## 2024-05-21 DIAGNOSIS — R53.83 OTHER FATIGUE: ICD-10-CM

## 2024-05-21 LAB
25(OH)D3 SERPL-MCNC: 35.8 NG/ML (ref 30–100)
ALBUMIN SERPL BCP-MCNC: 4.6 G/DL (ref 3.5–5)
ALP SERPL-CCNC: 69 U/L (ref 34–104)
ALT SERPL W P-5'-P-CCNC: 32 U/L (ref 7–52)
ANION GAP SERPL CALCULATED.3IONS-SCNC: 8 MMOL/L (ref 4–13)
AST SERPL W P-5'-P-CCNC: 30 U/L (ref 13–39)
BASOPHILS # BLD AUTO: 0.04 THOUSANDS/ÂΜL (ref 0–0.1)
BASOPHILS NFR BLD AUTO: 1 % (ref 0–1)
BILIRUB SERPL-MCNC: 1.06 MG/DL (ref 0.2–1)
BUN SERPL-MCNC: 13 MG/DL (ref 5–25)
CALCIUM SERPL-MCNC: 9.7 MG/DL (ref 8.4–10.2)
CHLORIDE SERPL-SCNC: 103 MMOL/L (ref 96–108)
CHOLEST SERPL-MCNC: 147 MG/DL
CO2 SERPL-SCNC: 28 MMOL/L (ref 21–32)
CREAT SERPL-MCNC: 0.89 MG/DL (ref 0.6–1.3)
CRP SERPL QL: 2.3 MG/L
EOSINOPHIL # BLD AUTO: 0.05 THOUSAND/ÂΜL (ref 0–0.61)
EOSINOPHIL NFR BLD AUTO: 1 % (ref 0–6)
ERYTHROCYTE [DISTWIDTH] IN BLOOD BY AUTOMATED COUNT: 12.3 % (ref 11.6–15.1)
ERYTHROCYTE [SEDIMENTATION RATE] IN BLOOD: 12 MM/HOUR (ref 0–19)
GFR SERPL CREATININE-BSD FRML MDRD: 85 ML/MIN/1.73SQ M
GLUCOSE P FAST SERPL-MCNC: 96 MG/DL (ref 65–99)
HCT VFR BLD AUTO: 48.8 % (ref 36.5–49.3)
HDLC SERPL-MCNC: 45 MG/DL
HGB BLD-MCNC: 17.1 G/DL (ref 12–17)
IMM GRANULOCYTES # BLD AUTO: 0.01 THOUSAND/UL (ref 0–0.2)
IMM GRANULOCYTES NFR BLD AUTO: 0 % (ref 0–2)
LDLC SERPL CALC-MCNC: 69 MG/DL (ref 0–100)
LYMPHOCYTES # BLD AUTO: 1.14 THOUSANDS/ÂΜL (ref 0.6–4.47)
LYMPHOCYTES NFR BLD AUTO: 22 % (ref 14–44)
MCH RBC QN AUTO: 31.9 PG (ref 26.8–34.3)
MCHC RBC AUTO-ENTMCNC: 35 G/DL (ref 31.4–37.4)
MCV RBC AUTO: 91 FL (ref 82–98)
MONOCYTES # BLD AUTO: 0.55 THOUSAND/ÂΜL (ref 0.17–1.22)
MONOCYTES NFR BLD AUTO: 10 % (ref 4–12)
NEUTROPHILS # BLD AUTO: 3.52 THOUSANDS/ÂΜL (ref 1.85–7.62)
NEUTS SEG NFR BLD AUTO: 66 % (ref 43–75)
NRBC BLD AUTO-RTO: 0 /100 WBCS
PLATELET # BLD AUTO: 144 THOUSANDS/UL (ref 149–390)
PMV BLD AUTO: 11.5 FL (ref 8.9–12.7)
POTASSIUM SERPL-SCNC: 4.1 MMOL/L (ref 3.5–5.3)
PROT SERPL-MCNC: 7.3 G/DL (ref 6.4–8.4)
RBC # BLD AUTO: 5.36 MILLION/UL (ref 3.88–5.62)
SODIUM SERPL-SCNC: 139 MMOL/L (ref 135–147)
TRIGL SERPL-MCNC: 163 MG/DL
TSH SERPL DL<=0.05 MIU/L-ACNC: 0.79 UIU/ML (ref 0.45–4.5)
WBC # BLD AUTO: 5.31 THOUSAND/UL (ref 4.31–10.16)

## 2024-05-21 PROCEDURE — 80061 LIPID PANEL: CPT

## 2024-05-21 PROCEDURE — 85025 COMPLETE CBC W/AUTO DIFF WBC: CPT

## 2024-05-21 PROCEDURE — 85652 RBC SED RATE AUTOMATED: CPT

## 2024-05-21 PROCEDURE — 86140 C-REACTIVE PROTEIN: CPT

## 2024-05-21 PROCEDURE — 80053 COMPREHEN METABOLIC PANEL: CPT

## 2024-05-21 PROCEDURE — 82306 VITAMIN D 25 HYDROXY: CPT

## 2024-05-21 PROCEDURE — 36415 COLL VENOUS BLD VENIPUNCTURE: CPT

## 2024-05-21 PROCEDURE — 73521 X-RAY EXAM HIPS BI 2 VIEWS: CPT

## 2024-05-21 PROCEDURE — 84443 ASSAY THYROID STIM HORMONE: CPT

## 2024-05-22 DIAGNOSIS — D58.2 ELEVATED HEMOGLOBIN (HCC): Primary | ICD-10-CM

## 2024-05-23 ENCOUNTER — TELEPHONE (OUTPATIENT)
Age: 74
End: 2024-05-23

## 2024-05-23 NOTE — TELEPHONE ENCOUNTER
Shelli Brandon  5/23/2024  7:50 AM EDT Back to Top      Lvm to return call    Amol Montalvo DO  5/22/2024  5:22 PM EDT       Notify the patient elevation of the triglyceride level please have patient reduce carbohydrates and sweets in the diet normal comprehensive metabolic panel except a slightly elevated bilirubin which is relatively stable and we will discuss at follow-up elevation of the hemoglobin 17.1 and low platelet count slightly recheck CBC in 2 weeks please have patient drink 2 glasses of water prior to next test normal vitamin D level, normal C-reactive protein level normal TSH please have the pt follow up with me to discuss as scheduled

## 2024-05-24 NOTE — TELEPHONE ENCOUNTER
Patient states he missed a phone call, he wanted to know if his results changed. Made aware to repeat labs in 2 weeks.

## 2024-06-12 ENCOUNTER — APPOINTMENT (OUTPATIENT)
Dept: LAB | Facility: CLINIC | Age: 74
End: 2024-06-12
Payer: COMMERCIAL

## 2024-06-12 DIAGNOSIS — D58.2 ELEVATED HEMOGLOBIN (HCC): ICD-10-CM

## 2024-06-12 LAB
ERYTHROCYTE [DISTWIDTH] IN BLOOD BY AUTOMATED COUNT: 12.7 % (ref 11.6–15.1)
HCT VFR BLD AUTO: 48.6 % (ref 36.5–49.3)
HGB BLD-MCNC: 16.6 G/DL (ref 12–17)
MCH RBC QN AUTO: 31.7 PG (ref 26.8–34.3)
MCHC RBC AUTO-ENTMCNC: 34.2 G/DL (ref 31.4–37.4)
MCV RBC AUTO: 93 FL (ref 82–98)
PLATELET # BLD AUTO: 153 THOUSANDS/UL (ref 149–390)
PMV BLD AUTO: 12.1 FL (ref 8.9–12.7)
RBC # BLD AUTO: 5.24 MILLION/UL (ref 3.88–5.62)
WBC # BLD AUTO: 4.76 THOUSAND/UL (ref 4.31–10.16)

## 2024-06-12 PROCEDURE — 36415 COLL VENOUS BLD VENIPUNCTURE: CPT

## 2024-06-12 PROCEDURE — 85027 COMPLETE CBC AUTOMATED: CPT

## 2024-06-13 PROBLEM — Z12.5 SCREENING PSA (PROSTATE SPECIFIC ANTIGEN): Status: RESOLVED | Noted: 2020-05-07 | Resolved: 2024-06-13

## 2024-07-01 ENCOUNTER — OFFICE VISIT (OUTPATIENT)
Dept: URGENT CARE | Age: 74
End: 2024-07-01
Payer: COMMERCIAL

## 2024-07-01 VITALS
WEIGHT: 187 LBS | SYSTOLIC BLOOD PRESSURE: 138 MMHG | HEART RATE: 78 BPM | DIASTOLIC BLOOD PRESSURE: 82 MMHG | OXYGEN SATURATION: 98 % | RESPIRATION RATE: 16 BRPM | TEMPERATURE: 97.5 F | BODY MASS INDEX: 28.34 KG/M2 | HEIGHT: 68 IN

## 2024-07-01 DIAGNOSIS — M54.50 ACUTE RIGHT-SIDED LOW BACK PAIN WITHOUT SCIATICA: Primary | ICD-10-CM

## 2024-07-01 DIAGNOSIS — R31.9 HEMATURIA, UNSPECIFIED TYPE: ICD-10-CM

## 2024-07-01 LAB
SL AMB  POCT GLUCOSE, UA: NEGATIVE
SL AMB LEUKOCYTE ESTERASE,UA: NEGATIVE
SL AMB POCT BILIRUBIN,UA: NEGATIVE
SL AMB POCT BLOOD,UA: ABNORMAL
SL AMB POCT CLARITY,UA: CLEAR
SL AMB POCT COLOR,UA: ABNORMAL
SL AMB POCT KETONES,UA: ABNORMAL
SL AMB POCT NITRITE,UA: NEGATIVE
SL AMB POCT PH,UA: 6
SL AMB POCT SPECIFIC GRAVITY,UA: 1.02
SL AMB POCT URINE PROTEIN: NEGATIVE
SL AMB POCT UROBILINOGEN: 0.2

## 2024-07-01 PROCEDURE — 81002 URINALYSIS NONAUTO W/O SCOPE: CPT

## 2024-07-01 PROCEDURE — 99213 OFFICE O/P EST LOW 20 MIN: CPT

## 2024-07-01 PROCEDURE — 87086 URINE CULTURE/COLONY COUNT: CPT

## 2024-07-01 RX ORDER — AMPICILLIN TRIHYDRATE 250 MG
50 CAPSULE ORAL
COMMUNITY

## 2024-07-01 RX ORDER — METHOCARBAMOL 500 MG/1
500 TABLET, FILM COATED ORAL 2 TIMES DAILY PRN
Qty: 10 TABLET | Refills: 0 | Status: SHIPPED | OUTPATIENT
Start: 2024-07-01 | End: 2024-07-06

## 2024-07-01 NOTE — PATIENT INSTRUCTIONS
Urine dip performed in office revealed moderate blood, urine culture results pending.   At this time, differential diagnosis includes acute musculoskeletal back pain vs renal stone vs UTI with or without pyelonephritis (less likely given lack of urinary symptoms).   Please begin Robaxin as directed, continue Tylenol and begin heat application or other desired topicals.   Given the presence of blood in urine in the setting of acute right-sided back pain radiating to abdomen, strongly recommend same-day follow up with primary care provider for further evaluation. If pain worsens, present to emergency department.

## 2024-07-01 NOTE — PROGRESS NOTES
"[facility]        NAME: Shine Toro is a 74 y.o. male  : 1950    MRN: [id number]  DATE: 2024  TIME: 12:09 PM    Assessment and Plan   Acute right-sided low back pain without sciatica [M54.50]  1. Acute right-sided low back pain without sciatica  POCT urine dip    Urine culture    methocarbamol (ROBAXIN) 500 mg tablet      2. Hematuria, unspecified type  Urine culture      Urine dip performed in office revealed moderate blood, urine culture results pending.   At this time, differential diagnosis includes acute musculoskeletal back pain vs renal stone vs UTI with or without pyelonephritis (less likely given lack of urinary symptoms).   Please begin Robaxin as directed, continue Tylenol and begin heat application or other desired topicals.   Given the presence of blood in urine in the setting of acute right-sided back pain radiating to abdomen, strongly recommend same-day follow up with primary care provider for further evaluation. If pain worsens, present to emergency department.       Patient Instructions     Patient Education     Low back pain - Discharge instructions   The Basics   Written by the doctors and editors at [facility]   What are discharge instructions? -- Discharge instructions are information about how to take care of yourself after getting medical care for a health problem.  What is low back pain? -- Low back pain is pain or discomfort in the lower part of your back. Many people have low back pain at some point, and it most often gets better on its own. Many different things can cause low back pain. Most of the time, doctors do not know the exact cause.  Back pain can happen if you strain a muscle. This is often what has happened when a person \"throws out\" their back. This refers to pain that starts suddenly after physical activity, like lifting something heavy or bending the back.  Back pain can also happen if you have (figure 1):   Damaged, bulging, or torn discs   " "Arthritis affecting the joints of the spine   Bony growths on the vertebrae that crowd nearby nerves   A \"compression fracture\" due to osteoporosis (a condition that makes your bones weak)   A vertebra out of place   Narrowing in the spinal canal   A tumor or infection (but this is very rare)  How do I care for myself at home? -- Ask the doctor or nurse what you should do when you go home. Make sure that you understand exactly what you need to do to care for yourself. Ask questions if there is anything you do not understand.  You should also:   Use heat on your back for short periods of time, if it helps with pain. Put a heating pad (on the low setting) on your back for 20 minutes at a time a few times each day. Never go to sleep with heat on your back.   Try to stay as active as you can without causing too much pain, if your doctor or nurse said that it is OK. If your pain is severe, you might need to rest for a day or 2. But it's important to get back to walking and moving as soon as possible. Try to keep doing your normal daily activities. Get up and move around gently during the day as you are able.   Slowly start to increase your activity level as you are able to. If something causes your pain to come back or get worse, stop and go back to doing easier activities that did not hurt.   Avoid sitting or standing in 1 position for a long time. You might want to sleep with a pillow under or between your knees if this eases your pain.   Take a medicine like ibuprofen (sample brand names: Advil, Motrin) or naproxen (brand name: Aleve) for pain, if needed. These are nonsteroidal antiinflammatory drugs (\"NSAIDs\"). They might work better than acetaminophen for low back pain.   Talk to your doctor or nurse before trying any of the following. These treatments might help you feel better for a little while:   Spinal manipulation - This is when a chiropractor, physical therapist, or other professional moves or \"adjusts\" the " joints of your back.   Acupuncture - This is when someone who knows traditional Chinese medicine inserts tiny needles through your skin to block pain signals.   Massage therapy - The massage therapist manipulates your muscles and soft tissues to decrease muscle tension and increase relaxation.  What follow-up care do I need? -- Your doctor or nurse will tell you if you need to make a follow-up appointment. If so, make sure that you know when and where to go. The doctor might suggest that you see a physical therapist to learn exercises to help with your back pain.  When should I call the doctor? -- Call for emergency help right away (in the [place] and [place], call 9-1-1) if:   You are unable to walk, or cannot control your bowels or bladder.   You develop a fever of 100.4°F (38°C) or higher, chills, or night sweats.  Call your doctor for advice if:   Your legs are numb, weak, or tingly.   Your pain is getting worse, even with medicines and rest.   You develop a new rash.  All topics are updated as new evidence becomes available and our peer review process is complete.  This topic retrieved from [employer] on: May 15, 2024.  Topic 350424 Version 1.0  Release: 32.4.3 - C32.134  © 2024 UpToDate, Inc. and/or its affiliates. All rights reserved.  figure 1: Anatomy of the back     This drawing shows the different parts of the back. Back pain can be caused by problems with the muscles, ligaments, discs, bones (vertebrae), or nerves.  Graphic 49439 Version 6.0  Consumer Information Use and Disclaimer   Disclaimer: This generalized information is a limited summary of diagnosis, treatment, and/or medication information. It is not meant to be comprehensive and should be used as a tool to help the user understand and/or assess potential diagnostic and treatment options. It does NOT include all information about conditions, treatments, medications, side effects, or risks that may apply to a specific patient. It is not intended to be  medical advice or a substitute for the medical advice, diagnosis, or treatment of a health care provider based on the health care provider's examination and assessment of a patient's specific and unique circumstances. Patients must speak with a health care provider for complete information about their health, medical questions, and treatment options, including any risks or benefits regarding use of medications. This information does not endorse any treatments or medications as safe, effective, or approved for treating a specific patient. UpToDate, Inc. and its affiliates disclaim any warranty or liability relating to this information or the use thereof.The use of this information is governed by the Terms of Use, available at https://www.[employer]/en/know/clinical-effectiveness-terms. 2024© UpToDate, Inc. and its affiliates and/or licensors. All rights reserved.  Copyright   © 2024 UpToDate, Inc. and/or its affiliates. All rights reserved.       Follow up with PCP in 3-5 days.  Proceed to  ER if symptoms worsen.    Chief Complaint     Chief Complaint   Patient presents with    Back Pain     Pt states he was putting on shoes this morning and experienced right lower back pain that radiates to abdomen. Took acetaminophen. Hx of spinal sx.          History of Present Illness       74 year old male with PMH significant for x2 discectomies from L5-S1, BPH, anemia, presents for evaluation of acute right-sided lower back pain which began 3 hours ago after bending down to tie his shoes. He describes the pain as dull and radiating to the right abdomen. It is aggravated by bending and ascending stairs. Standing relieves the pain. He took 1000 mg Tylenol which is starting to help. He denies numbness, tingling, saddle anesthesia, bowel/bladder dysfunction, UTI symptoms, nausea/vomiting.     Back Pain  Pertinent negatives include no chest pain, fever, headaches or numbness.       Review of Systems   Review of Systems    Constitutional:  Negative for fatigue and fever.   HENT:  Negative for congestion, ear discharge, ear pain, postnasal drip, rhinorrhea, sinus pressure, sinus pain, sneezing and sore throat.    Eyes: Negative.  Negative for pain, discharge, redness and itching.   Respiratory: Negative.  Negative for apnea, cough, choking, chest tightness, shortness of breath and stridor.    Cardiovascular: Negative.  Negative for chest pain and palpitations.   Gastrointestinal: Negative.  Negative for diarrhea, nausea and vomiting.   Endocrine: Negative.  Negative for polydipsia, polyphagia and polyuria.   Genitourinary: Negative.  Negative for decreased urine volume and flank pain.   Musculoskeletal:  Positive for back pain. Negative for arthralgias, gait problem, joint swelling, myalgias, neck pain and neck stiffness.   Skin: Negative.  Negative for color change and rash.   Allergic/Immunologic: Negative.  Negative for environmental allergies.   Neurological: Negative.  Negative for dizziness, facial asymmetry, light-headedness, numbness and headaches.   Hematological: Negative.  Negative for adenopathy.   Psychiatric/Behavioral: Negative.           Current Medications       Current Outpatient Medications:     calcium carbonate (TUMS) 500 mg chewable tablet, Chew 1 tablet daily When needed, Disp: , Rfl:     Co Q-10 50 MG, Take 50 mg by mouth, Disp: , Rfl:     famotidine (PEPCID) 40 MG tablet, TAKE 1 TABLET BY MOUTH EVERYDAY AT BEDTIME, Disp: 30 tablet, Rfl: 3    fluocinonide (LIDEX) 0.05 % cream, APPLY TO AFFECTED AREA OF SKIN TWICE A DAY AS NEEDED, Disp: , Rfl:     fluticasone (FLONASE) 50 mcg/act nasal spray, 2 sprays into each nostril daily, Disp: 1 g, Rfl: 0    methocarbamol (ROBAXIN) 500 mg tablet, Take 1 tablet (500 mg total) by mouth 2 (two) times a day as needed for muscle spasms for up to 5 days, Disp: 10 tablet, Rfl: 0    Misc Natural Products (OSTEO BI-FLEX JOINT SHIELD PO), Take by mouth, Disp: , Rfl:     Multiple  Vitamins-Minerals (MULTIVITAL) tablet, Take 1 tablet by mouth daily Spectravite , Disp: , Rfl:     pantoprazole (PROTONIX) 40 mg tablet, TAKE 1 TABLET BY MOUTH EVERY DAY, Disp: 90 tablet, Rfl: 2    prednisoLONE acetate (PRED FORTE) 1 % ophthalmic suspension, Administer 1 drop to the right eye 2 (two) times a day, Disp: , Rfl:     rosuvastatin (CRESTOR) 5 mg tablet, TAKE 1 TABLET (5 MG TOTAL) BY MOUTH DAILY., Disp: 30 tablet, Rfl: 5    tamsulosin (FLOMAX) 0.4 mg, TAKE 1 CAPSULE BY MOUTH EVERY DAY WITH DINNER, Disp: 90 capsule, Rfl: 3    ammonium lactate (LAC-HYDRIN) 12 % cream, Apply topically as needed for dry skin (Patient not taking: Reported on 5/14/2024), Disp: 385 g, Rfl: 0    cholestyramine (QUESTRAN) 4 g packet, Take 1 packet (4 g total) by mouth daily (Patient taking differently: Take 1 packet by mouth every other day), Disp: 30 each, Rfl: 3    Difluprednate 0.05 % EMUL, Apply to eye (Patient not taking: Reported on 5/14/2024), Disp: , Rfl:     Current Allergies     Allergies as of 07/01/2024    (No Known Allergies)            The following portions of the patient's history were reviewed and updated as appropriate: allergies, current medications, past family history, past medical history, past social history, past surgical history and problem list.     Past Medical History:   Diagnosis Date    Abnormal weight loss     RESOLVED: 15JUN2015    Anemia     RESOLVED: 15JUN2015    Atypical chest pain     RESOLVED: 15JUN2015    BPH (benign prostatic hyperplasia)     Cancer (HCC)     DUODENAL    Luis Miguel's syndrome     Depression     RESOLVED: 25MAR2015    Disc disorder     cervical and lumbar    Diverticulitis of colon     LAST ASSESSED: [id number]    Duodenal nodule     RESOLVED: 16MAR2017    Encounter for hepatitis C screening test for low risk patient 03/24/2019    Gastrointestinal stromal tumor (GIST) (HCC)     MALIGNANT; RESOLVED: 08MAR2016    GERD (gastroesophageal reflux disease)     Glaucoma     RESOLVED:  [id number]    History of COVID-19 09/2021    Insomnia     RESOLVED: [id number]    Preop exam for internal medicine 01/20/2021    Preop examination 05/07/2020    Preoperative clearance 03/09/2021       Past Surgical History:   Procedure Laterality Date    BACK SURGERY      BACK SURGERY      l4 s1 surgery     CERVICAL FUSION      c4 and c5    CHOLECYSTECTOMY      COLONOSCOPY  2014    kutty    ELBOW SURGERY Left     REPAIR    EYE SURGERY      EYE SURGERY      EYE SURGERY Right 2022    right eye surgery    GANGLION CYST EXCISION Left 06/05/2020    Procedure: KNEE EXCISION BAKERS CYST;  Surgeon: Svetlana Aburto MD;  Location: [facility];  Service: Orthopedics    KNEE ARTHROSCOPY Left     LUMBAR DISCECTOMY      L5 S1    ORIF RADIAL SHAFT FRACTURE      NC ARTHROSCOPY KNEE DIAGNOSTIC W/WO SYNOVIAL BX SPX Left 12/21/2020    Procedure: KNEE ARTHROSCOPY, popliteal cyst decompression;  Surgeon: Emre Duffy MD;  Location: [facility];  Service: Orthopedics    NC CYSTO INSERTION TRANSPROSTATIC IMPLANT SINGLE N/A 12/20/2022    Procedure: CYSTOSCOPY WITH INSERTION UROLIFT WITH 6 IMPLANTS;  Surgeon: Chris Brock MD;  Location: [facility];  Service: Urology    NC ESOPHAGOGASTRODUODENOSCOPY TRANSORAL DIAGNOSTIC N/A 11/01/2016    Procedure: ESOPHAGOGASTRODUODENOSCOPY (EGD);  Surgeon: Roberto Reese MD;  Location: [facility];  Service: Gastroenterology    NC ESOPHAGOGASTRODUODENOSCOPY TRANSORAL DIAGNOSTIC N/A 05/31/2018    Procedure: ESOPHAGOGASTRODUODENOSCOPY (EGD);  Surgeon: Roberto Reese MD;  [facility]: [facility];  Service: Gastroenterology    NC LAMOT PRTL FFD EXC DISC REEXPL 1 NTRSP LUMBAR N/A 10/24/2019    Procedure: Revision laminectomy and decompression at L5-S1;  Surgeon: Quiana Amaro MD;  Location: [facility];  Service: Orthopedics    SMALL INTESTINE SURGERY      GIST surgery    US GUIDED MSK PROCEDURE  05/21/2019    US GUIDED MSK PROCEDURE  10/30/2020       Family History   Problem Relation Age of Onset    Thyroid  "disease Mother     Thyroid disease Brother     Diabetes Paternal Grandmother     Hypertension Family     Skin cancer Father     Thyroid disease Father     Autoimmune disease Sister         Lupus         Medications have been verified.        Objective   /82   Pulse 78   Temp 97.5 °F (36.4 °C)   Resp 16   Ht 5' 8\" (1.727 m)   Wt 84.8 kg (187 lb)   SpO2 98%   BMI 28.43 kg/m²        Physical Exam     Physical Exam  Vitals reviewed.   Constitutional:       General: He is not in acute distress.     Appearance: Normal appearance. He is not ill-appearing, toxic-appearing or diaphoretic.      Interventions: He is not intubated.  HENT:      Head: Normocephalic and atraumatic.      Right Ear: Tympanic membrane, ear canal and external ear normal. There is no impacted cerumen.      Left Ear: Tympanic membrane, ear canal and external ear normal. There is no impacted cerumen.      Nose: Nose normal. No congestion or rhinorrhea.      Mouth/Throat:      Mouth: Mucous membranes are moist.      Pharynx: Oropharynx is clear. Uvula midline. No pharyngeal swelling, oropharyngeal exudate, posterior oropharyngeal erythema or uvula swelling.      Tonsils: No tonsillar exudate or tonsillar abscesses. 1+ on the right. 1+ on the left.   Eyes:      Extraocular Movements: Extraocular movements intact.      Conjunctiva/sclera: Conjunctivae normal.      Pupils: Pupils are equal, round, and reactive to light.   Cardiovascular:      Rate and Rhythm: Normal rate and regular rhythm.      Pulses: Normal pulses.      Heart sounds: Normal heart sounds, S1 normal and S2 normal. Heart sounds not distant. No murmur heard.     No friction rub. No gallop.   Pulmonary:      Effort: Pulmonary effort is normal. No tachypnea, bradypnea, accessory muscle usage, prolonged expiration, respiratory distress or retractions. He is not intubated.      Breath sounds: Normal breath sounds. No stridor, decreased air movement or transmitted upper airway sounds. " No decreased breath sounds, wheezing, rhonchi or rales.   Chest:      Chest wall: No tenderness.   Abdominal:      General: Abdomen is flat.      Palpations: Abdomen is soft.      Tenderness: There is no abdominal tenderness. There is no right CVA tenderness or left CVA tenderness.   Musculoskeletal:        Arms:       Cervical back: Normal range of motion and neck supple. No rigidity or tenderness.      Lumbar back: Tenderness present. No swelling, edema, deformity, signs of trauma, lacerations, spasms or bony tenderness. Decreased range of motion. Negative right straight leg raise test and negative left straight leg raise test. No scoliosis.      Comments: Decreased lumbar flexion-only possible up to 20 degrees d/t pain.    Lymphadenopathy:      Cervical: No cervical adenopathy.   Skin:     General: Skin is warm and dry.      Capillary Refill: Capillary refill takes less than 2 seconds.      Findings: No erythema.   Neurological:      General: No focal deficit present.      Mental Status: He is alert.   Psychiatric:         Mood and Affect: Mood normal.

## 2024-07-02 ENCOUNTER — OFFICE VISIT (OUTPATIENT)
Dept: INTERNAL MEDICINE CLINIC | Facility: CLINIC | Age: 74
End: 2024-07-02
Payer: COMMERCIAL

## 2024-07-02 VITALS
HEIGHT: 68 IN | WEIGHT: 187.8 LBS | HEART RATE: 78 BPM | SYSTOLIC BLOOD PRESSURE: 150 MMHG | BODY MASS INDEX: 28.46 KG/M2 | DIASTOLIC BLOOD PRESSURE: 82 MMHG | OXYGEN SATURATION: 96 %

## 2024-07-02 DIAGNOSIS — M54.50 ACUTE LOW BACK PAIN, UNSPECIFIED BACK PAIN LATERALITY, UNSPECIFIED WHETHER SCIATICA PRESENT: Primary | ICD-10-CM

## 2024-07-02 DIAGNOSIS — R31.9 HEMATURIA, UNSPECIFIED TYPE: ICD-10-CM

## 2024-07-02 PROBLEM — M54.9 BACK PAIN: Status: ACTIVE | Noted: 2024-07-02

## 2024-07-02 LAB
BACTERIA UR CULT: NORMAL
BACTERIA UR QL AUTO: NORMAL /HPF
BILIRUB UR QL STRIP: NEGATIVE
CLARITY UR: CLEAR
COLOR UR: NORMAL
GLUCOSE UR STRIP-MCNC: NEGATIVE MG/DL
HGB UR QL STRIP.AUTO: NEGATIVE
KETONES UR STRIP-MCNC: NEGATIVE MG/DL
LEUKOCYTE ESTERASE UR QL STRIP: NEGATIVE
NITRITE UR QL STRIP: NEGATIVE
NON-SQ EPI CELLS URNS QL MICRO: NORMAL /HPF
PH UR STRIP.AUTO: 6 [PH]
PROT UR STRIP-MCNC: NEGATIVE MG/DL
RBC #/AREA URNS AUTO: NORMAL /HPF
SP GR UR STRIP.AUTO: 1.01 (ref 1–1.03)
UROBILINOGEN UR STRIP-ACNC: <2 MG/DL
WBC #/AREA URNS AUTO: NORMAL /HPF

## 2024-07-02 PROCEDURE — G2211 COMPLEX E/M VISIT ADD ON: HCPCS

## 2024-07-02 PROCEDURE — 81001 URINALYSIS AUTO W/SCOPE: CPT

## 2024-07-02 PROCEDURE — 99214 OFFICE O/P EST MOD 30 MIN: CPT

## 2024-07-02 RX ORDER — NAPROXEN 500 MG/1
500 TABLET ORAL 2 TIMES DAILY WITH MEALS
Qty: 14 TABLET | Refills: 0 | Status: SHIPPED | OUTPATIENT
Start: 2024-07-02 | End: 2024-07-09

## 2024-07-02 RX ORDER — LIDOCAINE 50 MG/G
1 PATCH TOPICAL DAILY
Qty: 15 PATCH | Refills: 0 | Status: SHIPPED | OUTPATIENT
Start: 2024-07-02 | End: 2024-07-17

## 2024-07-02 NOTE — ASSESSMENT & PLAN NOTE
Back pain started after bending forward yestarday   The pain gets worse with bending forward and movements of the back and get improved with standing still.  Past medical history of spinal surgeries (discectomy) (L5-S1) for intervertebral disc herniation  Patient had a slightly negative but numbness and tingling on the left posterior thigh for the past 1 day.    Plan:  Over-the-counter Tylenol, Voltaren gel  Lidocaine patch  Methocarbamol  Spine PT  Activity as tolerated, ice compression, elevation of the limb

## 2024-07-02 NOTE — PATIENT INSTRUCTIONS
Please use ice/heat compression  naproxen  Over the counter tylenol and voltaren gel  Lidocaine patch   Please do not use voltaren gel and lidocaine patch together at the same time and same location

## 2024-07-02 NOTE — PROGRESS NOTES
Ambulatory Visit  Name: Shine Toro      : 1950      MRN: [id number]  Encounter Provider: Karis Arias MD  Encounter Date: 2024   Encounter department: [facility]    Assessment & Plan   1. Acute low back pain, unspecified back pain laterality, unspecified whether sciatica present  Assessment & Plan:  Back pain started after bending forward yestarday   The pain gets worse with bending forward and movements of the back and get improved with standing still.  Past medical history of spinal surgeries (discectomy) (L5-S1) for intervertebral disc herniation  Patient had a slightly negative but numbness and tingling on the left posterior thigh for the past 1 day.    Plan:  Over-the-counter Tylenol, Voltaren gel  Lidocaine patch  Methocarbamol  Spine PT  Activity as tolerated, ice compression, elevation of the limb    2. Hematuria, unspecified type  Assessment & Plan:  No signs of stone or uti  Incidental hematuria    Plan:  Repeat UA  If repeat UA is positive for hematuria, needs  scan and urology follow up for cystoscopy       History of Present Illness     This is 74 M presented for sudden onset of acute, worsening right sided back pain with no red flag signs. Pain started after bending forward. SLRT negative and incidental hematuria found with no signs or symptoms of UTI or  stones. Pain gets worsened with bending forward and relieved with standing still.    Back Pain  Associated symptoms include numbness. Pertinent negatives include no fever, headaches or weakness.     Review of Systems   Constitutional:  Positive for activity change. Negative for appetite change, chills, diaphoresis, fatigue, fever and unexpected weight change.   Respiratory: Negative.     Cardiovascular: Negative.    Gastrointestinal: Negative.    Musculoskeletal:  Positive for back pain. Negative for arthralgias, gait problem, joint swelling, myalgias and neck stiffness.   Neurological:  Positive  for numbness. Negative for dizziness, tremors, seizures, syncope, facial asymmetry, speech difficulty, weakness, light-headedness and headaches.     Past Medical History:   Diagnosis Date    Abnormal weight loss     RESOLVED: 15JUN2015    Anemia     RESOLVED: 15JUN2015    Atypical chest pain     RESOLVED: 15JUN2015    BPH (benign prostatic hyperplasia)     Cancer (HCC)     DUODENAL    Luis Miguel's syndrome     Depression     RESOLVED: 25MAR2015    Disc disorder     cervical and lumbar    Diverticulitis of colon     LAST ASSESSED: [id number]    Duodenal nodule     RESOLVED: 16MAR2017    Encounter for hepatitis C screening test for low risk patient 03/24/2019    Gastrointestinal stromal tumor (GIST) (HCC)     MALIGNANT; RESOLVED: 08MAR2016    GERD (gastroesophageal reflux disease)     Glaucoma     RESOLVED: 16MAR2017    History of COVID-19 09/2021    Insomnia     RESOLVED: 16MAR2017    Preop exam for internal medicine 01/20/2021    Preop examination 05/07/2020    Preoperative clearance 03/09/2021     Past Surgical History:   Procedure Laterality Date    BACK SURGERY      BACK SURGERY      l4 s1 surgery     CERVICAL FUSION      c4 and c5    CHOLECYSTECTOMY      COLONOSCOPY  2014    kutty    ELBOW SURGERY Left     REPAIR    EYE SURGERY      EYE SURGERY      EYE SURGERY Right 2022    right eye surgery    GANGLION CYST EXCISION Left 06/05/2020    Procedure: KNEE EXCISION BAKERS CYST;  Surgeon: Svetlana Aburto MD;  Location: [facility];  Service: Orthopedics    KNEE ARTHROSCOPY Left     LUMBAR DISCECTOMY      L5 S1    ORIF RADIAL SHAFT FRACTURE      LA ARTHROSCOPY KNEE DIAGNOSTIC W/WO SYNOVIAL BX SPX Left 12/21/2020    Procedure: KNEE ARTHROSCOPY, popliteal cyst decompression;  Surgeon: Emre Duffy MD;  Location: [facility];  Service: Orthopedics    LA CYSTO INSERTION TRANSPROSTATIC IMPLANT SINGLE N/A 12/20/2022    Procedure: CYSTOSCOPY WITH INSERTION UROLIFT WITH 6 IMPLANTS;  Surgeon: Chris Brock MD;  Location:  [facility];  Service: Urology    ID ESOPHAGOGASTRODUODENOSCOPY TRANSORAL DIAGNOSTIC N/A 11/01/2016    Procedure: ESOPHAGOGASTRODUODENOSCOPY (EGD);  Surgeon: Roberto Reese MD;  Location: [facility];  Service: Gastroenterology    ID ESOPHAGOGASTRODUODENOSCOPY TRANSORAL DIAGNOSTIC N/A 05/31/2018    Procedure: ESOPHAGOGASTRODUODENOSCOPY (EGD);  Surgeon: Roberto Reese MD;  Location: [facility];  Service: Gastroenterology    ID LAMOT PRTL FFD EXC DISC REEXPL 1 NTRSPC LUMBAR N/A 10/24/2019    Procedure: Revision laminectomy and decompression at L5-S1;  Surgeon: Quiana Amaro MD;  Location: [facility];  Service: Orthopedics    SMALL INTESTINE SURGERY      GIST surgery    US GUIDED MSK PROCEDURE  05/21/2019    US GUIDED MSK PROCEDURE  10/30/2020     Family History   Problem Relation Age of Onset    Thyroid disease Mother     Thyroid disease Brother     Diabetes Paternal Grandmother     Hypertension Family     Skin cancer Father     Thyroid disease Father     Autoimmune disease Sister         Lupus     Social History     Tobacco Use    Smoking status: Never    Smokeless tobacco: Never   Vaping Use    Vaping status: Never Used   Substance and Sexual Activity    Alcohol use: Yes     Comment: Holidays( some months without consumption)    Drug use: No    Sexual activity: Yes     Partners: Female     Birth control/protection: None     Comment: With my wife     Current Outpatient Medications on File Prior to Visit   Medication Sig    calcium carbonate (TUMS) 500 mg chewable tablet Chew 1 tablet daily When needed    cholestyramine (QUESTRAN) 4 g packet Take 1 packet (4 g total) by mouth daily (Patient taking differently: Take 1 packet by mouth every other day)    Co Q-10 50 MG Take 50 mg by mouth    famotidine (PEPCID) 40 MG tablet TAKE 1 TABLET BY MOUTH EVERYDAY AT BEDTIME    fluticasone (FLONASE) 50 mcg/act nasal spray 2 sprays into each nostril daily    methocarbamol (ROBAXIN) 500 mg tablet Take 1 tablet (500 mg total) by mouth  "2 (two) times a day as needed for muscle spasms for up to 5 days    Misc Natural Products (OSTEO BI-FLEX JOINT SHIELD PO) Take by mouth    Multiple Vitamins-Minerals (MULTIVITAL) tablet Take 1 tablet by mouth daily Spectravite     pantoprazole (PROTONIX) 40 mg tablet TAKE 1 TABLET BY MOUTH EVERY DAY    prednisoLONE acetate (PRED FORTE) 1 % ophthalmic suspension Administer 1 drop to the right eye 2 (two) times a day    rosuvastatin (CRESTOR) 5 mg tablet TAKE 1 TABLET (5 MG TOTAL) BY MOUTH DAILY.    tamsulosin (FLOMAX) 0.4 mg TAKE 1 CAPSULE BY MOUTH EVERY DAY WITH DINNER    ammonium lactate (LAC-HYDRIN) 12 % cream Apply topically as needed for dry skin (Patient not taking: Reported on 5/14/2024)    Difluprednate 0.05 % EMUL Apply to eye (Patient not taking: Reported on 5/14/2024)    fluocinonide (LIDEX) 0.05 % cream APPLY TO AFFECTED AREA OF SKIN TWICE A DAY AS NEEDED (Patient not taking: Reported on 7/2/2024)     No Known Allergies  Immunization History   Administered Date(s) Administered    COVID-19 MODERNA VACC 0.25 ML IM BOOSTER 12/17/2021    COVID-19 MODERNA VACC 0.5 ML IM 01/05/2021, 02/04/2021    INFLUENZA 12/02/2018, 11/08/2020, 10/26/2021, 11/03/2022    Influenza, seasonal, injectable 11/09/2014    Influenza, seasonal, injectable, preservative free 11/01/2012    MMR 01/12/2015    Pneumococcal Conjugate 13-Valent 03/01/2017    Pneumococcal Polysaccharide PPV23 09/28/2012, 09/12/2018    Td (adult), adsorbed 02/01/2003    Tdap 01/24/2013    Zoster 12/06/2012    Zoster Vaccine Recombinant 03/14/2020, 06/22/2020    influenza, trivalent, adjuvanted 10/16/2023     Objective     /82 (BP Location: Left arm, Patient Position: Standing, Cuff Size: Standard)   Pulse 78   Ht 5' 8\" (1.727 m)   Wt 85.2 kg (187 lb 12.8 oz)   SpO2 96%   BMI 28.55 kg/m²     Physical Exam  Constitutional:       General: He is not in acute distress.     Appearance: He is not ill-appearing, toxic-appearing or diaphoretic. "   Cardiovascular:      Rate and Rhythm: Normal rate and regular rhythm.      Heart sounds: No murmur heard.     No friction rub. No gallop.   Pulmonary:      Effort: Pulmonary effort is normal. No respiratory distress.      Breath sounds: Normal breath sounds. No stridor. No wheezing, rhonchi or rales.   Chest:      Chest wall: No tenderness.   Abdominal:      General: There is no distension.      Palpations: Abdomen is soft. There is no mass.      Tenderness: There is no abdominal tenderness. There is no guarding or rebound.      Hernia: No hernia is present.   Musculoskeletal:      Right lower leg: No edema.      Left lower leg: No edema.      Comments: Right sided paraspinal lumbar tenderness  SLRT negative  ROM of hip joints are intact   Neurological:      Mental Status: He is alert.   Psychiatric:         Mood and Affect: Mood normal.         Behavior: Behavior normal.         Thought Content: Thought content normal.         Judgment: Judgment normal.       Administrative Statements   I have spent a total time of 30 minutes in caring for this patient on the day of the visit/encounter including Diagnostic results, Prognosis, Risks and benefits of tx options, Instructions for management, Patient and family education, Importance of tx compliance, Risk factor reductions, Impressions, Counseling / Coordination of care, Documenting in the medical record, Reviewing / ordering tests, medicine, procedures  , Obtaining or reviewing history  , and Communicating with other healthcare professionals .

## 2024-07-02 NOTE — ASSESSMENT & PLAN NOTE
No signs of stone or uti  Incidental hematuria    Plan:  Repeat UA  If repeat UA is positive for hematuria, needs  scan and urology follow up for cystoscopy

## 2024-07-03 ENCOUNTER — TELEPHONE (OUTPATIENT)
Age: 74
End: 2024-07-03

## 2024-07-04 DIAGNOSIS — E78.5 HYPERLIPIDEMIA, UNSPECIFIED HYPERLIPIDEMIA TYPE: ICD-10-CM

## 2024-07-04 RX ORDER — ROSUVASTATIN CALCIUM 5 MG/1
5 TABLET, COATED ORAL DAILY
Qty: 30 TABLET | Refills: 5 | Status: SHIPPED | OUTPATIENT
Start: 2024-07-04

## 2024-07-09 ENCOUNTER — OFFICE VISIT (OUTPATIENT)
Dept: INTERNAL MEDICINE CLINIC | Facility: CLINIC | Age: 74
End: 2024-07-09
Payer: COMMERCIAL

## 2024-07-09 VITALS
HEIGHT: 68 IN | WEIGHT: 189.6 LBS | OXYGEN SATURATION: 98 % | HEART RATE: 76 BPM | DIASTOLIC BLOOD PRESSURE: 78 MMHG | BODY MASS INDEX: 28.73 KG/M2 | SYSTOLIC BLOOD PRESSURE: 146 MMHG

## 2024-07-09 DIAGNOSIS — M54.50 ACUTE RIGHT-SIDED LOW BACK PAIN WITHOUT SCIATICA: ICD-10-CM

## 2024-07-09 PROCEDURE — 99213 OFFICE O/P EST LOW 20 MIN: CPT | Performed by: INTERNAL MEDICINE

## 2024-07-09 PROCEDURE — G2211 COMPLEX E/M VISIT ADD ON: HCPCS | Performed by: INTERNAL MEDICINE

## 2024-07-09 RX ORDER — METHYLPREDNISOLONE 4 MG/1
TABLET ORAL
Qty: 21 EACH | Refills: 0 | Status: SHIPPED | OUTPATIENT
Start: 2024-07-09

## 2024-07-09 RX ORDER — METHOCARBAMOL 500 MG/1
500 TABLET, FILM COATED ORAL 2 TIMES DAILY PRN
Qty: 20 TABLET | Refills: 0 | Status: SHIPPED | OUTPATIENT
Start: 2024-07-09 | End: 2024-07-19

## 2024-07-09 NOTE — PROGRESS NOTES
Name: Shine Toro      : 1950      MRN: [id number]  Encounter Provider: Tomás Hogan MD  Encounter Date: 2024   Enc[facility] department: [facility]    Assessment & Plan     1. Acute right-sided low back pain without sciatica  -     methylPREDNISolone 4 MG tablet therapy pack; Use as directed on package  -     methocarbamol (ROBAXIN) 500 mg tablet; Take 1 tablet (500 mg total) by mouth 2 (two) times a day as needed for muscle spasms for up to 10 days  -     Ambulatory Referral to Physical Therapy; Future  -Start a Medrol Dosepak for pain and inflammation  -Do not take ibuprofen while you are on your Medrol Dosepak  -A referral has been made to physical therapy  -A new prescription for Robaxin has been sent to your pharmacy       Subjective      HPI  Patient presents today for follow-up of acute right-sided low back pain.  He states he initially aggravated his back about 8 days ago when he bent over to  his shoes.  He was recently seen in office by one of our resident physicians and given a prescription for Robaxin and naproxen.  He notes his pain is slightly improved however it has not completely resolved.  His history is notable for a revision hemilaminectomy with decompression discectomy at L5-S1 in 2019.  He reports a few days ago he was experiencing some paresthesias involving his upper thigh which have since resolved.      All other systems negative except for pertinent findings noted in HPI.       Current Outpatient Medications on File Prior to Visit   Medication Sig   • calcium carbonate (TUMS) 500 mg chewable tablet Chew 1 tablet daily When needed   • Co Q-10 50 MG Take 50 mg by mouth   • famotidine (PEPCID) 40 MG tablet TAKE 1 TABLET BY MOUTH EVERYDAY AT BEDTIME   • fluticasone (FLONASE) 50 mcg/act nasal spray 2 sprays into each nostril daily   • lidocaine (Lidoderm) 5 % Apply 1 patch topically over 12 hours daily for 15 days Remove & Discard patch  "within 12 hours or as directed by MD   • [facility] Natural Products (OSTEO BI-FLEX JOINT SHIELD PO) Take by mouth   • Multiple Vitamins-Minerals (MULTIVITAL) tablet Take 1 tablet by mouth daily Spectravite    • pantoprazole (PROTONIX) 40 mg tablet TAKE 1 TABLET BY MOUTH EVERY DAY   • prednisoLONE acetate (PRED FORTE) 1 % ophthalmic suspension Administer 1 drop to the right eye 2 (two) times a day   • rosuvastatin (CRESTOR) 5 mg tablet TAKE 1 TABLET (5 MG TOTAL) BY MOUTH DAILY.   • tamsulosin (FLOMAX) 0.4 mg TAKE 1 CAPSULE BY MOUTH EVERY DAY WITH DINNER   • ammonium lactate (LAC-HYDRIN) 12 % cream Apply topically as needed for dry skin (Patient not taking: Reported on 5/14/2024)   • cholestyramine (QUESTRAN) 4 g packet Take 1 packet (4 g total) by mouth daily (Patient not taking: Reported on 7/9/2024)   • Difluprednate 0.05 % EMUL Apply to eye (Patient not taking: Reported on 5/14/2024)   • fluocinonide (LIDEX) 0.05 % cream APPLY TO AFFECTED AREA OF SKIN TWICE A DAY AS NEEDED (Patient not taking: Reported on 7/2/2024)   • naproxen (Naprosyn) 500 mg tablet Take 1 tablet (500 mg total) by mouth 2 (two) times a day with meals for 7 days (Patient not taking: Reported on 7/9/2024)   • [DISCONTINUED] methocarbamol (ROBAXIN) 500 mg tablet Take 1 tablet (500 mg total) by mouth 2 (two) times a day as needed for muscle spasms for up to 5 days       Objective     /78 (BP Location: Left arm, Patient Position: Sitting, Cuff Size: Standard)   Pulse 76   Ht 5' 8\" (1.727 m)   Wt 86 kg (189 lb 9.6 oz)   SpO2 98%   BMI 28.83 kg/m²     BP Readings from Last 3 Encounters:   07/09/24 146/78   07/02/24 150/82   07/01/24 138/82        Wt Readings from Last 3 Encounters:   07/09/24 86 kg (189 lb 9.6 oz)   07/02/24 85.2 kg (187 lb 12.8 oz)   07/01/24 84.8 kg (187 lb)       Physical Exam    General: NAD  HEENT: NCAT, EOMI, normal conjunctiva  Cardiovascular: RRR, normal S1 and S2, no m/r/g  Pulmonary: Normal respiratory effort, no " wheezes, rales or rhonchi  GI: Soft, nontender, nondistended, normoactive bowel sounds  MSK: Normal bulk and tone, tenderness to palpation over the right lumbar paraspinal muscles, 5 out of 5 strength in lower extremities  Neuro: 2+ patellar DTRs bilaterally, no ankle clonus, chronic numbness over left medial knee with old surgical scar  Extremities: No lower extremity edema  Skin: Normal skin color, no rashes     Tomás Hogan MD

## 2024-07-09 NOTE — PATIENT INSTRUCTIONS
-Start a Medrol Dosepak for pain and inflammation  -Do not take ibuprofen while you are on your Medrol Dosepak  -A referral has been made to physical therapy  -A new prescription for Robaxin has been sent to your pharmacy

## 2024-07-12 ENCOUNTER — EVALUATION (OUTPATIENT)
Dept: PHYSICAL THERAPY | Facility: REHABILITATION | Age: 74
End: 2024-07-12
Payer: COMMERCIAL

## 2024-07-12 DIAGNOSIS — M54.50 ACUTE RIGHT-SIDED LOW BACK PAIN WITHOUT SCIATICA: ICD-10-CM

## 2024-07-12 PROCEDURE — 97110 THERAPEUTIC EXERCISES: CPT

## 2024-07-12 PROCEDURE — 97161 PT EVAL LOW COMPLEX 20 MIN: CPT

## 2024-07-16 ENCOUNTER — OFFICE VISIT (OUTPATIENT)
Dept: PHYSICAL THERAPY | Facility: REHABILITATION | Age: 74
End: 2024-07-16
Payer: COMMERCIAL

## 2024-07-16 DIAGNOSIS — M54.50 ACUTE RIGHT-SIDED LOW BACK PAIN WITHOUT SCIATICA: Primary | ICD-10-CM

## 2024-07-16 PROCEDURE — 97112 NEUROMUSCULAR REEDUCATION: CPT

## 2024-07-16 PROCEDURE — 97110 THERAPEUTIC EXERCISES: CPT

## 2024-07-16 NOTE — PROGRESS NOTES
"Daily Note     Today's date: 2024  Patient name: Shine Toro  : 1950  MRN: [id number]  Referring provider: Tomás Hogan,*  Dx:   Encounter Diagnosis     ICD-10-CM    1. Acute right-sided low back pain without sciatica  M54.50                      Subjective: Pt reported feeling pain in R iliopsoas and low back.       Objective: See treatment diary below      Assessment: Tolerated treatment well. Patient demonstrated fatigue post treatment and exhibited good technique with therapeutic exercises. VC's needed for correct technique throughout session. Multiple cuing needed in order to correctly perform exercises. Soreness noted post session. Monitor NV.      Plan: Continue per plan of care.      POC expires Auth Status Total   Visits  Start date  Expiration date PT/OT + Visit Limit? Co-Insurance   24     BOMN                 Visit/Unit Tracking  Date             Visit # 1 2                Precautions: h/o CA (duodenal), GERD, revision hemilaminectomy w/ decompression discectomy L5-S1 (2019)    * indicates included in HEP:   Access Code: [email or web address]  URL: [email or web address]  Date: 2024  Prepared by: Andra Ball    Manuals                                                                Neuro Re-Ed             TA draw / march* X10 alt 10 ea. alt           TA hold w/ hip ext  10 ea alt           TA dead bug  10 ea. alt           Cat-camel NV            Supine pelvic tilts NV 5\"x2'           Core stability             TA hold w/LPD  11# 3\" 2x10           TA hold w/unilateral row  11# 3\" 2x10 ea.           TA multifidus press  11# 3\" 2x10 ea.           Ther Ex             Pt education POC, HEP            Bike vs NuStep  Nustep L5x10'           Prone quad stretch w/ strap* 3x30\" b/l 3x30\" b/l           PPU* x10 10           Bridges* 2x10 3\" 2x10           clamshells  3\" 2x10           Hip flexor stretch EOM 3x30\" w/ PT OP 3x30\"            Hip extensor " strength             TA  squats  2x10                        Ther Activity                                       Gait Training                                       Modalities

## 2024-07-18 ENCOUNTER — OFFICE VISIT (OUTPATIENT)
Dept: PHYSICAL THERAPY | Facility: REHABILITATION | Age: 74
End: 2024-07-18
Payer: COMMERCIAL

## 2024-07-18 DIAGNOSIS — M54.50 ACUTE RIGHT-SIDED LOW BACK PAIN WITHOUT SCIATICA: Primary | ICD-10-CM

## 2024-07-18 PROCEDURE — 97112 NEUROMUSCULAR REEDUCATION: CPT

## 2024-07-18 PROCEDURE — 97110 THERAPEUTIC EXERCISES: CPT

## 2024-07-18 NOTE — PROGRESS NOTES
"Daily Note     Today's date: 2024  Patient name: Shine Toro  : 1950  MRN: [id number]  Referring provider: Tomsá Hogan,*  Dx:   Encounter Diagnosis     ICD-10-CM    1. Acute right-sided low back pain without sciatica  M54.50           Start Time: 0830  Stop Time: 0915  Total time in clinic (min): 45 minutes    Subjective: Pt reports no pain but a little \"sore\" today from cutting the grass yesterday. He hasn't cut grass in over 3 weeks. States he mostly is complaint with HEP - not after grass cutting.       Objective: See treatment diary below      Assessment: Tolerated treatment well. Continues focus on TrA activation in synchrony with exhalation during TE's to improve core strength and lumbar stability, stretches and proximal hip strengthening. Patient was challenged with dead bug coordination and ability to activate PPT and engage TrA, VC/TC's required to improve form and intended musculare recruitment- improved with practice. Patient continues to experience dull back pain t/o session but not increasing. Patient would benefit from ongoing PT to address restrictions and restore PLOF.      Plan: Continue per plan of care.      POC expires Auth Status Total   Visits  Start date  Expiration date PT/OT + Visit Limit? Co-Insurance   24     BOMN                 Visit/Unit Tracking  Date            Visit # 1 2 3               Precautions: h/o CA (duodenal), GERD, revision hemilaminectomy w/ decompression discectomy L5-S1 (2019)    * indicates included in HEP:   Access Code: [email or web address]  URL: [email or web address]  Date: 2024  Prepared by: Andra Ball    Manuals                                                               Neuro Re-Ed             TA draw w/ march* X10 alt 10 ea. alt 10 ea. alt          TA hold w/ hip ext  10 ea alt 10 ea alt          TA dead bug  10 ea. alt 15   Ea alt          Cat-camel NV            Supine pelvic tilts NV " "5\"x2' 5\" x2'          Core stability             TA hold w/LPD  11# 3\" 2x10 11# 3\" 2x10          TA hold w/unilateral row  11# 3\" 2x10 ea. 11# 3\" 2x10 ea.          TA multifidus press  11# 3\" 2x10 ea. 14# 3\" 2x10 ea.          Ther Ex             Pt education POC, HEP            Bike vs NuStep  Nustep L5x10' Nustep L5x10'          Prone quad stretch w/ strap* 3x30\" b/l 3x30\" b/l 3x30\" b/l          PPU* x10 10 x10          Bridges* 2x10 3\" 2x10 3\" 2x10          clamshells  3\" 2x10 3\" 2x10  BTB   5' top          Hip flexor stretch EOM 3x30\" w/ PT OP 3x30\"  3x30          Hip extensor strength             TA  squats  2x10                        Ther Activity                                       Gait Training                                       Modalities                                            "

## 2024-07-18 NOTE — TELEPHONE ENCOUNTER
Lidocaine (Lidoderm) 5 % not covered by plan    Payer: [employer]    [phone]  Note from payer: Drug is not covered by plan

## 2024-07-22 ENCOUNTER — OFFICE VISIT (OUTPATIENT)
Dept: PHYSICAL THERAPY | Facility: REHABILITATION | Age: 74
End: 2024-07-22
Payer: COMMERCIAL

## 2024-07-22 DIAGNOSIS — M54.50 ACUTE RIGHT-SIDED LOW BACK PAIN WITHOUT SCIATICA: Primary | ICD-10-CM

## 2024-07-22 PROCEDURE — 97112 NEUROMUSCULAR REEDUCATION: CPT

## 2024-07-22 PROCEDURE — 97110 THERAPEUTIC EXERCISES: CPT

## 2024-07-22 NOTE — PROGRESS NOTES
"Daily Note     Today's date: 2024  Patient name: Shine Toro  : 1950  MRN: [id number]  Referring provider: Tomás Hogan,*  Dx:   Encounter Diagnosis     ICD-10-CM    1. Acute right-sided low back pain without sciatica  M54.50                      Subjective: Pt. reports no change in status upon arrival.      Objective: See treatment diary below      Assessment: Tolerated treatment well. Patient would benefit from continued PT.  Pt. able to complete all exercises with no increase in pain during or after session.  Pt. 1:1 with PTA for entirety.      Plan: Continue per plan of care.      POC expires Auth Status Total   Visits  Start date  Expiration date PT/OT + Visit Limit? Co-Insurance   24     BOMN                 Visit/Unit Tracking  Date           Visit # 1 2 3 4              Precautions: h/o CA (duodenal), GERD, revision hemilaminectomy w/ decompression discectomy L5-S1 (2019)    * indicates included in HEP:   Access Code: [email or web address]  URL: [email or web address]  Date: 2024  Prepared by: Andra Ball    Manuals                                                              Neuro Re-Ed             TA draw / march* X10 alt 10 ea. alt 10 ea. alt 10 ea alt         TA hold w/ hip ext  10 ea alt 10 ea alt          TA dead bug  10 ea. alt 15   Ea alt 15 ea alt         Cat-camel NV            Supine pelvic tilts NV 5\"x2' 5\" x2'          Core stability             TA hold w/LPD  11# 3\" 2x10 11# 3\" 2x10 2x10 3\" 11#         TA hold w/unilateral row  11# 3\" 2x10 ea. 11# 3\" 2x10 ea. 2x10 ea b/l 3\" 11#         TA multifidus press  11# 3\" 2x10 ea. 14# 3\" 2x10 ea. 2x10 ea b/l 3\" 14#         Ther Ex             Pt education POC, HEP            Bike vs NuStep  Nustep L5x10' Nustep L5x10' 8' RB         Prone quad stretch w/ strap* 3x30\" b/l 3x30\" b/l 3x30\" b/l 3x30\" b/l         PPU* x10 10 x10          Bridges* 2x10 3\" 2x10 3\" 2x10 2x10 3\"      " "   bostonhells  3\" 2x10 3\" 2x10  BTB   5' top 2x10 btb         Hip flexor stretch EOM 3x30\" w/ PT OP 3x30\"  3x30          Hip extensor strength             TA  squats  2x10  2x10                      Ther Activity                                       Gait Training                                       Modalities                                              "

## 2024-07-25 ENCOUNTER — OFFICE VISIT (OUTPATIENT)
Dept: INTERNAL MEDICINE CLINIC | Facility: CLINIC | Age: 74
End: 2024-07-25
Payer: COMMERCIAL

## 2024-07-25 VITALS
SYSTOLIC BLOOD PRESSURE: 140 MMHG | BODY MASS INDEX: 28.04 KG/M2 | WEIGHT: 185 LBS | DIASTOLIC BLOOD PRESSURE: 80 MMHG | OXYGEN SATURATION: 99 % | HEART RATE: 62 BPM | HEIGHT: 68 IN

## 2024-07-25 DIAGNOSIS — M79.18 MUSCLE ACHE OF EXTREMITY: ICD-10-CM

## 2024-07-25 DIAGNOSIS — Z00.00 ANNUAL PHYSICAL EXAM: Primary | ICD-10-CM

## 2024-07-25 DIAGNOSIS — M54.50 LOW BACK PAIN, UNSPECIFIED BACK PAIN LATERALITY, UNSPECIFIED CHRONICITY, UNSPECIFIED WHETHER SCIATICA PRESENT: ICD-10-CM

## 2024-07-25 PROCEDURE — G2211 COMPLEX E/M VISIT ADD ON: HCPCS

## 2024-07-25 PROCEDURE — 99213 OFFICE O/P EST LOW 20 MIN: CPT

## 2024-07-25 NOTE — PROGRESS NOTES
PT Discharge Summary    Pt called to cancel his last scheduled PT appointment as he visited his PCP today and feels that he is where he needs to be. Discharge from physical therapy as of 7/25/2024.

## 2024-07-25 NOTE — ASSESSMENT & PLAN NOTE
patient here patient here with complaint of bilateral thigh muscle pain for the past 8 months  Patient reports that his Symptoms started after he started Crestor he is worried, if this is statin induced myalgia  Nature of pain-constant, does not worsen with activities  No weakness, muscle stiffness or decrease in muscle strength  Plan  Will stop Crestor for 2 weeks-patient will call back or message us in 2 weeks to see if the pain improved   If pain improved and the cause is likely statin induced myalgia will switch Crestor to Pravachol

## 2024-07-25 NOTE — ASSESSMENT & PLAN NOTE
Patient was seen in the office recently for acute low back pain with no weakness, bowel or bladder problems  Patient completed Medrol Dosepak course  Takes Robaxin as needed and follows up with physical therapy outpatient  Patient reports that pain is better now  Plan  Outpatient physical therapy and Robaxin as needed

## 2024-07-25 NOTE — PROGRESS NOTES
"Ambulatory Visit  Name: Shine Toro      : 1950      MRN: [id number]  Encounter Provider: Ceferino Rahman MD  Encounter Date: 2024   Enc[facility] department: [facility]    Assessment & Plan   1. Annual physical exam  -     CBC and differential; Future  -     Lipid Panel with Direct LDL reflex; Future  -     Hemoglobin A1C; Future  -     Comprehensive metabolic panel; Future       History of Present Illness     HPI  75-year-old male with history of hyperlipidemia, here for follow-up of his back pain and concerns of bilateral thigh pain.  Patient reports that his back pain is much better now after the Medrol Dosepak course, Robaxin and outpatient physical therapy.  However he is concerned that his bilateral thigh pain is induced by statin.  Patient reports his bilateral thigh pain started 8 months back after he was started on statin.  Review of Systems   Constitutional: Negative.    HENT: Negative.     Respiratory: Negative.     Cardiovascular: Negative.    Gastrointestinal: Negative.    Endocrine: Negative.    Genitourinary: Negative.    Musculoskeletal:  Positive for back pain and myalgias.   Skin: Negative.    Allergic/Immunologic: Negative.    Neurological: Negative.    Hematological: Negative.    Psychiatric/Behavioral: Negative.     .       Objective     /80   Pulse 62   Ht 5' 8\" (1.727 m)   Wt 83.9 kg (185 lb)   SpO2 99%   BMI 28.13 kg/m²     Physical Exam  Constitutional:       Appearance: Normal appearance.   HENT:      Mouth/Throat:      Mouth: Mucous membranes are moist.      Pharynx: Oropharynx is clear.   Eyes:      Extraocular Movements: Extraocular movements intact.      Conjunctiva/sclera: Conjunctivae normal.   Cardiovascular:      Rate and Rhythm: Normal rate and regular rhythm.      Pulses: Normal pulses.      Heart sounds: Normal heart sounds.   Pulmonary:      Effort: Pulmonary effort is normal.      Breath sounds: Normal breath " sounds.   Abdominal:      General: Abdomen is flat.      Palpations: Abdomen is soft.   Musculoskeletal:         General: Normal range of motion.      Cervical back: Normal range of motion.   Skin:     General: Skin is warm.      Capillary Refill: Capillary refill takes less than 2 seconds.   Neurological:      Mental Status: He is alert and oriented to person, place, and time. Mental status is at baseline.   Psychiatric:         Mood and Affect: Mood normal.         Behavior: Behavior normal.       Administrative Statements   40 resting

## 2024-07-26 ENCOUNTER — APPOINTMENT (OUTPATIENT)
Dept: PHYSICAL THERAPY | Facility: REHABILITATION | Age: 74
End: 2024-07-26
Payer: COMMERCIAL

## 2024-08-09 NOTE — TELEPHONE ENCOUNTER
Medication Refill Request     Name Flomax    Dose/Frequency Once daily   Quantity 90/0  Verified pharmacy   [x]  Verified ordering Provider   [x]  Verified enough for 3 days  [x]      Rx refilled on 6 7 22 but it was listed as a "No print " Rx resent to Pharmacy
Speaking Coherently

## 2024-09-07 DIAGNOSIS — K21.9 GASTROESOPHAGEAL REFLUX DISEASE WITHOUT ESOPHAGITIS: ICD-10-CM

## 2024-09-09 RX ORDER — FAMOTIDINE 40 MG/1
40 TABLET, FILM COATED ORAL
Qty: 30 TABLET | Refills: 3 | Status: SHIPPED | OUTPATIENT
Start: 2024-09-09

## 2024-10-15 ENCOUNTER — OFFICE VISIT (OUTPATIENT)
Dept: GASTROENTEROLOGY | Facility: AMBULARY SURGERY CENTER | Age: 74
End: 2024-10-15
Payer: COMMERCIAL

## 2024-10-15 ENCOUNTER — APPOINTMENT (OUTPATIENT)
Dept: LAB | Facility: CLINIC | Age: 74
End: 2024-10-15
Payer: COMMERCIAL

## 2024-10-15 VITALS
OXYGEN SATURATION: 96 % | BODY MASS INDEX: 27.98 KG/M2 | WEIGHT: 184.6 LBS | DIASTOLIC BLOOD PRESSURE: 78 MMHG | HEART RATE: 74 BPM | HEIGHT: 68 IN | SYSTOLIC BLOOD PRESSURE: 134 MMHG

## 2024-10-15 DIAGNOSIS — Z85.09 ENCOUNTER FOR FOLLOW-UP SURVEILLANCE OF MALIGNANT GASTROINTESTINAL STROMAL TUMOR (GIST): ICD-10-CM

## 2024-10-15 DIAGNOSIS — K58.0 IRRITABLE BOWEL SYNDROME WITH DIARRHEA: Primary | ICD-10-CM

## 2024-10-15 DIAGNOSIS — Z08 ENCOUNTER FOR FOLLOW-UP SURVEILLANCE OF MALIGNANT GASTROINTESTINAL STROMAL TUMOR (GIST): ICD-10-CM

## 2024-10-15 DIAGNOSIS — K21.9 GASTROESOPHAGEAL REFLUX DISEASE WITHOUT ESOPHAGITIS: ICD-10-CM

## 2024-10-15 DIAGNOSIS — Z00.00 ANNUAL PHYSICAL EXAM: ICD-10-CM

## 2024-10-15 PROBLEM — K92.1 MELENA: Status: RESOLVED | Noted: 2018-08-19 | Resolved: 2024-10-15

## 2024-10-15 LAB
ALBUMIN SERPL BCG-MCNC: 4.3 G/DL (ref 3.5–5)
ALP SERPL-CCNC: 65 U/L (ref 34–104)
ALT SERPL W P-5'-P-CCNC: 28 U/L (ref 7–52)
AST SERPL W P-5'-P-CCNC: 27 U/L (ref 13–39)
BASOPHILS # BLD AUTO: 0.04 THOUSANDS/ΜL (ref 0–0.1)
BASOPHILS NFR BLD AUTO: 1 % (ref 0–1)
BILIRUB DIRECT SERPL-MCNC: 0.11 MG/DL (ref 0–0.2)
BILIRUB SERPL-MCNC: 0.69 MG/DL (ref 0.2–1)
EOSINOPHIL # BLD AUTO: 0.04 THOUSAND/ΜL (ref 0–0.61)
EOSINOPHIL NFR BLD AUTO: 1 % (ref 0–6)
ERYTHROCYTE [DISTWIDTH] IN BLOOD BY AUTOMATED COUNT: 12.8 % (ref 11.6–15.1)
EST. AVERAGE GLUCOSE BLD GHB EST-MCNC: 100 MG/DL
HBA1C MFR BLD: 5.1 %
HCT VFR BLD AUTO: 47.3 % (ref 36.5–49.3)
HGB BLD-MCNC: 16.5 G/DL (ref 12–17)
IMM GRANULOCYTES # BLD AUTO: 0.01 THOUSAND/UL (ref 0–0.2)
IMM GRANULOCYTES NFR BLD AUTO: 0 % (ref 0–2)
LYMPHOCYTES # BLD AUTO: 0.94 THOUSANDS/ΜL (ref 0.6–4.47)
LYMPHOCYTES NFR BLD AUTO: 18 % (ref 14–44)
MCH RBC QN AUTO: 32.9 PG (ref 26.8–34.3)
MCHC RBC AUTO-ENTMCNC: 34.9 G/DL (ref 31.4–37.4)
MCV RBC AUTO: 94 FL (ref 82–98)
MONOCYTES # BLD AUTO: 0.66 THOUSAND/ΜL (ref 0.17–1.22)
MONOCYTES NFR BLD AUTO: 13 % (ref 4–12)
NEUTROPHILS # BLD AUTO: 3.59 THOUSANDS/ΜL (ref 1.85–7.62)
NEUTS SEG NFR BLD AUTO: 67 % (ref 43–75)
NRBC BLD AUTO-RTO: 0 /100 WBCS
PLATELET # BLD AUTO: 140 THOUSANDS/UL (ref 149–390)
PMV BLD AUTO: 11.6 FL (ref 8.9–12.7)
PROT SERPL-MCNC: 7 G/DL (ref 6.4–8.4)
RBC # BLD AUTO: 5.02 MILLION/UL (ref 3.88–5.62)
WBC # BLD AUTO: 5.28 THOUSAND/UL (ref 4.31–10.16)

## 2024-10-15 PROCEDURE — 83036 HEMOGLOBIN GLYCOSYLATED A1C: CPT

## 2024-10-15 PROCEDURE — 99214 OFFICE O/P EST MOD 30 MIN: CPT | Performed by: INTERNAL MEDICINE

## 2024-10-15 PROCEDURE — 85025 COMPLETE CBC W/AUTO DIFF WBC: CPT

## 2024-10-15 PROCEDURE — 80076 HEPATIC FUNCTION PANEL: CPT

## 2024-10-15 NOTE — ASSESSMENT & PLAN NOTE
-Has a history of IBS, associated with stressors in the past with work, currently caring for his mother and his work stressors seem to be improved  -Continue cholestyramine as needed for bile acid induced component of loose stools  -Overall patient has improved, not taking fiber supplement daily

## 2024-10-15 NOTE — PROGRESS NOTES
Ambulatory Visit  Name: Shine Toro      : 1950      MRN: [id number]  Encounter Provider: Roberto Reese MD  Encounter Date: 10/15/2024   Encounter department: 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  Patient overall is doing well from GI standpoint.  Recently has been started on 3000 mg of acetaminophen daily for myalgias.  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  Prior endoscopic evaluation was reviewed.  History was independently obtained.    History of Present Illness     Shine Toro is a 74 y.o. male who presents for follow-up.  Rolf 74-year-old gentleman with a remote history of GIST's, surgically resected from his duodenum, history of mild IBS, history of GERD.  Patient reports that overall he is doing very well.  He has tried to minimize his PPI therapy, he tries to skip a few days here and there takes it maybe 2-3 times per week.  Additionally he takes famotidine about every other night.  He denies any  "significant breakthrough unless he has dietary indiscretion.  Denies any nausea, vomiting, dysphagia, abdominal pain.  In general his stools are more regular, having formed bowel movements.  Occasionally has a loose stool but this is about once every 2 to 3 months and he takes Questran as needed with control of his symptoms.  His weight has been stable.  He is eating well.  Recently diagnosed with myalgia and is using 3000 mg of Tylenol almost on a daily basis.      Review of Systems  Review of systems was negative except for pertinent positive mentioned in HPI          Objective     /78 (BP Location: Right arm, Patient Position: Sitting, Cuff Size: Standard)   Pulse 74   Ht 5' 8\" (1.727 m)   Wt 83.7 kg (184 lb 9.6 oz)   SpO2 96%   BMI 28.07 kg/m²     Physical Exam  GEN: WN/WD, no acute distress, healthy-appearing gentleman  HEENT: anicteric, MMM, no cervical lymphadenopathy  CV: RRR, no m/r/g  CHEST: CTA b/l, no WRR  ABD: +BS, soft NT/ND, no hepatosplenomegaly  EXT: no C/C/E  NEURO: AAOx3  SKIN: no rashes      "

## 2024-10-15 NOTE — ASSESSMENT & PLAN NOTE
-Remote history of GIST, surgically resected  -Follow-up endoscopies have been negative  -Check LFTs given significant acetaminophen use  Orders:    Hepatic function panel; Future

## 2024-10-15 NOTE — ASSESSMENT & PLAN NOTE
-Well-controlled, continue to take pantoprazole alternating with famotidine as needed  -Continue with dietary and lifestyle modification

## 2024-10-21 ENCOUNTER — TELEPHONE (OUTPATIENT)
Dept: GASTROENTEROLOGY | Facility: CLINIC | Age: 74
End: 2024-10-21

## 2024-10-21 NOTE — TELEPHONE ENCOUNTER
Written by Roberto Reese MD on 10/17/2024 10:38 AM EDT  Seen by patient Shine Toro on 10/17/2024  4:23 PM

## 2024-10-21 NOTE — TELEPHONE ENCOUNTER
----- Message from Blanca MALLOY sent at 10/17/2024 11:52 AM EDT -----    ----- Message -----  From: Roberto Reese MD  Sent: 10/17/2024  10:38 AM EDT  To: [facility]    Good news your liver function tests are completely normal.

## 2024-11-21 ENCOUNTER — APPOINTMENT (OUTPATIENT)
Dept: LAB | Facility: CLINIC | Age: 74
End: 2024-11-21
Payer: COMMERCIAL

## 2024-11-21 LAB
ALBUMIN SERPL BCG-MCNC: 4.4 G/DL (ref 3.5–5)
ALP SERPL-CCNC: 68 U/L (ref 34–104)
ALT SERPL W P-5'-P-CCNC: 30 U/L (ref 7–52)
ANION GAP SERPL CALCULATED.3IONS-SCNC: 7 MMOL/L (ref 4–13)
AST SERPL W P-5'-P-CCNC: 26 U/L (ref 13–39)
BILIRUB SERPL-MCNC: 0.8 MG/DL (ref 0.2–1)
BUN SERPL-MCNC: 18 MG/DL (ref 5–25)
CALCIUM SERPL-MCNC: 9.2 MG/DL (ref 8.4–10.2)
CHLORIDE SERPL-SCNC: 101 MMOL/L (ref 96–108)
CHOLEST SERPL-MCNC: 161 MG/DL (ref ?–200)
CO2 SERPL-SCNC: 31 MMOL/L (ref 21–32)
CREAT SERPL-MCNC: 0.85 MG/DL (ref 0.6–1.3)
GFR SERPL CREATININE-BSD FRML MDRD: 87 ML/MIN/1.73SQ M
GLUCOSE P FAST SERPL-MCNC: 100 MG/DL (ref 65–99)
HDLC SERPL-MCNC: 44 MG/DL
LDLC SERPL CALC-MCNC: 72 MG/DL (ref 0–100)
POTASSIUM SERPL-SCNC: 4.4 MMOL/L (ref 3.5–5.3)
PROT SERPL-MCNC: 7.1 G/DL (ref 6.4–8.4)
PSA SERPL-MCNC: 1.76 NG/ML (ref 0–4)
SODIUM SERPL-SCNC: 139 MMOL/L (ref 135–147)
TRIGL SERPL-MCNC: 224 MG/DL (ref ?–150)

## 2024-11-21 PROCEDURE — 80053 COMPREHEN METABOLIC PANEL: CPT

## 2024-11-21 PROCEDURE — 80061 LIPID PANEL: CPT

## 2024-12-09 ENCOUNTER — RA CDI HCC (OUTPATIENT)
Dept: OTHER | Facility: HOSPITAL | Age: 74
End: 2024-12-09

## 2024-12-10 ENCOUNTER — OFFICE VISIT (OUTPATIENT)
Dept: INTERNAL MEDICINE CLINIC | Facility: CLINIC | Age: 74
End: 2024-12-10
Payer: COMMERCIAL

## 2024-12-10 ENCOUNTER — APPOINTMENT (OUTPATIENT)
Dept: LAB | Facility: CLINIC | Age: 74
End: 2024-12-10
Payer: COMMERCIAL

## 2024-12-10 VITALS
HEART RATE: 88 BPM | WEIGHT: 188 LBS | DIASTOLIC BLOOD PRESSURE: 68 MMHG | BODY MASS INDEX: 28.59 KG/M2 | OXYGEN SATURATION: 98 % | SYSTOLIC BLOOD PRESSURE: 116 MMHG

## 2024-12-10 DIAGNOSIS — Z00.00 MEDICARE ANNUAL WELLNESS VISIT, SUBSEQUENT: Primary | ICD-10-CM

## 2024-12-10 DIAGNOSIS — L40.9 PSORIASIS: ICD-10-CM

## 2024-12-10 DIAGNOSIS — Z85.09 HISTORY OF GASTROINTESTINAL STROMAL TUMOR (GIST): ICD-10-CM

## 2024-12-10 DIAGNOSIS — M79.10 MYALGIA: ICD-10-CM

## 2024-12-10 DIAGNOSIS — K21.9 GASTROESOPHAGEAL REFLUX DISEASE WITHOUT ESOPHAGITIS: ICD-10-CM

## 2024-12-10 DIAGNOSIS — K58.0 IRRITABLE BOWEL SYNDROME WITH DIARRHEA: ICD-10-CM

## 2024-12-10 PROBLEM — L29.9 PRURITUS: Status: ACTIVE | Noted: 2024-12-10

## 2024-12-10 LAB — CK SERPL-CCNC: 107 U/L (ref 39–308)

## 2024-12-10 PROCEDURE — 36415 COLL VENOUS BLD VENIPUNCTURE: CPT

## 2024-12-10 PROCEDURE — 82550 ASSAY OF CK (CPK): CPT

## 2024-12-10 PROCEDURE — G0439 PPPS, SUBSEQ VISIT: HCPCS | Performed by: INTERNAL MEDICINE

## 2024-12-10 PROCEDURE — 99214 OFFICE O/P EST MOD 30 MIN: CPT | Performed by: INTERNAL MEDICINE

## 2024-12-10 RX ORDER — CLOBETASOL PROPIONATE 0.5 MG/G
CREAM TOPICAL 2 TIMES DAILY
Qty: 60 G | Refills: 0 | Status: SHIPPED | OUTPATIENT
Start: 2024-12-10 | End: 2024-12-24

## 2024-12-10 NOTE — PATIENT INSTRUCTIONS
-Your muscle pain may be due to your statin.  Please hold your rosuvastatin over the next 1 to 2 weeks.  -A CK level has also been ordered to assess for muscle inflammation.  -Contact our office in 2 weeks to give us an update on your symptoms.    Medicare Preventive Visit Patient Instructions  Thank you for completing your Welcome to Medicare Visit or Medicare Annual Wellness Visit today. Your next wellness visit will be due in one year (12/11/2025).  The screening/preventive services that you may require over the next 5-10 years are detailed below. Some tests may not apply to you based off risk factors and/or age. Screening tests ordered at today's visit but not completed yet may show as past due. Also, please note that scanned in results may not display below.  Preventive Screenings:  Service Recommendations Previous Testing/Comments   Colorectal Cancer Screening  Colonoscopy    Fecal Occult Blood Test (FOBT)/Fecal Immunochemical Test (FIT)  Fecal DNA/Cologuard Test  Flexible Sigmoidoscopy Age: 45-75 years old   Colonoscopy: every 10 years (May be performed more frequently if at higher risk)  OR  FOBT/FIT: every 1 year  OR  Cologuard: every 3 years  OR  Sigmoidoscopy: every 5 years  Screening may be recommended earlier than age 45 if at higher risk for colorectal cancer. Also, an individualized decision between you and your healthcare provider will decide whether screening between the ages of 76-85 would be appropriate. Colonoscopy: 09/28/2022  FOBT/FIT: Not on file  Cologuard: Not on file  Sigmoidoscopy: Not on file          Prostate Cancer Screening Individualized decision between patient and health care provider in men between ages of 55-69   Medicare will cover every 12 months beginning on the day after your 50th birthday PSA: 1.763 ng/mL           Hepatitis C Screening Once for adults born between 1945 and 1965  More frequently in patients at high risk for Hepatitis C Hep C Antibody: 09/24/2019         Diabetes Screening 1-2 times per year if you're at risk for diabetes or have pre-diabetes Fasting glucose: 100 mg/dL (11/21/2024)  A1C: 5.1 % (10/15/2024)      Cholesterol Screening Once every 5 years if you don't have a lipid disorder. May order more often based on risk factors. Lipid panel: 11/21/2024         Other Preventive Screenings Covered by Medicare:  Abdominal Aortic Aneurysm (AAA) Screening: covered once if your at risk. You're considered to be at risk if you have a family history of AAA or a male between the age of 65-75 who smoking at least 100 cigarettes in your lifetime.  Lung Cancer Screening: covers low dose CT scan once per year if you meet all of the following conditions: (1) Age 55-77; (2) No signs or symptoms of lung cancer; (3) Current smoker or have quit smoking within the last 15 years; (4) You have a tobacco smoking history of at least 20 pack years (packs per day x number of years you smoked); (5) You get a written order from a healthcare provider.  Glaucoma Screening: covered annually if you're considered high risk: (1) You have diabetes OR (2) Family history of glaucoma OR (3)  aged 50 and older OR (4)  American aged 65 and older  Osteoporosis Screening: covered every 2 years if you meet one of the following conditions: (1) Have a vertebral abnormality; (2) On glucocorticoid therapy for more than 3 months; (3) Have primary hyperparathyroidism; (4) On osteoporosis medications and need to assess response to drug therapy.  HIV Screening: covered annually if you're between the age of 15-65. Also covered annually if you are younger than 15 and older than 65 with risk factors for HIV infection. For pregnant patients, it is covered up to 3 times per pregnancy.    Immunizations:  Immunization Recommendations   Influenza Vaccine Annual influenza vaccination during flu season is recommended for all persons aged >= 6 months who do not have contraindications   Pneumococcal  Vaccine   * Pneumococcal conjugate vaccine = PCV13 (Prevnar 13), PCV15 (Vaxneuvance), PCV20 (Prevnar 20)  * Pneumococcal polysaccharide vaccine = PPSV23 (Pneumovax) Adults 19-65 yo with certain risk factors or if 65+ yo  If never received any pneumonia vaccine: recommend Prevnar 20 (PCV20)  Give PCV20 if previously received 1 dose of PCV13 or PPSV23   Hepatitis B Vaccine 3 dose series if at intermediate or high risk (ex: diabetes, end stage renal disease, liver disease)   Respiratory syncytial virus (RSV) Vaccine - COVERED BY MEDICARE PART D  * RSVPreF3 (Arexvy) CDC recommends that adults 60 years of age and older may receive a single dose of RSV vaccine using shared clinical decision-making (SCDM)   Tetanus (Td) Vaccine - COST NOT COVERED BY MEDICARE PART B Following completion of primary series, a booster dose should be given every 10 years to maintain immunity against tetanus. Td may also be given as tetanus wound prophylaxis.   Tdap Vaccine - COST NOT COVERED BY MEDICARE PART B Recommended at least once for all adults. For pregnant patients, recommended with each pregnancy.   Shingles Vaccine (Shingrix) - COST NOT COVERED BY MEDICARE PART B  2 shot series recommended in those 19 years and older who have or will have weakened immune systems or those 50 years and older     Health Maintenance Due:      Topic Date Due    Colorectal Cancer Screening  09/27/2027    Hepatitis C Screening  Completed     Immunizations Due:      Topic Date Due    COVID-19 Vaccine (4 - 2024-25 season) 09/01/2024     Advance Directives   What are advance directives?  Advance directives are legal documents that state your wishes and plans for medical care. These plans are made ahead of time in case you lose your ability to make decisions for yourself. Advance directives can apply to any medical decision, such as the treatments you want, and if you want to donate organs.   What are the types of advance directives?  There are many types of  advance directives, and each state has rules about how to use them. You may choose a combination of any of the following:  Living will:  This is a written record of the treatment you want. You can also choose which treatments you do not want, which to limit, and which to stop at a certain time. This includes surgery, medicine, IV fluid, and tube feedings.   Durable power of  for healthcare (DPAHC):  This is a written record that states who you want to make healthcare choices for you when you are unable to make them for yourself. This person, called a proxy, is usually a family member or a friend. You may choose more than 1 proxy.  Do not resuscitate (DNR) order:  A DNR order is used in case your heart stops beating or you stop breathing. It is a request not to have certain forms of treatment, such as CPR. A DNR order may be included in other types of advance directives.  Medical directive:  This covers the care that you want if you are in a coma, near death, or unable to make decisions for yourself. You can list the treatments you want for each condition. Treatment may include pain medicine, surgery, blood transfusions, dialysis, IV or tube feedings, and a ventilator (breathing machine).  Values history:  This document has questions about your views, beliefs, and how you feel and think about life. This information can help others choose the care that you would choose.  Why are advance directives important?  An advance directive helps you control your care. Although spoken wishes may be used, it is better to have your wishes written down. Spoken wishes can be misunderstood, or not followed. Treatments may be given even if you do not want them. An advance directive may make it easier for your family to make difficult choices about your care.   Weight Management   Why it is important to manage your weight:  Being overweight increases your risk of health conditions such as heart disease, high blood pressure,  type 2 diabetes, and certain types of cancer. It can also increase your risk for osteoarthritis, sleep apnea, and other respiratory problems. Aim for a slow, steady weight loss. Even a small amount of weight loss can lower your risk of health problems.  How to lose weight safely:  A safe and healthy way to lose weight is to eat fewer calories and get regular exercise. You can lose up about 1 pound a week by decreasing the number of calories you eat by 500 calories each day.   Healthy meal plan for weight management:  A healthy meal plan includes a variety of foods, contains fewer calories, and helps you stay healthy. A healthy meal plan includes the following:  Eat whole-grain foods more often.  A healthy meal plan should contain fiber. Fiber is the part of grains, fruits, and vegetables that is not broken down by your body. Whole-grain foods are healthy and provide extra fiber in your diet. Some examples of whole-grain foods are whole-wheat breads and pastas, oatmeal, brown rice, and bulgur.  Eat a variety of vegetables every day.  Include dark, leafy greens such as spinach, kale, brannon greens, and mustard greens. Eat yellow and orange vegetables such as carrots, sweet potatoes, and winter squash.   Eat a variety of fruits every day.  Choose fresh or canned fruit (canned in its own juice or light syrup) instead of juice. Fruit juice has very little or no fiber.  Eat low-fat dairy foods.  Drink fat-free (skim) milk or 1% milk. Eat fat-free yogurt and low-fat cottage cheese. Try low-fat cheeses such as mozzarella and other reduced-fat cheeses.  Choose meat and other protein foods that are low in fat.  Choose beans or other legumes such as split peas or lentils. Choose fish, skinless poultry (chicken or turkey), or lean cuts of red meat (beef or pork). Before you cook meat or poultry, cut off any visible fat.   Use less fat and oil.  Try baking foods instead of frying them. Add less fat, such as margarine, sour  cream, regular salad dressing and mayonnaise to foods. Eat fewer high-fat foods. Some examples of high-fat foods include french fries, doughnuts, ice cream, and cakes.  Eat fewer sweets.  Limit foods and drinks that are high in sugar. This includes candy, cookies, regular soda, and sweetened drinks.  Exercise:  Exercise at least 30 minutes per day on most days of the week. Some examples of exercise include walking, biking, dancing, and swimming. You can also fit in more physical activity by taking the stairs instead of the elevator or parking farther away from stores. Ask your healthcare provider about the best exercise plan for you.      © Copyright [employer] 2018 Information is for End User's use only and may not be sold, redistributed or otherwise used for commercial purposes. All illustrations and images included in CareNotes® are the copyrighted property of [employer] or [employer]

## 2024-12-10 NOTE — PROGRESS NOTES
Name: Shine Toro      : 1950      MRN: [id number]  Encounter Provider: Tomás Hogan MD  Encounter Date: 12/10/2024   Encounter department: [facility]  Medicare annual wellness visit, subsequent         Myalgia  -Patient reports symptoms were previously felt to be possibly related to polymyalgia rheumatica.  His ESR and CRP were completely normal.  Given the temporal relation of his symptoms with the initiation of his statin suspect his myalgias being be secondary to statin.  Recommended a 2-week trial off of rosuvastatin.  Will also check CK level.  At the end of 2 weeks his symptoms have improved could consider either every other day dosing of statin or initiating Zetia.  Orders:  •  CK; Future    Gastroesophageal reflux disease without esophagitis  -Controlled continue famotidine as prescribed in the evening with Protonix in the morning       Irritable bowel syndrome with diarrhea  -Stable  -Appreciate GI follow-up       History of gastrointestinal stromal tumor (GIST)  -No evidence of recurrence  -Currently followed by GI        Psoriasis  -Recommended patient schedule follow-up with his dermatologist for management.  I have given him a prescription for clobetasol cream and told them for treatment.  Orders:  •  clobetasol (TEMOVATE) 0.05 % cream; Apply topically 2 (two) times a day for 14 days       Preventive health issues were discussed with patient, and age appropriate screening tests were ordered as noted in patient's After Visit Summary. Personalized health advice and appropriate referrals for health education or preventive services given if needed, as noted in patient's After Visit Summary.    History of Present Illness     HPI   Patient presents today for his annual Medicare wellness visit.  His past medical history is notable for GERD, GIST and irritable bowel syndrome.  Overall he states he is doing well but his main complaint is muscle  pain involving mostly his thighs over the past year.  He reports his PCP had concerns that potentially this was related to polymyalgia rheumatica and recommended a trial of Tylenol.  He states he has been taking Tylenol 1000 mg 3 times daily over the past year.  He states Tylenol helps his pain but he would like to get to the underlying cause of his symptoms.      Patient Care Team:  Amol Montalvo DO as PCP - General  MD Roberto Varela MD (Gastroenterology)  MD Go Hidalgo MD Roderick Quiros, MD as Surgeon (Surgical Oncology)    Review of Systems  Medical History Reviewed by provider this encounter:       Annual Wellness Visit Questionnaire   Shine is here for his Subsequent Wellness visit.     Health Risk Assessment:   Patient rates overall health as good. Patient feels that their physical health rating is same. Patient is satisfied with their life. Eyesight was rated as slightly worse. Hearing was rated as same. Patient feels that their emotional and mental health rating is same. Patients states they are never, rarely angry. Patient states they are sometimes unusually tired/fatigued. Pain experienced in the last 7 days has been some. Patient's pain rating has been 3/10. Patient states that he has experienced no weight loss or gain in last 6 months.     Depression Screening:   PHQ-9 Score: 4      Fall Risk Screening:   In the past year, patient has experienced: no history of falling in past year      Home Safety:  Patient does not have trouble with stairs inside or outside of their home. Patient has working smoke alarms and has working carbon monoxide detector. Home safety hazards include: none.     Nutrition:   Current diet is Regular.     Medications:   Patient is not currently taking any over-the-counter supplements. Patient is able to manage medications.     Activities of Daily Living (ADLs)/Instrumental Activities of Daily Living (IADLs):   Walk and transfer into and out  of bed and chair?: Yes  Dress and groom yourself?: Yes    Bathe or shower yourself?: Yes    Feed yourself? Yes  Do your laundry/housekeeping?: Yes  Manage your money, pay your bills and track your expenses?: Yes  Make your own meals?: Yes    Do your own shopping?: Yes    Previous Hospitalizations:   Any hospitalizations or ED visits within the last 12 months?: No      Advance Care Planning:   Living will: Yes    Durable POA for healthcare: Yes    Advanced directive: Yes      PREVENTIVE SCREENINGS      Cardiovascular Screening:    General: Screening Not Indicated and History Lipid Disorder      Diabetes Screening:     General: Screening Current      Colorectal Cancer Screening:     General: Screening Current      Prostate Cancer Screening:    General: Screening Current      Abdominal Aortic Aneurysm (AAA) Screening:    Risk factors include: age between 65-74 yo        Lung Cancer Screening:     General: Screening Not Indicated      Hepatitis C Screening:    General: Screening Current    Screening, Brief Intervention, and Referral to Treatment (SBIRT)    Screening  Typical number of drinks in a day: 0  Typical number of drinks in a week: 1  Interpretation: Low risk drinking behavior.    AUDIT-C Screenin) How often did you have a drink containing alcohol in the past year? 2 to 4 times a month  2) How many drinks did you have on a typical day when you were drinking in the past year? 0  3) How often did you have 6 or more drinks on one occasion in the past year? never    AUDIT-C Score: 2  Interpretation: Score 0-3 (male): Negative screen for alcohol misuse    Single Item Drug Screening:  How often have you used an illegal drug (including marijuana) or a prescription medication for non-medical reasons in the past year? never    Single Item Drug Screen Score: 0  Interpretation: Negative screen for possible drug use disorder    Social Drivers of Health     Financial Resource Strain: Medium Risk (2023)    Overall  Financial Resource Strain (CARDIA)    • Difficulty of Paying Living Expenses: Somewhat hard   Food Insecurity: No Food Insecurity (12/4/2024)    Hunger Vital Sign    • Worried About Running Out of Food in the Last Year: Never true    • Ran Out of Food in the Last Year: Never true   Transportation Needs: No Transportation Needs (12/4/2024)    PRAPARE - Transportation    • Lack of Transportation (Medical): No    • Lack of Transportation (Non-Medical): No   Housing Stability: Low Risk  (12/4/2024)    Housing Stability Vital Sign    • Unable to Pay for Housing in the Last Year: No    • Number of Times Moved in the Last Year: 0    • Homeless in the Last Year: No   Utilities: Not At Risk (12/4/2024)    [facility] Utilities    • Threatened with loss of utilities: No     No results found.    Objective   /68   Pulse 88   Wt 85.3 kg (188 lb)   SpO2 98%   BMI 28.59 kg/m²     Physical Exam  General: NAD  HEENT: NCAT, EOMI, normal conjunctiva, right corneal clouding  Cardiovascular: RRR, normal S1 and S2, no m/r/g  Pulmonary: Normal respiratory effort, no wheezes, rales or rhonchi  GI: Soft, nontender, nondistended, normoactive bowel sounds  Musculoskeletal: Normal bulk and tone  Neuro: Non-focal, ambulating without difficulty, non-antalgic gait  Extremities: No lower extremity edema  Skin: Psoriatic plaques located on extensor surface of bilateral elbow  Psychiatric: Normal mood and affect

## 2024-12-10 NOTE — ASSESSMENT & PLAN NOTE
-Recommended patient schedule follow-up with his dermatologist for management.  I have given him a prescription for clobetasol cream and told them for treatment.  Orders:  •  clobetasol (TEMOVATE) 0.05 % cream; Apply topically 2 (two) times a day for 14 days

## 2024-12-11 ENCOUNTER — RESULTS FOLLOW-UP (OUTPATIENT)
Dept: INTERNAL MEDICINE CLINIC | Facility: CLINIC | Age: 74
End: 2024-12-11

## 2025-01-03 DIAGNOSIS — K21.9 GASTROESOPHAGEAL REFLUX DISEASE WITHOUT ESOPHAGITIS: ICD-10-CM

## 2025-01-03 DIAGNOSIS — E78.5 HYPERLIPIDEMIA, UNSPECIFIED HYPERLIPIDEMIA TYPE: ICD-10-CM

## 2025-01-03 RX ORDER — FAMOTIDINE 40 MG/1
40 TABLET, FILM COATED ORAL
Qty: 30 TABLET | Refills: 5 | Status: SHIPPED | OUTPATIENT
Start: 2025-01-03

## 2025-01-03 RX ORDER — ROSUVASTATIN CALCIUM 5 MG/1
5 TABLET, COATED ORAL DAILY
Qty: 30 TABLET | Refills: 5 | Status: SHIPPED | OUTPATIENT
Start: 2025-01-03

## 2025-01-12 ENCOUNTER — PATIENT MESSAGE (OUTPATIENT)
Dept: INTERNAL MEDICINE CLINIC | Facility: CLINIC | Age: 75
End: 2025-01-12

## 2025-01-12 DIAGNOSIS — M79.10 MYALGIA: Primary | ICD-10-CM

## 2025-01-23 DIAGNOSIS — K21.9 GASTROESOPHAGEAL REFLUX DISEASE WITHOUT ESOPHAGITIS: ICD-10-CM

## 2025-01-23 RX ORDER — PANTOPRAZOLE SODIUM 40 MG/1
40 TABLET, DELAYED RELEASE ORAL DAILY
Qty: 90 TABLET | Refills: 1 | Status: SHIPPED | OUTPATIENT
Start: 2025-01-23

## 2025-01-27 ENCOUNTER — NURSE TRIAGE (OUTPATIENT)
Age: 75
End: 2025-01-27

## 2025-01-27 ENCOUNTER — APPOINTMENT (OUTPATIENT)
Dept: LAB | Facility: CLINIC | Age: 75
End: 2025-01-27
Payer: COMMERCIAL

## 2025-01-27 DIAGNOSIS — R39.9 UTI SYMPTOMS: Primary | ICD-10-CM

## 2025-01-27 LAB
BACTERIA UR QL AUTO: ABNORMAL /HPF
BILIRUB UR QL STRIP: NEGATIVE
CAOX CRY URNS QL MICRO: ABNORMAL /HPF
CLARITY UR: CLEAR
COLOR UR: YELLOW
GLUCOSE UR STRIP-MCNC: NEGATIVE MG/DL
HGB UR QL STRIP.AUTO: NEGATIVE
HYALINE CASTS #/AREA URNS LPF: ABNORMAL /LPF
KETONES UR STRIP-MCNC: NEGATIVE MG/DL
LEUKOCYTE ESTERASE UR QL STRIP: NEGATIVE
MUCOUS THREADS UR QL AUTO: ABNORMAL
NITRITE UR QL STRIP: NEGATIVE
NON-SQ EPI CELLS URNS QL MICRO: ABNORMAL /HPF
PH UR STRIP.AUTO: 6 [PH]
PROT UR STRIP-MCNC: ABNORMAL MG/DL
RBC #/AREA URNS AUTO: ABNORMAL /HPF
SP GR UR STRIP.AUTO: 1.02 (ref 1–1.03)
UROBILINOGEN UR STRIP-ACNC: <2 MG/DL
WBC #/AREA URNS AUTO: ABNORMAL /HPF

## 2025-01-27 PROCEDURE — 81001 URINALYSIS AUTO W/SCOPE: CPT

## 2025-01-27 NOTE — TELEPHONE ENCOUNTER
Agree with urine testing.  If UA appears to be concerning for infection I will send in empiric antibiotic until urine culture results.  For now watch/monitor for urine testing results

## 2025-01-27 NOTE — TELEPHONE ENCOUNTER
"Patient of Dr. Eli, last seen 1/11/2024, called stating he is experiencing a burning sensation when starting to urinate, frequency, and urgency. H states his symptoms started about 3-4 weeks ago. He denies pain, fever, or blood in urine. I ordered UA and culture. I reviewed ED precautions, encouraged water intake, and avoiding bladder irritants. I verified [facility]. Please advise.    Reason for Disposition   All other urine symptoms    Answer Assessment - Initial Assessment Questions  1. SYMPTOM: \"What's the main symptom you're concerned about?\" (e.g., frequency, incontinence)      Burning sensation starting to urinate, frequency, urgency    2. ONSET: \"When did the symptoms start?\"      3-4 weeks ago    3. PAIN: \"Is there any pain?\" If Yes, ask: \"How bad is it?\" (Scale: 1-10; mild, moderate, severe)      Denies    4. CAUSE: \"What do you think is causing the symptoms?\"      UTI    5. OTHER SYMPTOMS: \"Do you have any other symptoms?\" (e.g., blood in urine, fever, flank pain, pain with urination)      Denies    Protocols used: Urinary Symptoms-Adult-[place]    "

## 2025-01-28 ENCOUNTER — RESULTS FOLLOW-UP (OUTPATIENT)
Dept: UROLOGY | Facility: AMBULATORY SURGERY CENTER | Age: 75
End: 2025-01-28

## 2025-01-28 DIAGNOSIS — R39.9 UTI SYMPTOMS: Primary | ICD-10-CM

## 2025-01-28 NOTE — PROGRESS NOTES
Rheumatology Initial Outpatient Visit  Name: Shine Toro      : 1950      MRN: [id number]  Encounter Provider: Hannah Sin MD  Encounter Date: 2/3/2025   Encounter department: [facility] [address]  :  Assessment & Plan  Arthralgia, unspecified joint  75-year-old male who presents for further evaluation of several months of low back, posterior hip, SI joint discomfort.  Patient notes that symptoms are constant, but does note worsening with certain activities such as going up or down the stairs and also notes worsening in the morning when he awakens.  He has had no shoulder involvement.  The symptoms have responded to Tylenol, though have not fully resolved.  Of note, patient also has psoriasis which has been worsening over around the same duration of time.  Additionally, he has a history of chronic left confusion for which he has had 2 prior surgeries, though no cell count available to review to see if the fluid has ever been inflammatory or noninflammatory.  Discussed with patient that the lack of shoulder involvement and normal inflammatory markers would make diagnosis of PMR less likely.  However, given his worsening psoriasis and description of pain, I would like to further evaluate him for possible axial psoriatic arthritis.  I recommend that we obtain x-rays of the SI joint and L-spine as well as updated inflammatory markers.  If x-ray of the SI joint is normal, would then need to proceed with MRI to evaluate for any nonradiographically changes.  In the interim, discussed that patient can change his Tylenol to naproxen to see if this provides any additional relief as this is a first-line treatment for psoriatic arthritis.  Patient will follow-up in 2 months.    ADDENDUM: Patient underwent x-rays of the SI joints which shows possible degenerative arthritis.  However, given the concern for possible axial involvement from psoriasis I recommend that we do an MRI of his sacroiliac  joints to determine whether these x-ray changes are degenerative or inflammatory.  Orders:    Ambulatory Referral to Rheumatology    XR spine lumbar minimum 4 views non injury; Future    XR sacroiliac joints 3+ views; Future    C-reactive protein; Future    Sedimentation rate, automated; Future    Hepatitis B core antibody, total; Future    Hepatitis B surface antibody; Future    Hepatitis B surface antigen; Future    Hepatitis C antibody; Future    Quantiferon TB Gold Plus Assay; Future    Psoriasis  Worsening skin psoriasis, following with dermatology.  Discussed that he would likely be a good candidate for systemic therapy.  Will get updated hepatitis and tuberculosis testing in the event immunosuppression is added.       Encounter for screening for other viral diseases    Orders:    Hepatitis B core antibody, total; Future    Hepatitis B surface antibody; Future    Hepatitis B surface antigen; Future        History of Present Illness   HPI  Shine Toro is a 75 y.o. male who presents for evaluation of joint pain.  Patient reports about a year or so ago he started to develop pain in the buttock/posterior hip region.  He had never previously had any symptoms similar to this.  Patient denies any groin pain or lateral hip pain.  He also denies any shoulder discomfort.  He reports the pain is there all of the time and achy.  Certain things do exacerbate his symptoms such as going down the stairs as well as when rising from seated to standing position.  He also notes morning time when he gets out of bed he has a difficult time walking.  He has been taking Tylenol 1000 mg 3 times daily.  This does help with symptoms.  He definitely feels a difference if he misses a dose.    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  Continues to have some swelling in the left knee.  He has had the knee aspirated by orthopedics, though appears only sent for crystal and Lyme not cell count.    Patient  "also has a history of psoriasis.  He is at the psoriasis has certainly gotten worse over about the past year.  He is following with dermatology.  Using topicals but does not seem to help.  He gets very itchy at night.    Family history significant for sister with lupus and Sjogren's.    Review of Systems  Complete ROS conducted as per HPI.   + Chronic eye issues for which she follows with ophthalmology  + Chronic chills  In addition, denies:  Fever  Photosensitive rash  Sicca symptoms  Recurrent oral ulcers  Muscle weakness  Uveitis  Dactylitis  Chest pain  SOB  Pleurisy  Raynaud's  Joint issues other than noted above      Objective   /90 (BP Location: Left arm, Patient Position: Sitting, Cuff Size: Adult)   Pulse 87   Temp 97.7 °F (36.5 °C) (Tympanic)   Resp 16   Ht 5' 8\" (1.727 m)   Wt 85.5 kg (188 lb 6.4 oz)   SpO2 98%   BMI 28.65 kg/m²      Physical Exam  Physical Exam  Constitutional: well appearing, no acute distress  HEENT: normocephalic, sclera clear, no visible oral or nasal ulcers  Neck: supple, no palpable cervical adenopathy  CV: regular rate and rhythm, no murmur  Pulm: normal respiratory effort, lungs clear to auscultation b/l  Skin: no rashes, no skin thickening  Extremities: warm and well perfused, no edema  MSK: Small warm left knee effusion, no synovitis, positive Diana on left    Labs and Imaging  I have personally reviewed pertinent labs and imaging.     Xray hips nml  CK nml  Crp and ESR in May 2024 nml  "

## 2025-01-29 ENCOUNTER — APPOINTMENT (OUTPATIENT)
Dept: RADIOLOGY | Facility: OTHER | Age: 75
End: 2025-01-29
Payer: COMMERCIAL

## 2025-01-29 ENCOUNTER — OFFICE VISIT (OUTPATIENT)
Dept: OBGYN CLINIC | Facility: OTHER | Age: 75
End: 2025-01-29
Payer: COMMERCIAL

## 2025-01-29 ENCOUNTER — APPOINTMENT (OUTPATIENT)
Dept: LAB | Facility: CLINIC | Age: 75
End: 2025-01-29
Payer: COMMERCIAL

## 2025-01-29 VITALS — BODY MASS INDEX: 28.49 KG/M2 | HEIGHT: 68 IN | WEIGHT: 188 LBS

## 2025-01-29 DIAGNOSIS — M25.561 RIGHT KNEE PAIN, UNSPECIFIED CHRONICITY: ICD-10-CM

## 2025-01-29 DIAGNOSIS — S76.312A STRAIN OF LEFT HAMSTRING MUSCLE, INITIAL ENCOUNTER: ICD-10-CM

## 2025-01-29 DIAGNOSIS — M25.562 LEFT KNEE PAIN, UNSPECIFIED CHRONICITY: ICD-10-CM

## 2025-01-29 DIAGNOSIS — R39.9 UTI SYMPTOMS: ICD-10-CM

## 2025-01-29 DIAGNOSIS — S80.01XA CONTUSION OF RIGHT KNEE, INITIAL ENCOUNTER: ICD-10-CM

## 2025-01-29 DIAGNOSIS — M25.561 RIGHT KNEE PAIN, UNSPECIFIED CHRONICITY: Primary | ICD-10-CM

## 2025-01-29 PROCEDURE — 87086 URINE CULTURE/COLONY COUNT: CPT

## 2025-01-29 PROCEDURE — 73564 X-RAY EXAM KNEE 4 OR MORE: CPT

## 2025-01-29 PROCEDURE — 99213 OFFICE O/P EST LOW 20 MIN: CPT | Performed by: ORTHOPAEDIC SURGERY

## 2025-01-29 NOTE — TELEPHONE ENCOUNTER
Lab calling to have UC placed.  PT is there per office request to obtain a UC.  Order was not placed    Please call Rosetta at [phone] once order is placed

## 2025-01-29 NOTE — TELEPHONE ENCOUNTER
Spoke to pt informed pt UA not concerning for infection. Patient should have urine culture     ----- Message from Arsalan Moncada PA-C sent at 1/28/2025  4:08 PM EST -----  UA not concerning for infection.  Patient should have urine culture

## 2025-01-29 NOTE — PROGRESS NOTES
Orthopaedic Surgery - Office Note  Shine Toro (75 y.o. 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  In regards to the fracture through the bone spur on the distal pole of the patella we did discuss that it would be unlikely that with rest and modalities that his symptomology does not completely resolve.  If it does continue though over time, surgical removal can be considered.  Progress activity as tolerated.  Ice, heat, anti-inflammatories PRN for pain  Return if symptoms worsen or fail to improve.    History of Present Illness  Shine Toro is a 75 y.o. male for evaluation of new injury that occurred 2025 when he slipped going down stairs at his house.  He states that his left leg slipped and extend it fully while his right knee bent and he landed directly on the front of his kneecap.  He has been able to walk and move around since but with increased pain in the left knee especially in the area of the hamstring and on the right knee distal pole of the patella.  He can still move his legs fully and has noticed some radiation in the left leg down through his foot but he feels this may be due to his prior saphenous nerve injury.      His history includes left knee arthroscopy with popliteal cyst decompression with Dr. Duffy on 2020. Prior to this, he  "was being treated by Dr. Aburto with a Villanueva's cyst excision on 6/5/2020 and had postoperative saphenous nerve neuropathy.       Review of Systems  Pertinent items are noted in HPI.  All other systems were reviewed and are negative.    Physical Exam  Ht 5' 8\" (1.727 m)   Wt 85.3 kg (188 lb)   BMI 28.59 kg/m²   Cons: Appears well.  No apparent distress.  Psych: Alert. Oriented x3.  Mood and affect normal.  Eyes: PERRLA, EOMI  Resp: Normal effort.  No audible wheezing or stridor.  CV: Palpable pulse.  No discernable arrhythmia.  No LE edema.  Lymph:  No palpable cervical, axillary, or inguinal lymphadenopathy.  Skin: Warm.  No palpable masses.  No visible lesions.  Neuro: Normal muscle tone.  Normal and symmetric DTR's.     Left leg/knee Exam  Alignment:  Normal knee alignment.  Inspection:  No erythema. No ecchymosis.  Incision on the medial joint line is well-healed.  Palpation: No tenderness in the medial or lateral joint line of the left knee but mild effusion was palpated.  Patient does have moderate tenderness in the belly of the hamstring tendon without palpable defect.  ROM:  Normal hip ROM. Normal knee ROM.  Strength:  Hamstrings 5-/5. Able to actively extend knee against gravity.  Stability:  No objective knee instability. Stable Varus / Valgus stress, Lachman, and Posterior drawer.  Tests:  No pertinent positive or negative tests.  Patella:  Patella tracks centrally with crepitus.  Neurovascular: intact along DP, SP, Corbin, T nerve distributions. Decreased sensation along saphensous nerve distribution on L.  2+ DP & PT pulses.  Gait:  Antalgic.    Right Knee Exam  Alignment:  Normal knee alignment.  Inspection:   Small area of swelling around the distal pole of the patella where he did sustain a contusion.  No ecchymosis or obvious deformity otherwise.  No swelling noted in the knee.  Palpation:   Moderate tenderness at the inferior/distal pole of the patella with no obvious defect, no joint line " pain.  ROM:  Normal knee ROM.  Strength:  Able to actively extend knee against gravity.  Stability:  No objective knee instability. Stable Varus / Valgus stress, Lachman, and Posterior drawer.  Tests:  No pertinent positive or negative tests.  Patella:  Patella tracks centrally without crepitus.  Neurovascular:  Sensation intact in DP/SP/Corbin/Sa/T nerve distributions. Sensation intact in all digital nerve distributions.  Toes warm and perfused.  Gait:  Antalgic.       Studies Reviewed  I have personally reviewed pertinent films in PACS.  XR of bilateral knee - from 1/29/2025 show no fracture or dislocation in the left knee with mild to moderate medial joint line narrowing and subchondral sclerosis and spurring in the medial and patellofemoral joints.  On the right knee it does appear that he has a small fracture through the spur in the anterior aspect of the distal pole of the patella otherwise no fracture or dislocation seen.  Mild narrowing of the medial joint line noted in the right knee otherwise.    Procedures   No procedures at today's visit    Medical, Surgical, Family, and Social History  The patient's medical history, family history, and social history, were reviewed and updated as appropriate.    Past Medical History:   Diagnosis Date    Abnormal weight loss     RESOLVED: 15JUN2015    Anemia     RESOLVED: 15JUN2015    Atypical chest pain     RESOLVED: 15JUN2015    BPH (benign prostatic hyperplasia)     Cancer (HCC)     DUODENAL    Luis Miguel's syndrome     Depression     RESOLVED: 25MAR2015    Disc disorder     cervical and lumbar    Diverticulitis of colon     LAST ASSESSED: 12FEB2015    Duodenal nodule     RESOLVED: 16MAR2017    Encounter for hepatitis C screening test for low risk patient 03/24/2019    Gastrointestinal stromal tumor (GIST) (HCC)     MALIGNANT; RESOLVED: 08MAR2016    GERD (gastroesophageal reflux disease)     Glaucoma     RESOLVED: 16MAR2017    History of COVID-19 09/2021    Insomnia      RESOLVED: [id number]    Preop exam for internal medicine 01/20/2021    Preop examination 05/07/2020    Preoperative clearance 03/09/2021       Past Surgical History:   Procedure Laterality Date    BACK SURGERY      BACK SURGERY      l4 s1 surgery     CERVICAL FUSION      c4 and c5    CHOLECYSTECTOMY      COLONOSCOPY  2014    kutty    ELBOW SURGERY Left     REPAIR    EYE SURGERY      EYE SURGERY      EYE SURGERY Right 2022    right eye surgery    GANGLION CYST EXCISION Left 06/05/2020    Procedure: KNEE EXCISION BAKERS CYST;  Surgeon: Svetlana Aburto MD;  Location: [facility];  Service: Orthopedics    KNEE ARTHROSCOPY Left     LUMBAR DISCECTOMY      L5 S1    ORIF RADIAL SHAFT FRACTURE      MN ARTHROSCOPY KNEE DIAGNOSTIC W/WO SYNOVIAL BX SPX Left 12/21/2020    Procedure: KNEE ARTHROSCOPY, popliteal cyst decompression;  Surgeon: Emre Duffy MD;  Location: [facility];  Service: Orthopedics    MN CYSTO INSERTION TRANSPROSTATIC IMPLANT SINGLE N/A 12/20/2022    Procedure: CYSTOSCOPY WITH INSERTION UROLIFT WITH 6 IMPLANTS;  Surgeon: Chris Brock MD;  Location: [facility];  Service: Urology    MN ESOPHAGOGASTRODUODENOSCOPY TRANSORAL DIAGNOSTIC N/A 11/01/2016    Procedure: ESOPHAGOGASTRODUODENOSCOPY (EGD);  Surgeon: Roberto Reese MD;  Location: [facility];  Service: Gastroenterology    MN ESOPHAGOGASTRODUODENOSCOPY TRANSORAL DIAGNOSTIC N/A 05/31/2018    Procedure: ESOPHAGOGASTRODUODENOSCOPY (EGD);  Surgeon: Roberto Reese MD;  [facility];  Service: Gastroenterology    MN LAMOT PRTL FFD EXC DISC REEXPL 1 NTRSPC LUMBAR N/A 10/24/2019    Procedure: Revision laminectomy and decompression at L5-S1;  Surgeon: Quiana Amaro MD;  Location: [facility];  Service: Orthopedics    SMALL INTESTINE SURGERY      GIST surgery    US GUIDED MSK PROCEDURE  05/21/2019    US GUIDED MSK PROCEDURE  10/30/2020       Family History   Problem Relation Age of Onset    Thyroid disease Mother     Thyroid disease Brother     Diabetes  Paternal Grandmother     Hypertension Family     Skin cancer Father     Thyroid disease Father     Autoimmune disease Sister         Lupus       Social History     Occupational History    Not on file   Tobacco Use    Smoking status: Never    Smokeless tobacco: Never   Vaping Use    Vaping status: Never Used   Substance and Sexual Activity    Alcohol use: Yes     Comment: Holidays( some months without consumption)    Drug use: No    Sexual activity: Yes     Partners: Female     Birth control/protection: None     Comment: With my wife       No Known Allergies      Current Outpatient Medications:     calcium carbonate (TUMS) 500 mg chewable tablet, Chew 1 tablet daily When needed, Disp: , Rfl:     cholestyramine (QUESTRAN) 4 g packet, Take 1 packet (4 g total) by mouth daily, Disp: 30 each, Rfl: 3    Co Q-10 50 MG, Take 200 mg by mouth, Disp: , Rfl:     famotidine (PEPCID) 40 MG tablet, TAKE 1 TABLET BY MOUTH EVERYDAY AT BEDTIME, Disp: 30 tablet, Rfl: 5    fluticasone (FLONASE) 50 mcg/act nasal spray, 2 sprays into each nostril daily, Disp: 1 g, Rfl: 0    Misc Natural Products (OSTEO BI-FLEX JOINT SHIELD PO), Take by mouth, Disp: , Rfl:     Multiple Vitamins-Minerals (MULTIVITAL) tablet, Take 1 tablet by mouth daily Spectravite , Disp: , Rfl:     pantoprazole (PROTONIX) 40 mg tablet, TAKE 1 TABLET BY MOUTH EVERY DAY, Disp: 90 tablet, Rfl: 1    prednisoLONE acetate (PRED FORTE) 1 % ophthalmic suspension, Administer 1 drop to the right eye 2 (two) times a day, Disp: , Rfl:     rosuvastatin (CRESTOR) 5 mg tablet, TAKE 1 TABLET (5 MG TOTAL) BY MOUTH DAILY., Disp: 30 tablet, Rfl: 5    tamsulosin (FLOMAX) 0.4 mg, TAKE 1 CAPSULE BY MOUTH EVERY DAY WITH DINNER, Disp: 90 capsule, Rfl: 3    clobetasol (TEMOVATE) 0.05 % cream, Apply topically 2 (two) times a day for 14 days, Disp: 60 g, Rfl: 0    fluocinonide (LIDEX) 0.05 % cream, APPLY TO AFFECTED AREA OF SKIN TWICE A DAY AS NEEDED (Patient not taking: Reported on 7/2/2024),  Disp: , Rfl:       Andrew Guerrero PA-C    Scribe Attestation      I,:  Andrew Guerrero PA-C am acting as a scribe while in the presence of the attending physician.:       I,:  Emre Duffy MD personally performed the services described in this documentation    as scribed in my presence.:

## 2025-01-30 ENCOUNTER — RESULTS FOLLOW-UP (OUTPATIENT)
Dept: UROLOGY | Facility: AMBULATORY SURGERY CENTER | Age: 75
End: 2025-01-30

## 2025-01-30 LAB — BACTERIA UR CULT: NORMAL

## 2025-01-31 NOTE — TELEPHONE ENCOUNTER
Spoke to pt informed pt Urine culture was negative. No antibiotics are required at this point in time.pt is still having symptoms and will make a f/u appt       ----- Message from Arsalan Moncada PA-C sent at 1/30/2025  4:38 PM EST -----  Urine culture was negative.  No antibiotics are required at this point in time.  Patient should have a follow-up appointment to discuss his symptoms if there is still persistent

## 2025-01-31 NOTE — TELEPHONE ENCOUNTER
Patient was calling to schedule a follow up appt. Per the patient and the previous encounter, he was to follow up if he was still experiencing symptoms because the urine culture came back negative. (ie the burning, urge and frequency)    Patient wanted to schedule. He also asked if he can be moved back to the [place] office was he was seen a few years ago. He is not sure why they scheduled him last time in [facility]. It is a far drive. He would like to stay closer to home.     Advised we can change him to the [place] office. But all future appt will now be in that office and he is preferred not to change again. He understand and said that is completely fine.     Scheduled for follow up with MUNIR on Monday 2/3 at 9:40 am with Hannah in [place]

## 2025-02-03 ENCOUNTER — OFFICE VISIT (OUTPATIENT)
Dept: UROLOGY | Facility: AMBULATORY SURGERY CENTER | Age: 75
End: 2025-02-03
Payer: COMMERCIAL

## 2025-02-03 ENCOUNTER — CONSULT (OUTPATIENT)
Age: 75
End: 2025-02-03
Payer: COMMERCIAL

## 2025-02-03 VITALS
DIASTOLIC BLOOD PRESSURE: 68 MMHG | OXYGEN SATURATION: 97 % | SYSTOLIC BLOOD PRESSURE: 116 MMHG | WEIGHT: 188.8 LBS | BODY MASS INDEX: 28.61 KG/M2 | HEIGHT: 68 IN | HEART RATE: 102 BPM

## 2025-02-03 VITALS
HEIGHT: 68 IN | WEIGHT: 188.4 LBS | SYSTOLIC BLOOD PRESSURE: 132 MMHG | DIASTOLIC BLOOD PRESSURE: 90 MMHG | TEMPERATURE: 97.7 F | BODY MASS INDEX: 28.55 KG/M2 | RESPIRATION RATE: 16 BRPM | HEART RATE: 87 BPM | OXYGEN SATURATION: 98 %

## 2025-02-03 DIAGNOSIS — Z11.59 ENCOUNTER FOR SCREENING FOR OTHER VIRAL DISEASES: ICD-10-CM

## 2025-02-03 DIAGNOSIS — L40.9 PSORIASIS: ICD-10-CM

## 2025-02-03 DIAGNOSIS — N40.1 BENIGN PROSTATIC HYPERPLASIA WITH URINARY FREQUENCY: Primary | ICD-10-CM

## 2025-02-03 DIAGNOSIS — R35.0 BENIGN PROSTATIC HYPERPLASIA WITH URINARY FREQUENCY: Primary | ICD-10-CM

## 2025-02-03 DIAGNOSIS — M25.50 ARTHRALGIA, UNSPECIFIED JOINT: Primary | ICD-10-CM

## 2025-02-03 PROCEDURE — 99213 OFFICE O/P EST LOW 20 MIN: CPT

## 2025-02-03 PROCEDURE — 99204 OFFICE O/P NEW MOD 45 MIN: CPT | Performed by: STUDENT IN AN ORGANIZED HEALTH CARE EDUCATION/TRAINING PROGRAM

## 2025-02-03 NOTE — ASSESSMENT & PLAN NOTE
Worsening skin psoriasis, following with dermatology.  Discussed that he would likely be a good candidate for systemic therapy.  Will get updated hepatitis and tuberculosis testing in the event immunosuppression is added.

## 2025-02-03 NOTE — PROGRESS NOTES
Name: Shine Toro      : 1950      MRN: [id number]  Encounter Provider: LYLE Lane  Encounter Date: 2/3/2025   Encounter department: [facility] [place]  :  Assessment & Plan  Benign prostatic hyperplasia with urinary frequency  Patient underwent Urolift procedure in 2022. He has remained on Flomax since the procedure due to complaints or urinary hesitancy.    Urine culture on 2025 was negative for bacteria. I did discuss with the patient that his urinary complaints do not correlate with a urinary tract infection. I do believe that his urinary symptoms are stemming more from overgrowth of prostate tissue after his Urolift. He did have a discussion with Dr. Eli in 2024 about undergoing a TURP.  We discussed proceeding with additional surgical intervention which she wishes to defer.    We did discuss increasing his Flomax to 0.8 mg or adding in finasteride 5 mg, he wishes to defer any medication adjustment at this time.    We did discuss that his urinalysis and urine culture is overall not concerning.  There is no need to repeat any urine studies at this time.    Patient will call our office if he wishes to be seen for additional follow-up.  I did offer a 6-month follow-up appointment which he wished to defer, he stated he will call if he has any additional questions or concerns.        History of Present Illness   Shine Toro is a 75 y.o. male who presents today to the office for follow-up.  He recently called into the office with urinary complaints of dysuria, urgency, and frequency.  He does have a history of a UroLift in 2022.  Since that time he has remained on Flomax 0.4 mg for urinary hesitancy.    Today in the office he states that when the weather started to get colder he started to notice a worsening in his urinary symptoms.  He states that after Tracy he noted some burning with urination.  He also stated that his  "urgency and frequency started to somewhat worsen.  He states that the dysuria has started to subside but he still feels a \"strange sensation\" while urinating.  He denies any history of UTIs or kidney stones.  He denies any hematuria or flank pain.    He states that he does stay adequately hydrated with water during the day.  He is not interested in making any adjustments in medications or pursuing any additional surgical intervention.    AUA SYMPTOM SCORE      Flowsheet Row Most Recent Value   AUA SYMPTOM SCORE    How often have you had a sensation of not emptying your bladder completely after you finished urinating? 5 (P)     How often have you had to urinate again less than two hours after you finished urinating? 5 (P)     How often have you found you stopped and started again several times when you urinate? 4 (P)     How often have you found it difficult to postpone urination? 5 (P)     How often have you had a weak urinary stream? 4 (P)     How often have you had to push or strain to begin urination? 3 (P)     How many times did you most typically get up to urinate from the time you went to bed at night until the time you got up in the morning? 5 (P)     Quality of Life: If you were to spend the rest of your life with your urinary condition just the way it is now, how would you feel about that? 4 (P)     AUA SYMPTOM SCORE 31 (P)            Review of Systems   Constitutional:  Negative for chills and fever.   Respiratory: Negative.  Negative for cough and shortness of breath.    Cardiovascular: Negative.  Negative for chest pain.   Gastrointestinal: Negative.  Negative for abdominal distention, abdominal pain, nausea and vomiting.   Genitourinary:  Negative for decreased urine volume, difficulty urinating, dysuria, flank pain, frequency, hematuria, penile discharge, penile pain, penile swelling, scrotal swelling, testicular pain and urgency.   Skin: Negative.  Negative for rash.   Neurological: Negative.  "   Hematological:  Negative for adenopathy. Does not bruise/bleed easily.          Objective   There were no vitals taken for this visit.    Physical Exam  Vitals reviewed.   Constitutional:       Appearance: Normal appearance.   HENT:      Head: Normocephalic and atraumatic.   Eyes:      Pupils: Pupils are equal, round, and reactive to light.   Cardiovascular:      Rate and Rhythm: Normal rate.   Pulmonary:      Effort: Pulmonary effort is normal.   Musculoskeletal:      Cervical back: Normal range of motion.   Skin:     General: Skin is warm and dry.   Neurological:      General: No focal deficit present.      Mental Status: He is alert and oriented to person, place, and time.   Psychiatric:         Mood and Affect: Mood normal.         Behavior: Behavior normal.         Thought Content: Thought content normal.         Judgment: Judgment normal.          Results  Lab Results   Component Value Date    PSA 1.763 11/21/2024    PSA 2.66 06/30/2023    PSA 1.5 06/04/2022     Lab Results   Component Value Date    GLUCOSE 97 09/25/2015    CALCIUM 9.2 11/21/2024     09/25/2015    K 4.4 11/21/2024    CO2 31 11/21/2024     11/21/2024    BUN 18 11/21/2024    CREATININE 0.85 11/21/2024     Lab Results   Component Value Date    WBC 5.28 10/15/2024    HGB 16.5 10/15/2024    HCT 47.3 10/15/2024    MCV 94 10/15/2024     (L) 10/15/2024       Office Urine Dip  No results found for this or any previous visit (from the past hour).]

## 2025-02-03 NOTE — ASSESSMENT & PLAN NOTE
Patient underwent Urolift procedure in December 2022. He has remained on Flomax since the procedure due to complaints or urinary hesitancy.    Urine culture on 1/29/2025 was negative for bacteria. I did discuss with the patient that his urinary complaints do not correlate with a urinary tract infection. I do believe that his urinary symptoms are stemming more from overgrowth of prostate tissue after his Urolift. He did have a discussion with Dr. Eli in January 2024 about undergoing a TURP.  We discussed proceeding with additional surgical intervention which she wishes to defer.    We did discuss increasing his Flomax to 0.8 mg or adding in finasteride 5 mg, he wishes to defer any medication adjustment at this time.    We did discuss that his urinalysis and urine culture is overall not concerning.  There is no need to repeat any urine studies at this time.    Patient will call our office if he wishes to be seen for additional follow-up.  I did offer a 6-month follow-up appointment which he wished to defer, he stated he will call if he has any additional questions or concerns.

## 2025-02-04 ENCOUNTER — APPOINTMENT (OUTPATIENT)
Dept: RADIOLOGY | Age: 75
End: 2025-02-04
Payer: COMMERCIAL

## 2025-02-04 ENCOUNTER — APPOINTMENT (OUTPATIENT)
Dept: LAB | Age: 75
End: 2025-02-04
Payer: COMMERCIAL

## 2025-02-04 DIAGNOSIS — M25.50 ARTHRALGIA, UNSPECIFIED JOINT: ICD-10-CM

## 2025-02-04 DIAGNOSIS — Z11.59 ENCOUNTER FOR SCREENING FOR OTHER VIRAL DISEASES: ICD-10-CM

## 2025-02-04 LAB
CRP SERPL QL: 2.6 MG/L
ERYTHROCYTE [SEDIMENTATION RATE] IN BLOOD: 8 MM/HOUR (ref 0–19)

## 2025-02-04 PROCEDURE — 86140 C-REACTIVE PROTEIN: CPT

## 2025-02-04 PROCEDURE — 85652 RBC SED RATE AUTOMATED: CPT

## 2025-02-04 PROCEDURE — 86803 HEPATITIS C AB TEST: CPT

## 2025-02-04 PROCEDURE — 86706 HEP B SURFACE ANTIBODY: CPT

## 2025-02-04 PROCEDURE — 72110 X-RAY EXAM L-2 SPINE 4/>VWS: CPT

## 2025-02-04 PROCEDURE — 72200 X-RAY EXAM SI JOINTS: CPT

## 2025-02-04 PROCEDURE — 87340 HEPATITIS B SURFACE AG IA: CPT

## 2025-02-04 PROCEDURE — 86704 HEP B CORE ANTIBODY TOTAL: CPT

## 2025-02-04 PROCEDURE — 36415 COLL VENOUS BLD VENIPUNCTURE: CPT

## 2025-02-04 PROCEDURE — 86480 TB TEST CELL IMMUN MEASURE: CPT

## 2025-02-05 ENCOUNTER — RESULTS FOLLOW-UP (OUTPATIENT)
Age: 75
End: 2025-02-05

## 2025-02-05 ENCOUNTER — TELEPHONE (OUTPATIENT)
Age: 75
End: 2025-02-05

## 2025-02-05 LAB
GAMMA INTERFERON BACKGROUND BLD IA-ACNC: 0 IU/ML
HBV CORE AB SER QL: NORMAL
HBV SURFACE AB SER-ACNC: 194 MIU/ML
HBV SURFACE AG SER QL: NORMAL
HCV AB SER QL: NORMAL
M TB IFN-G BLD-IMP: NEGATIVE
M TB IFN-G CD4+ BCKGRND COR BLD-ACNC: 0.01 IU/ML
M TB IFN-G CD4+ BCKGRND COR BLD-ACNC: 0.02 IU/ML
MITOGEN IGNF BCKGRD COR BLD-ACNC: 10 IU/ML

## 2025-02-05 NOTE — TELEPHONE ENCOUNTER
Rheumatology Pre-Certification Request     MRI Sacroilliac Joint wo Contrast    Work-up for Psoriatic Arthritis. Xray sacroiliac joints showing bony changes -need to determine if degenerative or related to psoriatic arthritis.    Hannah Sin MD, CCD, [facility]  NPI: [id number]  Lic: [id number]

## 2025-02-25 ENCOUNTER — HOSPITAL ENCOUNTER (OUTPATIENT)
Dept: RADIOLOGY | Age: 75
Discharge: HOME | End: 2025-02-25
Payer: COMMERCIAL

## 2025-02-25 DIAGNOSIS — L40.9 PSORIASIS: ICD-10-CM

## 2025-02-25 DIAGNOSIS — M25.50 ARTHRALGIA, UNSPECIFIED JOINT: ICD-10-CM

## 2025-02-25 PROCEDURE — 72195 MRI PELVIS W/O DYE: CPT

## 2025-03-03 ENCOUNTER — APPOINTMENT (OUTPATIENT)
Dept: RADIOLOGY | Age: 75
End: 2025-03-03
Payer: COMMERCIAL

## 2025-03-03 ENCOUNTER — OFFICE VISIT (OUTPATIENT)
Dept: URGENT CARE | Age: 75
End: 2025-03-03
Payer: COMMERCIAL

## 2025-03-03 VITALS
DIASTOLIC BLOOD PRESSURE: 84 MMHG | TEMPERATURE: 96.3 F | SYSTOLIC BLOOD PRESSURE: 134 MMHG | RESPIRATION RATE: 18 BRPM | HEART RATE: 89 BPM | OXYGEN SATURATION: 99 %

## 2025-03-03 DIAGNOSIS — S99.911A RIGHT ANKLE INJURY, INITIAL ENCOUNTER: ICD-10-CM

## 2025-03-03 DIAGNOSIS — S82.441A CLOSED DISPLACED SPIRAL FRACTURE OF SHAFT OF RIGHT FIBULA, INITIAL ENCOUNTER: Primary | ICD-10-CM

## 2025-03-03 PROCEDURE — 73610 X-RAY EXAM OF ANKLE: CPT

## 2025-03-03 PROCEDURE — 99214 OFFICE O/P EST MOD 30 MIN: CPT

## 2025-03-03 PROCEDURE — 29515 APPLICATION SHORT LEG SPLINT: CPT

## 2025-03-03 NOTE — PATIENT INSTRUCTIONS
You have been placed in a boot or splint because you have a fracture or suspected fracture to your lower extremity. This needs to be worn at all times until you follow-up with an orthopedic specialist, and needs to be kept clean and dry. Boot may be removed for hygiene (aka showering) only. Cover splint with 2 layers of plastic and or an extremity bag when showering. You may need assistance with dressing and showering as you are not to put any weight on your leg.   You should remain non-weight bearing in the boot or splint until you follow-up with an orthopedic specialist. This means do not put any weight on your foot. Crutches will be supplied to you to assist you in non-weight bearing. See Below attached instructions for how to use crutches appropriately.   Follow-up with orthopedics sometime in the next week. Referral has been provided for you.   Take Ibuprofen 600-800 mg as needed for pain and swelling. You may supplement with Tylenol 1,000 mg every 6-8 hours as well. It is recommended that you alternate between the two medications every 4 hours. Do not take Ibuprofen if you have a history of peptic ulcer disease, kidney disease, or you have undergone gastric bypass surgery. Do not take Tylenol if you have a history of liver disease.   Ice and elevate as much as possible. Try to ice for at least 20 minutes once and hour. Do not apply ice for longer than 1 hour duration and allow at least 20 minutes between icing sessions.   A narcotic medication MAY have been prescribed for you. These are usually only prescribed for severe fractures. Narcotics can have serious side effects including but not limited to fatigue, confusion, decreased concentration, and severe constipation. This medication should only be taken if you are in severe pain that is not controlled with Ibuprofen and Tylenol. You pain is controlled if you are able to fall asleep despite it being present  If you have significant increased swelling,  increased pain not controlled with your medications, or develop numbness or tingling in your toes, loosen your boot or the wrap on your splint, but do not remove them. If symptoms do not improve in the next 20-30 minutes after loosening the wrap or boot, report to the nearest emergency room. These are symptoms of compartment syndrome and if not addressed quickly can result in the loss of part of your limb.

## 2025-03-03 NOTE — PROGRESS NOTES
[facility]        NAME: Shine Toro is a 75 y.o. male  : 1950    MRN: [id number]  DATE: March 3, 2025  TIME: 10:41 AM    Assessment and Plan   Closed displaced spiral fracture of shaft of right fibula, initial encounter [S82.441A]  1. Closed displaced spiral fracture of shaft of right fibula, initial encounter  XR ankle 3+ vw right    Ambulatory Referral to Orthopedic Surgery    Splint application        Xray right ankle: closed Distal fibular shaft fracture; pending final read.  Neurovascularly intact distal to injury.    Given information for orthopedics; follow up within 1 week  Instructed to keep splint on until follow up with orthopedics  Declined crutches here in office, due to having a pair at home.  Follow up with PCP in 3-5 days.  Proceed to  ER if symptoms worsen.     Splint application    Date/Time: 3/3/2025 9:30 AM    Performed by: LYLE Rubio  Authorized by: LYLE Rubio    Verbal consent obtained?: No    Risks and benefits: Risks, benefits and alternatives were discussed    Consent given by:  Patient  Patient identity confirmed:  Verbally with patient  Pre-procedure details:     Sensation:  Normal  Procedure details:     Laterality:  Right    Location:  Ankle    Ankle:  R ankle    Strapping: No      Splint composition: static      Splint type:  Short leg    Supplies:  Cotton padding, elastic bandage and Ortho-Glass  Post-procedure details:     Pain:  Unchanged    Sensation:  Normal    Patient tolerance of procedure:  Tolerated well, no immediate complications    Neurovascular intact following splint application.     Patient Instructions       Follow up with PCP in 3-5 days.  Proceed to  ER if symptoms worsen.    If tests have been performed at [facility], our office will contact you with results if changes need to be made to the care plan discussed with you at the visit.  You can review your full results on [facility] MyChart.    Chief Complaint     Chief  Complaint   Patient presents with   • Ankle Pain     Right ankle gave out yesterday when walking. Patient having swelling and slight bruising. [place] a crack when he fell.          History of Present Illness       Pt is a 75 year old male presenting with 2 days of right ankle pain after sustaining an inversion turn of the right ankle and falling to the ground.  He reports taking aleeve and applying ice to the area without relief.  He denies previous right ankle injuries.  No numbness, tingling, or weakness to the right lower extremity.  He has pain with ambulation, however is able to walk with some complaints of pain.     Ankle Pain         Review of Systems   Review of Systems   Musculoskeletal:  Positive for arthralgias, gait problem and joint swelling.         Current Medications       Current Outpatient Medications:   •  calcium carbonate (TUMS) 500 mg chewable tablet, Chew 1 tablet daily When needed, Disp: , Rfl:   •  cholestyramine (QUESTRAN) 4 g packet, Take 1 packet (4 g total) by mouth daily, Disp: 30 each, Rfl: 3  •  Co Q-10 50 MG, Take 200 mg by mouth, Disp: , Rfl:   •  famotidine (PEPCID) 40 MG tablet, TAKE 1 TABLET BY MOUTH EVERYDAY AT BEDTIME, Disp: 30 tablet, Rfl: 5  •  fluticasone (FLONASE) 50 mcg/act nasal spray, 2 sprays into each nostril daily, Disp: 1 g, Rfl: 0  •  Misc Natural Products (OSTEO BI-FLEX JOINT SHIELD PO), Take by mouth, Disp: , Rfl:   •  Multiple Vitamins-Minerals (MULTIVITAL) tablet, Take 1 tablet by mouth daily Spectravite , Disp: , Rfl:   •  pantoprazole (PROTONIX) 40 mg tablet, TAKE 1 TABLET BY MOUTH EVERY DAY, Disp: 90 tablet, Rfl: 1  •  prednisoLONE acetate (PRED FORTE) 1 % ophthalmic suspension, Administer 1 drop to the right eye 2 (two) times a day, Disp: , Rfl:   •  rosuvastatin (CRESTOR) 5 mg tablet, TAKE 1 TABLET (5 MG TOTAL) BY MOUTH DAILY., Disp: 30 tablet, Rfl: 5  •  tamsulosin (FLOMAX) 0.4 mg, TAKE 1 CAPSULE BY MOUTH EVERY DAY WITH DINNER, Disp: 90 capsule, Rfl: 3  •   clobetasol (TEMOVATE) 0.05 % cream, Apply topically 2 (two) times a day for 14 days (Patient not taking: Reported on 2/3/2025), Disp: 60 g, Rfl: 0  •  fluocinonide (LIDEX) 0.05 % cream, APPLY TO AFFECTED AREA OF SKIN TWICE A DAY AS NEEDED (Patient not taking: Reported on 3/3/2025), Disp: , Rfl:     Current Allergies     Allergies as of 03/03/2025   • (No Known Allergies)            The following portions of the patient's history were reviewed and updated as appropriate: allergies, current medications, past family history, past medical history, past social history, past surgical history and problem list.     Past Medical History:   Diagnosis Date   • Abnormal weight loss     RESOLVED: 15JUN2015   • Anemia     RESOLVED: 15JUN2015   • Atypical chest pain     RESOLVED: 15JUN2015   • BPH (benign prostatic hyperplasia)    • Cancer (HCC)     DUODENAL   • Luis Miguel's syndrome    • Depression     RESOLVED: 25MAR2015   • Disc disorder     cervical and lumbar   • Diverticulitis of colon     LAST ASSESSED: 12FEB2015   • Duodenal nodule     RESOLVED: 16MAR2017   • Encounter for hepatitis C screening test for low risk patient 03/24/2019   • Gastrointestinal stromal tumor (GIST) (HCC)     MALIGNANT; RESOLVED: 08MAR2016   • GERD (gastroesophageal reflux disease)    • Glaucoma     RESOLVED: 16MAR2017   • History of COVID-19 09/2021   • Insomnia     RESOLVED: 16MAR2017   • Preop exam for internal medicine 01/20/2021   • Preop examination 05/07/2020   • Preoperative clearance 03/09/2021       Past Surgical History:   Procedure Laterality Date   • BACK SURGERY     • BACK SURGERY      l4 s1 surgery    • CERVICAL FUSION      c4 and c5   • CHOLECYSTECTOMY     • COLONOSCOPY  2014    [facility]ty   • ELBOW SURGERY Left     REPAIR   • EYE SURGERY     • EYE SURGERY     • EYE SURGERY Right 2022    right eye surgery   • GANGLION CYST EXCISION Left 06/05/2020    Procedure: KNEE EXCISION BAKERS CYST;  Surgeon: Svetlana Aburto MD;  Location: [facility]  OR;  Service: Orthopedics   • KNEE ARTHROSCOPY Left    • LUMBAR DISCECTOMY      L5 S1   • ORIF RADIAL SHAFT FRACTURE     • ME ARTHROSCOPY KNEE DIAGNOSTIC W/WO SYNOVIAL BX SPX Left 12/21/2020    Procedure: KNEE ARTHROSCOPY, popliteal cyst decompression;  Surgeon: Emre Duffy MD;  [facility];  Service: Orthopedics   • ME CYSTO INSERTION TRANSPROSTATIC IMPLANT SINGLE N/A 12/20/2022    Procedure: CYSTOSCOPY WITH INSERTION UROLIFT WITH 6 IMPLANTS;  Surgeon: Chris Brock MD;  [facility];  Service: Urology   • ME ESOPHAGOGASTRODUODENOSCOPY TRANSORAL DIAGNOSTIC N/A 11/01/2016    Procedure: ESOPHAGOGASTRODUODENOSCOPY (EGD);  Surgeon: Roberto Reese MD;  [facility]: [facility];  Service: Gastroenterology   • ME ESOPHAGOGASTRODUODENOSCOPY TRANSORAL DIAGNOSTIC N/A 05/31/2018    Procedure: ESOPHAGOGASTRODUODENOSCOPY (EGD);  Surgeon: Roberto Reese MD;  [facility];  Service: Gastroenterology   • ME LAMOT PRTL FFD EXC DISC REEXPL 1 NTRSP LUMBAR N/A 10/24/2019    Procedure: Revision laminectomy and decompression at L5-S1;  Surgeon: Quiana Amaro MD;  [facility];  Service: Orthopedics   • SMALL INTESTINE SURGERY      GIST surgery   • US GUIDED MSK PROCEDURE  05/21/2019   • US GUIDED MSK PROCEDURE  10/30/2020       Family History   Problem Relation Age of Onset   • Thyroid disease Mother    • Thyroid disease Brother    • Diabetes Paternal Grandmother    • Hypertension Family    • Skin cancer Father    • Thyroid disease Father    • Autoimmune disease Sister         Lupus         Medications have been verified.        Objective   /84   Pulse 89   Temp (!) 96.3 °F (35.7 °C)   Resp 18   SpO2 99%   No LMP for male patient.       Physical Exam     Physical Exam  Vitals and nursing note reviewed.   Constitutional:       General: He is not in acute distress.     Appearance: Normal appearance. He is normal weight.   Musculoskeletal:      Right ankle: Swelling present. Tenderness  present over the posterior TF ligament. Decreased range of motion. Anterior drawer test negative. Normal pulse.      Right foot: Normal capillary refill. Tenderness present. No bony tenderness or crepitus. Normal pulse.        Legs:    Skin:     General: Skin is warm.      Capillary Refill: Capillary refill takes less than 2 seconds.   Neurological:      Mental Status: He is alert.

## 2025-03-05 ENCOUNTER — HOSPITAL ENCOUNTER (OUTPATIENT)
Dept: RADIOLOGY | Facility: HOSPITAL | Age: 75
Discharge: HOME | End: 2025-03-05
Attending: ORTHOPAEDIC SURGERY
Payer: COMMERCIAL

## 2025-03-05 ENCOUNTER — OFFICE VISIT (OUTPATIENT)
Dept: OBGYN CLINIC | Facility: HOSPITAL | Age: 75
End: 2025-03-05
Payer: COMMERCIAL

## 2025-03-05 VITALS — HEIGHT: 68 IN | BODY MASS INDEX: 28.65 KG/M2

## 2025-03-05 DIAGNOSIS — S82.441A CLOSED DISPLACED SPIRAL FRACTURE OF SHAFT OF RIGHT FIBULA, INITIAL ENCOUNTER: ICD-10-CM

## 2025-03-05 PROCEDURE — 99213 OFFICE O/P EST LOW 20 MIN: CPT | Performed by: ORTHOPAEDIC SURGERY

## 2025-03-05 PROCEDURE — 73600 X-RAY EXAM OF ANKLE: CPT

## 2025-03-05 NOTE — ASSESSMENT & PLAN NOTE
Patient fell 3/1/2025  Radiograph displays distal fibula fracture with no widening with stress view  CAM boot provided   Partial weight bear as tolerated with CAM boot  Follow up in 3 weeks

## 2025-03-05 NOTE — PROGRESS NOTES
Encounter Provider: Wayne Swift MD   Encounter Date: 03/05/25  Encounter department: [facility] [place]         ASSESSMENT & PLAN:  Assessment & Plan  Closed displaced spiral fracture of shaft of right fibula, initial encounter  Patient fell 3/1/2025  Radiograph displays distal fibula fracture with no widening with stress view  CAM boot provided   Partial weight bear as tolerated with CAM boot  Follow up in 3 weeks            To do next visit:  Return in about 3 weeks (around 3/26/2025) for re-check with x-rays.    Scribe Attestation      I,:  Tiago Soares MA am acting as a scribe while in the presence of the attending physician.:       I,:  Wayne Swift MD personally performed the services described in this documentation    as scribed in my presence.:             ____________________________________________________  CHIEF COMPLAINT:  Chief Complaint   Patient presents with    Right Ankle - Fracture         SUBJECTIVE:  Shine Toro is a 75 y.o. male who presents for initial evaluation of right ankle.  He did fall 3/1/2025 and was seen at an Urgent Care including splint and crutch.  Today he complains of right medial and lateral ankle pain.  He has pain with weight bearing and has been non-weight bearing.  He denies distal tingling or numbness.            PAST MEDICAL HISTORY:  Past Medical History:   Diagnosis Date    Abnormal weight loss     RESOLVED: 15JUN2015    Anemia     RESOLVED: 15JUN2015    Atypical chest pain     RESOLVED: 15JUN2015    BPH (benign prostatic hyperplasia)     Cancer (HCC)     DUODENAL    Luis Miguel's syndrome     Depression     RESOLVED: 25MAR2015    Disc disorder     cervical and lumbar    Diverticulitis of colon     LAST ASSESSED: 12FEB2015    Duodenal nodule     RESOLVED: 16MAR2017    Encounter for hepatitis C screening test for low risk patient 03/24/2019    Gastrointestinal stromal tumor (GIST) (HCC)     MALIGNANT; RESOLVED:  08MAR2016    GERD (gastroesophageal reflux disease)     Glaucoma     RESOLVED: [id number]    History of COVID-19 09/2021    Insomnia     RESOLVED: [id number]    Preop exam for internal medicine 01/20/2021    Preop examination 05/07/2020    Preoperative clearance 03/09/2021       PAST SURGICAL HISTORY:  Past Surgical History:   Procedure Laterality Date    BACK SURGERY      BACK SURGERY      l4 s1 surgery     CERVICAL FUSION      c4 and c5    CHOLECYSTECTOMY      COLONOSCOPY  2014    [facility]    ELBOW SURGERY Left     REPAIR    EYE SURGERY      EYE SURGERY      EYE SURGERY Right 2022    right eye surgery    GANGLION CYST EXCISION Left 06/05/2020    Procedure: KNEE EXCISION BAKERS CYST;  Surgeon: Svetlana Aburto MD;  [facility];  Service: Orthopedics    KNEE ARTHROSCOPY Left     LUMBAR DISCECTOMY      L5 S1    ORIF RADIAL SHAFT FRACTURE      VT ARTHROSCOPY KNEE DIAGNOSTIC W/WO SYNOVIAL BX SPX Left 12/21/2020    Procedure: KNEE ARTHROSCOPY, popliteal cyst decompression;  Surgeon: Emre Duffy MD;  [facility];  Service: Orthopedics    VT CYSTO INSERTION TRANSPROSTATIC IMPLANT SINGLE N/A 12/20/2022    Procedure: CYSTOSCOPY WITH INSERTION UROLIFT WITH 6 IMPLANTS;  Surgeon: Chris Brock MD;  [facility];  Service: Urology    VT ESOPHAGOGASTRODUODENOSCOPY TRANSORAL DIAGNOSTIC N/A 11/01/2016    Procedure: ESOPHAGOGASTRODUODENOSCOPY (EGD);  Surgeon: Roberto Reese MD;  [facility];  Service: Gastroenterology    VT ESOPHAGOGASTRODUODENOSCOPY TRANSORAL DIAGNOSTIC N/A 05/31/2018    Procedure: ESOPHAGOGASTRODUODENOSCOPY (EGD);  Surgeon: Roberto Reese MD;  [facility];  Service: Gastroenterology    VT LAMOT PRTL FFD EXC DISC REEXPL 1 NTRSPC LUMBAR N/A 10/24/2019    Procedure: Revision laminectomy and decompression at L5-S1;  Surgeon: Quiana Amaro MD;  [facility];  Service: Orthopedics    SMALL INTESTINE SURGERY      GIST surgery    US GUIDED MSK PROCEDURE  05/21/2019     US GUIDED MSK PROCEDURE  10/30/2020       FAMILY HISTORY:  Family History   Problem Relation Age of Onset    Thyroid disease Mother     Thyroid disease Brother     Diabetes Paternal Grandmother     Hypertension Family     Skin cancer Father     Thyroid disease Father     Autoimmune disease Sister         Lupus       SOCIAL HISTORY:  Social History     Tobacco Use    Smoking status: Never    Smokeless tobacco: Never   Vaping Use    Vaping status: Never Used   Substance Use Topics    Alcohol use: Yes     Comment: Holidays( some months without consumption)    Drug use: No       MEDICATIONS:    Current Outpatient Medications:     calcium carbonate (TUMS) 500 mg chewable tablet, Chew 1 tablet daily When needed, Disp: , Rfl:     cholestyramine (QUESTRAN) 4 g packet, Take 1 packet (4 g total) by mouth daily, Disp: 30 each, Rfl: 3    clobetasol (TEMOVATE) 0.05 % cream, Apply topically 2 (two) times a day for 14 days (Patient not taking: Reported on 2/3/2025), Disp: 60 g, Rfl: 0    Co Q-10 50 MG, Take 200 mg by mouth, Disp: , Rfl:     famotidine (PEPCID) 40 MG tablet, TAKE 1 TABLET BY MOUTH EVERYDAY AT BEDTIME, Disp: 30 tablet, Rfl: 5    fluocinonide (LIDEX) 0.05 % cream, APPLY TO AFFECTED AREA OF SKIN TWICE A DAY AS NEEDED (Patient not taking: Reported on 3/3/2025), Disp: , Rfl:     fluticasone (FLONASE) 50 mcg/act nasal spray, 2 sprays into each nostril daily, Disp: 1 g, Rfl: 0    Misc Natural Products (OSTEO BI-FLEX JOINT SHIELD PO), Take by mouth, Disp: , Rfl:     Multiple Vitamins-Minerals (MULTIVITAL) tablet, Take 1 tablet by mouth daily Spectravite , Disp: , Rfl:     pantoprazole (PROTONIX) 40 mg tablet, TAKE 1 TABLET BY MOUTH EVERY DAY, Disp: 90 tablet, Rfl: 1    prednisoLONE acetate (PRED FORTE) 1 % ophthalmic suspension, Administer 1 drop to the right eye 2 (two) times a day, Disp: , Rfl:     rosuvastatin (CRESTOR) 5 mg tablet, TAKE 1 TABLET (5 MG TOTAL) BY MOUTH DAILY., Disp: 30 tablet, Rfl: 5    tamsulosin  "(FLOMAX) 0.4 mg, TAKE 1 CAPSULE BY MOUTH EVERY DAY WITH DINNER, Disp: 90 capsule, Rfl: 3    ALLERGIES:  No Known Allergies    LABS:  HgA1c:   Lab Results   Component Value Date    HGBA1C 5.1 10/15/2024     BMP:   Lab Results   Component Value Date    GLUCOSE 97 09/25/2015    CALCIUM 9.2 11/21/2024     09/25/2015    K 4.4 11/21/2024    CO2 31 11/21/2024     11/21/2024    BUN 18 11/21/2024    CREATININE 0.85 11/21/2024     CBC: No components found for: \"CBC\"    _____________________________________________________  PHYSICAL EXAMINATION:  Vital signs: Ht 5' 8\" (1.727 m)   BMI 28.65 kg/m²   General: No acute distress, awake and alert  Psychiatric: Mood and affect appear appropriate  HEENT: Trachea Midline, No torticollis, no apparent facial trauma  Cardiovascular: No audible murmurs; Extremities appear perfused  Pulmonary: No audible wheezing or stridor  Skin: No open lesions; see further details (if any) below    MUSCULOSKELETAL EXAMINATION:  Right ankle:  Splint removed for exam  No erythema or ecchymosis  TTP over distal fibula  Mildly TTP over medial malleolus  Calf compartments soft and supple  Sensation intact  Toes are warm sensate and mobile        _____________________________________________________  STUDIES REVIEWED:    The attending physician has personally reviewed the pertinent films in PACS and interpretation is as follows:  Right ankle x-ray with stress view:  Mildly displaced distal fibula spiral fracture.  No syndesmotic widening with stress view.      PROCEDURES PERFORMED:  Procedures      None Preformed       Portions of the record may have been created with voice recognition software.  Occasional wrong word or \"sound a like\" substitutions may have occurred due to the inherent limitations of voice recognition software.  Read the chart carefully and recognize, using context, where substitutions have occurred.        "

## 2025-03-12 ENCOUNTER — TELEPHONE (OUTPATIENT)
Age: 75
End: 2025-03-12

## 2025-03-12 ENCOUNTER — OFFICE VISIT (OUTPATIENT)
Dept: OBGYN CLINIC | Facility: HOSPITAL | Age: 75
End: 2025-03-12
Payer: COMMERCIAL

## 2025-03-12 ENCOUNTER — HOSPITAL ENCOUNTER (OUTPATIENT)
Dept: RADIOLOGY | Facility: HOSPITAL | Age: 75
Discharge: HOME | End: 2025-03-12
Attending: ORTHOPAEDIC SURGERY
Payer: COMMERCIAL

## 2025-03-12 VITALS — HEIGHT: 68 IN | BODY MASS INDEX: 28.65 KG/M2

## 2025-03-12 DIAGNOSIS — Z09 FRACTURE FOLLOW-UP: Primary | ICD-10-CM

## 2025-03-12 DIAGNOSIS — M79.671 PAIN IN RIGHT FOOT: ICD-10-CM

## 2025-03-12 DIAGNOSIS — S82.441A CLOSED DISPLACED SPIRAL FRACTURE OF SHAFT OF RIGHT FIBULA, INITIAL ENCOUNTER: Primary | ICD-10-CM

## 2025-03-12 DIAGNOSIS — Z09 FRACTURE FOLLOW-UP: ICD-10-CM

## 2025-03-12 PROCEDURE — 99213 OFFICE O/P EST LOW 20 MIN: CPT | Performed by: ORTHOPAEDIC SURGERY

## 2025-03-12 PROCEDURE — 73610 X-RAY EXAM OF ANKLE: CPT

## 2025-03-12 PROCEDURE — 73620 X-RAY EXAM OF FOOT: CPT

## 2025-03-12 NOTE — TELEPHONE ENCOUNTER
Caller: Patient- Shine    Doctor: Dr. Swift    Reason for call: Patient is calling in stating that he was seen in the office on 3/5 relating to his right ankle. He is stating that the swelling has gone down a little and he is now having more pain in his right foot on the top, the patient is asking as to what further he should do relating this. Patient is not sure if he needs to be seen with the Dr today relating this please advise and reach out to patient.     Call back#: [phone]

## 2025-03-12 NOTE — ASSESSMENT & PLAN NOTE
Continue partial weightbearing to tolerance in cam boot at all times to the right lower extremity  Continue pain medications as needed  X-rays taken and examined of the right foot due to increased tenderness over the past few days, with no significant findings  Follow-up in about 2 weeks

## 2025-03-12 NOTE — PROGRESS NOTES
Name: Shine Toro      : 1950       MRN: [id number]   Encounter Provider: Wayne Swift MD   Encounter Date: 25  Encounter department: [facility] [place]     ASSESSMENT & PLAN:  Assessment & Plan  Closed displaced spiral fracture of shaft of right fibula  Continue partial weightbearing to tolerance in cam boot at all times to the right lower extremity  Continue pain medications as needed  X-rays taken and examined of the right foot due to increased tenderness over the past few days, with no significant findings  Follow-up in about 2 weeks       Pain in right foot    Orders:  •  XR foot 2 vw right; Future         To do next visit:  Return in about 2 weeks (around 3/26/2025) for Right ankle with repeat x-rays.    _____________________________________________________  CHIEF COMPLAINT:  Chief Complaint   Patient presents with   • Right Foot - Pain         SUBJECTIVE:  Shine Toro is a 75 y.o. male who presents due to continued pain of the right lower extremity.  Patient seen on , found to have right mildly displaced distal fibula spiral fracture without syndesmotic widening.  He presents today due to complaint of increased pain and tenderness over the dorsal aspect of the right foot.  He continues to have moderate swelling of the right lower extremity, with 1+ pitting edema at the level of the foot.  X-rays taken and examined today show no acute pathology of the right foot and no acute changes of right distal fibula fracture.         PAST MEDICAL HISTORY:  Past Medical History:   Diagnosis Date   • Abnormal weight loss     RESOLVED: 2015   • Anemia     RESOLVED: 2015   • Atypical chest pain     RESOLVED: 2015   • BPH (benign prostatic hyperplasia)    • Cancer (HCC)     DUODENAL   • Luis Miguel's syndrome    • Depression     RESOLVED: 2015   • Disc disorder     cervical and lumbar   • Diverticulitis of colon     LAST ASSESSED:  [id number]   • Duodenal nodule     RESOLVED: 16MAR2017   • Encounter for hepatitis C screening test for low risk patient 03/24/2019   • Gastrointestinal stromal tumor (GIST) (HCC)     MALIGNANT; RESOLVED: 08MAR2016   • GERD (gastroesophageal reflux disease)    • Glaucoma     RESOLVED: 16MAR2017   • History of COVID-19 09/2021   • Insomnia     RESOLVED: 16MAR2017   • Preop exam for internal medicine 01/20/2021   • Preop examination 05/07/2020   • Preoperative clearance 03/09/2021       PAST SURGICAL HISTORY:  Past Surgical History:   Procedure Laterality Date   • BACK SURGERY     • BACK SURGERY      l4 s1 surgery    • CERVICAL FUSION      c4 and c5   • CHOLECYSTECTOMY     • COLONOSCOPY  2014 [facility]   • ELBOW SURGERY Left     REPAIR   • EYE SURGERY     • EYE SURGERY     • EYE SURGERY Right 2022    right eye surgery   • GANGLION CYST EXCISION Left 06/05/2020    Procedure: KNEE EXCISION BAKERS CYST;  Surgeon: Svetlana Aburto MD;  [facility];  Service: Orthopedics   • KNEE ARTHROSCOPY Left    • LUMBAR DISCECTOMY      L5 S1   • ORIF RADIAL SHAFT FRACTURE     • NE ARTHROSCOPY KNEE DIAGNOSTIC W/WO SYNOVIAL BX SPX Left 12/21/2020    Procedure: KNEE ARTHROSCOPY, popliteal cyst decompression;  Surgeon: Emre Duffy MD;  [facility];  Service: Orthopedics   • NE CYSTO INSERTION TRANSPROSTATIC IMPLANT SINGLE N/A 12/20/2022    Procedure: CYSTOSCOPY WITH INSERTION UROLIFT WITH 6 IMPLANTS;  Surgeon: Chris Brock MD;  [facility];  Service: Urology   • NE ESOPHAGOGASTRODUODENOSCOPY TRANSORAL DIAGNOSTIC N/A 11/01/2016    Procedure: ESOPHAGOGASTRODUODENOSCOPY (EGD);  Surgeon: Roberto Reese MD;  [facility];  Service: Gastroenterology   • NE ESOPHAGOGASTRODUODENOSCOPY TRANSORAL DIAGNOSTIC N/A 05/31/2018    Procedure: ESOPHAGOGASTRODUODENOSCOPY (EGD);  Surgeon: Roberto Reese MD;  [facility];  Service: Gastroenterology   • NE LAMOT PRTL FFD EXC DISC REEXPL 1 NTRSPC LUMBAR N/A  10/24/2019    Procedure: Revision laminectomy and decompression at L5-S1;  Surgeon: Quiana Amaro MD;  [facility];  Service: Orthopedics   • SMALL INTESTINE SURGERY      GIST surgery   • US GUIDED MSK PROCEDURE  05/21/2019   • US GUIDED MSK PROCEDURE  10/30/2020       FAMILY HISTORY:  Family History   Problem Relation Age of Onset   • Thyroid disease Mother    • Thyroid disease Brother    • Diabetes Paternal Grandmother    • Hypertension Family    • Skin cancer Father    • Thyroid disease Father    • Autoimmune disease Sister         Lupus       SOCIAL HISTORY:  Social History     Tobacco Use   • Smoking status: Never   • Smokeless tobacco: Never   Vaping Use   • Vaping status: Never Used   Substance Use Topics   • Alcohol use: Yes     Comment: Holidays( some months without consumption)   • Drug use: No       MEDICATIONS:    Current Outpatient Medications:   •  calcium carbonate (TUMS) 500 mg chewable tablet, Chew 1 tablet daily When needed, Disp: , Rfl:   •  cholestyramine (QUESTRAN) 4 g packet, Take 1 packet (4 g total) by mouth daily, Disp: 30 each, Rfl: 3  •  clobetasol (TEMOVATE) 0.05 % cream, Apply topically 2 (two) times a day for 14 days (Patient not taking: Reported on 2/3/2025), Disp: 60 g, Rfl: 0  •  Co Q-10 50 MG, Take 200 mg by mouth, Disp: , Rfl:   •  famotidine (PEPCID) 40 MG tablet, TAKE 1 TABLET BY MOUTH EVERYDAY AT BEDTIME, Disp: 30 tablet, Rfl: 5  •  fluocinonide (LIDEX) 0.05 % cream, APPLY TO AFFECTED AREA OF SKIN TWICE A DAY AS NEEDED (Patient not taking: Reported on 3/3/2025), Disp: , Rfl:   •  fluticasone (FLONASE) 50 mcg/act nasal spray, 2 sprays into each nostril daily, Disp: 1 g, Rfl: 0  •  Misc Natural Products (OSTEO BI-FLEX JOINT SHIELD PO), Take by mouth, Disp: , Rfl:   •  Multiple Vitamins-Minerals (MULTIVITAL) tablet, Take 1 tablet by mouth daily Spectravite , Disp: , Rfl:   •  pantoprazole (PROTONIX) 40 mg tablet, TAKE 1 TABLET BY MOUTH EVERY DAY, Disp: 90 tablet, Rfl:  "1  •  prednisoLONE acetate (PRED FORTE) 1 % ophthalmic suspension, Administer 1 drop to the right eye 2 (two) times a day, Disp: , Rfl:   •  rosuvastatin (CRESTOR) 5 mg tablet, TAKE 1 TABLET (5 MG TOTAL) BY MOUTH DAILY., Disp: 30 tablet, Rfl: 5  •  tamsulosin (FLOMAX) 0.4 mg, TAKE 1 CAPSULE BY MOUTH EVERY DAY WITH DINNER, Disp: 90 capsule, Rfl: 3    ALLERGIES:  No Known Allergies    LABS:  HgA1c:   Lab Results   Component Value Date    HGBA1C 5.1 10/15/2024     BMP:   Lab Results   Component Value Date    GLUCOSE 97 09/25/2015    CALCIUM 9.2 11/21/2024     09/25/2015    K 4.4 11/21/2024    CO2 31 11/21/2024     11/21/2024    BUN 18 11/21/2024    CREATININE 0.85 11/21/2024     CBC: No components found for: \"CBC\"    _____________________________________________________  PHYSICAL EXAMINATION:  Vital signs: Ht 5' 8\" (1.727 m)   BMI 28.65 kg/m²   General: No acute distress, awake and alert  Psychiatric: Mood and affect appear appropriate  HEENT: Trachea Midline, No torticollis, no apparent facial trauma  Cardiovascular: No audible murmurs; Extremities appear perfused  Pulmonary: No audible wheezing or stridor  Skin: No open lesions; see further details (if any) below    MUSCULOSKELETAL EXAMINATION:  Ortho Exam  Right ankle:  No erythema or ecchymosis  Tender to palpation over distal fibula  Tender to palpation over fourth and fifth metatarsal  Calf compartment soft and supple  Modest edema appreciated of right lower extremity  1+ pitting edema of right foot  Sensation intact  Toes are warm, sensate, mobile    ___________________________________________________  STUDIES REVIEWED:  I personally reviewed the images obtained in office today and my independent interpretation is as follows:    Right ankle x-ray: Mildly displaced distal fibular spiral fracture without syndesmotic widening, no major changes from prior x-rays    Right foot x-ray: No acute evidence of fractures or dislocations.  Moderate amount of " swelling noted    PROCEDURES PERFORMED:    None preformed       Le Salgado PA-C

## 2025-03-13 DIAGNOSIS — Z09 FRACTURE FOLLOW-UP: Primary | ICD-10-CM

## 2025-04-01 ENCOUNTER — OFFICE VISIT (OUTPATIENT)
Dept: OBGYN CLINIC | Facility: HOSPITAL | Age: 75
End: 2025-04-01
Payer: COMMERCIAL

## 2025-04-01 ENCOUNTER — HOSPITAL ENCOUNTER (OUTPATIENT)
Dept: RADIOLOGY | Facility: HOSPITAL | Age: 75
Discharge: HOME | End: 2025-04-01
Attending: ORTHOPAEDIC SURGERY
Payer: COMMERCIAL

## 2025-04-01 VITALS — BODY MASS INDEX: 28.65 KG/M2 | HEIGHT: 68 IN

## 2025-04-01 DIAGNOSIS — S82.441A CLOSED DISPLACED SPIRAL FRACTURE OF SHAFT OF RIGHT FIBULA, INITIAL ENCOUNTER: Primary | ICD-10-CM

## 2025-04-01 DIAGNOSIS — Z09 FRACTURE FOLLOW-UP: ICD-10-CM

## 2025-04-01 PROCEDURE — 99213 OFFICE O/P EST LOW 20 MIN: CPT | Performed by: ORTHOPAEDIC SURGERY

## 2025-04-01 PROCEDURE — 73610 X-RAY EXAM OF ANKLE: CPT

## 2025-04-01 NOTE — ASSESSMENT & PLAN NOTE
DOI 3/2/2025  Referred to PT  Progress to WBAT in the boot  Orders:    Ambulatory Referral to Physical Therapy; Future

## 2025-04-01 NOTE — PROGRESS NOTES
Name: Shine Toro      : 1950       MRN: [id number]   Encounter Provider: Wayne Swift MD   Encounter Date: 25  Encounter department: [facility] [place]     ASSESSMENT & PLAN:  Assessment & Plan  Closed displaced spiral fracture of shaft of right fibula  DOI 3/2/2025  Referred to PT  Progress to WBAT in the boot  Orders:    Ambulatory Referral to Physical Therapy; Future      ___________________________________________________  X-rays taken and reviewed, physical exam performed.  Clinically he is healing but not healed of his right fibula spiral fracture, date of injury 3/2/25  At this point recommend progressive weightbearing in his boot as tolerated.  Referral to physical therapy to improve recovery to include range of motion and strengthening  Ice and elevate as indicated for swelling reduction    To do next visit:  Return in about 4 weeks (around 2025) for re-check with x-rays upon arrival right ankle.  ____________________________________________________  CHIEF COMPLAINT:  Chief Complaint   Patient presents with    Right Ankle - Fracture         SUBJECTIVE:  Shine Toro is a 75 y.o. male who presents today for repeat evaluation of his right ankle, 1 month status post closed treatment spiral fracture right fibula date of injury was 3/2/25.  He was initially seen through immediate care where he was splinted and placed on crutches.  He was seen in the office early last month and transition to a cam walker boot.  He has been protected weightbearing of his right lower extremity in the boot.  Over the past several days he has been increasing his weight bearing on his right lower extremity without any increase or change of symptoms.  He does note mild soreness and discomfort as well as varying degree of swelling.  Denies any calf or thigh pain.  Denies any fevers or chills.          PAST MEDICAL HISTORY:  Past Medical History:   Diagnosis Date     Abnormal weight loss     RESOLVED: 15JUN2015    Anemia     RESOLVED: 15JUN2015    Atypical chest pain     RESOLVED: 15JUN2015    BPH (benign prostatic hyperplasia)     Cancer (HCC)     DUODENAL    Luis Miguel's syndrome     Depression     RESOLVED: 25MAR2015    Disc disorder     cervical and lumbar    Diverticulitis of colon     LAST ASSESSED: 12FEB2015    Duodenal nodule     RESOLVED: 16MAR2017    Encounter for hepatitis C screening test for low risk patient 03/24/2019    Gastrointestinal stromal tumor (GIST) (HCC)     MALIGNANT; RESOLVED: 08MAR2016    GERD (gastroesophageal reflux disease)     Glaucoma     RESOLVED: 16MAR2017    History of COVID-19 09/2021    Insomnia     RESOLVED: 16MAR2017    Preop exam for internal medicine 01/20/2021    Preop examination 05/07/2020    Preoperative clearance 03/09/2021       PAST SURGICAL HISTORY:  Past Surgical History:   Procedure Laterality Date    BACK SURGERY      BACK SURGERY      l4 s1 surgery     CERVICAL FUSION      c4 and c5    CHOLECYSTECTOMY      COLONOSCOPY  2014    [facility]    ELBOW SURGERY Left     REPAIR    EYE SURGERY      EYE SURGERY      EYE SURGERY Right 2022    right eye surgery    GANGLION CYST EXCISION Left 06/05/2020    Procedure: KNEE EXCISION BAKERS CYST;  Surgeon: Svetlana Aburto MD;  [facility];  Service: Orthopedics    KNEE ARTHROSCOPY Left     LUMBAR DISCECTOMY      L5 S1    ORIF RADIAL SHAFT FRACTURE      MI ARTHROSCOPY KNEE DIAGNOSTIC W/WO SYNOVIAL BX SPX Left 12/21/2020    Procedure: KNEE ARTHROSCOPY, popliteal cyst decompression;  Surgeon: Emre Duffy MD;  [facility];  Service: Orthopedics    MI CYSTO INSERTION TRANSPROSTATIC IMPLANT SINGLE N/A 12/20/2022    Procedure: CYSTOSCOPY WITH INSERTION UROLIFT WITH 6 IMPLANTS;  Surgeon: Chris Brock MD;  [facility];  Service: Urology    MI ESOPHAGOGASTRODUODENOSCOPY TRANSORAL DIAGNOSTIC N/A 11/01/2016    Procedure: ESOPHAGOGASTRODUODENOSCOPY (EGD);  Surgeon: Roberto  MD Mary Ann;  [facility];  Service: Gastroenterology    MD ESOPHAGOGASTRODUODENOSCOPY TRANSORAL DIAGNOSTIC N/A 05/31/2018    Procedure: ESOPHAGOGASTRODUODENOSCOPY (EGD);  Surgeon: Roberto Reese MD;  [facility];  Service: Gastroenterology    MD LAMOT PRTL FFD EXC DISC REEXPL 1 NTRSPC LUMBAR N/A 10/24/2019    Procedure: Revision laminectomy and decompression at L5-S1;  Surgeon: Quiana Amaro MD;  [facility];  Service: Orthopedics    SMALL INTESTINE SURGERY      GIST surgery    US GUIDED MSK PROCEDURE  05/21/2019    US GUIDED MSK PROCEDURE  10/30/2020       FAMILY HISTORY:  Family History   Problem Relation Age of Onset    Thyroid disease Mother     Thyroid disease Brother     Diabetes Paternal Grandmother     Hypertension Family     Skin cancer Father     Thyroid disease Father     Autoimmune disease Sister         Lupus       SOCIAL HISTORY:  Social History     Tobacco Use    Smoking status: Never    Smokeless tobacco: Never   Vaping Use    Vaping status: Never Used   Substance Use Topics    Alcohol use: Yes     Comment: Holidays( some months without consumption)    Drug use: No       MEDICATIONS:    Current Outpatient Medications:     calcium carbonate (TUMS) 500 mg chewable tablet, Chew 1 tablet daily When needed, Disp: , Rfl:     cholestyramine (QUESTRAN) 4 g packet, Take 1 packet (4 g total) by mouth daily, Disp: 30 each, Rfl: 3    clobetasol (TEMOVATE) 0.05 % cream, Apply topically 2 (two) times a day for 14 days (Patient not taking: Reported on 2/3/2025), Disp: 60 g, Rfl: 0    Co Q-10 50 MG, Take 200 mg by mouth, Disp: , Rfl:     famotidine (PEPCID) 40 MG tablet, TAKE 1 TABLET BY MOUTH EVERYDAY AT BEDTIME, Disp: 30 tablet, Rfl: 5    fluocinonide (LIDEX) 0.05 % cream, APPLY TO AFFECTED AREA OF SKIN TWICE A DAY AS NEEDED (Patient not taking: Reported on 3/3/2025), Disp: , Rfl:     fluticasone (FLONASE) 50 mcg/act nasal spray, 2 sprays into each nostril daily, Disp: 1 g, Rfl: 0     "Misc Natural Products (OSTEO BI-FLEX JOINT SHIELD PO), Take by mouth, Disp: , Rfl:     Multiple Vitamins-Minerals (MULTIVITAL) tablet, Take 1 tablet by mouth daily Spectravite , Disp: , Rfl:     pantoprazole (PROTONIX) 40 mg tablet, TAKE 1 TABLET BY MOUTH EVERY DAY, Disp: 90 tablet, Rfl: 1    prednisoLONE acetate (PRED FORTE) 1 % ophthalmic suspension, Administer 1 drop to the right eye 2 (two) times a day, Disp: , Rfl:     rosuvastatin (CRESTOR) 5 mg tablet, TAKE 1 TABLET (5 MG TOTAL) BY MOUTH DAILY., Disp: 30 tablet, Rfl: 5    tamsulosin (FLOMAX) 0.4 mg, TAKE 1 CAPSULE BY MOUTH EVERY DAY WITH DINNER, Disp: 90 capsule, Rfl: 3    ALLERGIES:  No Known Allergies    LABS:  HgA1c:   Lab Results   Component Value Date    HGBA1C 5.1 10/15/2024     BMP:   Lab Results   Component Value Date    GLUCOSE 97 09/25/2015    CALCIUM 9.2 11/21/2024     09/25/2015    K 4.4 11/21/2024    CO2 31 11/21/2024     11/21/2024    BUN 18 11/21/2024    CREATININE 0.85 11/21/2024     CBC: No components found for: \"CBC\"    _____________________________________________________  PHYSICAL EXAMINATION:  Vital signs: Ht 5' 8\" (1.727 m)   BMI 28.65 kg/m²   General: No acute distress, awake and alert  Psychiatric: Mood and affect appear appropriate  HEENT: Trachea Midline, No torticollis, no apparent facial trauma  Cardiovascular: No audible murmurs; Extremities appear perfused  Pulmonary: No audible wheezing or stridor  Skin: Intact, no open lesions; see further details (if any) below    MUSCULOSKELETAL EXAMINATION:    Right Ankle Exam     Tenderness   Right ankle tenderness location: Minimal tenderness over the distal fibula, no tenderness proximally.  Swelling: mild    Other   Erythema: absent  Sensation: normal     Comments:    Dorsiflexion passively to neutral with Achilles tightness  Plantarflexion to approximately 30 degrees    Calf is soft nontender no signs " DVT              _____________________________________________________  STUDIES REVIEWED:    The attending physician has personally reviewed the pertinent films in PACS and their interpretation is as follows:    Right ankle x-rays taken and reviewed in the office today show: Minimally displaced spiral fracture of the right fibula remains in acceptable position alignment without further signs of displacement.  Mortise remains intact.      PROCEDURES PERFORMED:    Procedures    None Preformed        Scribe Attestation      I,:  Dank Gifford am acting as a scribe while in the presence of the attending physician.:       I,:  Wayne Swift MD personally performed the services described in this documentation    as scribed in my presence.:

## 2025-04-02 ENCOUNTER — OFFICE VISIT (OUTPATIENT)
Dept: OBGYN CLINIC | Facility: OTHER | Age: 75
End: 2025-04-02
Payer: COMMERCIAL

## 2025-04-02 VITALS — HEIGHT: 68 IN | WEIGHT: 188 LBS | BODY MASS INDEX: 28.49 KG/M2

## 2025-04-02 DIAGNOSIS — M25.862 CYST OF LEFT KNEE JOINT: ICD-10-CM

## 2025-04-02 DIAGNOSIS — M25.562 LEFT KNEE PAIN, UNSPECIFIED CHRONICITY: Primary | ICD-10-CM

## 2025-04-02 PROCEDURE — 20610 DRAIN/INJ JOINT/BURSA W/O US: CPT | Performed by: ORTHOPAEDIC SURGERY

## 2025-04-02 PROCEDURE — 99214 OFFICE O/P EST MOD 30 MIN: CPT | Performed by: ORTHOPAEDIC SURGERY

## 2025-04-02 RX ORDER — BUPIVACAINE HYDROCHLORIDE 2.5 MG/ML
4 INJECTION, SOLUTION INFILTRATION; PERINEURAL
Status: COMPLETED | OUTPATIENT
Start: 2025-04-02 | End: 2025-04-02

## 2025-04-02 RX ADMIN — BUPIVACAINE HYDROCHLORIDE 4 ML: 2.5 INJECTION, SOLUTION INFILTRATION; PERINEURAL at 10:15

## 2025-04-02 NOTE — PROGRESS NOTES
"Orthopaedic Surgery - Office Note  Shine Toro (75 y.o. 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    History of Present Illness   Shine Toro is a 75 y.o. male who presents for follow up of L knee. Previously seen on 25 where we aspirated fluid from his knee. Patient is here today due to fluid build up again in knee. Patient also recently fractured his R distal fibula on 3/2, currently presents today with a CAM boot. States he believes he is putting excess pressure on his L knee due to the CAM boot and injury.       Review of Systems  Pertinent items are noted in HPI.  All other systems were reviewed and are negative.      Physical Exam  Ht 5' 8\" (1.727 m)   Wt 85.3 kg (188 lb)   BMI 28.59 kg/m²   Cons: Appears well.  No apparent distress.  Psych: Alert. Oriented x3.  Mood and affect normal.  Eyes: PERRLA, EOMI  Resp: Normal effort.  No audible wheezing or stridor.  CV: Palpable pulse.  No discernable arrhythmia.    Lymph:  No palpable cervical, axillary, or inguinal lymphadenopathy.  Skin: Warm.  No palpable masses.  No visible lesions.  Neuro: Normal muscle tone.  Normal and symmetric DTR's.     Left Knee Exam  Alignment:  Normal knee alignment.  Inspection:  No erythema. No ecchymosis.  Palpation:   tender at medial joint line, cyst present   ROM:  Normal knee ROM.  Strength:  5/5 quadriceps and hamstrings.  Stability:  No objective knee instability. Stable Varus / Valgus stress, Lachman, and Posterior drawer.  Tests:  No pertinent " positive or negative tests.  Patella:  Patella tracks centrally without crepitus.  Neurovascular:  Sensation intact in DP/SP/Corbin/Sa/T nerve distributions.  2+ DP & PT pulses.  Gait:   unable to evaluate patient in CAM boot for other leg        Studies Reviewed  No studies to review      Large joint arthrocentesis: L knee  Universal Protocol:  Consent: Verbal consent obtained.  Risks and benefits: risks, benefits and alternatives were discussed  Consent given by: patient  Timeout called at: 4/2/2025 11:07 AM.  Patient understanding: patient states understanding of the procedure being performed  Patient consent: the patient's understanding of the procedure matches consent given  Patient identity confirmed: verbally with patient  Supporting Documentation  Indications: joint swelling   Procedure Details  Location: knee - L knee  Needle size: 18 G  Approach: medial  Medications administered: 4 mL bupivacaine 0.25 %    Aspirate amount: 65 mL  Aspirate: clear and yellow  Patient tolerance: patient tolerated the procedure well with no immediate complications  Dressing:  Sterile dressing applied              Medical, Surgical, Family, and Social History  The patient's medical history, family history, and social history, were reviewed and updated as appropriate.    Past Medical History:   Diagnosis Date    Abnormal weight loss     RESOLVED: 15JUN2015    Anemia     RESOLVED: 15JUN2015    Atypical chest pain     RESOLVED: 15JUN2015    BPH (benign prostatic hyperplasia)     Cancer (HCC)     DUODENAL    Luis Miguel's syndrome     Depression     RESOLVED: 25MAR2015    Disc disorder     cervical and lumbar    Diverticulitis of colon     LAST ASSESSED: 12FEB2015    Duodenal nodule     RESOLVED: 16MAR2017    Encounter for hepatitis C screening test for low risk patient 03/24/2019    Gastrointestinal stromal tumor (GIST) (HCC)     MALIGNANT; RESOLVED: 08MAR2016    GERD (gastroesophageal reflux disease)     Glaucoma     RESOLVED: 16MAR2017     History of COVID-19 09/2021    Insomnia     RESOLVED: [id number]    Preop exam for internal medicine 01/20/2021    Preop examination 05/07/2020    Preoperative clearance 03/09/2021       Past Surgical History:   Procedure Laterality Date    BACK SURGERY      BACK SURGERY      l4 s1 surgery     CERVICAL FUSION      c4 and c5    CHOLECYSTECTOMY      COLONOSCOPY  2014    kutty    ELBOW SURGERY Left     REPAIR    EYE SURGERY      EYE SURGERY      EYE SURGERY Right 2022    right eye surgery    GANGLION CYST EXCISION Left 06/05/2020    Procedure: KNEE EXCISION BAKERS CYST;  Surgeon: Svetlana Aburto MD;  Location: [facility];  Service: Orthopedics    KNEE ARTHROSCOPY Left     LUMBAR DISCECTOMY      L5 S1    ORIF RADIAL SHAFT FRACTURE      NH ARTHROSCOPY KNEE DIAGNOSTIC W/WO SYNOVIAL BX SPX Left 12/21/2020    Procedure: KNEE ARTHROSCOPY, popliteal cyst decompression;  Surgeon: Emre Duffy MD;  Location: [facility];  Service: Orthopedics    NH CYSTO INSERTION TRANSPROSTATIC IMPLANT SINGLE N/A 12/20/2022    Procedure: CYSTOSCOPY WITH INSERTION UROLIFT WITH 6 IMPLANTS;  Surgeon: Chris Brock MD;  Location: [facility];  Service: Urology    NH ESOPHAGOGASTRODUODENOSCOPY TRANSORAL DIAGNOSTIC N/A 11/01/2016    Procedure: ESOPHAGOGASTRODUODENOSCOPY (EGD);  Surgeon: Roberto Reese MD;  Location: [facility];  Service: Gastroenterology    NH ESOPHAGOGASTRODUODENOSCOPY TRANSORAL DIAGNOSTIC N/A 05/31/2018    Procedure: ESOPHAGOGASTRODUODENOSCOPY (EGD);  Surgeon: Roberto Reese MD;  Location: [facility];  Service: Gastroenterology    NH LAMOT PRTL FFD EXC DISC REEXPL 1 NTRSPC LUMBAR N/A 10/24/2019    Procedure: Revision laminectomy and decompression at L5-S1;  Surgeon: Quiana Amaro MD;  Location: [facility];  Service: Orthopedics    SMALL INTESTINE SURGERY      GIST surgery    US GUIDED MSK PROCEDURE  05/21/2019    US GUIDED MSK PROCEDURE  10/30/2020       Family History   Problem Relation Age of Onset    Thyroid disease  Mother     Thyroid disease Brother     Diabetes Paternal Grandmother     Hypertension Family     Skin cancer Father     Thyroid disease Father     Autoimmune disease Sister         Lupus       Social History     Occupational History    Not on file   Tobacco Use    Smoking status: Never    Smokeless tobacco: Never   Vaping Use    Vaping status: Never Used   Substance and Sexual Activity    Alcohol use: Yes     Comment: Holidays( some months without consumption)    Drug use: No    Sexual activity: Yes     Partners: Female     Birth control/protection: None     Comment: With my wife       No Known Allergies      Current Outpatient Medications:     calcium carbonate (TUMS) 500 mg chewable tablet, Chew 1 tablet daily When needed, Disp: , Rfl:     cholestyramine (QUESTRAN) 4 g packet, Take 1 packet (4 g total) by mouth daily, Disp: 30 each, Rfl: 3    Co Q-10 50 MG, Take 200 mg by mouth, Disp: , Rfl:     famotidine (PEPCID) 40 MG tablet, TAKE 1 TABLET BY MOUTH EVERYDAY AT BEDTIME, Disp: 30 tablet, Rfl: 5    fluticasone (FLONASE) 50 mcg/act nasal spray, 2 sprays into each nostril daily, Disp: 1 g, Rfl: 0    Misc Natural Products (OSTEO BI-FLEX JOINT SHIELD PO), Take by mouth, Disp: , Rfl:     Multiple Vitamins-Minerals (MULTIVITAL) tablet, Take 1 tablet by mouth daily Spectravite , Disp: , Rfl:     pantoprazole (PROTONIX) 40 mg tablet, TAKE 1 TABLET BY MOUTH EVERY DAY, Disp: 90 tablet, Rfl: 1    prednisoLONE acetate (PRED FORTE) 1 % ophthalmic suspension, Administer 1 drop to the right eye 2 (two) times a day, Disp: , Rfl:     rosuvastatin (CRESTOR) 5 mg tablet, TAKE 1 TABLET (5 MG TOTAL) BY MOUTH DAILY., Disp: 30 tablet, Rfl: 5    tamsulosin (FLOMAX) 0.4 mg, TAKE 1 CAPSULE BY MOUTH EVERY DAY WITH DINNER, Disp: 90 capsule, Rfl: 3    clobetasol (TEMOVATE) 0.05 % cream, Apply topically 2 (two) times a day for 14 days (Patient not taking: Reported on 2/3/2025), Disp: 60 g, Rfl: 0    fluocinonide (LIDEX) 0.05 % cream, APPLY TO  AFFECTED AREA OF SKIN TWICE A DAY AS NEEDED (Patient not taking: Reported on 7/2/2024), Disp: , Rfl:       Kristyn Street    Scribe Attestation      I,:  Kristyn Street am acting as a scribe while in the presence of the attending physician.:       I,:  Emre Duffy MD personally performed the services described in this documentation    as scribed in my presence.:

## 2025-04-08 NOTE — PROGRESS NOTES
Rheumatology Follow-up Visit  Name: Shine Toro      : 1950      MRN: [id number]  Encounter Provider: Hannah Sin MD  Encounter Date: 4/15/2025   Encounter department: [facility] [address]  :  Assessment & Plan  Primary generalized (osteo)arthritis  75-year-old male who presents for follow-up.  Patient was initially seen for several months of low back and SI joint pain.  Due to his personal history of psoriasis, we conducted workup for psoriatic arthritis.  Today we reviewed his x-rays and MRI which does not show any evidence of sacroiliitis.  We reviewed findings which are most suggestive of degenerative osteoarthritis.  We reviewed the diagnosis of osteoarthritis and general treatment principles.  Recommend that he continue with Tylenol and NSAIDs as needed.  No rheumatology follow-up required at this time.           History of Present Illness   HPI  Shine Toro is a 75 y.o. male who presents for follow-up.    Interval History:  Patient reports since last visit he did unfortunately suffer a fracture of the right ankle.  Because of this he had been quite an active and actually had resolution of the low back/SI joint pain that he previously had.  He did not have to take any Tylenol or NSAIDs.  Recently he was cleared to start being more active so he noticed that the pain did seem to come back.  Otherwise no new symptoms.    Permanent History:  Presented in 2025 for several months of low back, posterior hip, SI joint discomfort.  Patient notes that symptoms are constant, but does note worsening with certain activities such as going up or down the stairs and also notes worsening in the morning when he awakens.  Patient also has psoriasis which has been worsening over around the same duration of time.  Additionally, he has a history of chronic left knee effusion for which he has had 2 prior surgeries, though no cell count available to review to see if the fluid has ever been  "inflammatory or noninflammatory.  Discussed with patient that the lack of shoulder involvement and normal inflammatory markers would make diagnosis of PMR less likely.  However, given his worsening psoriasis and description of pain, we evaluted him for possible axial psoriatic arthritis.  He underwent x-rays of the SI joints which showed JSN and sclerosis. Subsequent MRI of his sacroiliac joints confirmed findings related to degenerative changes rather than inflammatory.    Review of Systems  Complete ROS conducted as per HPI. In addition, denies:  Fever  Photosensitive rash  Sicca symptoms  Recurrent oral ulcers  Muscle weakness  Uveitis  Dactylitis  Chest pain  SOB  Pleurisy  Gross hematuria  Foamy urine  Raynaud's  Joint issues other than noted above    Objective   /66 (BP Location: Left arm, Patient Position: Sitting, Cuff Size: Standard)   Pulse 96   Temp 97.8 °F (36.6 °C) (Tympanic)   Ht 5' 8\" (1.727 m)   Wt 85 kg (187 lb 6.4 oz)   SpO2 97%   BMI 28.49 kg/m²      Physical Exam  Physical Exam  Constitutional: well appearing, no acute distress  HEENT: normocephalic, sclera clear, no visible oral or nasal ulcers  Neck: supple, no palpable cervical adenopathy  CV: regular rate and rhythm  Pulm: normal respiratory effort, l breathing comfortably on room air  Skin: no rashes, no skin thickening  Extremities: warm and well perfused, no edema  MSK: Boot right foot but no synovitis or effusions    Labs and Imaging  I have personally reviewed pertinent labs and imaging.   "

## 2025-04-11 NOTE — PROGRESS NOTES
PT Evaluation     Today's date: 2025  Patient name: Shine Toro  : 1950  MRN: [id number]  Referring provider: Wayne Swift,*  Dx:   Encounter Diagnosis     ICD-10-CM    1. Closed displaced spiral fracture of shaft of right fibula  S82.441A Ambulatory Referral to Physical Therapy          Start Time: 0845  Stop Time: 0930  Total time in clinic (min): 45 minutes    Assessment  Impairments: abnormal coordination, abnormal gait, abnormal muscle firing, abnormal or restricted ROM, abnormal movement, activity intolerance, impaired balance, impaired physical strength, lacks appropriate home exercise program, pain with function, weight-bearing intolerance, poor posture , poor body mechanics, unable to perform ADL, participation limitations, activity limitations and endurance  Symptom irritability: high    Assessment details: Shine Toro is a pleasant 75 y.o. male with a referred dx of s/p closed spiral fracture right fibula sustained on 3/2/25 from Wayne Swift MD.  No further referral appears necessary at this time based upon examination results. Upon further clinical testing, patient presents with the following deficits: active and passive ankle mobility dysfunction, RLE weakness, joint stiffness, slight edema, poor balance, decrease weight bearing tolerance, gait abnormalities, and decrease foot/ankle neuromuscular control. These deficits and impairments result in increase loading on contralateral leg which as chronic hx of orthopedic issues, inability to drive, and decrease independence with ADLs. Patient would like to be able to perform yard work in near future and an international anniversary trip in August involving a lot of walking. Pt was provided with a basic HEP focused on ankle mobility, strength and graded loading which will be reviewed in the upcoming session. Pt able to demonstrate HEP with good technique and no pain. Educated pt to stop any exercises causing pain  increase, pt verbalizes understanding. Pt was educated on anatomy and physiology of diagnosis and demonstrated verbal understanding. Positive prognostic indicators include acuity of symptoms and absence of observed red flags. Negative prognostic indicators include high symptom irritability and multiple concurrent orthopedic problems. Pt would benefit from skilled OP physical therapy to address these, and the below impairments in order to optimize outcomes and promote return to functional baseline.     Patient verbalized understanding of POC.    Please contact me if you have any questions or recommendations. Thank you for the referral and the opportunity to share in Shine's care      Barriers to intervention: transportation and medical complexity  Understanding of Dx/Px/POC: good     Prognosis: good    Goals    Short Term Goals:  In 2-4 weeks, the patient will:  1. Pt will report at least a 25% reduction in subjective pain complaints/symptoms to better manage ADLs and household chores.   2. Pt will wean himself off CAM boot with SPC  3. Pt independent with initial HEP, rationale, technique and frequency, for ROM and pain control.  4. Pt will demonstrate 2-3 deg improvement in his ankle mobility       Long Term Goals:  In 12-14 weeks, the patient will:  1. Pt will have WFL of ankle mobility and atleast 4+/5 in his strength  2. FOTO to greater than predicted value.  3. Independent with comprehensive HEP upon discharge.  4. Pt will be able to perform ADLs, iADLS, and household duties with minimal restriction indicating return to PLOF.  5. Pt independent with rationale, technique and plan for performance of advanced HEP to ensure independent self-management of symptoms upon discharge.  6. Pt will be gradually walking community distances with SPC as needed      Plan  Patient would benefit from: PT eval and skilled physical therapy  Referral necessary: No  Planned modality interventions: cryotherapy, biofeedback and  TENS    Planned therapy interventions: activity modification, joint mobilization, manual therapy, massage, balance, balance/weight bearing training, behavior modification, body mechanics training, muscle pump exercises, motor coordination training, nerve gliding, neuromuscular re-education, patient education, postural training, community reintegration, self care, sensory integrative techniques, strengthening, stretching, therapeutic activities, therapeutic exercise, therapeutic training, home exercise program, graded motor, graded exercise, graded activity, functional ROM exercises, gait training, abdominal trunk stabilization, IASTM, patient/caregiver education and kinesiology taping    Frequency: 1-2x week  Plan of Care beginning date: 4/14/2025  Plan of Care expiration date: 7/7/2025  Treatment plan discussed with: patient        Subjective Evaluation    History of Present Illness  Date of onset: 3/2/2025  Mechanism of injury: trauma  Mechanism of injury: Patient presents for PT initial evaluation regarding concerns of right ankle pain s/p  closed spiral fracture right fibula date of injury was 3/2/25. He recently has transitioned to WBAT in CAM boot by Ortho, trialing to wean off it slowly at home. He notes increase soreness and intermittent swelling along the midfoot and forefoot since increasing his WB. He was recommended PT to improve his ROM and strength.     Secondary to this injury is acute on chronic left knee pain. Patient had a knee aspiration performed on 1/29/25. Patient is also S/p L knee arthroscopy with popliteal cyst decompression and residual saphenous neuropathy from prior surgery.    Denies any red flags which include: fever, chills, chest pain, focal neurologic deficits, urinary distention, urinary incontinence, fecal incontinence, saddle anesthesia.    Aggravating Factors: all weight bearing activities  Relieving Factors: all NWB    Personal Functional Goals: yard work, [place] tour in  August            Not a recurrent problem   Quality of life: good    Patient Goals  Patient goals for therapy: increased strength, independence with ADLs/IADLs, return to sport/leisure activities, increased motion, improved balance, decreased edema and decreased pain    Pain  Current pain ratin  At best pain ratin  At worst pain ratin  Location: R Ankle  Quality: dull ache and discomfort  Relieving factors: medications  Aggravating factors: standing, walking, stair climbing and lifting    Social Support  Steps to enter house: no  Stairs in house: yes   Lives in: multiple-level home  Lives with: spouse    Employment status: not working  Exercise history: Active      Diagnostic Tests  X-ray: abnormal (Redemonstrated minimally displaced oblique fracture of the distal right fibula. No change in alignment or significant interval healing callus formation about the fracture.)  Treatments  Previous treatment: immobilization  Current treatment: medication and physical therapy        Objective      Range of Motion: Goniometric measurements revealed the following findings (in degrees).  Joint Motion Right: 2025 Left: 2025   Knee Extension WNL WFL   Knee Flexion WNL WFL   Ankle Dorsiflexion Seated 5 deg WFL   Ankle Plantarflexion Seated 15 deg WFL   Ankle Inversion 10 deg WFL   Ankle Eversion 10 deg WFL     Strength: MMT revealed the following findings.  Joint Motion Right: 2025 Left: 2025   Knee Extension 4/5 5/5   Knee Flexion 4/5 5/5   Ankle Plantarflexion 3/5 5/5   Ankle Dorsiflexion 3+/5 5/5   Ankle Inversion 3+/5 5/5   Ankle Eversion 3+/5 5/5     Additional Assessments:  Palpation: Denies significant pain with palpation   Gait Pattern: in CAM boot with SPC, increase trunk swaying, slight analgic   Balance: Poor on RLE    LE Neuro Screen:  LE Dermatomes: Intact bilaterally      LE Myotomes:  Nerve root Test Action RIGHT LEFT   L1-L2 Hip Flexion intact intact   L3 Knee Extension intact intact    L4  Ankle DF intact intact   L5 Great toe Extension intact intact   S1 Ankle PF, Ankle Eversion, Hip Extension, Knee flexion intact intact   S2 Ankle PF intact intact            Precautions: s/p closed displaced spiral fracture of shaft of right fibula 3/2/25 (Progress to WBAT in the boot), Hx of Cancer, Glaucoma of R eye    POC expires Unit limit Auth Expiration date PT/OT + Visit Limit?   7/7/25 bomn pend bomn         Visit/Unit Tracking  AUTH Status:  Date 4/14         Used 1         Remaining             Pertinent Findings:                                                                                             Test / Measure  4/14/25   FOTO (Predicted 63) 43   Seated Abkle AROM DF: 5 deg  PF: 15 deg  Inv: 10 deg  Ev: 10 deg   Ankle MMT 3+/5         Access Code: [email or web address]  URL: [email or web address]  Date: 04/14/2025  Prepared by: Jennifer Reece    Exercises  - Supine Bridge  - 1 x daily - 7 x weekly - 3 sets - 5 reps  - Supine Active Straight Leg Raise  - 1 x daily - 7 x weekly - 2 sets - 10 reps  - Long Sitting Calf Stretch with Strap  - 1 x daily - 7 x weekly - 1 sets - 3-4 reps - 30 second hold  - Ankle Inversion Eversion Towel Slide  - 1-3 x daily - 7 x weekly - 2 sets - 10 reps  - Seated Toe Towel Scrunches  - 1 x daily - 7 x weekly - 2-3 sets - 10 reps  - Toe Yoga - Alternating Great Toe and Lesser Toe Extension  - 1 x daily - 7 x weekly - 2 sets - 10 reps  - Long Sitting Ankle Eversion with Resistance  - 1 x daily - 2-3 x weekly - 2-3 sets - 10 reps  - Long Sitting Ankle Plantar Flexion with Resistance  - 1 x daily - 2-3 x weekly - 2-3 sets - 10 reps  - Long Sitting Ankle Inversion with Resistance  - 1 x daily - 2-3 x weekly - 2-3 sets - 10 reps  - Long Sitting Ankle Dorsiflexion with Anchored Resistance  - 1 x daily - 2-3 x weekly - 2-3 sets - 10 reps    Manuals 4/14            R Ankle PROM             R Calf Stretch                                       Neuro Re-Ed             [employer]  Tasks             Tandem Balance             Bridge                                                                 Ther Ex             HEP/Patient Education AR performed            NuStep             4-Way Ankle             LAQ             Seated Calf/Toe Raise             Calf Pro-Stretch             STS                                                     Ther Activity                                       Gait Training                                       Modalities

## 2025-04-14 ENCOUNTER — EVALUATION (OUTPATIENT)
Dept: PHYSICAL THERAPY | Facility: REHABILITATION | Age: 75
End: 2025-04-14
Payer: COMMERCIAL

## 2025-04-14 DIAGNOSIS — S82.441A CLOSED DISPLACED SPIRAL FRACTURE OF SHAFT OF RIGHT FIBULA, INITIAL ENCOUNTER: Primary | ICD-10-CM

## 2025-04-14 PROCEDURE — 97110 THERAPEUTIC EXERCISES: CPT

## 2025-04-14 PROCEDURE — 97161 PT EVAL LOW COMPLEX 20 MIN: CPT

## 2025-04-15 ENCOUNTER — OFFICE VISIT (OUTPATIENT)
Age: 75
End: 2025-04-15
Payer: COMMERCIAL

## 2025-04-15 VITALS
BODY MASS INDEX: 28.4 KG/M2 | SYSTOLIC BLOOD PRESSURE: 126 MMHG | WEIGHT: 187.4 LBS | HEART RATE: 96 BPM | HEIGHT: 68 IN | OXYGEN SATURATION: 97 % | TEMPERATURE: 97.8 F | DIASTOLIC BLOOD PRESSURE: 66 MMHG

## 2025-04-15 DIAGNOSIS — M15.0 PRIMARY GENERALIZED (OSTEO)ARTHRITIS: Primary | ICD-10-CM

## 2025-04-15 PROCEDURE — 99213 OFFICE O/P EST LOW 20 MIN: CPT | Performed by: STUDENT IN AN ORGANIZED HEALTH CARE EDUCATION/TRAINING PROGRAM

## 2025-04-16 DIAGNOSIS — Z09 FRACTURE FOLLOW-UP: Primary | ICD-10-CM

## 2025-04-18 ENCOUNTER — OFFICE VISIT (OUTPATIENT)
Dept: PHYSICAL THERAPY | Facility: REHABILITATION | Age: 75
End: 2025-04-18
Attending: ORTHOPAEDIC SURGERY
Payer: COMMERCIAL

## 2025-04-18 DIAGNOSIS — S82.441A CLOSED DISPLACED SPIRAL FRACTURE OF SHAFT OF RIGHT FIBULA, INITIAL ENCOUNTER: Primary | ICD-10-CM

## 2025-04-18 PROCEDURE — 97110 THERAPEUTIC EXERCISES: CPT

## 2025-04-18 PROCEDURE — 97140 MANUAL THERAPY 1/> REGIONS: CPT

## 2025-04-18 NOTE — PROGRESS NOTES
Daily Note     Today's date: 2025  Patient name: Shine Toro  : 1950  MRN: [id number]  Referring provider: Wayne Swift,*  Dx:   Encounter Diagnosis     ICD-10-CM    1. Closed displaced spiral fracture of shaft of right fibula  S82.441A           Start Time: 0807  Stop Time: 0856  Total time in clinic (min): 49 minutes    Subjective: Patient presents with no pain today. Continues to be compliant with WBAT in CAM boot. Overall is walking well with SPC.       Objective: See treatment diary below      Assessment:  Patient tolerated treatment session well today with focus on ankle mobility and NWB strength. Has anticipated joint restriction but improved after manuals. Reviewed techniques for self-anchored banded ankle motion. Required slight tactile cueing for tandem balance. Patient will continue to be appropriate for skilled outpatient physical therapy in order to address impairments and return to PLOF. 1:1 with Jennifer Reece, PT, DPT for entirety of treatment session.        Plan: Continue per plan of care.      Precautions: s/p closed displaced spiral fracture of shaft of right fibula 3/2/25 (Progress to WBAT in the boot), Hx of Cancer, Glaucoma of R eye    POC expires Unit limit Auth Expiration date PT/OT + Visit Limit?   25 bomn 10/10/2025 bomn         Visit/Unit Tracking  AUTH Status:  Date        9 Auth Used 1 2        Remaining  8 7          Pertinent Findings:                                                                                             Test / Measure  25   FOTO (Predicted 63) 43   Seated Abkle AROM DF: 5 deg  PF: 15 deg  Inv: 10 deg  Ev: 10 deg   Ankle MMT 3+/5         Access Code: [email or web address]  URL: [email or web address]  Date: 2025  Prepared by: Jennifer Reece    Exercises  - Supine Bridge  - 1 x daily - 7 x weekly - 3 sets - 5 reps  - Supine Active Straight Leg Raise  - 1 x daily - 7 x weekly - 2 sets - 10 reps  - Long Sitting Calf  "Stretch with Strap  - 1 x daily - 7 x weekly - 1 sets - 3-4 reps - 30 second hold  - Ankle Inversion Eversion Towel Slide  - 1-3 x daily - 7 x weekly - 2 sets - 10 reps  - Seated Toe Towel Scrunches  - 1 x daily - 7 x weekly - 2-3 sets - 10 reps  - Toe Yoga - Alternating Great Toe and Lesser Toe Extension  - 1 x daily - 7 x weekly - 2 sets - 10 reps  - Long Sitting Ankle Eversion with Resistance  - 1 x daily - 2-3 x weekly - 2-3 sets - 10 reps  - Long Sitting Ankle Plantar Flexion with Resistance  - 1 x daily - 2-3 x weekly - 2-3 sets - 10 reps  - Long Sitting Ankle Inversion with Resistance  - 1 x daily - 2-3 x weekly - 2-3 sets - 10 reps  - Long Sitting Ankle Dorsiflexion with Anchored Resistance  - 1 x daily - 2-3 x weekly - 2-3 sets - 10 reps    Manuals 4/14 4/18           R Ankle PROM  AR           R Calf Stretch  AR                                     Neuro Re-Ed             BiodFinalta Tasks  NV           Tandem Balance  20\"x2  ea                                                               Ther Ex             HEP/Patient Education AR performed            NuStep  Seat: 10  8' L6           3-Way Ankle  GTB  15x ea           LAQ  #4  3x10           Seated Calf/Toe Raise  Gr pad  2x10           Calf Pro-Stretch  Seat  10\"x10           STS                                                     Ther Activity                                       Gait Training                                       Modalities                                            "

## 2025-04-20 DIAGNOSIS — N40.0 ENLARGED PROSTATE WITHOUT LOWER URINARY TRACT SYMPTOMS (LUTS): ICD-10-CM

## 2025-04-21 RX ORDER — TAMSULOSIN HYDROCHLORIDE 0.4 MG/1
0.4 CAPSULE ORAL
Qty: 90 CAPSULE | Refills: 3 | Status: SHIPPED | OUTPATIENT
Start: 2025-04-21

## 2025-04-23 ENCOUNTER — OFFICE VISIT (OUTPATIENT)
Dept: PHYSICAL THERAPY | Facility: REHABILITATION | Age: 75
End: 2025-04-23
Attending: ORTHOPAEDIC SURGERY
Payer: COMMERCIAL

## 2025-04-23 DIAGNOSIS — S82.441A CLOSED DISPLACED SPIRAL FRACTURE OF SHAFT OF RIGHT FIBULA, INITIAL ENCOUNTER: Primary | ICD-10-CM

## 2025-04-23 PROCEDURE — 97112 NEUROMUSCULAR REEDUCATION: CPT

## 2025-04-23 PROCEDURE — 97110 THERAPEUTIC EXERCISES: CPT

## 2025-04-29 ENCOUNTER — HOSPITAL ENCOUNTER (OUTPATIENT)
Dept: RADIOLOGY | Facility: HOSPITAL | Age: 75
Discharge: HOME | End: 2025-04-29
Attending: ORTHOPAEDIC SURGERY
Payer: COMMERCIAL

## 2025-04-29 ENCOUNTER — OFFICE VISIT (OUTPATIENT)
Dept: OBGYN CLINIC | Facility: HOSPITAL | Age: 75
End: 2025-04-29
Payer: COMMERCIAL

## 2025-04-29 VITALS — HEIGHT: 68 IN | BODY MASS INDEX: 28.49 KG/M2

## 2025-04-29 DIAGNOSIS — Z09 FRACTURE FOLLOW-UP: Primary | ICD-10-CM

## 2025-04-29 DIAGNOSIS — Z09 FRACTURE FOLLOW-UP: ICD-10-CM

## 2025-04-29 DIAGNOSIS — S82.441A CLOSED DISPLACED SPIRAL FRACTURE OF SHAFT OF RIGHT FIBULA, INITIAL ENCOUNTER: ICD-10-CM

## 2025-04-29 PROCEDURE — 73610 X-RAY EXAM OF ANKLE: CPT

## 2025-04-29 PROCEDURE — 99213 OFFICE O/P EST LOW 20 MIN: CPT | Performed by: ORTHOPAEDIC SURGERY

## 2025-04-29 NOTE — PROGRESS NOTES
Name: Shine Toro      : 1950       MRN: [id number]   Encounter Provider: Wayne Swift MD   Encounter Date: 25  Encounter department: [facility]     ASSESSMENT & PLAN:  Assessment & Plan  Fracture follow-up  DOI: 3/2/2025  8 weeks s/p closed spiral fracture of right fibular shaft   Patient able to progress out of walking boot as tolerated  Advised use of walking boot primarily for long distances as needed  Continue working with physical therapy to improve range of motion and strengthening  Continue ice and elevation as needed for swelling  Follow up in 4 weeks with repeat x-rays       Closed displaced spiral fracture of shaft of right fibula              To do next visit:  Return in about 4 weeks (around 2025) for 3 month check with repeat x-rays.    _____________________________________________________  CHIEF COMPLAINT:  Chief Complaint   Patient presents with   • Right Ankle - Fracture, Follow-up         SUBJECTIVE:  Shine Toro is a 75 y.o. male who presents 8 weeks status post closed treatment of spiral fracture of right fibula (3/2/2025).  He admits to weightbearing to tolerance in cam walking boot when outside of the house however over the past month he is gradually increased weightbearing within the house without the walking boot.  He has been working with physical therapy on primarily stretching of the right ankle.  Patient was advised to continue working with physical therapy to strengthen right lower extremity.  He denies any pain or discomfort of the right ankle with minimal swelling which resolves with ice and elevation as needed.        PAST MEDICAL HISTORY:  Past Medical History:   Diagnosis Date   • Abnormal weight loss     RESOLVED: 2015   • Anemia     RESOLVED: 2015   • Atypical chest pain     RESOLVED: 2015   • BPH (benign prostatic hyperplasia)    • Cancer (HCC)     DUODENAL   • Luis Miguel's syndrome    •  Depression     RESOLVED: 25MAR2015   • Disc disorder     cervical and lumbar   • Diverticulitis of colon     LAST ASSESSED: [id number]   • Duodenal nodule     RESOLVED: 16MAR2017   • Encounter for hepatitis C screening test for low risk patient 03/24/2019   • Gastrointestinal stromal tumor (GIST) (HCC)     MALIGNANT; RESOLVED: 08MAR2016   • GERD (gastroesophageal reflux disease)    • Glaucoma     RESOLVED: 16MAR2017   • History of COVID-19 09/2021   • Insomnia     RESOLVED: 16MAR2017   • Preop exam for internal medicine 01/20/2021   • Preop examination 05/07/2020   • Preoperative clearance 03/09/2021       PAST SURGICAL HISTORY:  Past Surgical History:   Procedure Laterality Date   • BACK SURGERY     • BACK SURGERY      l4 s1 surgery    • CERVICAL FUSION      c4 and c5   • CHOLECYSTECTOMY     • COLONOSCOPY  2014    kutty   • ELBOW SURGERY Left     REPAIR   • EYE SURGERY     • EYE SURGERY     • EYE SURGERY Right 2022    right eye surgery   • GANGLION CYST EXCISION Left 06/05/2020    Procedure: KNEE EXCISION BAKERS CYST;  Surgeon: Svetlana Aburto MD;  Location: [facility];  Service: Orthopedics   • KNEE ARTHROSCOPY Left    • LUMBAR DISCECTOMY      L5 S1   • ORIF RADIAL SHAFT FRACTURE     • WV ARTHROSCOPY KNEE DIAGNOSTIC W/WO SYNOVIAL BX SPX Left 12/21/2020    Procedure: KNEE ARTHROSCOPY, popliteal cyst decompression;  Surgeon: Emre Duffy MD;  Location: [facility];  Service: Orthopedics   • WV CYSTO INSERTION TRANSPROSTATIC IMPLANT SINGLE N/A 12/20/2022    Procedure: CYSTOSCOPY WITH INSERTION UROLIFT WITH 6 IMPLANTS;  Surgeon: Chris Brock MD;  Location: [facility];  Service: Urology   • WV ESOPHAGOGASTRODUODENOSCOPY TRANSORAL DIAGNOSTIC N/A 11/01/2016    Procedure: ESOPHAGOGASTRODUODENOSCOPY (EGD);  Surgeon: Roberto Reese MD;  Location: [facility];  Service: Gastroenterology   • WV ESOPHAGOGASTRODUODENOSCOPY TRANSORAL DIAGNOSTIC N/A 05/31/2018    Procedure: ESOPHAGOGASTRODUODENOSCOPY (EGD);  Surgeon: Roberto  MD Mary Ann;  Location: [facility];  Service: Gastroenterology   • KS LAMOT PRTL FFD EXC DISC REEXPL 1 NTRSPC LUMBAR N/A 10/24/2019    Procedure: Revision laminectomy and decompression at L5-S1;  Surgeon: Quiana Amaro MD;  Location: [facility];  Service: Orthopedics   • SMALL INTESTINE SURGERY      GIST surgery   • US GUIDED MSK PROCEDURE  05/21/2019   • US GUIDED MSK PROCEDURE  10/30/2020       FAMILY HISTORY:  Family History   Problem Relation Age of Onset   • Thyroid disease Mother    • Thyroid disease Brother    • Diabetes Paternal Grandmother    • Hypertension Family    • Skin cancer Father    • Thyroid disease Father    • Autoimmune disease Sister         Lupus       SOCIAL HISTORY:  Social History     Tobacco Use   • Smoking status: Never   • Smokeless tobacco: Never   Vaping Use   • Vaping status: Never Used   Substance Use Topics   • Alcohol use: Yes     Comment: Holidays( some months without consumption)   • Drug use: No       MEDICATIONS:    Current Outpatient Medications:   •  calcium carbonate (TUMS) 500 mg chewable tablet, Chew 1 tablet daily When needed, Disp: , Rfl:   •  cholestyramine (QUESTRAN) 4 g packet, Take 1 packet (4 g total) by mouth daily, Disp: 30 each, Rfl: 3  •  clobetasol (TEMOVATE) 0.05 % cream, Apply topically 2 (two) times a day for 14 days (Patient not taking: Reported on 2/3/2025), Disp: 60 g, Rfl: 0  •  Co Q-10 50 MG, Take 200 mg by mouth, Disp: , Rfl:   •  famotidine (PEPCID) 40 MG tablet, TAKE 1 TABLET BY MOUTH EVERYDAY AT BEDTIME, Disp: 30 tablet, Rfl: 5  •  fluocinonide (LIDEX) 0.05 % cream, APPLY TO AFFECTED AREA OF SKIN TWICE A DAY AS NEEDED (Patient not taking: Reported on 4/15/2025), Disp: , Rfl:   •  fluticasone (FLONASE) 50 mcg/act nasal spray, 2 sprays into each nostril daily, Disp: 1 g, Rfl: 0  •  Misc Natural Products (OSTEO BI-FLEX JOINT SHIELD PO), Take by mouth, Disp: , Rfl:   •  Multiple Vitamins-Minerals (MULTIVITAL) tablet, Take 1 tablet by mouth daily  "Spectravite , Disp: , Rfl:   •  pantoprazole (PROTONIX) 40 mg tablet, TAKE 1 TABLET BY MOUTH EVERY DAY, Disp: 90 tablet, Rfl: 1  •  prednisoLONE acetate (PRED FORTE) 1 % ophthalmic suspension, Administer 1 drop to the right eye 2 (two) times a day, Disp: , Rfl:   •  rosuvastatin (CRESTOR) 5 mg tablet, TAKE 1 TABLET (5 MG TOTAL) BY MOUTH DAILY., Disp: 30 tablet, Rfl: 5  •  tamsulosin (FLOMAX) 0.4 mg, TAKE 1 CAPSULE BY MOUTH EVERY DAY WITH DINNER, Disp: 90 capsule, Rfl: 3    ALLERGIES:  No Known Allergies    LABS:  HgA1c:   Lab Results   Component Value Date    HGBA1C 5.1 10/15/2024     BMP:   Lab Results   Component Value Date    GLUCOSE 97 09/25/2015    CALCIUM 9.2 11/21/2024     09/25/2015    K 4.4 11/21/2024    CO2 31 11/21/2024     11/21/2024    BUN 18 11/21/2024    CREATININE 0.85 11/21/2024     CBC: No components found for: \"CBC\"    _____________________________________________________  PHYSICAL EXAMINATION:  Vital signs: Ht 5' 8\" (1.727 m)   BMI 28.49 kg/m²   General: No acute distress, awake and alert  Psychiatric: Mood and affect appear appropriate  HEENT: Trachea Midline, No torticollis, no apparent facial trauma  Cardiovascular: No audible murmurs; Extremities appear perfused  Pulmonary: No audible wheezing or stridor  Skin: No open lesions; see further details (if any) below    MUSCULOSKELETAL EXAMINATION:  Right Ankle Exam     Tenderness   The patient is experiencing tenderness in the lateral malleolus (Mild tenderness over distal fibula).  Swelling: mild    Range of Motion   Right ankle dorsiflexion: Limited.   Right ankle plantar flexion: Limited.     Other   Erythema: absent  Scars: absent               ___________________________________________________  STUDIES REVIEWED:  I personally reviewed the images obtained in office today and my independent interpretation is as follows:    Right ankle x-rays:  Minimally displaced spiral fracture of the right fibula in acceptable alignment without " signs of displacement.  Mortise remains intact.  Evidence of callus formation noted on lateral image.      PROCEDURES PERFORMED:    None preformed       Le Salgado PA-C

## 2025-04-30 ENCOUNTER — OFFICE VISIT (OUTPATIENT)
Dept: PHYSICAL THERAPY | Facility: REHABILITATION | Age: 75
End: 2025-04-30
Attending: ORTHOPAEDIC SURGERY
Payer: COMMERCIAL

## 2025-04-30 DIAGNOSIS — S82.441A CLOSED DISPLACED SPIRAL FRACTURE OF SHAFT OF RIGHT FIBULA, INITIAL ENCOUNTER: Primary | ICD-10-CM

## 2025-04-30 PROCEDURE — 97112 NEUROMUSCULAR REEDUCATION: CPT

## 2025-04-30 PROCEDURE — 97140 MANUAL THERAPY 1/> REGIONS: CPT

## 2025-04-30 PROCEDURE — 97110 THERAPEUTIC EXERCISES: CPT

## 2025-04-30 NOTE — PROGRESS NOTES
Daily Note     Today's date: 2025  Patient name: Shine Toro  : 1950  MRN: [id number]  Referring provider: Wayne Swift,*  Dx:   Encounter Diagnosis     ICD-10-CM    1. Closed displaced spiral fracture of shaft of right fibula  S82.441A             Start Time: 0840  Stop Time: 0933  Total time in clinic (min): 53 minutes    Subjective: Patient presents with no pain today. Ortho is concern regarding his healing via recent x-ray imaging. ROM and strength needs to be continued. CAM boot is to be removed and is WBAT.       Objective: See treatment diary below      Assessment:  Patient tolerated treatment session well today with focus on ankle mobility and initiation of weight bearing strengthening. Patient had some difficulty with weight shifting and ankle postural stability with Biodex tasks today. Patient will continue to be appropriate for skilled outpatient physical therapy in order to address impairments and return to PLOF. 1:1 with Jennifer Reece, PT, DPT for entirety of treatment session.        Plan: Continue per plan of care.      Precautions: s/p closed displaced spiral fracture of shaft of right fibula 3/2/25 (Progress to WBAT in the boot), Hx of Cancer, Glaucoma of R eye    POC expires Unit limit Auth Expiration date PT/OT + Visit Limit?   25 bomn 10/10/2025 bomn         Visit/Unit Tracking  AUTH Status:  Date      9 Auth Used 1 2 3 4      Remaining  8 7 6 5        Pertinent Findings:                                                                                             Test / Measure  25   FOTO (Predicted 63) 43   Seated Abkle AROM DF: 5 deg  PF: 15 deg  Inv: 10 deg  Ev: 10 deg   Ankle MMT 3+/5         Access Code: [email or web address]  URL: [email or web address]  Date: 2025  Prepared by: Jennifer Reece    Exercises  - Supine Bridge  - 1 x daily - 7 x weekly - 3 sets - 5 reps  - Supine Active Straight Leg Raise  - 1 x daily - 7 x weekly - 2  "sets - 10 reps  - Long Sitting Calf Stretch with Strap  - 1 x daily - 7 x weekly - 1 sets - 3-4 reps - 30 second hold  - Ankle Inversion Eversion Towel Slide  - 1-3 x daily - 7 x weekly - 2 sets - 10 reps  - Seated Toe Towel Scrunches  - 1 x daily - 7 x weekly - 2-3 sets - 10 reps  - Toe Yoga - Alternating Great Toe and Lesser Toe Extension  - 1 x daily - 7 x weekly - 2 sets - 10 reps  - Long Sitting Ankle Eversion with Resistance  - 1 x daily - 2-3 x weekly - 2-3 sets - 10 reps  - Long Sitting Ankle Plantar Flexion with Resistance  - 1 x daily - 2-3 x weekly - 2-3 sets - 10 reps  - Long Sitting Ankle Inversion with Resistance  - 1 x daily - 2-3 x weekly - 2-3 sets - 10 reps  - Long Sitting Ankle Dorsiflexion with Anchored Resistance  - 1 x daily - 2-3 x weekly - 2-3 sets - 10 reps    Manuals 4/14 4/18 4/23 4/30         R Ankle PROM  AR AR AR         R Calf Stretch  AR AR AR                                   Neuro Re-Ed             Biodex Tasks  NV WB:  2' 67%    WS:  Lat  1' 68%    LOS:  M/Stat  48\" 67%   WB:   2' 60%    WS:  LAT  1' 60%  FWD  1' 57%    LOS:  M/Stat  39\" 57%         Tandem Balance  20\"x2  ea Foam  20\"x3 Foam  20\"x3         Foam Beam Tandem/Lateral Walking    4 laps each         Standing WS on Rockerboard    20x                                   Ther Ex             HEP/Patient Education AR performed            NuStep  Seat: 10  8' L6 8' L7 10' L7         3-Way Ankle  GTB  15x ea GTB  15x ea Dc for HEP         LAQ  #4  3x10 #4  3x10          Seated Calf/Toe Raise  Gr pad  2x10 Dome  3x10 Stand  On foam   3\"  2x10           Calf Pro-Stretch  Seat  10\"x10 Seat  10\"x10 Stand  10\"x10         STS  (feet on foam)                                                    Ther Activity                                       Gait Training                                       Modalities                                            "

## 2025-05-07 ENCOUNTER — OFFICE VISIT (OUTPATIENT)
Dept: PHYSICAL THERAPY | Facility: REHABILITATION | Age: 75
End: 2025-05-07
Attending: ORTHOPAEDIC SURGERY
Payer: COMMERCIAL

## 2025-05-07 DIAGNOSIS — S82.441A CLOSED DISPLACED SPIRAL FRACTURE OF SHAFT OF RIGHT FIBULA, INITIAL ENCOUNTER: Primary | ICD-10-CM

## 2025-05-07 PROCEDURE — 97140 MANUAL THERAPY 1/> REGIONS: CPT

## 2025-05-07 PROCEDURE — 97110 THERAPEUTIC EXERCISES: CPT

## 2025-05-07 NOTE — PROGRESS NOTES
Daily Note     Today's date: 2025  Patient name: Shine Toro  : 1950  MRN: [id number]  Referring provider: Wayne Swift,*  Dx:   Encounter Diagnosis     ICD-10-CM    1. Closed displaced spiral fracture of shaft of right fibula  S82.441A           Start Time: 1545  Stop Time: 1630  Total time in clinic (min): 45 minutes    Subjective: Continues to do well. Pain and swelling isolated on lateral aspect of dorsum and malleoli.       Objective: See treatment diary below      Assessment:  Patient tolerated treatment session well today with focus on ankle mobility and ankle stabilization. Patient had difficulty with ankle stability and neuromuscular control on foam tasks. Patient will continue to be appropriate for skilled outpatient physical therapy in order to address impairments and return to PLOF. 1:1 with Jennifer Reece, PT, DPT for entirety of treatment session.        Plan: Continue per plan of care.      Precautions: s/p closed displaced spiral fracture of shaft of right fibula 3/2/25 (Progress to WBAT in the boot), Hx of Cancer, Glaucoma of R eye    POC expires Unit limit Auth Expiration date PT/OT + Visit Limit?   25 bomn 10/10/2025 bomn         Visit/Unit Tracking  AUTH Status:  Date     9 Auth Used 1 2 3 4 5     Remaining  8 7 6 5 4       Pertinent Findings:                                                                                             Test / Measure  25   FOTO (Predicted 63) 43   Seated Abkle AROM DF: 5 deg  PF: 15 deg  Inv: 10 deg  Ev: 10 deg   Ankle MMT 3+/5         Access Code: [email or web address]  URL: [email or web address]  Date: 2025  Prepared by: Jennifer Reece    Exercises  - Supine Bridge  - 1 x daily - 7 x weekly - 3 sets - 5 reps  - Supine Active Straight Leg Raise  - 1 x daily - 7 x weekly - 2 sets - 10 reps  - Long Sitting Calf Stretch with Strap  - 1 x daily - 7 x weekly - 1 sets - 3-4 reps - 30 second hold  - Ankle  "Inversion Eversion Towel Slide  - 1-3 x daily - 7 x weekly - 2 sets - 10 reps  - Seated Toe Towel Scrunches  - 1 x daily - 7 x weekly - 2-3 sets - 10 reps  - Toe Yoga - Alternating Great Toe and Lesser Toe Extension  - 1 x daily - 7 x weekly - 2 sets - 10 reps  - Long Sitting Ankle Eversion with Resistance  - 1 x daily - 2-3 x weekly - 2-3 sets - 10 reps  - Long Sitting Ankle Plantar Flexion with Resistance  - 1 x daily - 2-3 x weekly - 2-3 sets - 10 reps  - Long Sitting Ankle Inversion with Resistance  - 1 x daily - 2-3 x weekly - 2-3 sets - 10 reps  - Long Sitting Ankle Dorsiflexion with Anchored Resistance  - 1 x daily - 2-3 x weekly - 2-3 sets - 10 reps    Manuals 4/14 4/18 4/23 4/30 5/7      R Ankle PROM  AR AR AR AR      R Calf Stretch  AR AR AR AR                            Neuro Re-Ed           Biodex Tasks  NV WB:  2' 67%    WS:  Lat  1' 68%    LOS:  M/Stat  48\" 67%   WB:   2' 60%    WS:  LAT  1' 60%  FWD  1' 57%    LOS:  M/Stat  39\" 57%       Tandem Balance  20\"x2  ea Foam  20\"x3 Foam  20\"x3 Foam  20\"x3      Single Leg Balance     1 foam20\"x3  B/L        Foam Beam Tandem/Lateral Walking    4 laps each 4 laps each       Standing WS on Rockerboard    20x 20x                             Ther Ex           HEP/Patient Education AR performed          NuStep  Seat: 10  8' L6 8' L7 10' L7 8' L8      3-Way Ankle  GTB  15x ea GTB  15x ea Dc for HEP       Self-Mobilization Ankle DF Lunge with Yellow Band     2x10      LAQ  #4  3x10 #4  3x10        Seated Calf/Toe Raise  Gr pad  2x10 Dome  3x10 Stand  On foam   3\"  2x10   Stand  On foam   3\"  3x10      Calf Pro-Stretch  Seat  10\"x10 Seat  10\"x10 Stand  10\"x10 Stand  10\"x10      STS + Band around forefoot     OTB  1 foam  2x10      Leg Press                                            Ther Activity                                 Gait Training                                 Modalities                                      "

## 2025-05-12 ENCOUNTER — OFFICE VISIT (OUTPATIENT)
Dept: PHYSICAL THERAPY | Facility: REHABILITATION | Age: 75
End: 2025-05-12
Attending: ORTHOPAEDIC SURGERY
Payer: COMMERCIAL

## 2025-05-12 DIAGNOSIS — Z09 FRACTURE FOLLOW-UP: Primary | ICD-10-CM

## 2025-05-12 DIAGNOSIS — S82.441A CLOSED DISPLACED SPIRAL FRACTURE OF SHAFT OF RIGHT FIBULA, INITIAL ENCOUNTER: Primary | ICD-10-CM

## 2025-05-12 PROCEDURE — 97112 NEUROMUSCULAR REEDUCATION: CPT

## 2025-05-12 PROCEDURE — 97110 THERAPEUTIC EXERCISES: CPT

## 2025-05-12 NOTE — PROGRESS NOTES
Daily Note     Today's date: 2025  Patient name: Shine Toro  : 1950  MRN: [id number]  Referring provider: Wayne Swift,*  Dx:   Encounter Diagnosis     ICD-10-CM    1. Closed displaced spiral fracture of shaft of right fibula  S82.441A           Start Time: 1145  Stop Time: 1230  Total time in clinic (min): 45 minutes    Subjective: Continues to do well. Pain and swelling isolated on lateral aspect of dorsum and malleoli. Had soreness after doing the NS.      Objective: See treatment diary below      Assessment:  Patient tolerated treatment session well today with focus on ankle mobility and ankle stabilization. Patient had difficulty with ankle stability and neuromuscular control on foam tasks. Is improving with is ankle dorsiflexion capacity with self-mobilization technique. Patient will continue to be appropriate for skilled outpatient physical therapy in order to address impairments and return to PLOF. 1:1 with Jennifer Reece, PT, DPT for entirety of treatment session.        Plan: Continue per plan of care.      Precautions: s/p closed displaced spiral fracture of shaft of right fibula 3/2/25, Hx of Cancer, Glaucoma of R eye    POC expires Unit limit Auth Expiration date PT/OT + Visit Limit?   25 bomn 10/10/2025 bomn         Visit/Unit Tracking  AUTH Status:  Date    9 Auth Used 1 2 3 4 5 6    Remaining  8 7 6 5 4 3      Pertinent Findings:                                                                                             Test / Measure  25   FOTO (Predicted 63) 43   Seated Abkle AROM DF: 5 deg  PF: 15 deg  Inv: 10 deg  Ev: 10 deg   Ankle MMT 3+/5         Access Code: [email or web address]  URL: [email or web address]  Date: 2025  Prepared by: Jennifer Reece    Exercises  - Supine Bridge  - 1 x daily - 7 x weekly - 3 sets - 5 reps  - Supine Active Straight Leg Raise  - 1 x daily - 7 x weekly - 2 sets - 10 reps  - Long Sitting Calf  "Stretch with Strap  - 1 x daily - 7 x weekly - 1 sets - 3-4 reps - 30 second hold  - Ankle Inversion Eversion Towel Slide  - 1-3 x daily - 7 x weekly - 2 sets - 10 reps  - Seated Toe Towel Scrunches  - 1 x daily - 7 x weekly - 2-3 sets - 10 reps  - Toe Yoga - Alternating Great Toe and Lesser Toe Extension  - 1 x daily - 7 x weekly - 2 sets - 10 reps  - Long Sitting Ankle Eversion with Resistance  - 1 x daily - 2-3 x weekly - 2-3 sets - 10 reps  - Long Sitting Ankle Plantar Flexion with Resistance  - 1 x daily - 2-3 x weekly - 2-3 sets - 10 reps  - Long Sitting Ankle Inversion with Resistance  - 1 x daily - 2-3 x weekly - 2-3 sets - 10 reps  - Long Sitting Ankle Dorsiflexion with Anchored Resistance  - 1 x daily - 2-3 x weekly - 2-3 sets - 10 reps    Manuals 4/14 4/18 4/23 4/30 5/7 5/12     R Ankle PROM  AR AR AR AR      R Calf Stretch  AR AR AR AR                            Neuro Re-Ed           Biodex Tasks  NV WB:  2' 67%    WS:  Lat  1' 68%    LOS:  M/Stat  48\" 67%   WB:   2' 60%    WS:  LAT  1' 60%  FWD  1' 57%    LOS:  M/Stat  39\" 57%       Tandem Balance  20\"x2  ea Foam  20\"x3 Foam  20\"x3 Foam  20\"x3 Foam  20\"x3     Single Leg Balance     1 foam20\"x3  B/L        Foam Beam Tandem/Lateral Walking    4 laps each 4 laps each  4 laps ea     Standing WS on Rockerboard    20x 20x  Wobble  below     Wobble Board      20x                Ther Ex           HEP/Patient Education AR performed          NuStep  Seat: 10  8' L6 8' L7 10' L7 8' L8 8' L7     Self-Mobilization Ankle DF Lunge with Yellow Band     2x10 With slant board  3\"x20     LAQ  #4  3x10 #4  3x10        Seated Calf/Toe Raise  Gr pad  2x10 Dome  3x10 Stand  On foam   3\"  2x10   Stand  On foam   3\"  3x10 Stand  On foam   3\"  3x10     Calf Pro-Stretch  Seat  10\"x10 Seat  10\"x10 Stand  10\"x10 Stand  10\"x10 Stand  10\"x10     STS + Band around forefoot     OTB  1 foam  2x10 OTB  1 foam  2x10     Lateral Walking with band at forefoot      OTB  2 laps      Leg Press "      RLE  #40  2x10                           Ther Activity                                 Gait Training                                 Modalities

## 2025-05-19 ENCOUNTER — OFFICE VISIT (OUTPATIENT)
Dept: PHYSICAL THERAPY | Facility: REHABILITATION | Age: 75
End: 2025-05-19
Attending: ORTHOPAEDIC SURGERY
Payer: COMMERCIAL

## 2025-05-19 DIAGNOSIS — S82.441A CLOSED DISPLACED SPIRAL FRACTURE OF SHAFT OF RIGHT FIBULA, INITIAL ENCOUNTER: Primary | ICD-10-CM

## 2025-05-19 PROCEDURE — 97110 THERAPEUTIC EXERCISES: CPT

## 2025-05-19 PROCEDURE — 97112 NEUROMUSCULAR REEDUCATION: CPT

## 2025-05-19 NOTE — PROGRESS NOTES
Daily Note     Today's date: 2025  Patient name: Shine Toro  : 1950  MRN: [id number]  Referring provider: Wayne Swift,*  Dx:   Encounter Diagnosis     ICD-10-CM    1. Closed displaced spiral fracture of shaft of right fibula  S82.441A           Start Time: 1130  Stop Time: 1215  Total time in clinic (min): 45 minutes    Subjective: Continues to do well. Pain and swelling isolated on lateral aspect of dorsum and malleoli.       Objective: See treatment diary below      Assessment:  Patient tolerated treatment session well today with focus on ankle mobility and ankle stabilization. Patient is progressing well with ankle stability with static and dynamic balance tasks. Patient will continue to be appropriate for skilled outpatient physical therapy in order to address impairments and return to PLOF. 1:1 with Jennifer Reece, PT, DPT for entirety of treatment session.        Plan: Continue per plan of care.      Precautions: s/p closed displaced spiral fracture of shaft of right fibula 3/2/25, Hx of Cancer, Glaucoma of R eye    POC expires Unit limit Auth Expiration date PT/OT + Visit Limit?   25 bomn 10/10/2025 bomn         Visit/Unit Tracking  AUTH Status:  Date    9 Auth Used 1 2 3 4 5 6 7    Remaining  8 7 6 5 4 3 2      Pertinent Findings:                                                                                             Test / Measure  25   FOTO (Predicted 63) 43   Seated Abkle AROM DF: 5 deg  PF: 15 deg  Inv: 10 deg  Ev: 10 deg   Ankle MMT 3+/5         Access Code: [email or web address]  URL: [email or web address]  Date: 2025  Prepared by: Jennifer Reece    Exercises  - Supine Bridge  - 1 x daily - 7 x weekly - 3 sets - 5 reps  - Supine Active Straight Leg Raise  - 1 x daily - 7 x weekly - 2 sets - 10 reps  - Long Sitting Calf Stretch with Strap  - 1 x daily - 7 x weekly - 1 sets - 3-4 reps - 30 second hold  - Ankle Inversion  "Eversion Towel Slide  - 1-3 x daily - 7 x weekly - 2 sets - 10 reps  - Seated Toe Towel Scrunches  - 1 x daily - 7 x weekly - 2-3 sets - 10 reps  - Toe Yoga - Alternating Great Toe and Lesser Toe Extension  - 1 x daily - 7 x weekly - 2 sets - 10 reps  - Long Sitting Ankle Eversion with Resistance  - 1 x daily - 2-3 x weekly - 2-3 sets - 10 reps  - Long Sitting Ankle Plantar Flexion with Resistance  - 1 x daily - 2-3 x weekly - 2-3 sets - 10 reps  - Long Sitting Ankle Inversion with Resistance  - 1 x daily - 2-3 x weekly - 2-3 sets - 10 reps  - Long Sitting Ankle Dorsiflexion with Anchored Resistance  - 1 x daily - 2-3 x weekly - 2-3 sets - 10 reps    Manuals 4/14 4/18 4/23 4/30 5/7 5/12 5/19    R Ankle PROM  AR AR AR AR      R Calf Stretch  AR AR AR AR                            Neuro Re-Ed           Biodex Tasks  NV WB:  2' 67%    WS:  Lat  1' 68%    LOS:  M/Stat  48\" 67%   WB:   2' 60%    WS:  LAT  1' 60%  FWD  1' 57%    LOS:  M/Stat  39\" 57%       Tandem Balance  20\"x2  ea Foam  20\"x3 Foam  20\"x3 Foam  20\"x3 Foam  20\"x3 Foam  23\"x3    Single Leg Balance     1 foam20\"x3  B/L    1 foam  30\"x3  RLE    Foam Beam Tandem/Lateral Walking    4 laps each 4 laps each  4 laps ea 4 laps ea    Standing WS on Rockerboard    20x 20x  Wobble  below Wobble  below    Wobble Board      20x 20x               Ther Ex           HEP/Patient Education AR performed          NuStep  Seat: 10  8' L6 8' L7 10' L7 8' L8 8' L7 UB  8'     Self-Mobilization Ankle DF Lunge with Yellow Band     2x10 With slant board  3\"x20 With slant board  3\"x20    Seated Calf/Toe Raise  Gr pad  2x10 Dome  3x10 Stand  On foam   3\"  2x10   Stand  On foam   3\"  3x10 Stand  On foam   3\"  3x10 On slant board  2x10    Calf Pro-Stretch  Seat  10\"x10 Seat  10\"x10 Stand  10\"x10 Stand  10\"x10 Stand  10\"x10 Stand  10\"x10    STS + Band around forefoot     OTB  1 foam  2x10 OTB  1 foam  2x10     Lateral Walking with band at forefoot      OTB  2 laps  GTB  2   laps    Leg " Press      RLE  #40  2x10 RLE  #40  2x10                          Ther Activity                                 Gait Training                                 Modalities

## 2025-05-27 ENCOUNTER — OFFICE VISIT (OUTPATIENT)
Dept: OBGYN CLINIC | Facility: HOSPITAL | Age: 75
End: 2025-05-27
Payer: COMMERCIAL

## 2025-05-27 ENCOUNTER — HOSPITAL ENCOUNTER (OUTPATIENT)
Dept: RADIOLOGY | Facility: HOSPITAL | Age: 75
Discharge: HOME | End: 2025-05-27
Attending: ORTHOPAEDIC SURGERY
Payer: COMMERCIAL

## 2025-05-27 VITALS — BODY MASS INDEX: 28.49 KG/M2 | HEIGHT: 68 IN

## 2025-05-27 DIAGNOSIS — S82.441A CLOSED DISPLACED SPIRAL FRACTURE OF SHAFT OF RIGHT FIBULA, INITIAL ENCOUNTER: Primary | ICD-10-CM

## 2025-05-27 DIAGNOSIS — Z09 FRACTURE FOLLOW-UP: ICD-10-CM

## 2025-05-27 PROCEDURE — 99213 OFFICE O/P EST LOW 20 MIN: CPT | Performed by: ORTHOPAEDIC SURGERY

## 2025-05-27 PROCEDURE — 73610 X-RAY EXAM OF ANKLE: CPT

## 2025-05-27 NOTE — ASSESSMENT & PLAN NOTE
3 months s/p right non-displaced distal fibula spiral fracture occurring 3/2/2025.  Patient currently has no right ankle pain  Radiograph reveals continued healing at fracture site  WBAT right LE  No restrictions  Follow up in 2 months

## 2025-05-27 NOTE — PROGRESS NOTES
"Encounter Provider: Wayne Swift MD   Encounter Date: 05/27/25  Encounter department: [facility] [place]         ASSESSMENT & PLAN:  Assessment & Plan  Closed displaced spiral fracture of shaft of right fibula  3 months s/p right non-displaced distal fibula spiral fracture occurring 3/2/2025.  Patient currently has no right ankle pain  Radiograph reveals continued healing at fracture site  WBAT right LE  No restrictions  Follow up in 2 months            To do next visit:  Return in about 2 months (around 7/27/2025) for re-check with x-rays.    Scribe Attestation      I,:  Tiago Soares MA am acting as a scribe while in the presence of the attending physician.:       I,:  Wayne Swift MD personally performed the services described in this documentation    as scribed in my presence.:             ____________________________________________________  CHIEF COMPLAINT:  Chief Complaint   Patient presents with    Right Ankle - Follow-up, Fracture         SUBJECTIVE:  Shine Toro is a 75 y.o. male who presents 3 months s/p right non-displaced distal fibula spiral fracture occurring 3/2/2025.  He is currently doing well.  Today he has no right ankle pain.  He is active without repercussion.             PAST MEDICAL HISTORY:  Past Medical History[1]    PAST SURGICAL HISTORY:  Past Surgical History[2]    FAMILY HISTORY:  Family History[3]    SOCIAL HISTORY:  Social History[4]    MEDICATIONS:  Current Medications[5]    ALLERGIES:  Allergies[6]    LABS:  HgA1c:   Lab Results   Component Value Date    HGBA1C 5.1 10/15/2024     BMP:   Lab Results   Component Value Date    GLUCOSE 97 09/25/2015    CALCIUM 9.2 11/21/2024     09/25/2015    K 4.4 11/21/2024    CO2 31 11/21/2024     11/21/2024    BUN 18 11/21/2024    CREATININE 0.85 11/21/2024     CBC: No components found for: \"CBC\"    _____________________________________________________  PHYSICAL EXAMINATION:  Vital " "signs: Ht 5' 8\" (1.727 m)   BMI 28.49 kg/m²   General: No acute distress, awake and alert  Psychiatric: Mood and affect appear appropriate  HEENT: Trachea Midline, No torticollis, no apparent facial trauma  Cardiovascular: No audible murmurs; Extremities appear perfused  Pulmonary: No audible wheezing or stridor  Skin: No open lesions; see further details (if any) below    Ortho Exam:  Right ankle:  Equal calf musculature  Good eversion and inversion with no pain  Good PF and DF with no pain  Calf compartments soft and supple  Sensation intact  Toes are warm sensate and mobile      _____________________________________________________  STUDIES REVIEWED:    The attending physician has personally reviewed the pertinent films in [facility] and interpretation is as follows:  Right ankle x-ray:  Interval healing distal fibula fracture with increased callus formation.      PROCEDURES PERFORMED:  Procedures      None Preformed       Portions of the record may have been created with voice recognition software.  Occasional wrong word or \"sound a like\" substitutions may have occurred due to the inherent limitations of voice recognition software.  Read the chart carefully and recognize, using context, where substitutions have occurred.             [1]   Past Medical History:  Diagnosis Date    Abnormal weight loss     RESOLVED: 15JUN2015    Anemia     RESOLVED: 15JUN2015    Atypical chest pain     RESOLVED: 15JUN2015    BPH (benign prostatic hyperplasia)     Cancer (HCC)     DUODENAL    Luis Miguel's syndrome     Depression     RESOLVED: 25MAR2015    Disc disorder     cervical and lumbar    Diverticulitis of colon     LAST ASSESSED: 12FEB2015    Duodenal nodule     RESOLVED: 16MAR2017    Encounter for hepatitis C screening test for low risk patient 03/24/2019    Gastrointestinal stromal tumor (GIST) (HCC)     MALIGNANT; RESOLVED: 08MAR2016    GERD (gastroesophageal reflux disease)     Glaucoma     RESOLVED: 16MAR2017    History of " COVID-19 09/2021    Insomnia     RESOLVED: [id number]    Preop exam for internal medicine 01/20/2021    Preop examination 05/07/2020    Preoperative clearance 03/09/2021   [2]   Past Surgical History:  Procedure Laterality Date    BACK SURGERY      BACK SURGERY      l4 s1 surgery     CERVICAL FUSION      c4 and c5    CHOLECYSTECTOMY      COLONOSCOPY  2014    kutty    ELBOW SURGERY Left     REPAIR    EYE SURGERY      EYE SURGERY      EYE SURGERY Right 2022    right eye surgery    GANGLION CYST EXCISION Left 06/05/2020    Procedure: KNEE EXCISION BAKERS CYST;  Surgeon: Svetlana Aburto MD;  Location: [facility];  Service: Orthopedics    KNEE ARTHROSCOPY Left     LUMBAR DISCECTOMY      L5 S1    ORIF RADIAL SHAFT FRACTURE      OH ARTHROSCOPY KNEE DIAGNOSTIC W/WO SYNOVIAL BX SPX Left 12/21/2020    Procedure: KNEE ARTHROSCOPY, popliteal cyst decompression;  Surgeon: Emre Duffy MD;  Location: [facility];  Service: Orthopedics    OH CYSTO INSERTION TRANSPROSTATIC IMPLANT SINGLE N/A 12/20/2022    Procedure: CYSTOSCOPY WITH INSERTION UROLIFT WITH 6 IMPLANTS;  Surgeon: Chris Brock MD;  Location: [facility];  Service: Urology    OH ESOPHAGOGASTRODUODENOSCOPY TRANSORAL DIAGNOSTIC N/A 11/01/2016    Procedure: ESOPHAGOGASTRODUODENOSCOPY (EGD);  Surgeon: Roberto Reese MD;  Location: [facility];  Service: Gastroenterology    OH ESOPHAGOGASTRODUODENOSCOPY TRANSORAL DIAGNOSTIC N/A 05/31/2018    Procedure: ESOPHAGOGASTRODUODENOSCOPY (EGD);  Surgeon: Roberto Reese MD;  [facility]: [facility];  Service: Gastroenterology    OH LAMOT PRTL FFD EXC DISC REEXPL 1 NTRSPC LUMBAR N/A 10/24/2019    Procedure: Revision laminectomy and decompression at L5-S1;  Surgeon: Quiana Amaro MD;  Location: [facility];  Service: Orthopedics    SMALL INTESTINE SURGERY      GIST surgery    US GUIDED MSK PROCEDURE  05/21/2019    US GUIDED MSK PROCEDURE  10/30/2020   [3]   Family History  Problem Relation Name Age of Onset    Thyroid disease Mother       Thyroid disease Brother Armando Toro     Diabetes Paternal Grandmother Ashley Toro     Hypertension Family      Skin cancer Father Galo Toro     Thyroid disease Father Galo Toro     Autoimmune disease Sister Patti [place]en[place]         Lupus   [4]   Social History  Tobacco Use    Smoking status: Never    Smokeless tobacco: Never   Vaping Use    Vaping status: Never Used   Substance Use Topics    Alcohol use: Yes     Comment: Holidays( some months without consumption)    Drug use: No   [5]   Current Outpatient Medications:     calcium carbonate (TUMS) 500 mg chewable tablet, Chew 1 tablet daily When needed, Disp: , Rfl:     cholestyramine (QUESTRAN) 4 g packet, Take 1 packet (4 g total) by mouth daily, Disp: 30 each, Rfl: 3    clobetasol (TEMOVATE) 0.05 % cream, Apply topically 2 (two) times a day for 14 days (Patient not taking: Reported on 2/3/2025), Disp: 60 g, Rfl: 0    Co Q-10 50 MG, Take 200 mg by mouth, Disp: , Rfl:     famotidine (PEPCID) 40 MG tablet, TAKE 1 TABLET BY MOUTH EVERYDAY AT BEDTIME, Disp: 30 tablet, Rfl: 5    fluocinonide (LIDEX) 0.05 % cream, APPLY TO AFFECTED AREA OF SKIN TWICE A DAY AS NEEDED (Patient not taking: Reported on 4/15/2025), Disp: , Rfl:     fluticasone (FLONASE) 50 mcg/act nasal spray, 2 sprays into each nostril daily, Disp: 1 g, Rfl: 0    Misc Natural Products (OSTEO BI-FLEX JOINT SHIELD PO), Take by mouth, Disp: , Rfl:     Multiple Vitamins-Minerals (MULTIVITAL) tablet, Take 1 tablet by mouth daily Spectravite , Disp: , Rfl:     pantoprazole (PROTONIX) 40 mg tablet, TAKE 1 TABLET BY MOUTH EVERY DAY, Disp: 90 tablet, Rfl: 1    prednisoLONE acetate (PRED FORTE) 1 % ophthalmic suspension, Administer 1 drop to the right eye 2 (two) times a day, Disp: , Rfl:     rosuvastatin (CRESTOR) 5 mg tablet, TAKE 1 TABLET (5 MG TOTAL) BY MOUTH DAILY., Disp: 30 tablet, Rfl: 5    tamsulosin (FLOMAX) 0.4 mg, TAKE 1 CAPSULE BY MOUTH EVERY DAY WITH DINNER, Disp: 90 capsule, Rfl:  3  [6] No Known Allergies

## 2025-06-05 ENCOUNTER — RA CDI HCC (OUTPATIENT)
Dept: OTHER | Facility: HOSPITAL | Age: 75
End: 2025-06-05

## 2025-06-11 ENCOUNTER — OFFICE VISIT (OUTPATIENT)
Dept: INTERNAL MEDICINE CLINIC | Facility: CLINIC | Age: 75
End: 2025-06-11
Payer: COMMERCIAL

## 2025-06-11 VITALS
SYSTOLIC BLOOD PRESSURE: 132 MMHG | HEIGHT: 68 IN | TEMPERATURE: 97.8 F | HEART RATE: 81 BPM | OXYGEN SATURATION: 97 % | DIASTOLIC BLOOD PRESSURE: 60 MMHG | WEIGHT: 185.8 LBS | BODY MASS INDEX: 28.16 KG/M2

## 2025-06-11 DIAGNOSIS — R20.2 TINGLING IN EXTREMITIES: ICD-10-CM

## 2025-06-11 DIAGNOSIS — E78.5 HYPERLIPIDEMIA, UNSPECIFIED HYPERLIPIDEMIA TYPE: ICD-10-CM

## 2025-06-11 DIAGNOSIS — E66.3 OVERWEIGHT (BMI 25.0-29.9): Primary | ICD-10-CM

## 2025-06-11 DIAGNOSIS — R73.01 IFG (IMPAIRED FASTING GLUCOSE): ICD-10-CM

## 2025-06-11 DIAGNOSIS — M84.68XA PATHOLOGICAL FRACTURE IN OTHER DISEASE, OTHER SITE, INITIAL ENCOUNTER FOR FRACTURE: ICD-10-CM

## 2025-06-11 DIAGNOSIS — Z12.5 SCREENING PSA (PROSTATE SPECIFIC ANTIGEN): ICD-10-CM

## 2025-06-11 DIAGNOSIS — D69.6 THROMBOCYTOPENIA (HCC): ICD-10-CM

## 2025-06-11 DIAGNOSIS — Z13.1 SCREENING FOR DIABETES MELLITUS: ICD-10-CM

## 2025-06-11 DIAGNOSIS — M84.463A PATHOLOGICAL FRACTURE OF RIGHT FIBULA, UNSPECIFIED PATHOLOGICAL CAUSE, INITIAL ENCOUNTER: ICD-10-CM

## 2025-06-11 DIAGNOSIS — Z13.6 SCREENING FOR CARDIOVASCULAR CONDITION: ICD-10-CM

## 2025-06-11 PROCEDURE — 99214 OFFICE O/P EST MOD 30 MIN: CPT | Performed by: INTERNAL MEDICINE

## 2025-06-11 PROCEDURE — G2211 COMPLEX E/M VISIT ADD ON: HCPCS | Performed by: INTERNAL MEDICINE

## 2025-06-11 RX ORDER — ACETAMINOPHEN 500 MG
500 TABLET ORAL 3 TIMES DAILY
COMMUNITY

## 2025-06-11 NOTE — PROGRESS NOTES
"Name: Shine Toro      : 1950      MRN: [id number]  Encounter Provider: Amol Montalvo DO  Encounter Date: 2025   [facility]  :  Assessment & Plan  Overweight (BMI 25.0-29.9)           Pathological fracture of right fibula, unspecified pathological cause, initial encounter    Orders:  •  DXA bone density spine hip and pelvis; Future    Pathological fracture in other disease, other site, initial encounter for fracture    Orders:  •  DXA bone density spine hip and pelvis; Future    Tingling in extremities    Orders:  •  EMG; Future    Screening PSA (prostate specific antigen)    Orders:  •  PSA, Total Screen; Future    Screening for cardiovascular condition    Orders:  •  Comprehensive metabolic panel; Future  •  Lipid Panel with Direct LDL reflex; Future  •  CBC (Includes Diff/Plt) (Refl); Future    Screening for diabetes mellitus         IFG (impaired fasting glucose)    Orders:  •  Hemoglobin A1C; Future    Thrombocytopenia (HCC)    Orders:  •  CBC (Includes Diff/Plt) (Refl); Future           History of Present Illness   HPI diet well balanced , ok to cut the lawn wake tire dosent sleep much numbness in the right numbness like didn't have controll of the leg  Review of Systems    Objective   /60 (BP Location: Left arm, Patient Position: Sitting, Cuff Size: Standard)   Pulse 81   Temp 97.8 °F (36.6 °C) (Tympanic)   Ht 5' 8\" (1.727 m)   Wt 84.3 kg (185 lb 12.8 oz)   SpO2 97%   BMI 28.25 kg/m²      Physical Exam    "

## 2025-06-11 NOTE — PROGRESS NOTES
Name: Shine Toro      : 1950      MRN: [id number]  Encounter Provider: Amol Montalvo DO  Encounter Date: 2025   Enc[facility]: [facility]  :  Assessment & Plan  Overweight (BMI 25.0-29.9)  I have counselled the pt to follow a healthy and balanced diet ,and recommend routine exercise.         Pathological fracture of right fibula, unspecified pathological cause, initial encounter  Healing distal spiral fracture of the fibula x-ray reviewed patient is currently in a walking boot working with orthopedics will check DEXA scan fracture occurred spontaneously while walking rule out osteoporosis  Orders:  •  DXA bone density spine hip and pelvis; Future    Pathological fracture in other disease, other site, initial encounter for fracture    Orders:  •  DXA bone density spine hip and pelvis; Future    Tingling in extremities  Patient has noticed tingling right lower extremity in the region of the peroneal nerve likely secondary to recent fracture will check EMG suspect peroneal nerve neuropathy  Orders:  •  EMG; Future    Screening PSA (prostate specific antigen)    Orders:  •  PSA, Total Screen; Future    Screening for cardiovascular condition  I have counselled the pt to follow a healthy and balanced diet ,and recommend routine exercise.  Orders:  •  Comprehensive metabolic panel; Future  •  Lipid Panel with Direct LDL reflex; Future  •  CBC (Includes Diff/Plt) (Refl); Future    Screening for diabetes mellitus         IFG (impaired fasting glucose)  Pre diabetes -I have counseled the patient to follow a healthy balanced diet, I have counseled patient reduce carbohydrates and sweets in the diet, I would like the patient exercise routinely.  I will be checking hemoglobin A1c and comprehensive metabolic panel.  Have counseled patient about the prevention of diabetes, and the risk of progression to type 2 diabetes.  Orders:  •  Hemoglobin A1C; Future    Thrombocytopenia  "(HCC)  Platelet count is stable we will continue to monitor  Orders:  •  CBC (Includes Diff/Plt) (Refl); Future    Hyperlipidemia, unspecified hyperlipidemia type  Hyperlipidemia controlled continue with current medical regiment recommend a low-cholesterol diet and recommend routine exercise we will continue to monitor the progress.  Continue Crestor 5 mg once daily       RTO in 6 months call if any problems       History of Present Illness   HPI 75-year old male coming in for a follow up visit regarding overweight, pathological fracture of the distal right fibula, tingling of the right lower extremity, IFG and thrombocytopenia; the patient reports me compliant taking medications without untoward side effects the.  The patient is here to review his medical condition, update me on the medical condition and the patient reports me no hospitalizations and no ER visits.  He reports me a recent fracture of the distal right fibula he was walking with his wife the fracture occurred spontaneously and suddenly.  He reports me he did not trip or fall it because this is been working with orthopedics Dr. Swift's activity levels are increasing he has noticed some tingling of the right lower extremity medial lateral no weakness  Review of Systems   Constitutional:  Negative for activity change, appetite change and unexpected weight change.   HENT:  Negative for congestion and postnasal drip.    Eyes:  Negative for visual disturbance.   Respiratory:  Negative for cough and shortness of breath.    Cardiovascular:  Negative for chest pain.   Gastrointestinal:  Negative for abdominal pain, diarrhea, nausea and vomiting.   Neurological:  Negative for dizziness, light-headedness and headaches.   Hematological:  Negative for adenopathy.       Objective   /60 (BP Location: Left arm, Patient Position: Sitting, Cuff Size: Standard)   Pulse 81   Temp 97.8 °F (36.6 °C) (Tympanic)   Ht 5' 8\" (1.727 m)   Wt 84.3 kg (185 lb 12.8 oz)  "  SpO2 97%   BMI 28.25 kg/m²      Physical Exam  Constitutional:       Appearance: He is well-developed.   HENT:      Head: Normocephalic and atraumatic.      Right Ear: External ear normal.      Left Ear: External ear normal.      Nose: Nose normal.     Eyes:      Pupils: Pupils are equal, round, and reactive to light.       Cardiovascular:      Rate and Rhythm: Normal rate and regular rhythm.      Heart sounds: Normal heart sounds. No murmur heard.  Pulmonary:      Effort: Pulmonary effort is normal.      Breath sounds: Normal breath sounds.   Abdominal:      General: There is no distension.      Palpations: Abdomen is soft.      Tenderness: There is no abdominal tenderness. There is no guarding.     Negative edema  Administrative Statements   I have spent a total time of 30 minutes in caring for this patient on the day of the visit/encounter including Diagnostic results, Instructions for management, Risk factor reductions, Impressions, Counseling / Coordination of care, Documenting in the medical record, Reviewing/placing orders in the medical record (including tests, medications, and/or procedures), and Obtaining or reviewing history  .

## 2025-06-19 DIAGNOSIS — E78.5 HYPERLIPIDEMIA, UNSPECIFIED HYPERLIPIDEMIA TYPE: ICD-10-CM

## 2025-06-19 RX ORDER — ROSUVASTATIN CALCIUM 5 MG/1
5 TABLET, COATED ORAL DAILY
Qty: 30 TABLET | Refills: 5 | Status: SHIPPED | OUTPATIENT
Start: 2025-06-19

## 2025-06-21 DIAGNOSIS — K21.9 GASTROESOPHAGEAL REFLUX DISEASE WITHOUT ESOPHAGITIS: ICD-10-CM

## 2025-06-23 RX ORDER — FAMOTIDINE 40 MG/1
40 TABLET, FILM COATED ORAL
Qty: 30 TABLET | Refills: 3 | Status: SHIPPED | OUTPATIENT
Start: 2025-06-23

## 2025-07-01 ENCOUNTER — HOSPITAL ENCOUNTER (OUTPATIENT)
Dept: NEUROLOGY | Facility: CLINIC | Age: 75
Discharge: HOME | End: 2025-07-01
Attending: INTERNAL MEDICINE
Payer: COMMERCIAL

## 2025-07-01 DIAGNOSIS — R20.2 TINGLING IN EXTREMITIES: ICD-10-CM

## 2025-07-01 PROCEDURE — 95910 NRV CNDJ TEST 7-8 STUDIES: CPT | Performed by: PSYCHIATRY & NEUROLOGY

## 2025-07-01 PROCEDURE — 95886 MUSC TEST DONE W/N TEST COMP: CPT | Performed by: PSYCHIATRY & NEUROLOGY

## 2025-07-10 DIAGNOSIS — Z09 FRACTURE FOLLOW-UP: Primary | ICD-10-CM

## 2025-07-17 DIAGNOSIS — K21.9 GASTROESOPHAGEAL REFLUX DISEASE WITHOUT ESOPHAGITIS: ICD-10-CM

## 2025-07-17 RX ORDER — PANTOPRAZOLE SODIUM 40 MG/1
40 TABLET, DELAYED RELEASE ORAL DAILY
Qty: 90 TABLET | Refills: 1 | Status: SHIPPED | OUTPATIENT
Start: 2025-07-17

## 2025-07-29 ENCOUNTER — HOSPITAL ENCOUNTER (OUTPATIENT)
Dept: RADIOLOGY | Facility: HOSPITAL | Age: 75
Discharge: HOME | End: 2025-07-29
Attending: ORTHOPAEDIC SURGERY
Payer: COMMERCIAL

## 2025-07-29 VITALS — BODY MASS INDEX: 28.25 KG/M2 | HEIGHT: 68 IN

## 2025-07-29 DIAGNOSIS — Z09 FRACTURE FOLLOW-UP: ICD-10-CM

## 2025-07-29 DIAGNOSIS — S82.441A CLOSED DISPLACED SPIRAL FRACTURE OF SHAFT OF RIGHT FIBULA, INITIAL ENCOUNTER: Primary | ICD-10-CM

## 2025-07-29 PROCEDURE — 73610 X-RAY EXAM OF ANKLE: CPT

## 2025-07-29 PROCEDURE — 99213 OFFICE O/P EST LOW 20 MIN: CPT | Performed by: ORTHOPAEDIC SURGERY

## 2025-08-07 ENCOUNTER — TELEPHONE (OUTPATIENT)
Age: 75
End: 2025-08-07

## 2025-08-07 ENCOUNTER — CONSULT (OUTPATIENT)
Dept: PAIN MEDICINE | Facility: CLINIC | Age: 75
End: 2025-08-07
Attending: INTERNAL MEDICINE
Payer: COMMERCIAL

## 2025-08-07 VITALS — HEIGHT: 68 IN | BODY MASS INDEX: 28.19 KG/M2 | WEIGHT: 186 LBS

## 2025-08-07 DIAGNOSIS — M54.16 LUMBAR RADICULOPATHY: ICD-10-CM

## 2025-08-07 DIAGNOSIS — M48.061 SPINAL STENOSIS OF LUMBAR REGION, UNSPECIFIED WHETHER NEUROGENIC CLAUDICATION PRESENT: ICD-10-CM

## 2025-08-07 DIAGNOSIS — M54.12 CERVICAL RADICULOPATHY: Primary | ICD-10-CM

## 2025-08-07 DIAGNOSIS — M43.22 CERVICAL VERTEBRAL FUSION: ICD-10-CM

## 2025-08-07 DIAGNOSIS — M47.816 LUMBAR SPONDYLOSIS: ICD-10-CM

## 2025-08-07 PROCEDURE — 99204 OFFICE O/P NEW MOD 45 MIN: CPT | Performed by: ANESTHESIOLOGY

## 2025-08-07 PROCEDURE — G2211 COMPLEX E/M VISIT ADD ON: HCPCS | Performed by: ANESTHESIOLOGY

## 2025-08-07 RX ORDER — DORZOLAMIDE HYDROCHLORIDE AND TIMOLOL MALEATE PRESERVATIVE FREE 20; 5 MG/ML; MG/ML
1 SOLUTION/ DROPS OPHTHALMIC
COMMUNITY
Start: 2025-06-27

## 2025-08-13 ENCOUNTER — APPOINTMENT (OUTPATIENT)
Dept: LAB | Facility: CLINIC | Age: 75
End: 2025-08-13
Payer: COMMERCIAL

## 2025-08-13 DIAGNOSIS — Z13.6 SCREENING FOR CARDIOVASCULAR CONDITION: ICD-10-CM

## 2025-08-13 LAB
ALBUMIN SERPL BCG-MCNC: 4.5 G/DL (ref 3.5–5)
ALP SERPL-CCNC: 72 U/L (ref 34–104)
ALT SERPL W P-5'-P-CCNC: 29 U/L (ref 7–52)
ANION GAP SERPL CALCULATED.3IONS-SCNC: 6 MMOL/L (ref 4–13)
AST SERPL W P-5'-P-CCNC: 26 U/L (ref 13–39)
BILIRUB SERPL-MCNC: 1.14 MG/DL (ref 0.2–1)
BUN SERPL-MCNC: 16 MG/DL (ref 5–25)
CALCIUM SERPL-MCNC: 9.5 MG/DL (ref 8.4–10.2)
CHLORIDE SERPL-SCNC: 102 MMOL/L (ref 96–108)
CO2 SERPL-SCNC: 31 MMOL/L (ref 21–32)
CREAT SERPL-MCNC: 0.88 MG/DL (ref 0.6–1.3)
GFR SERPL CREATININE-BSD FRML MDRD: 85 ML/MIN/1.73SQ M
GLUCOSE SERPL-MCNC: 93 MG/DL (ref 65–140)
POTASSIUM SERPL-SCNC: 3.8 MMOL/L (ref 3.5–5.3)
PROT SERPL-MCNC: 7.3 G/DL (ref 6.4–8.4)
SODIUM SERPL-SCNC: 139 MMOL/L (ref 135–147)

## 2025-08-13 PROCEDURE — 80053 COMPREHEN METABOLIC PANEL: CPT

## 2025-08-13 PROCEDURE — 36415 COLL VENOUS BLD VENIPUNCTURE: CPT

## (undated) DEVICE — INTENDED FOR TISSUE SEPARATION, AND OTHER PROCEDURES THAT REQUIRE A SHARP SURGICAL BLADE TO PUNCTURE OR CUT.: Brand: BARD-PARKER ® CARBON RIB-BACK BLADES

## (undated) DEVICE — BANDAGE, ESMARK LF STR 6"X9' (20/CS): Brand: CYPRESS

## (undated) DEVICE — SILVER-COATED ANTIMICROBIAL BARRIER DRESSING: Brand: ACTICOAT   4" X 8"

## (undated) DEVICE — BIPOLAR SEALER 23-113-1 AQM 2.3: Brand: AQUAMANTYS™

## (undated) DEVICE — CHLORAPREP HI-LITE 26ML ORANGE

## (undated) DEVICE — PADDING CAST 6IN COTTON STRL

## (undated) DEVICE — NEEDLE 22 G X 1 1/2 SAFETY

## (undated) DEVICE — GLOVE SRG BIOGEL 7.5

## (undated) DEVICE — SUT VICRYL PLUS 1 CTB-1 36 IN VCPB947H

## (undated) DEVICE — SUT PDS II 0 CT-1 27 IN Z340H

## (undated) DEVICE — SPONGE PVP SCRUB WING STERILE

## (undated) DEVICE — INTENDED FOR TISSUE SEPARATION, AND OTHER PROCEDURES THAT REQUIRE A SHARP SURGICAL BLADE TO PUNCTURE OR CUT.: Brand: BARD-PARKER SAFETY BLADES SIZE 10, STERILE

## (undated) DEVICE — ABDOMINAL PAD: Brand: DERMACEA

## (undated) DEVICE — DRAPE SHEET X-LG

## (undated) DEVICE — DRAPE SHEET THREE QUARTER

## (undated) DEVICE — SYRINGE 20ML LL

## (undated) DEVICE — GLOVE SRG BIOGEL 8

## (undated) DEVICE — LIGHT HANDLE COVER SLEEVE DISP BLUE STELLAR

## (undated) DEVICE — PROXIMATE PLUS MD MULTI-DIRECTIONAL RELEASE SKIN STAPLERS CONTAINS 35 STAINLESS STEEL STAPLES APPROXIMATE CLOSED DIMENSIONS: 6.9MM X 3.9MM WIDE: Brand: PROXIMATE

## (undated) DEVICE — PENCIL ELECTROSURG E-Z CLEAN -0035H

## (undated) DEVICE — 3M™ STERI-DRAPE™ U-DRAPE 1015: Brand: STERI-DRAPE™

## (undated) DEVICE — CATH FOLEY 20FR 5ML 2 WAY SILICONE ELASTIMER

## (undated) DEVICE — DRAPE C-ARM X-RAY

## (undated) DEVICE — PADDING CAST 4 IN  COTTON STRL

## (undated) DEVICE — FLOSEAL HEMOSTATIC MATRIX, 5 ML: Brand: FLOSEAL

## (undated) DEVICE — GAUZE SPONGES,16 PLY: Brand: CURITY

## (undated) DEVICE — GLOVE SRG BIOGEL 8.5

## (undated) DEVICE — 3M™ IOBAN™ 2 ANTIMICROBIAL INCISE DRAPE 6650EZ: Brand: IOBAN™ 2

## (undated) DEVICE — SUT VICRYL 2-0 CT-1 27 IN J259H

## (undated) DEVICE — 3M™ STERI-STRIP™ REINFORCED ADHESIVE SKIN CLOSURES, R1547, 1/2 IN X 4 IN (12 MM X 100 MM), 6 STRIPS/ENVELOPE: Brand: 3M™ STERI-STRIP™

## (undated) DEVICE — PAD GROUNDING ADULT

## (undated) DEVICE — INTENDED FOR TISSUE SEPARATION, AND OTHER PROCEDURES THAT REQUIRE A SHARP SURGICAL BLADE TO PUNCTURE OR CUT.: Brand: BARD-PARKER SAFETY BLADES SIZE 15, STERILE

## (undated) DEVICE — BAG URINE DRAINAGE 2000ML ANTI RFLX LF

## (undated) DEVICE — CYSTO TUBING TUR Y IRRIGATION

## (undated) DEVICE — DRESSING MEPILEX AG BORDER 4 X 4 IN

## (undated) DEVICE — DRAPE LAPAROTOMY W/POUCHES

## (undated) DEVICE — BLADE SHAVER DISSECTOR 4MM 13CM COOLCUT

## (undated) DEVICE — STERILE CYSTO PACK: Brand: CARDINAL HEALTH

## (undated) DEVICE — ACE WRAP 6 IN UNSTERILE

## (undated) DEVICE — GLOVE SRG BIOGEL ECLIPSE 7.5

## (undated) DEVICE — ELECTRODE EZ CLEAN BLADE -0012

## (undated) DEVICE — CURITY NON-ADHERENT STRIPS: Brand: CURITY

## (undated) DEVICE — GLOVE INDICATOR PI UNDERGLOVE SZ 8.5 BLUE

## (undated) DEVICE — 4-PORT MANIFOLD: Brand: NEPTUNE 2

## (undated) DEVICE — SCD SEQUENTIAL COMPRESSION COMFORT SLEEVE MEDIUM KNEE LENGTH: Brand: KENDALL SCD

## (undated) DEVICE — DRAPE EQUIPMENT RF WAND

## (undated) DEVICE — SUT VICRYL PLUS 2-0 CTB-1 27 IN VCPB259H

## (undated) DEVICE — TUBING SUCTION 5MM X 12 FT

## (undated) DEVICE — SPECIMEN CONTAINER STERILE PEEL PACK

## (undated) DEVICE — COBAN 6 IN STERILE

## (undated) DEVICE — UNIVERSAL MAJOR EXTREMITY,KIT: Brand: CARDINAL HEALTH

## (undated) DEVICE — FLOSEAL HEMOSTATIC MATRIX, 10 ML: Brand: FLOSEAL

## (undated) DEVICE — TIBURON SPLIT SHEET: Brand: CONVERTORS

## (undated) DEVICE — TUBING EXTENSION 6 FT CONTIN WAVE

## (undated) DEVICE — VAPR COOLPULSE 90 ELECTRODE 90 DEGREES SUCTION WITH INTEGRATED HANDPIECE: Brand: VAPR COOLPULSE

## (undated) DEVICE — GLOVE INDICATOR PI UNDERGLOVE SZ 8 BLUE

## (undated) DEVICE — BETHLEHEM UNIVERSAL  ARTHRO PK: Brand: CARDINAL HEALTH

## (undated) DEVICE — SWABSTCK, BENZOIN TINCTURE, 1/PK, STRL: Brand: APLICARE

## (undated) DEVICE — BETHLEHEM UNIVERSAL SPINE, KIT: Brand: CARDINAL HEALTH

## (undated) DEVICE — U-DRAPE: Brand: CONVERTORS

## (undated) DEVICE — DRAPE C-ARMOUR

## (undated) DEVICE — NEEDLE 18 G X 1 1/2

## (undated) DEVICE — TOOL 14MH30 LEGEND 14CM 3MM: Brand: MIDAS REX ™

## (undated) DEVICE — SUT ETHILON 3-0 FS-1 18 IN 663G

## (undated) DEVICE — DISPOSABLE EQUIPMENT COVER: Brand: SMALL TOWEL DRAPE

## (undated) DEVICE — CUFF TOURNIQUET 30 X 4 IN QUICK CONNECT DISP 1BLA

## (undated) DEVICE — IMPERVIOUS STOCKINETTE: Brand: DEROYAL